# Patient Record
Sex: FEMALE | Race: WHITE | Employment: OTHER | URBAN - METROPOLITAN AREA
[De-identification: names, ages, dates, MRNs, and addresses within clinical notes are randomized per-mention and may not be internally consistent; named-entity substitution may affect disease eponyms.]

---

## 2024-07-12 ENCOUNTER — APPOINTMENT (OUTPATIENT)
Dept: RADIOLOGY | Facility: CLINIC | Age: 79
End: 2024-07-12
Payer: COMMERCIAL

## 2024-07-19 ENCOUNTER — HOSPITAL ENCOUNTER (OUTPATIENT)
Dept: RADIOLOGY | Facility: CLINIC | Age: 79
Discharge: HOME | End: 2024-07-19
Payer: COMMERCIAL

## 2024-07-19 VITALS — HEIGHT: 64 IN | BODY MASS INDEX: 26.46 KG/M2 | WEIGHT: 155 LBS

## 2024-07-19 DIAGNOSIS — Z12.31 ENCOUNTER FOR SCREENING MAMMOGRAM FOR MALIGNANT NEOPLASM OF BREAST: ICD-10-CM

## 2024-07-19 DIAGNOSIS — R05.3 CHRONIC COUGH: ICD-10-CM

## 2024-07-19 PROCEDURE — 71250 CT THORAX DX C-: CPT

## 2024-07-19 PROCEDURE — 77067 SCR MAMMO BI INCL CAD: CPT

## 2025-01-01 ENCOUNTER — APPOINTMENT (OUTPATIENT)
Dept: RADIOLOGY | Facility: HOSPITAL | Age: 80
End: 2025-01-01
Payer: MEDICARE

## 2025-01-01 ENCOUNTER — APPOINTMENT (OUTPATIENT)
Dept: CARDIOLOGY | Facility: HOSPITAL | Age: 80
End: 2025-01-01
Payer: MEDICARE

## 2025-01-01 ENCOUNTER — APPOINTMENT (OUTPATIENT)
Dept: GASTROENTEROLOGY | Facility: HOSPITAL | Age: 80
End: 2025-01-01
Payer: MEDICARE

## 2025-01-01 PROCEDURE — 71045 X-RAY EXAM CHEST 1 VIEW: CPT

## 2025-01-01 PROCEDURE — 93005 ELECTROCARDIOGRAM TRACING: CPT

## 2025-01-01 PROCEDURE — 74018 RADEX ABDOMEN 1 VIEW: CPT

## 2025-01-01 PROCEDURE — 71046 X-RAY EXAM CHEST 2 VIEWS: CPT

## 2025-01-01 PROCEDURE — 76770 US EXAM ABDO BACK WALL COMP: CPT

## 2025-01-01 PROCEDURE — 71275 CT ANGIOGRAPHY CHEST: CPT

## 2025-01-02 ENCOUNTER — HOSPITAL ENCOUNTER (INPATIENT)
Facility: HOSPITAL | Age: 80
End: 2025-01-02
Attending: STUDENT IN AN ORGANIZED HEALTH CARE EDUCATION/TRAINING PROGRAM | Admitting: INTERNAL MEDICINE
Payer: MEDICARE

## 2025-01-02 DIAGNOSIS — J18.9: Primary | ICD-10-CM

## 2025-01-02 DIAGNOSIS — J96.01 ACUTE RESPIRATORY FAILURE WITH HYPOXIA (MULTI): ICD-10-CM

## 2025-01-02 DIAGNOSIS — R09.02 HYPOXIA: ICD-10-CM

## 2025-01-02 DIAGNOSIS — J80 ARDS (ADULT RESPIRATORY DISTRESS SYNDROME) (MULTI): ICD-10-CM

## 2025-01-02 LAB
ALBUMIN SERPL BCP-MCNC: 3.3 G/DL (ref 3.4–5)
ALP SERPL-CCNC: 96 U/L (ref 33–136)
ALT SERPL W P-5'-P-CCNC: 27 U/L (ref 7–45)
ANION GAP SERPL CALCULATED.3IONS-SCNC: 14 MMOL/L (ref 10–20)
APPEARANCE UR: ABNORMAL
AST SERPL W P-5'-P-CCNC: 32 U/L (ref 9–39)
BACTERIA #/AREA URNS AUTO: ABNORMAL /HPF
BASOPHILS # BLD AUTO: 0.04 X10*3/UL (ref 0–0.1)
BASOPHILS NFR BLD AUTO: 0.3 %
BILIRUB SERPL-MCNC: 1.1 MG/DL (ref 0–1.2)
BILIRUB UR STRIP.AUTO-MCNC: NEGATIVE MG/DL
BUN SERPL-MCNC: 20 MG/DL (ref 6–23)
CALCIUM SERPL-MCNC: 8.8 MG/DL (ref 8.6–10.3)
CARDIAC TROPONIN I PNL SERPL HS: 13 NG/L (ref 0–13)
CARDIAC TROPONIN I PNL SERPL HS: 16 NG/L (ref 0–13)
CHLORIDE SERPL-SCNC: 99 MMOL/L (ref 98–107)
CO2 SERPL-SCNC: 24 MMOL/L (ref 21–32)
COLOR UR: YELLOW
CREAT SERPL-MCNC: 0.92 MG/DL (ref 0.5–1.05)
EGFRCR SERPLBLD CKD-EPI 2021: 63 ML/MIN/1.73M*2
EOSINOPHIL # BLD AUTO: 0.06 X10*3/UL (ref 0–0.4)
EOSINOPHIL NFR BLD AUTO: 0.4 %
ERYTHROCYTE [DISTWIDTH] IN BLOOD BY AUTOMATED COUNT: 11.8 % (ref 11.5–14.5)
FLUAV RNA RESP QL NAA+PROBE: NOT DETECTED
FLUBV RNA RESP QL NAA+PROBE: NOT DETECTED
GLUCOSE SERPL-MCNC: 121 MG/DL (ref 74–99)
GLUCOSE UR STRIP.AUTO-MCNC: NORMAL MG/DL
HCT VFR BLD AUTO: 32.2 % (ref 36–46)
HGB BLD-MCNC: 11 G/DL (ref 12–16)
IMM GRANULOCYTES # BLD AUTO: 0.06 X10*3/UL (ref 0–0.5)
IMM GRANULOCYTES NFR BLD AUTO: 0.4 % (ref 0–0.9)
KETONES UR STRIP.AUTO-MCNC: ABNORMAL MG/DL
LACTATE SERPL-SCNC: 1.8 MMOL/L (ref 0.4–2)
LACTATE SERPL-SCNC: 2.2 MMOL/L (ref 0.4–2)
LEUKOCYTE ESTERASE UR QL STRIP.AUTO: ABNORMAL
LYMPHOCYTES # BLD AUTO: 0.66 X10*3/UL (ref 0.8–3)
LYMPHOCYTES NFR BLD AUTO: 4.4 %
MCH RBC QN AUTO: 30.5 PG (ref 26–34)
MCHC RBC AUTO-ENTMCNC: 34.2 G/DL (ref 32–36)
MCV RBC AUTO: 89 FL (ref 80–100)
MONOCYTES # BLD AUTO: 0.91 X10*3/UL (ref 0.05–0.8)
MONOCYTES NFR BLD AUTO: 6.1 %
MUCOUS THREADS #/AREA URNS AUTO: ABNORMAL /LPF
NEUTROPHILS # BLD AUTO: 13.25 X10*3/UL (ref 1.6–5.5)
NEUTROPHILS NFR BLD AUTO: 88.4 %
NITRITE UR QL STRIP.AUTO: NEGATIVE
NRBC BLD-RTO: 0 /100 WBCS (ref 0–0)
PH UR STRIP.AUTO: 7 [PH]
PLATELET # BLD AUTO: 536 X10*3/UL (ref 150–450)
POTASSIUM SERPL-SCNC: 3.2 MMOL/L (ref 3.5–5.3)
PROT SERPL-MCNC: 7 G/DL (ref 6.4–8.2)
PROT UR STRIP.AUTO-MCNC: ABNORMAL MG/DL
RBC # BLD AUTO: 3.61 X10*6/UL (ref 4–5.2)
RBC # UR STRIP.AUTO: ABNORMAL /UL
RBC #/AREA URNS AUTO: >20 /HPF
RSV RNA RESP QL NAA+PROBE: NOT DETECTED
SARS-COV-2 RNA RESP QL NAA+PROBE: NOT DETECTED
SODIUM SERPL-SCNC: 134 MMOL/L (ref 136–145)
SP GR UR STRIP.AUTO: 1.02
UROBILINOGEN UR STRIP.AUTO-MCNC: ABNORMAL MG/DL
WBC # BLD AUTO: 15 X10*3/UL (ref 4.4–11.3)
WBC #/AREA URNS AUTO: ABNORMAL /HPF
YEAST BUDDING #/AREA UR COMP ASSIST: PRESENT /HPF

## 2025-01-02 PROCEDURE — 86738 MYCOPLASMA ANTIBODY: CPT | Performed by: INTERNAL MEDICINE

## 2025-01-02 PROCEDURE — 81001 URINALYSIS AUTO W/SCOPE: CPT | Performed by: STUDENT IN AN ORGANIZED HEALTH CARE EDUCATION/TRAINING PROGRAM

## 2025-01-02 PROCEDURE — 87075 CULTR BACTERIA EXCEPT BLOOD: CPT | Mod: WESLAB | Performed by: STUDENT IN AN ORGANIZED HEALTH CARE EDUCATION/TRAINING PROGRAM

## 2025-01-02 PROCEDURE — 84484 ASSAY OF TROPONIN QUANT: CPT | Performed by: STUDENT IN AN ORGANIZED HEALTH CARE EDUCATION/TRAINING PROGRAM

## 2025-01-02 PROCEDURE — 83605 ASSAY OF LACTIC ACID: CPT | Performed by: STUDENT IN AN ORGANIZED HEALTH CARE EDUCATION/TRAINING PROGRAM

## 2025-01-02 PROCEDURE — 87637 SARSCOV2&INF A&B&RSV AMP PRB: CPT | Performed by: STUDENT IN AN ORGANIZED HEALTH CARE EDUCATION/TRAINING PROGRAM

## 2025-01-02 PROCEDURE — 96365 THER/PROPH/DIAG IV INF INIT: CPT

## 2025-01-02 PROCEDURE — 71046 X-RAY EXAM CHEST 2 VIEWS: CPT | Performed by: RADIOLOGY

## 2025-01-02 PROCEDURE — 2500000001 HC RX 250 WO HCPCS SELF ADMINISTERED DRUGS (ALT 637 FOR MEDICARE OP): Performed by: INTERNAL MEDICINE

## 2025-01-02 PROCEDURE — 99223 1ST HOSP IP/OBS HIGH 75: CPT | Performed by: INTERNAL MEDICINE

## 2025-01-02 PROCEDURE — 93010 ELECTROCARDIOGRAM REPORT: CPT | Performed by: INTERNAL MEDICINE

## 2025-01-02 PROCEDURE — 99285 EMERGENCY DEPT VISIT HI MDM: CPT | Mod: 25 | Performed by: STUDENT IN AN ORGANIZED HEALTH CARE EDUCATION/TRAINING PROGRAM

## 2025-01-02 PROCEDURE — 87081 CULTURE SCREEN ONLY: CPT | Mod: WESLAB | Performed by: INTERNAL MEDICINE

## 2025-01-02 PROCEDURE — 2500000002 HC RX 250 W HCPCS SELF ADMINISTERED DRUGS (ALT 637 FOR MEDICARE OP, ALT 636 FOR OP/ED): Performed by: INTERNAL MEDICINE

## 2025-01-02 PROCEDURE — 85025 COMPLETE CBC W/AUTO DIFF WBC: CPT | Performed by: STUDENT IN AN ORGANIZED HEALTH CARE EDUCATION/TRAINING PROGRAM

## 2025-01-02 PROCEDURE — 36415 COLL VENOUS BLD VENIPUNCTURE: CPT | Performed by: STUDENT IN AN ORGANIZED HEALTH CARE EDUCATION/TRAINING PROGRAM

## 2025-01-02 PROCEDURE — 96361 HYDRATE IV INFUSION ADD-ON: CPT

## 2025-01-02 PROCEDURE — 2500000001 HC RX 250 WO HCPCS SELF ADMINISTERED DRUGS (ALT 637 FOR MEDICARE OP): Performed by: STUDENT IN AN ORGANIZED HEALTH CARE EDUCATION/TRAINING PROGRAM

## 2025-01-02 PROCEDURE — 87086 URINE CULTURE/COLONY COUNT: CPT | Mod: WESLAB | Performed by: STUDENT IN AN ORGANIZED HEALTH CARE EDUCATION/TRAINING PROGRAM

## 2025-01-02 PROCEDURE — 94640 AIRWAY INHALATION TREATMENT: CPT

## 2025-01-02 PROCEDURE — 2500000004 HC RX 250 GENERAL PHARMACY W/ HCPCS (ALT 636 FOR OP/ED): Performed by: STUDENT IN AN ORGANIZED HEALTH CARE EDUCATION/TRAINING PROGRAM

## 2025-01-02 PROCEDURE — 1200000002 HC GENERAL ROOM WITH TELEMETRY DAILY

## 2025-01-02 PROCEDURE — 96367 TX/PROPH/DG ADDL SEQ IV INF: CPT

## 2025-01-02 PROCEDURE — 80053 COMPREHEN METABOLIC PANEL: CPT | Performed by: STUDENT IN AN ORGANIZED HEALTH CARE EDUCATION/TRAINING PROGRAM

## 2025-01-02 RX ORDER — FLUOXETINE HYDROCHLORIDE 40 MG/1
40 CAPSULE ORAL DAILY
Status: DISCONTINUED | OUTPATIENT
Start: 2025-01-02 | End: 2025-01-14

## 2025-01-02 RX ORDER — ENOXAPARIN SODIUM 100 MG/ML
40 INJECTION SUBCUTANEOUS EVERY 24 HOURS
Status: DISCONTINUED | OUTPATIENT
Start: 2025-01-02 | End: 2025-01-06

## 2025-01-02 RX ORDER — SIMVASTATIN 20 MG/1
20 TABLET, FILM COATED ORAL NIGHTLY
Status: DISCONTINUED | OUTPATIENT
Start: 2025-01-02 | End: 2025-01-14

## 2025-01-02 RX ORDER — CEFTRIAXONE 1 G/50ML
1 INJECTION, SOLUTION INTRAVENOUS EVERY 24 HOURS
Status: DISCONTINUED | OUTPATIENT
Start: 2025-01-03 | End: 2025-01-05

## 2025-01-02 RX ORDER — POTASSIUM CHLORIDE 20 MEQ/1
40 TABLET, EXTENDED RELEASE ORAL ONCE
Status: COMPLETED | OUTPATIENT
Start: 2025-01-02 | End: 2025-01-02

## 2025-01-02 RX ORDER — CEFTRIAXONE 2 G/50ML
2 INJECTION, SOLUTION INTRAVENOUS ONCE
Status: COMPLETED | OUTPATIENT
Start: 2025-01-02 | End: 2025-01-02

## 2025-01-02 RX ORDER — SIMVASTATIN 20 MG/1
1 TABLET, FILM COATED ORAL NIGHTLY
COMMUNITY
Start: 2024-12-09

## 2025-01-02 RX ORDER — FLUOXETINE HYDROCHLORIDE 40 MG/1
40 CAPSULE ORAL DAILY
COMMUNITY

## 2025-01-02 RX ORDER — POLYETHYLENE GLYCOL 3350 17 G/17G
17 POWDER, FOR SOLUTION ORAL DAILY PRN
Status: DISCONTINUED | OUTPATIENT
Start: 2025-01-02 | End: 2025-01-14

## 2025-01-02 RX ORDER — LEVOTHYROXINE SODIUM 50 UG/1
50 TABLET ORAL DAILY
Status: DISCONTINUED | OUTPATIENT
Start: 2025-01-03 | End: 2025-01-07

## 2025-01-02 RX ORDER — ACETAMINOPHEN 325 MG/1
650 TABLET ORAL ONCE
Status: COMPLETED | OUTPATIENT
Start: 2025-01-02 | End: 2025-01-02

## 2025-01-02 RX ORDER — IPRATROPIUM BROMIDE AND ALBUTEROL SULFATE 2.5; .5 MG/3ML; MG/3ML
3 SOLUTION RESPIRATORY (INHALATION) EVERY 6 HOURS PRN
Status: DISCONTINUED | OUTPATIENT
Start: 2025-01-02 | End: 2025-01-04

## 2025-01-02 RX ORDER — GABAPENTIN 400 MG/1
400 CAPSULE ORAL 2 TIMES DAILY
Status: DISCONTINUED | OUTPATIENT
Start: 2025-01-02 | End: 2025-01-05

## 2025-01-02 RX ORDER — LEVOTHYROXINE SODIUM 50 UG/1
50 TABLET ORAL DAILY
COMMUNITY

## 2025-01-02 RX ORDER — ACETAMINOPHEN 325 MG/1
650 TABLET ORAL EVERY 6 HOURS PRN
Status: DISCONTINUED | OUTPATIENT
Start: 2025-01-02 | End: 2025-01-14

## 2025-01-02 RX ORDER — GABAPENTIN 400 MG/1
400 CAPSULE ORAL 2 TIMES DAILY
COMMUNITY

## 2025-01-02 RX ORDER — ALBUTEROL SULFATE 90 UG/1
2 INHALANT RESPIRATORY (INHALATION) EVERY 6 HOURS PRN
Status: DISCONTINUED | OUTPATIENT
Start: 2025-01-02 | End: 2025-01-02 | Stop reason: SDUPTHER

## 2025-01-02 RX ADMIN — IPRATROPIUM BROMIDE AND ALBUTEROL SULFATE 3 ML: 2.5; .5 SOLUTION RESPIRATORY (INHALATION) at 23:21

## 2025-01-02 RX ADMIN — ACETAMINOPHEN 650 MG: 325 TABLET ORAL at 14:37

## 2025-01-02 RX ADMIN — CEFTRIAXONE SODIUM 2 G: 2 INJECTION, SOLUTION INTRAVENOUS at 15:51

## 2025-01-02 RX ADMIN — FLUOXETINE HYDROCHLORIDE 40 MG: 40 CAPSULE ORAL at 21:11

## 2025-01-02 RX ADMIN — DEXTROSE MONOHYDRATE 500 MG: 50 INJECTION, SOLUTION INTRAVENOUS at 16:12

## 2025-01-02 RX ADMIN — ACETAMINOPHEN 650 MG: 325 TABLET ORAL at 23:11

## 2025-01-02 RX ADMIN — POTASSIUM CHLORIDE 40 MEQ: 1500 TABLET, EXTENDED RELEASE ORAL at 23:11

## 2025-01-02 RX ADMIN — GABAPENTIN 400 MG: 400 CAPSULE ORAL at 21:11

## 2025-01-02 RX ADMIN — SODIUM CHLORIDE 1000 ML: 900 INJECTION, SOLUTION INTRAVENOUS at 14:36

## 2025-01-02 RX ADMIN — SIMVASTATIN 20 MG: 20 TABLET, FILM COATED ORAL at 21:11

## 2025-01-02 SDOH — HEALTH STABILITY: PHYSICAL HEALTH
HOW OFTEN DO YOU NEED TO HAVE SOMEONE HELP YOU WHEN YOU READ INSTRUCTIONS, PAMPHLETS, OR OTHER WRITTEN MATERIAL FROM YOUR DOCTOR OR PHARMACY?: RARELY

## 2025-01-02 SDOH — ECONOMIC STABILITY: HOUSING INSECURITY: AT ANY TIME IN THE PAST 12 MONTHS, WERE YOU HOMELESS OR LIVING IN A SHELTER (INCLUDING NOW)?: NO

## 2025-01-02 SDOH — ECONOMIC STABILITY: FOOD INSECURITY: WITHIN THE PAST 12 MONTHS, THE FOOD YOU BOUGHT JUST DIDN'T LAST AND YOU DIDN'T HAVE MONEY TO GET MORE.: NEVER TRUE

## 2025-01-02 SDOH — ECONOMIC STABILITY: HOUSING INSECURITY: IN THE LAST 12 MONTHS, WAS THERE A TIME WHEN YOU WERE NOT ABLE TO PAY THE MORTGAGE OR RENT ON TIME?: NO

## 2025-01-02 SDOH — ECONOMIC STABILITY: FOOD INSECURITY: WITHIN THE PAST 12 MONTHS, YOU WORRIED THAT YOUR FOOD WOULD RUN OUT BEFORE YOU GOT THE MONEY TO BUY MORE.: NEVER TRUE

## 2025-01-02 SDOH — ECONOMIC STABILITY: FOOD INSECURITY: HOW HARD IS IT FOR YOU TO PAY FOR THE VERY BASICS LIKE FOOD, HOUSING, MEDICAL CARE, AND HEATING?: NOT HARD AT ALL

## 2025-01-02 SDOH — SOCIAL STABILITY: SOCIAL NETWORK: HOW OFTEN DO YOU ATTEND MEETINGS OF THE CLUBS OR ORGANIZATIONS YOU BELONG TO?: NEVER

## 2025-01-02 SDOH — SOCIAL STABILITY: SOCIAL INSECURITY: WITHIN THE LAST YEAR, HAVE YOU BEEN AFRAID OF YOUR PARTNER OR EX-PARTNER?: NO

## 2025-01-02 SDOH — SOCIAL STABILITY: SOCIAL NETWORK
DO YOU BELONG TO ANY CLUBS OR ORGANIZATIONS SUCH AS CHURCH GROUPS, UNIONS, FRATERNAL OR ATHLETIC GROUPS, OR SCHOOL GROUPS?: NO

## 2025-01-02 SDOH — HEALTH STABILITY: MENTAL HEALTH
DO YOU FEEL STRESS - TENSE, RESTLESS, NERVOUS, OR ANXIOUS, OR UNABLE TO SLEEP AT NIGHT BECAUSE YOUR MIND IS TROUBLED ALL THE TIME - THESE DAYS?: NOT AT ALL

## 2025-01-02 SDOH — SOCIAL STABILITY: SOCIAL INSECURITY
WITHIN THE LAST YEAR, HAVE YOU BEEN KICKED, HIT, SLAPPED, OR OTHERWISE PHYSICALLY HURT BY YOUR PARTNER OR EX-PARTNER?: NO

## 2025-01-02 SDOH — SOCIAL STABILITY: SOCIAL INSECURITY: WERE YOU ABLE TO COMPLETE ALL THE BEHAVIORAL HEALTH SCREENINGS?: YES

## 2025-01-02 SDOH — HEALTH STABILITY: PHYSICAL HEALTH: ON AVERAGE, HOW MANY MINUTES DO YOU ENGAGE IN EXERCISE AT THIS LEVEL?: 0 MIN

## 2025-01-02 SDOH — HEALTH STABILITY: MENTAL HEALTH: EXPERIENCED ANY OF THE FOLLOWING LIFE EVENTS: OTHER (COMMENT)

## 2025-01-02 SDOH — ECONOMIC STABILITY: INCOME INSECURITY: IN THE PAST 12 MONTHS HAS THE ELECTRIC, GAS, OIL, OR WATER COMPANY THREATENED TO SHUT OFF SERVICES IN YOUR HOME?: NO

## 2025-01-02 SDOH — SOCIAL STABILITY: SOCIAL INSECURITY: ARE YOU OR HAVE YOU BEEN THREATENED OR ABUSED PHYSICALLY, EMOTIONALLY, OR SEXUALLY BY ANYONE?: NO

## 2025-01-02 SDOH — SOCIAL STABILITY: SOCIAL INSECURITY: ABUSE: ADULT

## 2025-01-02 SDOH — SOCIAL STABILITY: SOCIAL INSECURITY: ARE YOU MARRIED, WIDOWED, DIVORCED, SEPARATED, NEVER MARRIED, OR LIVING WITH A PARTNER?: WIDOWED

## 2025-01-02 SDOH — ECONOMIC STABILITY: TRANSPORTATION INSECURITY: IN THE PAST 12 MONTHS, HAS LACK OF TRANSPORTATION KEPT YOU FROM MEDICAL APPOINTMENTS OR FROM GETTING MEDICATIONS?: NO

## 2025-01-02 SDOH — SOCIAL STABILITY: SOCIAL NETWORK
IN A TYPICAL WEEK, HOW MANY TIMES DO YOU TALK ON THE PHONE WITH FAMILY, FRIENDS, OR NEIGHBORS?: MORE THAN THREE TIMES A WEEK

## 2025-01-02 SDOH — SOCIAL STABILITY: SOCIAL INSECURITY: POSSIBLE ABUSE REPORTED TO:: OTHER (COMMENT)

## 2025-01-02 SDOH — SOCIAL STABILITY: SOCIAL INSECURITY: HAS ANYONE EVER THREATENED TO HURT YOUR FAMILY OR YOUR PETS?: NO

## 2025-01-02 SDOH — HEALTH STABILITY: PHYSICAL HEALTH: ON AVERAGE, HOW MANY DAYS PER WEEK DO YOU ENGAGE IN MODERATE TO STRENUOUS EXERCISE (LIKE A BRISK WALK)?: 0 DAYS

## 2025-01-02 SDOH — SOCIAL STABILITY: SOCIAL INSECURITY: HAVE YOU HAD ANY THOUGHTS OF HARMING ANYONE ELSE?: NO

## 2025-01-02 SDOH — SOCIAL STABILITY: SOCIAL INSECURITY: DO YOU FEEL ANYONE HAS EXPLOITED OR TAKEN ADVANTAGE OF YOU FINANCIALLY OR OF YOUR PERSONAL PROPERTY?: NO

## 2025-01-02 SDOH — SOCIAL STABILITY: SOCIAL INSECURITY: DO YOU FEEL UNSAFE GOING BACK TO THE PLACE WHERE YOU ARE LIVING?: NO

## 2025-01-02 SDOH — SOCIAL STABILITY: SOCIAL INSECURITY
WITHIN THE LAST YEAR, HAVE YOU BEEN RAPED OR FORCED TO HAVE ANY KIND OF SEXUAL ACTIVITY BY YOUR PARTNER OR EX-PARTNER?: NO

## 2025-01-02 SDOH — SOCIAL STABILITY: SOCIAL INSECURITY: WITHIN THE LAST YEAR, HAVE YOU BEEN HUMILIATED OR EMOTIONALLY ABUSED IN OTHER WAYS BY YOUR PARTNER OR EX-PARTNER?: NO

## 2025-01-02 SDOH — SOCIAL STABILITY: SOCIAL INSECURITY: HAVE YOU HAD THOUGHTS OF HARMING ANYONE ELSE?: NO

## 2025-01-02 SDOH — SOCIAL STABILITY: SOCIAL INSECURITY: DOES ANYONE TRY TO KEEP YOU FROM HAVING/CONTACTING OTHER FRIENDS OR DOING THINGS OUTSIDE YOUR HOME?: NO

## 2025-01-02 SDOH — SOCIAL STABILITY: SOCIAL INSECURITY: ARE THERE ANY APPARENT SIGNS OF INJURIES/BEHAVIORS THAT COULD BE RELATED TO ABUSE/NEGLECT?: NO

## 2025-01-02 SDOH — SOCIAL STABILITY: SOCIAL NETWORK: HOW OFTEN DO YOU GET TOGETHER WITH FRIENDS OR RELATIVES?: THREE TIMES A WEEK

## 2025-01-02 SDOH — ECONOMIC STABILITY: HOUSING INSECURITY: IN THE PAST 12 MONTHS, HOW MANY TIMES HAVE YOU MOVED WHERE YOU WERE LIVING?: 1

## 2025-01-02 ASSESSMENT — COGNITIVE AND FUNCTIONAL STATUS - GENERAL
PATIENT BASELINE BEDBOUND: NO
DAILY ACTIVITIY SCORE: 24
CLIMB 3 TO 5 STEPS WITH RAILING: A LITTLE
MOBILITY SCORE: 23

## 2025-01-02 ASSESSMENT — ACTIVITIES OF DAILY LIVING (ADL)
JUDGMENT_ADEQUATE_SAFELY_COMPLETE_DAILY_ACTIVITIES: YES
ADEQUATE_TO_COMPLETE_ADL: YES
GROOMING: INDEPENDENT
HEARING - RIGHT EAR: FUNCTIONAL
LACK_OF_TRANSPORTATION: NO
LACK_OF_TRANSPORTATION: NO
PATIENT'S MEMORY ADEQUATE TO SAFELY COMPLETE DAILY ACTIVITIES?: YES
WALKS IN HOME: INDEPENDENT
TOILETING: INDEPENDENT
BATHING: INDEPENDENT
HEARING - LEFT EAR: FUNCTIONAL
FEEDING YOURSELF: INDEPENDENT
DRESSING YOURSELF: INDEPENDENT

## 2025-01-02 ASSESSMENT — LIFESTYLE VARIABLES
AUDIT-C TOTAL SCORE: 2
SKIP TO QUESTIONS 9-10: 0
HOW OFTEN DO YOU HAVE 6 OR MORE DRINKS ON ONE OCCASION: LESS THAN MONTHLY
PRESCIPTION_ABUSE_PAST_12_MONTHS: NO
HOW OFTEN DO YOU HAVE A DRINK CONTAINING ALCOHOL: MONTHLY OR LESS
SUBSTANCE_ABUSE_PAST_12_MONTHS: NO
AUDIT-C TOTAL SCORE: 2
HOW MANY STANDARD DRINKS CONTAINING ALCOHOL DO YOU HAVE ON A TYPICAL DAY: 1 OR 2

## 2025-01-02 ASSESSMENT — PAIN DESCRIPTION - LOCATION
LOCATION: HEAD
LOCATION: CHEST

## 2025-01-02 ASSESSMENT — ENCOUNTER SYMPTOMS
HEMATOLOGIC/LYMPHATIC NEGATIVE: 1
VOMITING: 1
PSYCHIATRIC NEGATIVE: 1
CHILLS: 1
NAUSEA: 1
MUSCULOSKELETAL NEGATIVE: 1
FEVER: 1
CARDIOVASCULAR NEGATIVE: 1
ALLERGIC/IMMUNOLOGIC NEGATIVE: 1
ENDOCRINE NEGATIVE: 1
NEUROLOGICAL NEGATIVE: 1
EYES NEGATIVE: 1
SHORTNESS OF BREATH: 1
COUGH: 1

## 2025-01-02 ASSESSMENT — PAIN SCALES - WONG BAKER: WONGBAKER_NUMERICALRESPONSE: HURTS LITTLE BIT

## 2025-01-02 ASSESSMENT — PATIENT HEALTH QUESTIONNAIRE - PHQ9
2. FEELING DOWN, DEPRESSED OR HOPELESS: NOT AT ALL
SUM OF ALL RESPONSES TO PHQ9 QUESTIONS 1 & 2: 0
1. LITTLE INTEREST OR PLEASURE IN DOING THINGS: NOT AT ALL

## 2025-01-02 ASSESSMENT — PAIN SCALES - GENERAL
PAINLEVEL_OUTOF10: 5 - MODERATE PAIN
PAINLEVEL_OUTOF10: 4
PAINLEVEL_OUTOF10: 0 - NO PAIN

## 2025-01-02 ASSESSMENT — PAIN DESCRIPTION - DESCRIPTORS: DESCRIPTORS: ACHING;HEADACHE

## 2025-01-02 ASSESSMENT — PAIN - FUNCTIONAL ASSESSMENT
PAIN_FUNCTIONAL_ASSESSMENT: 0-10
PAIN_FUNCTIONAL_ASSESSMENT: 0-10

## 2025-01-02 ASSESSMENT — PAIN DESCRIPTION - PAIN TYPE: TYPE: ACUTE PAIN

## 2025-01-02 NOTE — ED PROVIDER NOTES
HPI   Chief Complaint   Patient presents with    Shortness of Breath       This is a 79-year-old female with a past medical history of arthritis, hyperlipidemia, hypothyroidism presenting to the ED for evaluation of upper respiratory symptoms for about 9 to 10 days.  Symptoms started on December 24, and have waxed and waned. She admits productive cough, fevers, shortness of breath, and has some chest pain when she coughs.  No chest pain at rest or with ambulation.  She denies any swelling in the legs, any abdominal pain, vomiting or diarrhea.  She has been taking cough medicine at home with minimal relief. She says that she would not of come to the ER but she has been able to get into see her primary doctor because the holidays so she came here due to persistent symptoms.      History provided by:  Patient   used: No            Patient History   No past medical history on file.  Past Surgical History:   Procedure Laterality Date    BREAST BIOPSY Right     core biopsy 20 years ago    HYSTERECTOMY      MR HEAD ANGIO WO IV CONTRAST  07/14/2018    MR HEAD ANGIO WO IV CONTRAST [facility]     No family history on file.  Social History     Tobacco Use    Smoking status: Not on file    Smokeless tobacco: Not on file   Substance Use Topics    Alcohol use: Not on file    Drug use: Not on file       Physical Exam   ED Triage Vitals [01/02/25 1418]   Temperature Heart Rate Respirations BP   (!) 38.5 °C (101.3 °F) 93 17 153/70      Pulse Ox Temp Source Heart Rate Source Patient Position   97 % Oral Monitor Sitting      BP Location FiO2 (%)     Left arm --       Physical Exam  General: well developed, well nourished 79-year-old female who is awake and alert, in no apparent distress  Eyes: sclera clear bilaterally, PERRL, EOMI  HENT: normocephalic, atraumatic. Pharynx without erythema or exudates, uvula midline.  CV: regular rate and rhythm, no murmur, no gallops, or rubs. radial and dorsalis pedis  pulses +2/4 bilaterally  Resp: clear to ascultation bilaterally, no wheezes, rales, or rhonchi  GI: abdomen soft, nontender without rigidity or guarding, no peritoneal signs, abdomen is nondistended, no masses palpated  MSK: no swelling of the extremities.  Psych: appropriate mood and affect, cooperative with exam  Skin: warm, dry, without evidence of rash or abrasions    ED Course & MDM   ED Course as of 01/04/25 1523   Thu Jan 02, 2025   1442 EKG my interpretation shows normal sinus rhythm, rate of 94 bpm.  Normal axis.  QTc 582 ms, AL interval 130.  No ST elevation or depression, no acute ischemic pattern.  No STEMI. [NT]      ED Course User Index  [NT] Denver Phipps DO         Diagnoses as of 01/04/25 1523   Community acquired pneumonia of both upper lobes                 No data recorded     Jasmyn Coma Scale Score: 15 (01/02/25 1419 : Rocio Bailey RN)                           Medical Decision Making  The patient is in no acute distress in the emergency department, she is febrile, heart rate 93.  Mildly hypertensive.  She is not tachypneic, not hypoxic.  Pulse ox 97%.  Given her elevated heart rate and temperature, concern for possible infection whether viral or bacterial did order an infectious workup, sepsis evaluation with lactate and blood cultures.  She was given 1 L of IV fluids, I did order Tylenol for treatment of her fever.  No indication for immediate 30 cc/KG bolus, there is no evidence that the patient is in septic shock.  Cardiac enzymes ordered given her report of chest pain as well.  Patient's cardiopulmonary exam is unremarkable, she is not wheezing, there are no rhonchi present, no murmur.  No peripheral edema, exam not suggestive of CHF.  She has no history of heart failure or CAD that she is aware of.  Presentation more suspicious for respiratory infection or pneumonia.    She was found to have extensive patchy bilateral infiltrates in the upper lobes suspicious for pneumonia.   She is febrile with a leukocytosis, rate above 90.  Lactate 2.2 on arrival. Treated with rocephin and azithromycin. On reassessment the patient does feel better after treatment of her fever, however she has reportedly been on Augmentin with no improvement of her symptoms.  She also has an elevated curb 65 score estimating increased risk for mortality from pneumonia.  She was admitted to the hospital for further medical management.    XR chest 2 views   Final Result   Extensive patchy bilateral infiltrates most marked within the upper   lobes bilaterally. Findings are suspicious for pneumonia. Follow-up   to assure complete clearing is suggested.        MACRO:   none        Signed by: Jaxon Morales 1/2/2025 3:43 PM   Dictation workstation:   [id number]      FL modified barium swallow study    (Results Pending)     Labs Reviewed   CBC WITH AUTO DIFFERENTIAL - Abnormal       Result Value    WBC 15.0 (*)     nRBC 0.0      RBC 3.61 (*)     Hemoglobin 11.0 (*)     Hematocrit 32.2 (*)     MCV 89      MCH 30.5      MCHC 34.2      RDW 11.8      Platelets 536 (*)     Neutrophils % 88.4      Immature Granulocytes %, Automated 0.4      Lymphocytes % 4.4      Monocytes % 6.1      Eosinophils % 0.4      Basophils % 0.3      Neutrophils Absolute 13.25 (*)     Immature Granulocytes Absolute, Automated 0.06      Lymphocytes Absolute 0.66 (*)     Monocytes Absolute 0.91 (*)     Eosinophils Absolute 0.06      Basophils Absolute 0.04     COMPREHENSIVE METABOLIC PANEL - Abnormal    Glucose 121 (*)     Sodium 134 (*)     Potassium 3.2 (*)     Chloride 99      Bicarbonate 24      Anion Gap 14      Urea Nitrogen 20      Creatinine 0.92      eGFR 63      Calcium 8.8      Albumin 3.3 (*)     Alkaline Phosphatase 96      Total Protein 7.0      AST 32      Bilirubin, Total 1.1      ALT 27     LACTATE - Abnormal    Lactate 2.2 (*)     Narrative:     Venipuncture immediately after or during the administration of Metamizole may lead to falsely  low results. Testing should be performed immediately prior to Metamizole dosing.   URINALYSIS WITH REFLEX CULTURE AND MICROSCOPIC - Abnormal    Color, Urine Yellow      Appearance, Urine Turbid (*)     Specific Gravity, Urine 1.017      pH, Urine 7.0      Protein, Urine 100 (2+) (*)     Glucose, Urine Normal      Blood, Urine OVER (3+) (*)     Ketones, Urine 10 (1+) (*)     Bilirubin, Urine NEGATIVE      Urobilinogen, Urine 6 (2+) (*)     Nitrite, Urine NEGATIVE      Leukocyte Esterase, Urine 75 Brissa/µL (*)     Narrative:     OVER is reported when the result is greater than the clinically reportable range.   SERIAL TROPONIN, 1 HOUR - Abnormal    Troponin I, High Sensitivity 16 (*)     Narrative:     Less than 99th percentile of normal range cutoff-  Female and children under 18 years old <14 ng/L; Male <21 ng/L: Negative  Repeat testing should be performed if clinically indicated.     Female and children under 18 years old 14-50 ng/L; Male 21-50 ng/L:  Consistent with possible cardiac damage and possible increased clinical   risk. Serial measurements may help to assess extent of myocardial damage.     >50 ng/L: Consistent with cardiac damage, increased clinical risk and  myocardial infarction. Serial measurements may help assess extent of   myocardial damage.      NOTE: Children less than 1 year old may have higher baseline troponin   levels and results should be interpreted in conjunction with the overall   clinical context.     NOTE: Troponin I testing is performed using a different   testing methodology at [facility] than at other   [facility]. Direct result comparisons should only   be made within the same method.   MICROSCOPIC ONLY, URINE - Abnormal    WBC, Urine 21-50 (*)     RBC, Urine >20 (*)     Bacteria, Urine 1+ (*)     Budding Yeast, Urine PRESENT (*)     Mucus, Urine FEW     CBC - Abnormal    WBC 15.4 (*)     nRBC 0.0      RBC 3.32 (*)     Hemoglobin 10.0 (*)     Hematocrit 29.7 (*)      MCV 90      MCH 30.1      MCHC 33.7      RDW 11.9      Platelets 491 (*)    RENAL FUNCTION PANEL - Abnormal    Glucose 99      Sodium 136      Potassium 3.2 (*)     Chloride 103      Bicarbonate 25      Anion Gap 11      Urea Nitrogen 17      Creatinine 0.85      eGFR 70      Calcium 8.1 (*)     Phosphorus 2.6      Albumin 3.0 (*)    CBC - Abnormal    WBC 17.6 (*)     nRBC 0.0      RBC 3.26 (*)     Hemoglobin 9.8 (*)     Hematocrit 28.2 (*)     MCV 87      MCH 30.1      MCHC 34.8      RDW 11.9      Platelets 518 (*)    RENAL FUNCTION PANEL - Abnormal    Glucose 91      Sodium 134 (*)     Potassium 3.7      Chloride 103      Bicarbonate 23      Anion Gap 12      Urea Nitrogen 18      Creatinine 1.06 (*)     eGFR 54 (*)     Calcium 8.1 (*)     Phosphorus 3.0      Albumin 3.0 (*)    B-TYPE NATRIURETIC PEPTIDE - Abnormal     (*)     Narrative:        <100 pg/mL - Heart failure unlikely  100-299 pg/mL - Intermediate probability of acute heart                  failure exacerbation. Correlate with clinical                  context and patient history.    >=300 pg/mL - Heart Failure likely. Correlate with clinical                  context and patient history.    BNP testing is performed using different testing methodology at [facility] than at other [facility]. Direct result comparisons should only be made within the same method.      BLOOD CULTURE - Normal    Blood Culture No growth at 1 day     BLOOD CULTURE - Normal    Blood Culture No growth at 1 day     URINE CULTURE - Normal    Urine Culture No growth     LEGIONELLA ANTIGEN, URINE - Normal    L. pneumophila Urine Ag Negative     STREPTOCOCCUS PNEUMONIAE ANTIGEN, URINE - Normal    Streptococcus pneumoniae Ag, Urine Negative     STAPHYLOCOCCUS AUREUS/MRSA COLONIZATION, CULTURE - Normal    Staph/MRSA Screen Culture No Staphylococcus aureus isolated     SARS-COV-2 AND INFLUENZA A/B PCR - Normal    Flu A Result Not Detected      Flu B Result  Not Detected      Coronavirus 2019, PCR Not Detected      Narrative:     This assay has received FDA Emergency Use Authorization (EUA) and  is only authorized for the duration of time that circumstances exist to justify the authorization of the emergency use of in vitro diagnostic tests for the detection of SARS-CoV-2 virus and/or diagnosis of COVID-19 infection under section 564(b)(1) of the Act, 21 U.S.C. 360bbb-3(b)(1). Testing for SARS-CoV-2 is only recommended for patients who meet current clinical and/or epidemiological criteria as defined by federal, state, or local public health directives. This assay is an in vitro diagnostic nucleic acid amplification test for the qualitative detection of SARS-CoV-2, Influenza A, and Influenza B from nasopharyngeal specimens and has been validated for use at [facility]. Negative results do not preclude COVID-19 infections or Influenza A/B infections, and should not be used as the sole basis for diagnosis, treatment, or other management decisions. If Influenza A/B and RSV PCR results are negative, testing for Parainfluenza virus, Adenovirus and Metapneumovirus is routinely performed for [facility] pediatric oncology and intensive care inpatients, and is available on other patients by placing an add-on request.    RSV PCR - Normal    RSV PCR Not Detected      Narrative:     This assay is an FDA-cleared, in vitro diagnostic nucleic acid amplification test for the detection of RSV from nasopharyngeal specimens, and has been validated for use at [facility]. Negative results do not preclude RSV infections, and should not be used as the sole basis for diagnosis, treatment, or other management decisions. If Influenza A/B and RSV PCR results are negative, testing for Parainfluenza virus, Adenovirus and Metapneumovirus is routinely performed for pediatric oncology and intensive care inpatients at [facility], and is available on other patients by  placing an add-on request.       SERIAL TROPONIN-INITIAL - Normal    Troponin I, High Sensitivity 13      Narrative:     Less than 99th percentile of normal range cutoff-  Female and children under 18 years old <14 ng/L; Male <21 ng/L: Negative  Repeat testing should be performed if clinically indicated.     Female and children under 18 years old 14-50 ng/L; Male 21-50 ng/L:  Consistent with possible cardiac damage and possible increased clinical   risk. Serial measurements may help to assess extent of myocardial damage.     >50 ng/L: Consistent with cardiac damage, increased clinical risk and  myocardial infarction. Serial measurements may help assess extent of   myocardial damage.      NOTE: Children less than 1 year old may have higher baseline troponin   levels and results should be interpreted in conjunction with the overall   clinical context.     NOTE: Troponin I testing is performed using a different   testing methodology at [facility] than at other   [facility]. Direct result comparisons should only   be made within the same method.   LACTATE - Normal    Lactate 1.8      Narrative:     Venipuncture immediately after or during the administration of Metamizole may lead to falsely low results. Testing should be performed immediately prior to Metamizole dosing.   MAGNESIUM - Normal    Magnesium 1.67     MAGNESIUM - Normal    Magnesium 1.67     RESPIRATORY CULTURE/SMEAR   URINALYSIS WITH REFLEX CULTURE AND MICROSCOPIC    Narrative:     The following orders were created for panel order Urinalysis with Reflex Culture and Microscopic.  Procedure                               Abnormality         Status                     ---------                               -----------         ------                     Urinalysis with Reflex C...[[id number]]  Abnormal            Final result               Extra Urine Gray Tube[[id number]]                                                         Please view  results for these tests on the individual orders.   TROPONIN SERIES- (INITIAL, 1 HR)    Narrative:     The following orders were created for panel order Troponin Series, (0, 1 HR).  Procedure                               Abnormality         Status                     ---------                               -----------         ------                     Troponin I, High Sensiti...[[id number]]  Normal              Final result               Troponin, High Sensitivi...[[id number]]  Abnormal            Final result                 Please view results for these tests on the individual orders.   MYCOPLASMA PNEUMONIAE ANTIBODY, IGM   VITAMIN D 25-HYDROXY,TOTAL         Procedure  Procedures     Denver Phipps DO  01/04/25 1526

## 2025-01-02 NOTE — PROGRESS NOTES
Sepsis Alert @ 1426    -Patient presents to ED with possible pneumonia  -T 38.5, HR 93  -WBC 15, Lactate 2.2, repeat 1.8    -IV NS 1,000 mls given @ 1436  -IV Azithromycin 500 mg given @ 1612  -IV Ceftriaxone 2 gm given @ 1551    Rasheeda Patel, PharmD

## 2025-01-02 NOTE — H&P
Chief Complaint   Patient presents with    Shortness of Breath       HPI    Janet Snow is a 79 y.o. female with a PMHx of anxiety, sciatica, hypothyroidism, hyperlipidemia who presented to [facility] on 1/2/2025 with a chief complaint of shortness of breath.  Patient's symptoms started before Lorraine, he was having difficulty breathing, chills, productive cough with greenish-yellow sputum, with started to clean after she started getting Mucinex.  She was also having fever.  The patient tried to reach out to her PCP to get checked, but she was not able to get an appointment.  She was started on Augmentin with no improvement.  As she was having difficulty breathing her kids insisted on her coming to the hospital.  She was having difficulty swallowing.  She denies any headaches, change in vision, change in hearing, chest pain, abdominal pain, weight change, change in bowel habits/urine, joint pain/swelling, weakness in any of the extremities or swelling, insomnia or any symptoms of depression. She lives with her dog  and feels safe at home.     ROS: 10 point review of systems negative with the exception of above.    ED Course:    ED Triage Vitals [01/02/25 1418]   Temperature Heart Rate Respirations BP   (!) 38.5 °C (101.3 °F) 93 17 153/70      Pulse Ox Temp Source Heart Rate Source Patient Position   97 % Oral Monitor Sitting      BP Location FiO2 (%)     Left arm --         Labs:  Abnormal Labs Reviewed   CBC WITH AUTO DIFFERENTIAL - Abnormal; Notable for the following components:       Result Value    WBC 15.0 (*)     RBC 3.61 (*)     Hemoglobin 11.0 (*)     Hematocrit 32.2 (*)     Platelets 536 (*)     Neutrophils Absolute 13.25 (*)     Lymphocytes Absolute 0.66 (*)     Monocytes Absolute 0.91 (*)     All other components within normal limits   COMPREHENSIVE METABOLIC PANEL - Abnormal; Notable for the following components:    Glucose 121 (*)     Sodium 134 (*)     Potassium 3.2 (*)     Albumin 3.3 (*)      All other components within normal limits   LACTATE - Abnormal; Notable for the following components:    Lactate 2.2 (*)     All other components within normal limits    Narrative:     Venipuncture immediately after or during the administration of Metamizole may lead to falsely low results. Testing should be performed immediately prior to Metamizole dosing.   URINALYSIS WITH REFLEX CULTURE AND MICROSCOPIC - Abnormal; Notable for the following components:    Appearance, Urine Turbid (*)     Protein, Urine 100 (2+) (*)     Blood, Urine OVER (3+) (*)     Ketones, Urine 10 (1+) (*)     Urobilinogen, Urine 6 (2+) (*)     Leukocyte Esterase, Urine 75 Brissa/µL (*)     All other components within normal limits    Narrative:     OVER is reported when the result is greater than the clinically reportable range.   SERIAL TROPONIN, 1 HOUR - Abnormal; Notable for the following components:    Troponin I, High Sensitivity 16 (*)     All other components within normal limits    Narrative:     Less than 99th percentile of normal range cutoff-  Female and children under 18 years old <14 ng/L; Male <21 ng/L: Negative  Repeat testing should be performed if clinically indicated.     Female and children under 18 years old 14-50 ng/L; Male 21-50 ng/L:  Consistent with possible cardiac damage and possible increased clinical   risk. Serial measurements may help to assess extent of myocardial damage.     >50 ng/L: Consistent with cardiac damage, increased clinical risk and  myocardial infarction. Serial measurements may help assess extent of   myocardial damage.      NOTE: Children less than 1 year old may have higher baseline troponin   levels and results should be interpreted in conjunction with the overall   clinical context.     NOTE: Troponin I testing is performed using a different   testing methodology at [facility] than at other   [facility]. Direct result comparisons should only   be made within the same method.    MICROSCOPIC ONLY, URINE - Abnormal; Notable for the following components:    WBC, Urine 21-50 (*)     RBC, Urine >20 (*)     Bacteria, Urine 1+ (*)     Budding Yeast, Urine PRESENT (*)     All other components within normal limits   CBC - Abnormal; Notable for the following components:    WBC 15.4 (*)     RBC 3.32 (*)     Hemoglobin 10.0 (*)     Hematocrit 29.7 (*)     Platelets 491 (*)     All other components within normal limits   RENAL FUNCTION PANEL - Abnormal; Notable for the following components:    Potassium 3.2 (*)     Calcium 8.1 (*)     Albumin 3.0 (*)     All other components within normal limits        No orders to display       XR chest 2 views   Final Result   Extensive patchy bilateral infiltrates most marked within the upper   lobes bilaterally. Findings are suspicious for pneumonia. Follow-up   to assure complete clearing is suggested.        MACRO:   none        Signed by: Jaxon Morales 1/2/2025 3:43 PM   Dictation workstation:   [id number]        XR chest 2 views   Final Result   Extensive patchy bilateral infiltrates most marked within the upper   lobes bilaterally. Findings are suspicious for pneumonia. Follow-up   to assure complete clearing is suggested.        MACRO:   none        Signed by: Jaxon Morales 1/2/2025 3:43 PM   Dictation workstation:   [id number]              Intervention: In ED, patient received   Medications   enoxaparin (Lovenox) syringe 40 mg (40 mg subcutaneous Not Given 1/2/25 1959)   polyethylene glycol (Glycolax, Miralax) packet 17 g (has no administration in time range)   cefTRIAXone (Rocephin) 1 g in dextrose (iso) IV 50 mL (has no administration in time range)   azithromycin 500 mg in dextrose 5% 250 mL IV (0 mg intravenous Stopped 1/3/25 1704)   FLUoxetine (PROzac) capsule 40 mg (40 mg oral Given 1/3/25 0934)   gabapentin (Neurontin) capsule 400 mg (400 mg oral Given 1/3/25 0934)   levothyroxine (Synthroid, Levoxyl) tablet 50 mcg (50 mcg oral Given 1/3/25 0603)    simvastatin (Zocor) tablet 20 mg (20 mg oral Given 1/2/25 2111)   ipratropium-albuteroL (Duo-Neb) 0.5-2.5 mg/3 mL nebulizer solution 3 mL (3 mL nebulization Given 1/2/25 2321)   acetaminophen (Tylenol) tablet 650 mg (650 mg oral Given 1/3/25 1108)   guaiFENesin (Mucinex) 12 hr tablet 1,200 mg (1,200 mg oral Given 1/3/25 1359)   benzonatate (Tessalon) capsule 200 mg (has no administration in time range)   sodium chloride 0.9 % bolus 1,000 mL (0 mL intravenous Stopped 1/2/25 1622)   acetaminophen (Tylenol) tablet 650 mg (650 mg oral Given 1/2/25 1437)   cefTRIAXone (Rocephin) 2 g in dextrose (iso) IV 50 mL (0 g intravenous Stopped 1/2/25 1611)   azithromycin 500 mg in dextrose 5% 250 mL IV (0 mg intravenous Stopped 1/2/25 1740)   potassium chloride CR (Klor-Con M20) ER tablet 40 mEq (40 mEq oral Given 1/2/25 2311)   potassium chloride CR (Klor-Con M20) ER tablet 40 mEq (40 mEq oral Given 1/3/25 1106)      Patient was then transferred to the floor for further management      Meds:   Current Discharge Medication List          Follows up with Dr. Thor Quinonez DO      Past Medical History   History reviewed. No pertinent past medical history.   Surgical History     Past Surgical History:   Procedure Laterality Date    BREAST BIOPSY Right     core biopsy 20 years ago    HYSTERECTOMY      MR HEAD ANGIO WO IV CONTRAST  07/14/2018    MR HEAD ANGIO WO IV CONTRAST [facility]     Family History   No family history on file.  Social History     Tobacco Use: Medium Risk (1/2/2025)    Patient History     Smoking Tobacco Use: Former     Smokeless Tobacco Use: Never     Passive Exposure: Not on file      Social History     Substance and Sexual Activity   Alcohol Use None      Allergies     Allergies   Allergen Reactions    Meperidine (Pf) Unknown     Makes pt feel crazy    Ropinirole Swelling and Other    Meperidine Other, GI intolerance and Rash      Meds    Scheduled medications  azithromycin, 500 mg,  "intravenous, q24h  cefTRIAXone, 1 g, intravenous, q24h  enoxaparin, 40 mg, subcutaneous, q24h  FLUoxetine, 40 mg, oral, Daily  gabapentin, 400 mg, oral, BID  guaiFENesin, 1,200 mg, oral, BID  levothyroxine, 50 mcg, oral, Daily  simvastatin, 20 mg, oral, Nightly      Continuous medications     PRN medications  PRN medications: acetaminophen, benzonatate, ipratropium-albuteroL, polyethylene glycol   Objective     Vitals  Visit Vitals  /75 (BP Location: Right arm, Patient Position: Lying)   Pulse 101   Temp 36.4 °C (97.5 °F) (Axillary)   Resp 18   Ht 1.6 m (5' 3\")   Wt 68 kg (150 lb)   SpO2 95%   BMI 26.57 kg/m²   OB Status Hysterectomy   Smoking Status Former   BSA 1.74 m²        Review of Systems   Constitutional:  Positive for chills and fever.   HENT: Negative.     Eyes: Negative.    Respiratory:  Positive for cough and shortness of breath.    Cardiovascular: Negative.    Gastrointestinal:  Positive for nausea and vomiting.   Endocrine: Negative.    Genitourinary: Negative.    Musculoskeletal: Negative.    Skin: Negative.    Allergic/Immunologic: Negative.    Neurological: Negative.    Hematological: Negative.    Psychiatric/Behavioral: Negative.       Temp:  [36.4 °C (97.5 °F)-36.8 °C (98.2 °F)] 36.4 °C (97.5 °F)  Heart Rate:  [] 101  Resp:  [17-19] 18  BP: (122-159)/(59-96) 159/75  I/O last 3 completed shifts:  In: 1720 (25.3 mL/kg) [P.O.:420; IV Piggyback:1300]  Out: 200 (2.9 mL/kg) [Urine:200 (0.1 mL/kg/hr)]  Weight: 68 kg   I/O this shift:  In: 500 [IV Piggyback:500]  Out: -   Physical Exam  Constitutional:       General: She is in acute distress.      Appearance: Normal appearance. She is normal weight.   HENT:      Head: Normocephalic and atraumatic.      Nose: Nose normal.      Mouth/Throat:      Mouth: Mucous membranes are moist.      Pharynx: Oropharynx is clear.   Eyes:      Extraocular Movements: Extraocular movements intact.      Conjunctiva/sclera: Conjunctivae normal.      Pupils: Pupils " "are equal, round, and reactive to light.   Cardiovascular:      Rate and Rhythm: Normal rate and regular rhythm.      Pulses: Normal pulses.      Heart sounds: Normal heart sounds.   Pulmonary:      Effort: Respiratory distress present.      Breath sounds: Rhonchi present.      Comments: Fair air entry  Abdominal:      General: Abdomen is flat. Bowel sounds are normal.      Palpations: Abdomen is soft.   Musculoskeletal:         General: Normal range of motion.      Cervical back: Normal range of motion and neck supple.   Skin:     General: Skin is warm and dry.   Neurological:      General: No focal deficit present.      Mental Status: She is alert and oriented to person, place, and time. Mental status is at baseline.       Assessment & Plan  Community acquired pneumonia of both upper lobes      I/Os    Intake/Output Summary (Last 24 hours) at 1/3/2025 1706  Last data filed at 1/3/2025 1704  Gross per 24 hour   Intake 1170 ml   Output 200 ml   Net 970 ml         Labs:   Results from last 72 hours   Lab Units 01/03/25  0526 01/02/25  1434   SODIUM mmol/L 136 134*   POTASSIUM mmol/L 3.2* 3.2*   CHLORIDE mmol/L 103 99   CO2 mmol/L 25 24   BUN mg/dL 17 20   CREATININE mg/dL 0.85 0.92   GLUCOSE mg/dL 99 121*   CALCIUM mg/dL 8.1* 8.8   ANION GAP mmol/L 11 14   EGFR mL/min/1.73m*2 70 63   PHOSPHORUS mg/dL 2.6  --       Results from last 72 hours   Lab Units 01/03/25  0526 01/02/25  1434   WBC AUTO x10*3/uL 15.4* 15.0*   HEMOGLOBIN g/dL 10.0* 11.0*   HEMATOCRIT % 29.7* 32.2*   PLATELETS AUTO x10*3/uL 491* 536*   NEUTROS PCT AUTO %  --  88.4   LYMPHS PCT AUTO %  --  4.4   MONOS PCT AUTO %  --  6.1   EOS PCT AUTO %  --  0.4      Lab Results   Component Value Date    CALCIUM 8.1 (L) 01/03/2025    PHOS 2.6 01/03/2025      Lab Results   Component Value Date    CRP 10.1 (H) 01/04/2020      [unfilled]     Micro/ID:   No results found for the last 90 days.                   No lab exists for component: \"AGALPCRNB\"   .ID  Lab " Results   Component Value Date    BLOODCULT Loaded on Instrument - Culture in progress 01/02/2025    BLOODCULT Loaded on Instrument - Culture in progress 01/02/2025     Images    ECG 12 lead  Normal sinus rhythm  Nonspecific ST abnormality  Prolonged QT  Abnormal ECG  No previous ECGs available    Assessment and Plan    Janet Snow is a 79 y.o. female admitted for shortness of breath      Acute Medical Issues   # Acute hypoxic respiratory failure secondary to CAP:  - CXR: Extensive patchy bilateral infiltrates most marked within the upper lobes bilaterally. Findings are suspicious for pneumonia. Follow-up to assure complete clearing is suggested.  -Patient is on 2 L nasal cannula in the ED, baseline room air, wean as tolerated  - Blood cultures x2 drawn in the ED  - Lactate: 2.2 then normal  - Abx in ED: Rocephin and azithromycin  - Sputum culture pending  - Continue Rocephin and azithromycin  - Duonebs q4h  - Albuterol nebs q2h PRN  - Incentive spirometry q2h while awake  -Mycoplasma, Legionella, MRSA; strept, pending        # Sepsis:  -SIRS criteria: 3/4 in the ED, fever 101, tachycardia 93, WBCs 15.  -Endorgan damage acute hypoxic respiratory failure.  -Lactate 2.2, improved to normal after hydration.      Chronic Medical Issues   # HLD:  Continue pravastatin 20 mg daily.    # Anxiety:  -Continue Prozac 40 mg daily.    # Sciatica  -Continue gabapentin 400 mg twice daily.    #Hypothyroidism:  -Continue levothyroxine 50 mcg daily.    F: PO intake & IVF PRN  E: Replete as needed  N: Regular diet  GI ppx: Not required  DVT ppx: Lovenox subcutaneous  Antibiotics: Ceftriaxone, azithromycin  Oxygenation: 2 L nasal cannula    Code Status: DNR and No Intubation   Emergency Contact: Extended Emergency Contact Information  Primary Emergency Contact: Beverly Canales  Address: [address]           [place] [id number]  Home Phone: [phone]  Relation: Daughter  Secondary Emergency Contact:  Valeriy Snow  Home Phone: [phone]  Relation: Son     Disposition: 79 y.o.female admitted for hypoxic respiratory failure secondary to Community-acquired pneumonia, anticipate LOS > 2 midnights.         Jo Caraballo  Internal Medicine, Hospitalist  [facility]  [place]

## 2025-01-02 NOTE — ED TRIAGE NOTES
Pt from home via EMS for SOB x9 days. Pt has been on an antibiotic for URI and has albuterol treatments at home.

## 2025-01-03 LAB
ALBUMIN SERPL BCP-MCNC: 3 G/DL (ref 3.4–5)
ANION GAP SERPL CALCULATED.3IONS-SCNC: 11 MMOL/L (ref 10–20)
ATRIAL RATE: 94 BPM
BUN SERPL-MCNC: 17 MG/DL (ref 6–23)
CALCIUM SERPL-MCNC: 8.1 MG/DL (ref 8.6–10.3)
CHLORIDE SERPL-SCNC: 103 MMOL/L (ref 98–107)
CO2 SERPL-SCNC: 25 MMOL/L (ref 21–32)
CREAT SERPL-MCNC: 0.85 MG/DL (ref 0.5–1.05)
EGFRCR SERPLBLD CKD-EPI 2021: 70 ML/MIN/1.73M*2
ERYTHROCYTE [DISTWIDTH] IN BLOOD BY AUTOMATED COUNT: 11.9 % (ref 11.5–14.5)
GLUCOSE SERPL-MCNC: 99 MG/DL (ref 74–99)
HCT VFR BLD AUTO: 29.7 % (ref 36–46)
HGB BLD-MCNC: 10 G/DL (ref 12–16)
LEGIONELLA AG UR QL: NEGATIVE
MAGNESIUM SERPL-MCNC: 1.67 MG/DL (ref 1.6–2.4)
MCH RBC QN AUTO: 30.1 PG (ref 26–34)
MCHC RBC AUTO-ENTMCNC: 33.7 G/DL (ref 32–36)
MCV RBC AUTO: 90 FL (ref 80–100)
NRBC BLD-RTO: 0 /100 WBCS (ref 0–0)
P AXIS: 77 DEGREES
P OFFSET: 211 MS
P ONSET: 155 MS
PHOSPHATE SERPL-MCNC: 2.6 MG/DL (ref 2.5–4.9)
PLATELET # BLD AUTO: 491 X10*3/UL (ref 150–450)
POTASSIUM SERPL-SCNC: 3.2 MMOL/L (ref 3.5–5.3)
PR INTERVAL: 130 MS
Q ONSET: 220 MS
QRS COUNT: 16 BEATS
QRS DURATION: 86 MS
QT INTERVAL: 466 MS
QTC CALCULATION(BAZETT): 582 MS
QTC FREDERICIA: 541 MS
R AXIS: 77 DEGREES
RBC # BLD AUTO: 3.32 X10*6/UL (ref 4–5.2)
S PNEUM AG UR QL: NEGATIVE
SODIUM SERPL-SCNC: 136 MMOL/L (ref 136–145)
T AXIS: 78 DEGREES
T OFFSET: 453 MS
VENTRICULAR RATE: 94 BPM
WBC # BLD AUTO: 15.4 X10*3/UL (ref 4.4–11.3)

## 2025-01-03 PROCEDURE — 83735 ASSAY OF MAGNESIUM: CPT | Performed by: INTERNAL MEDICINE

## 2025-01-03 PROCEDURE — 9420000001 HC RT PATIENT EDUCATION 5 MIN

## 2025-01-03 PROCEDURE — 2500000001 HC RX 250 WO HCPCS SELF ADMINISTERED DRUGS (ALT 637 FOR MEDICARE OP): Performed by: INTERNAL MEDICINE

## 2025-01-03 PROCEDURE — 2500000002 HC RX 250 W HCPCS SELF ADMINISTERED DRUGS (ALT 637 FOR MEDICARE OP, ALT 636 FOR OP/ED): Performed by: INTERNAL MEDICINE

## 2025-01-03 PROCEDURE — 2500000004 HC RX 250 GENERAL PHARMACY W/ HCPCS (ALT 636 FOR OP/ED): Performed by: INTERNAL MEDICINE

## 2025-01-03 PROCEDURE — 99233 SBSQ HOSP IP/OBS HIGH 50: CPT | Performed by: INTERNAL MEDICINE

## 2025-01-03 PROCEDURE — 87899 AGENT NOS ASSAY W/OPTIC: CPT | Mod: WESLAB | Performed by: INTERNAL MEDICINE

## 2025-01-03 PROCEDURE — 80069 RENAL FUNCTION PANEL: CPT | Performed by: INTERNAL MEDICINE

## 2025-01-03 PROCEDURE — 94668 MNPJ CHEST WALL SBSQ: CPT

## 2025-01-03 PROCEDURE — 87449 NOS EACH ORGANISM AG IA: CPT | Mod: WESLAB | Performed by: INTERNAL MEDICINE

## 2025-01-03 PROCEDURE — 36415 COLL VENOUS BLD VENIPUNCTURE: CPT | Performed by: INTERNAL MEDICINE

## 2025-01-03 PROCEDURE — 1200000002 HC GENERAL ROOM WITH TELEMETRY DAILY

## 2025-01-03 PROCEDURE — 85027 COMPLETE CBC AUTOMATED: CPT | Performed by: INTERNAL MEDICINE

## 2025-01-03 RX ORDER — POTASSIUM CHLORIDE 20 MEQ/1
40 TABLET, EXTENDED RELEASE ORAL ONCE
Status: COMPLETED | OUTPATIENT
Start: 2025-01-03 | End: 2025-01-03

## 2025-01-03 RX ORDER — GUAIFENESIN 600 MG/1
1200 TABLET, EXTENDED RELEASE ORAL 2 TIMES DAILY
Status: DISCONTINUED | OUTPATIENT
Start: 2025-01-03 | End: 2025-01-06

## 2025-01-03 RX ORDER — BENZONATATE 100 MG/1
200 CAPSULE ORAL 3 TIMES DAILY PRN
Status: DISCONTINUED | OUTPATIENT
Start: 2025-01-03 | End: 2025-01-08

## 2025-01-03 RX ADMIN — SIMVASTATIN 20 MG: 20 TABLET, FILM COATED ORAL at 21:18

## 2025-01-03 RX ADMIN — GUAIFENESIN 1200 MG: 600 TABLET, MULTILAYER, EXTENDED RELEASE ORAL at 13:59

## 2025-01-03 RX ADMIN — CEFTRIAXONE SODIUM 1 G: 1 INJECTION, SOLUTION INTRAVENOUS at 17:05

## 2025-01-03 RX ADMIN — FLUOXETINE HYDROCHLORIDE 40 MG: 40 CAPSULE ORAL at 09:34

## 2025-01-03 RX ADMIN — LEVOTHYROXINE SODIUM 50 MCG: 0.05 TABLET ORAL at 06:03

## 2025-01-03 RX ADMIN — ACETAMINOPHEN 650 MG: 325 TABLET ORAL at 11:08

## 2025-01-03 RX ADMIN — GABAPENTIN 400 MG: 400 CAPSULE ORAL at 21:17

## 2025-01-03 RX ADMIN — DEXTROSE MONOHYDRATE 500 MG: 50 INJECTION, SOLUTION INTRAVENOUS at 16:03

## 2025-01-03 RX ADMIN — POTASSIUM CHLORIDE 40 MEQ: 1500 TABLET, EXTENDED RELEASE ORAL at 11:06

## 2025-01-03 RX ADMIN — GABAPENTIN 400 MG: 400 CAPSULE ORAL at 09:34

## 2025-01-03 RX ADMIN — GUAIFENESIN 1200 MG: 600 TABLET, MULTILAYER, EXTENDED RELEASE ORAL at 21:17

## 2025-01-03 ASSESSMENT — COGNITIVE AND FUNCTIONAL STATUS - GENERAL
MOBILITY SCORE: 24
DAILY ACTIVITIY SCORE: 24

## 2025-01-03 ASSESSMENT — PAIN DESCRIPTION - LOCATION: LOCATION: HEAD

## 2025-01-03 ASSESSMENT — PAIN DESCRIPTION - ORIENTATION: ORIENTATION: PROXIMAL;ANTERIOR

## 2025-01-03 ASSESSMENT — PAIN SCALES - GENERAL
PAINLEVEL_OUTOF10: 0 - NO PAIN
PAINLEVEL_OUTOF10: 3
PAINLEVEL_OUTOF10: 0 - NO PAIN

## 2025-01-03 ASSESSMENT — ACTIVITIES OF DAILY LIVING (ADL): LACK_OF_TRANSPORTATION: NO

## 2025-01-03 ASSESSMENT — PAIN - FUNCTIONAL ASSESSMENT
PAIN_FUNCTIONAL_ASSESSMENT: 0-10

## 2025-01-03 ASSESSMENT — PAIN DESCRIPTION - DESCRIPTORS
DESCRIPTORS: ACHING;HEADACHE
DESCRIPTORS: ACHING;HEADACHE

## 2025-01-03 NOTE — PROGRESS NOTES
01/03/25 1021   Discharge Planning   Living Arrangements Alone   Support Systems Children   Type of Residence Private residence   Number of Stairs to Enter Residence 3   Number of Stairs Within Residence 0   Do you have animals or pets at home? Yes   Type of Animals or Pets dog   Who is requesting discharge planning? Provider   Expected Discharge Disposition Othe   Financial Resource Strain   How hard is it for you to pay for the very basics like food, housing, medical care, and heating? Not hard   Housing Stability   In the last 12 months, was there a time when you were not able to pay the mortgage or rent on time? N   In the past 12 months, how many times have you moved where you were living? 0   At any time in the past 12 months, were you homeless or living in a shelter (including now)? N   Transportation Needs   In the past 12 months, has lack of transportation kept you from medical appointments or from getting medications? no   In the past 12 months, has lack of transportation kept you from meetings, work, or from getting things needed for daily living? No     TCC met with patient at bedside. Introduced self and explained role. Patient lives home alone. There are 3 steps to enter and no steps within the home. Patient has no medical equipment and does not use and assistive devices. Patient drives and is able to get to all appointments.  No reports of smoking or alcohol use.  [facility] choice for medications. Patient has established LW and POA.  Plan is to return home upon discharge. TCC will follow for discharge needs.

## 2025-01-03 NOTE — PROGRESS NOTES
Janet Snow is a 79 y.o. female on day 1 of admission presenting with Community acquired pneumonia of both upper lobes.      Subjective   Patient was seen and examined at bedside, she stated she is feeling much better, she was accompanied by her daughter, she was complaining of cough especially when she talks, but better than before, Denies any f/c, n/v, new onset headaches, vision changes, abdominal pain, changes in BM, or urinary changes.       Objective     Last Recorded Vitals  /75 (BP Location: Right arm, Patient Position: Lying)   Pulse 101   Temp 36.4 °C (97.5 °F) (Axillary)   Resp 18   Wt 68 kg (150 lb)   SpO2 95%   Intake/Output last 3 Shifts:    Intake/Output Summary (Last 24 hours) at 1/3/2025 1705  Last data filed at 1/3/2025 1704  Gross per 24 hour   Intake 1170 ml   Output 200 ml   Net 970 ml       Admission Weight  Weight: 68 kg (150 lb) (01/02/25 1418)    Daily Weight  01/02/25 : 68 kg (150 lb)    Image Results  ECG 12 lead  Normal sinus rhythm  Nonspecific ST abnormality  Prolonged QT  Abnormal ECG  No previous ECGs available      Physical Exam  Constitutional:       General: She is not in acute distress.     Appearance: Normal appearance. She is normal weight.   HENT:      Head: Normocephalic and atraumatic.      Nose: Nose normal.      Mouth/Throat:      Mouth: Mucous membranes are moist.      Pharynx: Oropharynx is clear.   Eyes:      Extraocular Movements: Extraocular movements intact.      Conjunctiva/sclera: Conjunctivae normal.      Pupils: Pupils are equal, round, and reactive to light.   Cardiovascular:      Rate and Rhythm: Normal rate and regular rhythm.      Pulses: Normal pulses.      Heart sounds: Normal heart sounds.   Pulmonary:      Effort: Pulmonary effort is normal. No respiratory distress.      Breath sounds: Normal breath sounds.      Comments: Nasal cannula in place, mild basal rhonchi  Abdominal:      General: Abdomen is flat. Bowel sounds are normal.       Palpations: Abdomen is soft.   Musculoskeletal:         General: Normal range of motion.      Cervical back: Normal range of motion and neck supple.   Skin:     General: Skin is warm and dry.   Neurological:      General: No focal deficit present.      Mental Status: She is alert and oriented to person, place, and time. Mental status is at baseline.         Relevant Results  Scheduled medications  azithromycin, 500 mg, intravenous, q24h  cefTRIAXone, 1 g, intravenous, q24h  enoxaparin, 40 mg, subcutaneous, q24h  FLUoxetine, 40 mg, oral, Daily  gabapentin, 400 mg, oral, BID  guaiFENesin, 1,200 mg, oral, BID  levothyroxine, 50 mcg, oral, Daily  simvastatin, 20 mg, oral, Nightly      Continuous medications     PRN medications  PRN medications: acetaminophen, benzonatate, ipratropium-albuteroL, polyethylene glycol      Results for orders placed or performed during the hospital encounter of 01/02/25 (from the past 24 hours)   CBC   Result Value Ref Range    WBC 15.4 (H) 4.4 - 11.3 x10*3/uL    nRBC 0.0 0.0 - 0.0 /100 WBCs    RBC 3.32 (L) 4.00 - 5.20 x10*6/uL    Hemoglobin 10.0 (L) 12.0 - 16.0 g/dL    Hematocrit 29.7 (L) 36.0 - 46.0 %    MCV 90 80 - 100 fL    MCH 30.1 26.0 - 34.0 pg    MCHC 33.7 32.0 - 36.0 g/dL    RDW 11.9 11.5 - 14.5 %    Platelets 491 (H) 150 - 450 x10*3/uL   Renal function panel   Result Value Ref Range    Glucose 99 74 - 99 mg/dL    Sodium 136 136 - 145 mmol/L    Potassium 3.2 (L) 3.5 - 5.3 mmol/L    Chloride 103 98 - 107 mmol/L    Bicarbonate 25 21 - 32 mmol/L    Anion Gap 11 10 - 20 mmol/L    Urea Nitrogen 17 6 - 23 mg/dL    Creatinine 0.85 0.50 - 1.05 mg/dL    eGFR 70 >60 mL/min/1.73m*2    Calcium 8.1 (L) 8.6 - 10.3 mg/dL    Phosphorus 2.6 2.5 - 4.9 mg/dL    Albumin 3.0 (L) 3.4 - 5.0 g/dL   Magnesium   Result Value Ref Range    Magnesium 1.67 1.60 - 2.40 mg/dL                    Assessment/Plan      Assessment & Plan  Community acquired pneumonia of both upper lobes    Janet Snow is a 79 y.o.  female admitted for shortness of breath        Acute Medical Issues   # Acute hypoxic respiratory failure secondary to CAP (Improving)  - CXR: Extensive patchy bilateral infiltrates most marked within the upper lobes bilaterally. Findings are suspicious for pneumonia. Follow-up to assure complete clearing is suggested.  -Patient is on 2 L nasal cannula in the ED, baseline room air, wean as tolerated; today saturating 89-91% on RA, continue to wean as tolerated.  - Blood cultures x2 drawn in the ED  - Lactate: 2.2 then normal  - Abx in ED: Rocephin and azithromycin  - Sputum culture pending  - Continue Rocephin and azithromycin  - Duonebs q4h  - Albuterol nebs q2h PRN  - Incentive spirometry q2h while awake  -Mycoplasma, Legionella, MRSA; strept, pending           # Sepsis (Resolved):  -SIRS criteria: 3/4 in the ED, fever 101, tachycardia 93, WBCs 15.  -Endorgan damage acute hypoxic respiratory failure.  -Lactate 2.2, improved to normal after hydration.       #Leukocytosis:  - Continue to monitor, no fever, patient is improving.      #Thrombocytosis:  - Likely reactive, continue to monitor.     Chronic Medical Issues   # HLD:  Continue pravastatin 20 mg daily.     # Anxiety:  -Continue Prozac 40 mg daily.     # Sciatica  -Continue gabapentin 400 mg twice daily.     #Hypothyroidism:  -Continue levothyroxine 50 mcg daily.     F: PO intake & IVF PRN  E: Replete as needed  N: Regular diet  GI ppx: Not required  DVT ppx: Lovenox subcutaneous  Antibiotics: Ceftriaxone, azithromycin  Oxygenation: 2 L nasal cannula     Code Status: DNR and No Intubation   Emergency Contact: Extended Emergency Contact Information  Primary Emergency Contact: Beverly Canales  Address: [address]           [place] [id number]  Home Phone: [phone]  Relation: Daughter  Secondary Emergency Contact: Valeriy Snow  Home Phone: [phone]  Relation: Son      Disposition: 79 y.o.female admitted for hypoxic respiratory failure  secondary to Community-acquired pneumonia, patient is improving, continue management as above.           Jo Caraballo, DO  Internal Medicine, Hospitalist  [facility]  [place]

## 2025-01-04 LAB
25(OH)D3 SERPL-MCNC: 34 NG/ML (ref 30–100)
ALBUMIN SERPL BCP-MCNC: 3 G/DL (ref 3.4–5)
ANION GAP SERPL CALCULATED.3IONS-SCNC: 12 MMOL/L (ref 10–20)
BACTERIA UR CULT: NO GROWTH
BNP SERPL-MCNC: 251 PG/ML (ref 0–99)
BUN SERPL-MCNC: 18 MG/DL (ref 6–23)
CALCIUM SERPL-MCNC: 8.1 MG/DL (ref 8.6–10.3)
CHLORIDE SERPL-SCNC: 103 MMOL/L (ref 98–107)
CO2 SERPL-SCNC: 23 MMOL/L (ref 21–32)
CREAT SERPL-MCNC: 1.06 MG/DL (ref 0.5–1.05)
EGFRCR SERPLBLD CKD-EPI 2021: 54 ML/MIN/1.73M*2
ERYTHROCYTE [DISTWIDTH] IN BLOOD BY AUTOMATED COUNT: 11.9 % (ref 11.5–14.5)
GLUCOSE SERPL-MCNC: 91 MG/DL (ref 74–99)
HCT VFR BLD AUTO: 28.2 % (ref 36–46)
HGB BLD-MCNC: 9.8 G/DL (ref 12–16)
MAGNESIUM SERPL-MCNC: 1.67 MG/DL (ref 1.6–2.4)
MCH RBC QN AUTO: 30.1 PG (ref 26–34)
MCHC RBC AUTO-ENTMCNC: 34.8 G/DL (ref 32–36)
MCV RBC AUTO: 87 FL (ref 80–100)
NRBC BLD-RTO: 0 /100 WBCS (ref 0–0)
PHOSPHATE SERPL-MCNC: 3 MG/DL (ref 2.5–4.9)
PLATELET # BLD AUTO: 518 X10*3/UL (ref 150–450)
POTASSIUM SERPL-SCNC: 3.7 MMOL/L (ref 3.5–5.3)
RBC # BLD AUTO: 3.26 X10*6/UL (ref 4–5.2)
SODIUM SERPL-SCNC: 134 MMOL/L (ref 136–145)
STAPHYLOCOCCUS SPEC CULT: NORMAL
WBC # BLD AUTO: 17.6 X10*3/UL (ref 4.4–11.3)

## 2025-01-04 PROCEDURE — 99222 1ST HOSP IP/OBS MODERATE 55: CPT | Performed by: INTERNAL MEDICINE

## 2025-01-04 PROCEDURE — 85027 COMPLETE CBC AUTOMATED: CPT | Performed by: INTERNAL MEDICINE

## 2025-01-04 PROCEDURE — 36415 COLL VENOUS BLD VENIPUNCTURE: CPT | Performed by: INTERNAL MEDICINE

## 2025-01-04 PROCEDURE — 97165 OT EVAL LOW COMPLEX 30 MIN: CPT | Mod: GO

## 2025-01-04 PROCEDURE — 2500000001 HC RX 250 WO HCPCS SELF ADMINISTERED DRUGS (ALT 637 FOR MEDICARE OP)

## 2025-01-04 PROCEDURE — 2500000005 HC RX 250 GENERAL PHARMACY W/O HCPCS: Performed by: INTERNAL MEDICINE

## 2025-01-04 PROCEDURE — 1200000002 HC GENERAL ROOM WITH TELEMETRY DAILY

## 2025-01-04 PROCEDURE — 2500000002 HC RX 250 W HCPCS SELF ADMINISTERED DRUGS (ALT 637 FOR MEDICARE OP, ALT 636 FOR OP/ED): Performed by: INTERNAL MEDICINE

## 2025-01-04 PROCEDURE — 94664 DEMO&/EVAL PT USE INHALER: CPT

## 2025-01-04 PROCEDURE — 99233 SBSQ HOSP IP/OBS HIGH 50: CPT

## 2025-01-04 PROCEDURE — 2500000001 HC RX 250 WO HCPCS SELF ADMINISTERED DRUGS (ALT 637 FOR MEDICARE OP): Performed by: INTERNAL MEDICINE

## 2025-01-04 PROCEDURE — 80069 RENAL FUNCTION PANEL: CPT | Performed by: INTERNAL MEDICINE

## 2025-01-04 PROCEDURE — 2500000004 HC RX 250 GENERAL PHARMACY W/ HCPCS (ALT 636 FOR OP/ED): Performed by: INTERNAL MEDICINE

## 2025-01-04 PROCEDURE — 2500000004 HC RX 250 GENERAL PHARMACY W/ HCPCS (ALT 636 FOR OP/ED)

## 2025-01-04 PROCEDURE — 83735 ASSAY OF MAGNESIUM: CPT | Performed by: INTERNAL MEDICINE

## 2025-01-04 PROCEDURE — 83880 ASSAY OF NATRIURETIC PEPTIDE: CPT

## 2025-01-04 PROCEDURE — 82306 VITAMIN D 25 HYDROXY: CPT | Mod: WESLAB

## 2025-01-04 PROCEDURE — 92610 EVALUATE SWALLOWING FUNCTION: CPT | Mod: GN

## 2025-01-04 PROCEDURE — 2500000002 HC RX 250 W HCPCS SELF ADMINISTERED DRUGS (ALT 637 FOR MEDICARE OP, ALT 636 FOR OP/ED)

## 2025-01-04 PROCEDURE — 94640 AIRWAY INHALATION TREATMENT: CPT

## 2025-01-04 PROCEDURE — 36415 COLL VENOUS BLD VENIPUNCTURE: CPT

## 2025-01-04 RX ORDER — SODIUM CHLORIDE 9 MG/ML
50 INJECTION, SOLUTION INTRAVENOUS CONTINUOUS
Status: DISCONTINUED | OUTPATIENT
Start: 2025-01-04 | End: 2025-01-05

## 2025-01-04 RX ORDER — HYDROCODONE BITARTRATE AND HOMATROPINE METHYLBROMIDE ORAL SOLUTION 5; 1.5 MG/5ML; MG/5ML
5 LIQUID ORAL EVERY 6 HOURS PRN
Status: DISCONTINUED | OUTPATIENT
Start: 2025-01-04 | End: 2025-01-05

## 2025-01-04 RX ORDER — DOCUSATE SODIUM 100 MG/1
100 CAPSULE, LIQUID FILLED ORAL 2 TIMES DAILY
Status: DISCONTINUED | OUTPATIENT
Start: 2025-01-04 | End: 2025-01-07

## 2025-01-04 RX ORDER — IPRATROPIUM BROMIDE AND ALBUTEROL SULFATE 2.5; .5 MG/3ML; MG/3ML
3 SOLUTION RESPIRATORY (INHALATION) EVERY 6 HOURS
Status: DISCONTINUED | OUTPATIENT
Start: 2025-01-04 | End: 2025-01-04

## 2025-01-04 RX ORDER — IPRATROPIUM BROMIDE AND ALBUTEROL SULFATE 2.5; .5 MG/3ML; MG/3ML
3 SOLUTION RESPIRATORY (INHALATION)
Status: DISCONTINUED | OUTPATIENT
Start: 2025-01-04 | End: 2025-01-05

## 2025-01-04 RX ORDER — PANTOPRAZOLE SODIUM 40 MG/1
40 TABLET, DELAYED RELEASE ORAL
Status: DISCONTINUED | OUTPATIENT
Start: 2025-01-05 | End: 2025-01-06

## 2025-01-04 RX ORDER — PSYLLIUM HUSK 0.4 G
1 CAPSULE ORAL 2 TIMES DAILY
Status: DISCONTINUED | OUTPATIENT
Start: 2025-01-04 | End: 2025-01-06

## 2025-01-04 RX ORDER — LANOLIN ALCOHOL/MO/W.PET/CERES
400 CREAM (GRAM) TOPICAL 2 TIMES DAILY
Status: DISCONTINUED | OUTPATIENT
Start: 2025-01-04 | End: 2025-01-06

## 2025-01-04 RX ADMIN — FLUOXETINE HYDROCHLORIDE 40 MG: 40 CAPSULE ORAL at 09:01

## 2025-01-04 RX ADMIN — SIMVASTATIN 20 MG: 20 TABLET, FILM COATED ORAL at 20:55

## 2025-01-04 RX ADMIN — IPRATROPIUM BROMIDE AND ALBUTEROL SULFATE 3 ML: 2.5; .5 SOLUTION RESPIRATORY (INHALATION) at 12:04

## 2025-01-04 RX ADMIN — HYDROCODONE BITARTRATE AND HOMATROPINE METHYLBROMIDE 5 ML: 5; 1.5 SOLUTION ORAL at 16:45

## 2025-01-04 RX ADMIN — Medication 1 TABLET: at 16:48

## 2025-01-04 RX ADMIN — IPRATROPIUM BROMIDE AND ALBUTEROL SULFATE 3 ML: 2.5; .5 SOLUTION RESPIRATORY (INHALATION) at 19:45

## 2025-01-04 RX ADMIN — GABAPENTIN 400 MG: 400 CAPSULE ORAL at 09:01

## 2025-01-04 RX ADMIN — Medication 3 L/MIN: at 19:45

## 2025-01-04 RX ADMIN — LEVOTHYROXINE SODIUM 50 MCG: 0.05 TABLET ORAL at 06:06

## 2025-01-04 RX ADMIN — ENOXAPARIN SODIUM 40 MG: 40 INJECTION SUBCUTANEOUS at 19:09

## 2025-01-04 RX ADMIN — Medication 400 MG: at 20:56

## 2025-01-04 RX ADMIN — CEFTRIAXONE SODIUM 1 G: 1 INJECTION, SOLUTION INTRAVENOUS at 18:55

## 2025-01-04 RX ADMIN — DOCUSATE SODIUM 100 MG: 100 CAPSULE, LIQUID FILLED ORAL at 20:55

## 2025-01-04 RX ADMIN — BENZONATATE 200 MG: 100 CAPSULE ORAL at 09:01

## 2025-01-04 RX ADMIN — ACETAMINOPHEN 650 MG: 325 TABLET ORAL at 09:01

## 2025-01-04 RX ADMIN — GUAIFENESIN 1200 MG: 600 TABLET, MULTILAYER, EXTENDED RELEASE ORAL at 20:55

## 2025-01-04 RX ADMIN — HYDROCODONE BITARTRATE AND HOMATROPINE METHYLBROMIDE 5 ML: 5; 1.5 SOLUTION ORAL at 11:33

## 2025-01-04 RX ADMIN — GUAIFENESIN 1200 MG: 600 TABLET, MULTILAYER, EXTENDED RELEASE ORAL at 09:01

## 2025-01-04 RX ADMIN — ACETAMINOPHEN 650 MG: 325 TABLET ORAL at 19:09

## 2025-01-04 RX ADMIN — SODIUM CHLORIDE 50 ML/HR: 900 INJECTION, SOLUTION INTRAVENOUS at 14:14

## 2025-01-04 RX ADMIN — BENZONATATE 200 MG: 100 CAPSULE ORAL at 16:45

## 2025-01-04 RX ADMIN — GABAPENTIN 400 MG: 400 CAPSULE ORAL at 20:56

## 2025-01-04 ASSESSMENT — COGNITIVE AND FUNCTIONAL STATUS - GENERAL
CLIMB 3 TO 5 STEPS WITH RAILING: A LITTLE
TOILETING: A LITTLE
HELP NEEDED FOR BATHING: A LITTLE
DAILY ACTIVITIY SCORE: 22
DRESSING REGULAR LOWER BODY CLOTHING: A LITTLE
CLIMB 3 TO 5 STEPS WITH RAILING: A LITTLE
DAILY ACTIVITIY SCORE: 24
MOBILITY SCORE: 23
DRESSING REGULAR LOWER BODY CLOTHING: A LITTLE
HELP NEEDED FOR BATHING: A LITTLE
DAILY ACTIVITIY SCORE: 20
MOBILITY SCORE: 23
DRESSING REGULAR UPPER BODY CLOTHING: A LITTLE

## 2025-01-04 ASSESSMENT — PAIN SCALES - PAIN ASSESSMENT IN ADVANCED DEMENTIA (PAINAD)
TOTALSCORE: 0
FACIALEXPRESSION: SMILING OR INEXPRESSIVE
CONSOLABILITY: NO NEED TO CONSOLE
BODYLANGUAGE: RELAXED
TOTALSCORE: MEDICATION (SEE MAR)
TOTALSCORE: MEDICATION (SEE MAR)
BREATHING: NORMAL

## 2025-01-04 ASSESSMENT — ACTIVITIES OF DAILY LIVING (ADL)
ADL_ASSISTANCE: INDEPENDENT
BATHING_ASSISTANCE: STAND BY

## 2025-01-04 ASSESSMENT — PAIN SCALES - WONG BAKER: WONGBAKER_NUMERICALRESPONSE: NO HURT

## 2025-01-04 ASSESSMENT — ENCOUNTER SYMPTOMS
MYALGIAS: 0
RHINORRHEA: 0
ABDOMINAL PAIN: 0
CHEST TIGHTNESS: 0
COUGH: 1
DIZZINESS: 0
SORE THROAT: 0
BLOOD IN STOOL: 0
FEVER: 0
ABDOMINAL DISTENTION: 0
NAUSEA: 0
UNEXPECTED WEIGHT CHANGE: 0
DYSURIA: 0
VOMITING: 0
LIGHT-HEADEDNESS: 0
CHILLS: 0
SHORTNESS OF BREATH: 1
ADENOPATHY: 0
DIARRHEA: 0

## 2025-01-04 ASSESSMENT — PAIN DESCRIPTION - LOCATION
LOCATION: CHEST
LOCATION: CHEST

## 2025-01-04 ASSESSMENT — PAIN SCALES - GENERAL
PAINLEVEL_OUTOF10: 0 - NO PAIN
PAINLEVEL_OUTOF10: 3
PAINLEVEL_OUTOF10: 3

## 2025-01-04 ASSESSMENT — PAIN - FUNCTIONAL ASSESSMENT
PAIN_FUNCTIONAL_ASSESSMENT: 0-10

## 2025-01-04 NOTE — PROGRESS NOTES
Occupational Therapy    Evaluation    Patient Name: Janet Snow  MRN: [id number]  Department: [facility]  Room: [address] [place] [address]  Today's Date: 1/4/2025  Time Calculation  Start Time: 1245  Stop Time: 1305  Time Calculation (min): 20 min    Assessment  IP OT Assessment  OT Assessment: Patient is a 79 year old female admitted with PNA. Patient would benefit from skilled OT while in house to maximize her safety and independence with daily tasks. Recommend low intensity OT post d/c, with hopeful progression to no OT needs as patient's medical status improves.  Prognosis: Good  Barriers to Discharge Home: No anticipated barriers  Evaluation/Treatment Tolerance: Other (Comment) (O2 sats decreasing with mobility)  End of Session Communication: Bedside nurse  End of Session Patient Position: Up in chair, Alarm on  Plan:  Treatment Interventions: ADL retraining, Functional transfer training, UE strengthening/ROM, Endurance training, Patient/family training, Compensatory technique education  OT Frequency: 3 times per week  OT Discharge Recommendations: Low intensity level of continued care  OT Recommended Transfer Status: Stand by assist  OT - OK to Discharge: Yes    Subjective   Current Problem:  1. Community acquired pneumonia of both upper lobes          General:  General  Reason for Referral: decline in ADLs, PNA  Referred By: NIKITA Lugo-CNP  Past Medical History Relevant to Rehab: anxiety, sciatica, hypothyroidism, hyperlipidemia, GERD  Family/Caregiver Present: Yes  Caregiver Feedback: daughter, very supportive  Prior to Session Communication: Bedside nurse  Patient Position Received: Bed, 3 rail up, Alarm off, not on at start of session  Preferred Learning Style: verbal, visual  General Comment: Patient is a 79 year old female who presented to the ED with c/o SOB, cough. Chest xray completed indicating: Extensive patchy bilateral infiltrates most marked within the upper  lobes bilaterally. Findings are suspicious  for pneumonia. Patient admitted with community acquired PNA of both upper lobes. Patient is cleared by nursing for therapy. Patient in bed upon arrival and agreeable to participate. +4L O2 via NC, tele  Precautions:  Hearing/Visual Limitations: glasses  Medical Precautions: Fall precautions, Oxygen therapy device and L/min (4L O2 via NC)    Vital Sign (Past 2hrs)        Date/Time Vitals Session Patient Position Pulse Resp SpO2 BP MAP (mmHg)    01/04/25 1204 --  --  --  --  93 %  --  --                   Vital Signs Comment: O2 reading 86% while on 4L with patient in the chair. Patient does recover to 91% with increased time    Pain:  Pain Assessment  Pain Assessment: 0-10  0-10 (Numeric) Pain Score: 0 - No pain (no pain at rest. 7/10 with coughing in chest)    Objective   Cognition:  Overall Cognitive Status: Within Functional Limits  Orientation Level: Oriented X4  Cognition Comments: patient is pleasant and cooperative. able to follow commands appropriately throughout     Home Living:  Type of Home: House  Lives With: Alone  Home Adaptive Equipment: Walker rolling or standard, Cane, Wheelchair-manual, Reacher, Sock aid (bed rails)  Home Layout: One level  Home Access: Stairs to enter with rails, Stairs to enter without rails  Entrance Stairs-Rails:  (one railing with first step to porch, no railing through the door with second step)  Entrance Stairs-Number of Steps: 1+1  Bathroom Shower/Tub: Tub only, Walk-in shower (uses walk in)  Bathroom Toilet: Adaptive toilet seating (raised toilet with rails)  Bathroom Equipment: Grab bars in shower, Built-in shower seat  Home Living Comments: patient reports that she falls often. she is able to get up without difficulty. last major fall was in 2022, she fell down the stairs and fx her pelvis   Prior Function:  Level of [place]: Independent with ADLs and functional transfers, Independent with homemaking with ambulation  Receives Help From: Family  ADL Assistance:  Independent  Homemaking Assistance: Independent  Ambulatory Assistance: Independent  Vocational: Retired  Leisure: patient is very active in the community. She has a group that she goes to dinner with often. She enjoys spending time with her family. Enjoys traveling  Prior Function Comments: + drives  IADL History:  Homemaking Responsibilities: Yes  ADL:  Eating Assistance: Independent  Eating Deficit: Setup (per clinical judgement)  Grooming Assistance: Stand by  Grooming Deficit: Supervision/safety (per clinical judgement)  Bathing Assistance: Stand by  Bathing Deficit: Steadying, Supervision/safety, Increased time to complete  (per clinical judgement)  UE Dressing Assistance: Stand by  UE Dressing Deficit: Setup (per clinical judgement)  LE Dressing Assistance: Stand by  LE Dressing Deficit: Steadying, Increased time to complete, Supervision/safety (patient demo the ability to don socks)  Toileting Assistance with Device: Stand by  Toileting Deficit: Steadying, Supervison/safety (per clinical judgement)  Activity Tolerance:  Endurance: Decreased tolerance for upright activites  Activity Tolerance Comments: 86% while on 4L O2 via NC when sitting up in the chair. patient does recover to 91%  Bed Mobility/Transfers: Bed Mobility  Bed Mobility: Yes  Bed Mobility 1  Bed Mobility 1: Supine to sitting  Level of Assistance 1: Close supervision  Bed Mobility Comments 1: head of bed elevated    Transfers  Transfer: Yes  Transfer 1  Transfer From 1: Bed to  Transfer to 1: Stand  Technique 1: Sit to stand  Transfer Level of Assistance 1: Close supervision    Functional Mobility:  Functional Mobility  Functional Mobility Performed: Yes  Functional Mobility 1  Surface 1: Level tile  Device 1: No device  Assistance 1: Close supervision  Comments 1: ~2 steps from EOB to the chair  Sitting Balance:  Static Sitting Balance  Static Sitting-Balance Support: Feet supported, No upper extremity supported  Static Sitting-Level of  Assistance: Independent  Standing Balance:  Static Standing Balance  Static Standing-Balance Support: No upper extremity supported  Static Standing-Level of Assistance: Close supervision    IADL's:   Homemaking Responsibilities: Yes  Vision: Vision - Basic Assessment  Current Vision: Wears glasses all the time  Sensation:  Sensation Comment: patient reports 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 L LE  Strength:  Strength Comments: B UE at least >/= 3+/5 grossly  Coordination:  Movements are Fluid and Coordinated: Yes   Hand Function:  Hand Function  Gross Grasp: Functional  Coordination: Functional  Extremities: RUE   RUE : Within Functional Limits and LUE   LUE: Within Functional Limits    Outcome Measures: [facility] Daily Activity  Putting on and taking off regular lower body clothing: A little  Bathing (including washing, rinsing, drying): A little  Putting on and taking off regular upper body clothing: A little  Toileting, which includes using toilet, bedpan or urinal: A little  Taking care of personal grooming such as brushing teeth: None  Eating Meals: None  Daily Activity - Total Score: 20    Education Documentation  Body Mechanics, taught by Darling Sanabria OT at 1/4/2025  1:42 PM.  Learner: Patient  Readiness: Acceptance  Method: Explanation, Demonstration  Response: Verbalizes Understanding    Precautions, taught by Darling Sanabria OT at 1/4/2025  1:42 PM.  Learner: Patient  Readiness: Acceptance  Method: Explanation, Demonstration  Response: Verbalizes Understanding    ADL Training, taught by Darling Sanabria OT at 1/4/2025  1:42 PM.  Learner: Patient  Readiness: Acceptance  Method: Explanation, Demonstration  Response: Verbalizes Understanding    Education Comments  No comments found.      Goals:   Encounter Problems       Encounter Problems (Active)       OT Goals       ADLs (Progressing)       Start:  01/04/25    Expected End:  01/31/25       Patient will demonstrate the ability to complete ADL tasks with [place] to safely  return to PLOF.         Functional Transfers (Progressing)       Start:  01/04/25    Expected End:  01/31/25       Patient will demonstrate the ability to complete functional transfers with [place] in order to increase safety return to PLOF.         Functional Mobility  (Progressing)       Start:  01/04/25    Expected End:  01/31/25       Patient will demonstrate the ability to complete item retrieval and functional mobility with [place] in order to increase safety return to PLOF.           Activity Tolerance (Progressing)       Start:  01/04/25    Expected End:  01/31/25       Patient will demonstrate the ability to participate in functional activity at least >/= 25 minutes in order to increase patient's safety and independence with daily tasks.

## 2025-01-04 NOTE — CARE PLAN
The patient's goals for the shift include Able to breath better, safety    The clinical goals for the shift include safety

## 2025-01-04 NOTE — CARE PLAN
The patient's goals for the shift include Able to breath better, safety    The clinical goals for the shift include assess symptoms    Problem: Pain - Adult  Goal: Verbalizes/displays adequate comfort level or baseline comfort level  Outcome: Progressing     Problem: Safety - Adult  Goal: Free from fall injury  Outcome: Progressing     Problem: Discharge Planning  Goal: Discharge to home or other facility with appropriate resources  Outcome: Progressing     Problem: Chronic Conditions and Co-morbidities  Goal: Patient's chronic conditions and co-morbidity symptoms are monitored and maintained or improved  Outcome: Progressing

## 2025-01-04 NOTE — PROGRESS NOTES
Speech-Language Pathology    Inpatient Speech-Language Pathology Clinical Swallow Evaluation       Patient Name: Janet Snow  MRN: [id number]  : 1945  Today's Date: 25  Time Calculation  Start Time: 1100  Stop Time: 1133  Time Calculation (min): 33 min       Current Problem:  1. Community acquired pneumonia of both upper lobes            Past Medical History:  GERD, anxiety, sciatica, hypothyroidism, hyperlipidemia     Reason for Referral:  Pt presented to ED on 25 for SOB x 9 days.  Pt admitted for community acquired BUL pneumonia.  Pt also reports occasional swallowing difficulty that has been ongoing for about the past year.  Pt referred to Speech-Language Pathology services for Clinical Swallow Evaluation in order to assess current swallow skills and determine plan of treatment.        Relevant Imaging Results:  CXR 25--  IMPRESSION:  Extensive patchy bilateral infiltrates most marked within the upper  lobes bilaterally. Findings are suspicious for pneumonia. Follow-up  to assure complete clearing is suggested.    Assessment    Impression: Pt presenting with frequent, mostly dry, cough before initiation of trials.  No immediate or responsive cough noted post swallow trials of liquids and solids.  Pt with long history of GERD, treated with omeprazole, reporting occasional coughing and near choking post swallowing liquids for approximately the past year.  Although no overt s/s aspiration detected at bedside, given current pneumonia and reported history of s/s dysphagia, further assessment of pharyngeal phase is recommended via MBSS.    Consistencies Trialed: Ice Chips, Thin (IDDSI Level 0) - Straw, Thin (IDDSI Level 0) - Cup, Pureed/extremely thick (IDDSI Level 4), Regular (IDDSI Level 7)   Oral Motor: Within Functional Limits  Dentition: Adequate/Natural   Medical Staff Made Aware: Yes      Recommendations:  Risk for Aspiration: Yes   Solid Diet Recommendations : Regular (IDDSI Level 7)    Liquid Diet Recommendations: Thin (IDDSI Level 0)         MBSS Recommended: Yes, to further assess pharyngeal phase of swallowing    Medication Intake Method: Whole with liquid  Compensatory Swallow Strategies:   - Upright positioning as close to 90º as possible  - Small bites/sips  - Small single sips  - Slow rate of intake      Skilled dysphagia treatment may be warranted at next level of care--to be determined upon further treatment.      Plan  SLP Plan: Skilled SLP   SLP Frequency: 2x per week   Duration: 2 weeks   Next Treatment Priority: MBSS   Discussed POC: Patient, Nursing   Discussed Risks/Benefits: Yes   Patient/Caregiver Agreeable: Yes     Further treatment in acute setting:  Skilled dysphagia treatment is warranted for further education and training on dysphagia management including instruction on oropharyngeal therapeutic exercises and/or compensatory swallowing strategies    Dysphagia Goals: (Established 1/4/25, projected discontinuation 2 weeks)    - Pt will safely swallow recommended diet without s/s aspiration for 90% of observed trials in order to maintain adequate nutrition and hydration.   CURRENT STATUS: Goal established   PROGRESS: N/A  - Pt will utilize safe swallow strategies with 75% acc in order to reduce risk of aspiration and s/s dysphagia.   CURRENT STATUS: Goal established   PROGRESS: N/A  - Pt to participate in assessment of oropharyngeal swallow function via MBSS for assessment of possible aspiration and to determine least restrictive diet to meet nutritional and hydration needs.    CURRENT STATUS: Goal established   PROGRESS: N/A      Subjective   Pt pleasant and cooperative, upright in bed for assessment.  Pt reports xerostomia and reduced appetite.    General Visit Information:  Ordering Physician: Jo Caraballo DO     Reason for Referral: Assess swallowing in setting of BUL pneumonia  Current Diet : Reg/thin       Self feeding: independent    Objective  Clinical Swallow  Evaluation completed consisting of patient/caregiver interview, oral motor assessment, and analysis of swallow abilities with PO trials (4 oz water via cup & straw, puree, regular solids.)    ORAL PHASE:   Natural dentition in adequate condition.     Oral mucosa were normal in color, appeared slightly dry,but free of obvious lesions.  SLP provided supplies and assisted pt in completion of oral hygiene with toothbrush and toothpaste in order to minimize oral bacteria and provide oral moisture prior to providing trials.  Mastication of regular solids was adequate and efficient.  Bolus formation,  A-P transport, and oral clearance were also adequate.    PHARYNGEAL PHASE:   No suspected delay in onset of timeliness of swallow.  Laryngeal movement was visualized with all trials, however adequacy of hyolaryngeal elevation/excursion cannot be determined at bedside.   No overt (immediate or delayed) s/s aspiration were demonstrated with any consistencies.  Vocal quality clear post all swallows.  Pt reports occasional sensation of coughing/choking on liquids for approximately the past year, although this did not occur during current assessment.    Baseline Assessment:  Respiratory Status: Oxygen via nasal cannula  Behavior/Cognition: Alert, Cooperative, Pleasant mood  Patient Positioning: Upright in Bed  Baseline Vocal Quality: Normal  Volitional Cough: Strong  Volitional Swallow: Within Functional Limits    Pain:  Pain Assessment  Pain Assessment: 0-10  0-10 (Numeric) Pain Score: 0 - No pain     Oxygen Status:  Oxygen via nasal canula    Oral Motor Assessment:  Oral/Motor Assessment  Oral Hygiene: WFL  Dentition: Adequate/Natural  Oral Motor: Within Functional Limits  Labial ROM: Within Functional Limits  Lingual ROM: Within Functional Limits    Inpatient Education:   Individual(s) Educated: Patient  Verbal Education : Results, recommendations  Risk and Benefits Discussed with Patient/Caregiver/Other: yes  Patient/Caregiver  Demonstrated Understanding: yes  Plan of Care Discussed and Agreed Upon: yes  Patient Response to Education: Patient/Caregiver Verbalized Understanding of Information    Communication with Medical Team:  SLP communicated with bedside nurse prior to evaluation to obtain clearance for patient participation.  Results of the clinical swallow evaluation were discussed verbally with nurse upon completion with verbal understanding noted.    Consultations/Referrals/Coordination of Services: Order MBSS to be scheduled with Radiology

## 2025-01-04 NOTE — PROGRESS NOTES
Janet Snow is a 79 y.o. female on day 2 of admission presenting with Community acquired pneumonia of both upper lobes.      Subjective   Laying in bed. Reports rib soreness from coughing. Denies chest pain. Currently on 2L oxygen NC no shortness of breath.        Objective     Last Recorded Vitals  /73 (BP Location: Left arm, Patient Position: Lying)   Pulse 95   Temp 36.9 °C (98.4 °F) (Oral)   Resp 20   Wt 68 kg (150 lb)   SpO2 92%   Intake/Output last 3 Shifts:    Intake/Output Summary (Last 24 hours) at 1/4/2025 1040  Last data filed at 1/4/2025 0300  Gross per 24 hour   Intake 920 ml   Output --   Net 920 ml       Admission Weight  Weight: 68 kg (150 lb) (01/02/25 1418)    Daily Weight  01/02/25 : 68 kg (150 lb)    Image Results  ECG 12 lead  Normal sinus rhythm  Nonspecific ST abnormality  Prolonged QT  Abnormal ECG  No previous ECGs available      Physical Exam  Vitals and nursing note reviewed.   Constitutional:       Appearance: Normal appearance.   HENT:      Head: Normocephalic and atraumatic.      Mouth/Throat:      Mouth: Mucous membranes are moist.   Eyes:      Extraocular Movements: Extraocular movements intact.   Cardiovascular:      Rate and Rhythm: Normal rate and regular rhythm.      Pulses: Normal pulses.      Heart sounds: Normal heart sounds.   Pulmonary:      Breath sounds: Normal breath sounds.      Comments: 2L oxygen NC  Lungs clear  Abdominal:      General: Bowel sounds are normal.      Palpations: Abdomen is soft.   Musculoskeletal:         General: Normal range of motion.      Cervical back: Normal range of motion and neck supple.   Skin:     General: Skin is warm and dry.      Capillary Refill: Capillary refill takes less than 2 seconds.   Neurological:      General: No focal deficit present.      Mental Status: She is alert and oriented to person, place, and time.   Psychiatric:         Mood and Affect: Mood normal.         Behavior: Behavior normal.       Relevant  Results               Assessment/Plan      Assessment & Plan  Community acquired pneumonia of both upper lobes    Acute hypoxic respiratory failure secondary to CAP   On 2 L nasal cannula, baseline room air, wean as tolerated  Negative COVID/FLU/RSV  Continue Rocephin and azithromycin, Duonebs, Albuterol  Sputum culture, procalcitonin pending   Pulmonology consult.  Scheduled guaifenesin. Have added PRN hycodan  Encourage IS, OOB with assistance as tolerated     Leukocytosis  Mildly increased today. May be reactive   Patient appears to be clinically improving on assessment   BCx2 NTD, Urine culture negative.    VSS, afebrile   Continue ABX, Recheck CXR, labs in AM    Mildly elevated Cr  Mild hyponatremia   Cr 0.85-->1.06 overnight.  Sodium 134.    Gentle IVF, Recheck BMP in AM    Thrombocytosis  Likely reactive, continue to monitor.    HLD  Continue home statin      Depression, Anxiety  Continue home prozac     Sciatica  Continue gabapentin 400 mg twice daily.     Hypothyroidism  Continue levothyroxine 50 mcg daily.    DVT/GI  Lovenox/SCDs, PPI    PT OT. Falls/aspiration precautions.               Brinda Paez, APRN-CNP

## 2025-01-04 NOTE — CONSULTS
Inpatient consult to Pulmonology  Consult performed by: Joya Ho MD  Consult ordered by: NIKITA Lugo-CNP  Reason for consult: Pneumonia      Department of Medicine  Division of Pulmonary, Critical Care, and Sleep Medicine  Consultation Note    No ref. provider found    History Of Present Illness:    Janet Snow is a 79 y.o. female with a past medical history of anxiety, hypothyroidism, hyperlipidemia who presented to [facility] ED with a chief complaint of shortness of breath.  Pulmonary was consulted for concerns of pneumonia.    Per patient she has been having symptoms over the last few days since the start of [place].  States that she has been having shortness of breath with associated productive cough for green to yellow sputum.  Furthermore she associated chills.  She is however denying fevers.  Tried to treat with over-the-counter Mucinex with no relief.  Went to her PCP who started her on Augmentin and once again had no relief.  Patient did mention that she has been having conversational dyspnea as well.    Patient was seen and assessed in the ED and worked up with both laboratory studies and imaging studies.  ED BMP showed hyperglycemia to 121, mild hyponatremia 134, hypokalemia 3.2, and low albumin to 3.3.  Lactate was elevated at 2.2 but resolved to 1.8.  BNP was elevated at 251.  Initial high-sensitivity troponin was 13 and was trended to 16.  Patient had a leukocytosis of 15 which has been uptrending to 17.6 currently.  Furthermore she has anemia with a hemoglobin and hematocrit of 11 and 32.2 which is considered to drop now to a hemoglobin of 9.8 and a hematocrit of 28.2.  She does have a thrombocytosis with the most recent platelet count being 518.  Blood cultures x 2 were collected and are in process.  MRSA nares was collected and is negative.  Strep pneumonia and urine Legionella antigen are negative.  Influenza A and B, RSV, and COVID-19 PCR's were negative.  Urinalysis was  positive for leuk esterase and WBCs.  Urine culture was sent off and currently shows no growth.  Patient did have a chest x-ray in the ED which showed bilateral upper lobe patchy infiltrates.  No effusions were noted.       Review of systems:   Review of Systems   Constitutional:  Negative for chills, fever and unexpected weight change.   HENT:  Negative for congestion, rhinorrhea and sore throat.    Eyes:  Negative for visual disturbance.   Respiratory:  Positive for cough and shortness of breath. Negative for chest tightness.    Cardiovascular:  Negative for chest pain and leg swelling.   Gastrointestinal:  Negative for abdominal distention, abdominal pain, blood in stool, diarrhea, nausea and vomiting.   Endocrine: Negative for cold intolerance and heat intolerance.   Genitourinary:  Negative for dysuria.   Musculoskeletal:  Negative for myalgias.   Skin:  Negative for rash.   Neurological:  Negative for dizziness and light-headedness.   Hematological:  Negative for adenopathy.   Psychiatric/Behavioral:  Negative for behavioral problems.       A 10+ point ROS was completed and otherwise negative except as noted above and per HPI.    Past Medical History:  History reviewed. No pertinent past medical history.    Past Surgical History:  Past Surgical History:   Procedure Laterality Date    BREAST BIOPSY Right     core biopsy 20 years ago    HYSTERECTOMY      MR HEAD ANGIO WO IV CONTRAST  07/14/2018    MR HEAD ANGIO WO IV CONTRAST [facility]        Family History:  No family history on file.     Social History:   reports that she has quit smoking. Her smoking use included cigarettes. She has never used smokeless tobacco. She reports that she does not use drugs. No history on file for alcohol use.    Objective     Last Recorded Vitals:  Vitals:    01/03/25 2358 01/04/25 0710 01/04/25 1130 01/04/25 1204   BP: 152/71 167/73 133/68    BP Location: Left arm Left arm Left arm    Patient Position: Lying Lying  Lying    Pulse: 93 95 86    Resp: 20 20 20    Temp: 37 °C (98.6 °F) 36.9 °C (98.4 °F) 36.4 °C (97.5 °F)    TempSrc: Oral Oral Oral    SpO2: 92% 92% 95% 93%   Weight:       Height:           Oxygen Therapy  SpO2: 93 %  Medical Gas Therapy: Supplemental oxygen  Medical Gas Delivery Method: Nasal cannula (3L)       Physical Exam:  Physical Exam  Constitutional:       General: She is not in acute distress.     Appearance: She is not toxic-appearing.   HENT:      Head: Normocephalic and atraumatic.      Nose: Nose normal.      Mouth/Throat:      Pharynx: Oropharynx is clear.   Eyes:      Extraocular Movements: Extraocular movements intact.   Neck:      Comments: No visualized masses  Cardiovascular:      Rate and Rhythm: Normal rate and regular rhythm.      Heart sounds: No murmur heard.     No friction rub. No gallop.   Pulmonary:      Effort: No respiratory distress.      Breath sounds: Normal breath sounds. No wheezing, rhonchi or rales.   Abdominal:      General: There is no distension.      Palpations: Abdomen is soft.      Tenderness: There is no abdominal tenderness. There is no guarding.   Musculoskeletal:         General: No tenderness.      Right lower leg: No edema.      Left lower leg: No edema.   Skin:     General: Skin is warm and dry.   Neurological:      General: No focal deficit present.      Mental Status: She is alert and oriented to person, place, and time.   Psychiatric:         Mood and Affect: Mood normal.         Allergies:  Allergies   Allergen Reactions    Meperidine (Pf) Unknown     Makes pt feel crazy    Ropinirole Swelling and Other    Meperidine Other, GI intolerance and Rash       Inpatient Medications:  azithromycin, 500 mg, intravenous, q24h  calcium carbonate-vitamin D3, 1 tablet, oral, BID  cefTRIAXone, 1 g, intravenous, q24h  docusate sodium, 100 mg, oral, BID  enoxaparin, 40 mg, subcutaneous, q24h  FLUoxetine, 40 mg, oral, Daily  gabapentin, 400 mg, oral, BID  guaiFENesin, 1,200 mg,  oral, BID  ipratropium-albuteroL, 3 mL, nebulization, q6h  levothyroxine, 50 mcg, oral, Daily  magnesium oxide, 400 mg, oral, BID  [START ON 1/5/2025] pantoprazole, 40 mg, oral, Daily before breakfast  simvastatin, 20 mg, oral, Nightly      sodium chloride 0.9%, 50 mL/hr, Last Rate: 50 mL/hr (01/04/25 1414)      PRN medications: acetaminophen, benzonatate, hydrocodone-homatropine, polyethylene glycol    Outpatient Medications:  Prior to Admission medications    Medication Sig Start Date End Date Taking? Authorizing Provider   simvastatin (Zocor) 20 mg tablet Take 1 tablet (20 mg) by mouth once daily at bedtime. 12/9/24  Yes Historical Provider, MD   FLUoxetine (PROzac) 40 mg capsule Take 1 capsule (40 mg) by mouth once daily.    Historical Provider, MD   gabapentin (Neurontin) 400 mg capsule Take 1 capsule (400 mg) by mouth 2 times a day.    Historical Provider, MD   levothyroxine (Synthroid, Levoxyl) 50 mcg tablet Take 1 tablet (50 mcg) by mouth early in the morning..    Historical Provider, MD       LABS/IMAGING/DIAGNOSTICS REVIEW:    Labs:  Lab Results   Component Value Date    WBC 17.6 (H) 01/04/2025    HGB 9.8 (L) 01/04/2025    HCT 28.2 (L) 01/04/2025    MCV 87 01/04/2025     (H) 01/04/2025      Lab Results   Component Value Date    GLUCOSE 91 01/04/2025    CALCIUM 8.1 (L) 01/04/2025     (L) 01/04/2025    K 3.7 01/04/2025    CO2 23 01/04/2025     01/04/2025    BUN 18 01/04/2025    CREATININE 1.06 (H) 01/04/2025      Lab Results   Component Value Date    ALT 27 01/02/2025    AST 32 01/02/2025    ALKPHOS 96 01/02/2025    BILITOT 1.1 01/02/2025        No results found for this or any previous visit from the past 10 days.    XR chest 2 views 01/02/2025    Narrative  Interpreted By:  Jaxon Morales,  STUDY:  XR CHEST 2 VIEWS; 1/2/2025 3:05 pm    INDICATION:  Signs/Symptoms:fever, productive cough, chest pain.    COMPARISON:  06/14/2022    ACCESSION NUMBER(S):  [id number]    ORDERING  "CLINICIAN:  DANIAL COATES    TECHNIQUE:  AP and lateral views of the chest were obtained.    FINDINGS:  The cardiac silhouette is normal in appearance. There are new patchy  bilateral alveolar infiltrates most marked within the upper lobes  bilaterally .  No effusions are identified.    Impression  Extensive patchy bilateral infiltrates most marked within the upper  lobes bilaterally. Findings are suspicious for pneumonia. Follow-up  to assure complete clearing is suggested.    MACRO:  none    Signed by: Jaxon Morales 1/2/2025 3:43 PM  Dictation workstation:   [id number]      Pulmonary Functions Testing Results:  No results found for: \"FEV1\", \"FVC\", \"ICZ8LVE\", \"TLC\", \"DLCO\"     Relevant imaging results were personally reviewed.      Assessment/Plan     Janet Snow is a 79 y.o. female with a past medical history of anxiety, hypothyroidism, hyperlipidemia who presented to [facility] ED with a chief complaint of shortness of breath.  Pulmonary was consulted for concerns of pneumonia.    Patient was seen and assessed by myself and his chart was reviewed for previous pulmonary visits, Labs and imaging.  Overall her chest x-ray appears to have bilateral upper lobe patchy infiltrates.  No effusions were noted.  Thus far her infectious workup was negative for strep pneumo urine antigen, Legionella urine antigen, influenza A and B, RSV, and COVID-19 PCR.  Blood cultures show no growth to date x 1 day.  Furthermore, MRSA nares was negative.  She does have a leukocytosis and does have symptoms of pneumonia however.  Will continue to treat with antibiotics.    Impressions and recommendations below:    #Hypoxic respiratory failure  #Bilateral upper lobe infiltrates  #Pneumonia of the bilateral upper lobes due to unknown organism  #Leukocytosis secondary to above    Recommendations:  -Can likely discontinue azithromycin as atypicals are negative; order discontinued  -Continue ceftriaxone  -Titrate FiO2 to SpO2 " greater than 92%  -Bronchodilators as needed  -Aggressive bronchopulmonary hygiene with I-S and flutter valve.  Can consider consulting RT for IPV if patient is unable to coordinate handheld bronchopulmonary hygiene.  -Continue to follow blood cultures  -Obtain procalcitonin; order placed      I spent 60 minutes in the professional and overall care of this patient.    Pulmonary team will continue to follow the patient while they are in house.     Joya Ho MD, MPH  Pulmonary and Critical Care Medicine     Please excuse any typographical or unwanted errors as voice recognition software was used to complete this note.

## 2025-01-05 ENCOUNTER — APPOINTMENT (OUTPATIENT)
Dept: RADIOLOGY | Facility: HOSPITAL | Age: 80
End: 2025-01-05
Payer: MEDICARE

## 2025-01-05 VITALS
SYSTOLIC BLOOD PRESSURE: 159 MMHG | DIASTOLIC BLOOD PRESSURE: 80 MMHG | TEMPERATURE: 97.3 F | WEIGHT: 154.32 LBS | BODY MASS INDEX: 27.34 KG/M2 | RESPIRATION RATE: 38 BRPM | OXYGEN SATURATION: 96 % | HEART RATE: 98 BPM | HEIGHT: 63 IN

## 2025-01-05 LAB
ALBUMIN SERPL BCP-MCNC: 3 G/DL (ref 3.4–5)
ANION GAP BLDA CALCULATED.4IONS-SCNC: 12 MMO/L (ref 10–25)
ANION GAP SERPL CALCULATED.3IONS-SCNC: 13 MMOL/L (ref 10–20)
APPARATUS: ABNORMAL
ARTERIAL PATENCY WRIST A: POSITIVE
ARTERIAL PATENCY WRIST A: POSITIVE
BACTERIA BLD CULT: NORMAL
BACTERIA BLD CULT: NORMAL
BASE EXCESS BLDA CALC-SCNC: -3.9 MMOL/L (ref -2–3)
BASE EXCESS BLDA CALC-SCNC: -4.4 MMOL/L (ref -2–3)
BNP SERPL-MCNC: 224 PG/ML (ref 0–99)
BODY TEMPERATURE: 37 DEGREES CELSIUS
BODY TEMPERATURE: 37 DEGREES CELSIUS
BUN SERPL-MCNC: 26 MG/DL (ref 6–23)
C3 SERPL-MCNC: 160 MG/DL (ref 87–200)
C4 SERPL-MCNC: 30 MG/DL (ref 10–50)
CA-I BLDA-SCNC: 1.2 MMOL/L (ref 1.1–1.33)
CALCIUM SERPL-MCNC: 8.5 MG/DL (ref 8.6–10.3)
CHLORIDE BLDA-SCNC: 104 MMOL/L (ref 98–107)
CHLORIDE SERPL-SCNC: 101 MMOL/L (ref 98–107)
CO2 SERPL-SCNC: 23 MMOL/L (ref 21–32)
CREAT SERPL-MCNC: 1.49 MG/DL (ref 0.5–1.05)
CREAT UR-MCNC: 81.1 MG/DL (ref 20–320)
CREAT UR-MCNC: 81.1 MG/DL (ref 20–320)
CRITICAL CALL TIME: 818
CRITICAL CALLED BY: ABNORMAL
CRITICAL CALLED TO: ABNORMAL
CRITICAL NOTE: ABNORMAL
CRITICAL READ BACK: ABNORMAL
CRP SERPL-MCNC: 35.67 MG/DL
EGFRCR SERPLBLD CKD-EPI 2021: 36 ML/MIN/1.73M*2
EPAP CMH2O: 10 CM H2O
ERYTHROCYTE [DISTWIDTH] IN BLOOD BY AUTOMATED COUNT: 12 % (ref 11.5–14.5)
ERYTHROCYTE [SEDIMENTATION RATE] IN BLOOD BY WESTERGREN METHOD: 77 MM/H (ref 0–30)
GLUCOSE BLDA-MCNC: 152 MG/DL (ref 74–99)
GLUCOSE SERPL-MCNC: 124 MG/DL (ref 74–99)
HCO3 BLDA-SCNC: 20.2 MMOL/L (ref 22–26)
HCO3 BLDA-SCNC: 21.6 MMOL/L (ref 22–26)
HCT VFR BLD AUTO: 26.1 % (ref 36–46)
HCT VFR BLD EST: 27 % (ref 36–46)
HGB BLD-MCNC: 9.1 G/DL (ref 12–16)
HGB BLDA-MCNC: 9.1 G/DL (ref 12–16)
INHALED O2 CONCENTRATION: 100 %
INHALED O2 CONCENTRATION: 80 %
IPAP CMH2O: 14 CM H2O
LACTATE BLDA-SCNC: 1.2 MMOL/L (ref 0.4–2)
M PNEUMO IGM SER IA-ACNC: 0.44 U/L
MAGNESIUM SERPL-MCNC: 1.84 MG/DL (ref 1.6–2.4)
MCH RBC QN AUTO: 30.6 PG (ref 26–34)
MCHC RBC AUTO-ENTMCNC: 34.9 G/DL (ref 32–36)
MCV RBC AUTO: 88 FL (ref 80–100)
NRBC BLD-RTO: 0 /100 WBCS (ref 0–0)
OSMOLALITY UR: 360 MOSM/KG (ref 200–1200)
OXYHGB MFR BLDA: 76.2 % (ref 94–98)
OXYHGB MFR BLDA: 97.7 % (ref 94–98)
PCO2 BLDA: 35 MM HG (ref 38–42)
PCO2 BLDA: 40 MM HG (ref 38–42)
PH BLDA: 7.34 PH (ref 7.38–7.42)
PH BLDA: 7.37 PH (ref 7.38–7.42)
PHOSPHATE SERPL-MCNC: 3.7 MG/DL (ref 2.5–4.9)
PLATELET # BLD AUTO: 572 X10*3/UL (ref 150–450)
PO2 BLDA: 108 MM HG (ref 85–95)
PO2 BLDA: 44 MM HG (ref 85–95)
POTASSIUM BLDA-SCNC: 4.1 MMOL/L (ref 3.5–5.3)
POTASSIUM SERPL-SCNC: 3.4 MMOL/L (ref 3.5–5.3)
PROT SERPL-MCNC: 6.6 G/DL (ref 6.4–8.2)
PROT UR-MCNC: 196 MG/DL (ref 5–24)
PROT/CREAT UR: 2.42 MG/MG CREAT (ref 0–0.17)
RBC # BLD AUTO: 2.97 X10*6/UL (ref 4–5.2)
SAO2 % BLDA: 78 % (ref 94–100)
SAO2 % BLDA: 99 % (ref 94–100)
SODIUM BLDA-SCNC: 133 MMOL/L (ref 136–145)
SODIUM SERPL-SCNC: 134 MMOL/L (ref 136–145)
SODIUM UR-SCNC: 17 MMOL/L
SODIUM/CREAT UR-RTO: 21 MMOL/G CREAT
SPECIMEN DRAWN FROM PATIENT: ABNORMAL
SPECIMEN DRAWN FROM PATIENT: ABNORMAL
TSH SERPL-ACNC: 1.35 MIU/L (ref 0.44–3.98)
VENTILATOR MODE: ABNORMAL
WBC # BLD AUTO: 21.2 X10*3/UL (ref 4.4–11.3)

## 2025-01-05 PROCEDURE — 94799 UNLISTED PULMONARY SVC/PX: CPT

## 2025-01-05 PROCEDURE — 94640 AIRWAY INHALATION TREATMENT: CPT

## 2025-01-05 PROCEDURE — 36600 WITHDRAWAL OF ARTERIAL BLOOD: CPT

## 2025-01-05 PROCEDURE — 2500000002 HC RX 250 W HCPCS SELF ADMINISTERED DRUGS (ALT 637 FOR MEDICARE OP, ALT 636 FOR OP/ED): Performed by: INTERNAL MEDICINE

## 2025-01-05 PROCEDURE — 86160 COMPLEMENT ANTIGEN: CPT | Mod: WESLAB | Performed by: INTERNAL MEDICINE

## 2025-01-05 PROCEDURE — 99233 SBSQ HOSP IP/OBS HIGH 50: CPT

## 2025-01-05 PROCEDURE — 2500000004 HC RX 250 GENERAL PHARMACY W/ HCPCS (ALT 636 FOR OP/ED): Performed by: INTERNAL MEDICINE

## 2025-01-05 PROCEDURE — 71275 CT ANGIOGRAPHY CHEST: CPT | Performed by: RADIOLOGY

## 2025-01-05 PROCEDURE — 71045 X-RAY EXAM CHEST 1 VIEW: CPT | Performed by: RADIOLOGY

## 2025-01-05 PROCEDURE — 84443 ASSAY THYROID STIM HORMONE: CPT

## 2025-01-05 PROCEDURE — 85652 RBC SED RATE AUTOMATED: CPT | Performed by: INTERNAL MEDICINE

## 2025-01-05 PROCEDURE — 2500000001 HC RX 250 WO HCPCS SELF ADMINISTERED DRUGS (ALT 637 FOR MEDICARE OP)

## 2025-01-05 PROCEDURE — 2550000001 HC RX 255 CONTRASTS: Performed by: INTERNAL MEDICINE

## 2025-01-05 PROCEDURE — 86038 ANTINUCLEAR ANTIBODIES: CPT | Mod: WESLAB | Performed by: INTERNAL MEDICINE

## 2025-01-05 PROCEDURE — 51701 INSERT BLADDER CATHETER: CPT

## 2025-01-05 PROCEDURE — 2500000004 HC RX 250 GENERAL PHARMACY W/ HCPCS (ALT 636 FOR OP/ED)

## 2025-01-05 PROCEDURE — 86320 SERUM IMMUNOELECTROPHORESIS: CPT | Performed by: INTERNAL MEDICINE

## 2025-01-05 PROCEDURE — 94660 CPAP INITIATION&MGMT: CPT

## 2025-01-05 PROCEDURE — 83935 ASSAY OF URINE OSMOLALITY: CPT | Mod: WESLAB | Performed by: INTERNAL MEDICINE

## 2025-01-05 PROCEDURE — 84156 ASSAY OF PROTEIN URINE: CPT | Performed by: INTERNAL MEDICINE

## 2025-01-05 PROCEDURE — 84165 PROTEIN E-PHORESIS SERUM: CPT | Performed by: INTERNAL MEDICINE

## 2025-01-05 PROCEDURE — 82805 BLOOD GASES W/O2 SATURATION: CPT

## 2025-01-05 PROCEDURE — 85027 COMPLETE CBC AUTOMATED: CPT | Performed by: INTERNAL MEDICINE

## 2025-01-05 PROCEDURE — 5A09357 ASSISTANCE WITH RESPIRATORY VENTILATION, LESS THAN 24 CONSECUTIVE HOURS, CONTINUOUS POSITIVE AIRWAY PRESSURE: ICD-10-PCS | Performed by: STUDENT IN AN ORGANIZED HEALTH CARE EDUCATION/TRAINING PROGRAM

## 2025-01-05 PROCEDURE — 76770 US EXAM ABDO BACK WALL COMP: CPT | Performed by: RADIOLOGY

## 2025-01-05 PROCEDURE — 84155 ASSAY OF PROTEIN SERUM: CPT | Mod: WESLAB | Performed by: INTERNAL MEDICINE

## 2025-01-05 PROCEDURE — 83880 ASSAY OF NATRIURETIC PEPTIDE: CPT

## 2025-01-05 PROCEDURE — 2500000002 HC RX 250 W HCPCS SELF ADMINISTERED DRUGS (ALT 637 FOR MEDICARE OP, ALT 636 FOR OP/ED)

## 2025-01-05 PROCEDURE — 71045 X-RAY EXAM CHEST 1 VIEW: CPT

## 2025-01-05 PROCEDURE — 99291 CRITICAL CARE FIRST HOUR: CPT

## 2025-01-05 PROCEDURE — 87632 RESP VIRUS 6-11 TARGETS: CPT | Performed by: INTERNAL MEDICINE

## 2025-01-05 PROCEDURE — 36415 COLL VENOUS BLD VENIPUNCTURE: CPT

## 2025-01-05 PROCEDURE — 80069 RENAL FUNCTION PANEL: CPT | Performed by: INTERNAL MEDICINE

## 2025-01-05 PROCEDURE — 84300 ASSAY OF URINE SODIUM: CPT | Performed by: INTERNAL MEDICINE

## 2025-01-05 PROCEDURE — 87305 ASPERGILLUS AG IA: CPT | Performed by: INTERNAL MEDICINE

## 2025-01-05 PROCEDURE — 87449 NOS EACH ORGANISM AG IA: CPT | Performed by: INTERNAL MEDICINE

## 2025-01-05 PROCEDURE — 84145 PROCALCITONIN (PCT): CPT | Mod: WESLAB

## 2025-01-05 PROCEDURE — 9420000001 HC RT PATIENT EDUCATION 5 MIN

## 2025-01-05 PROCEDURE — 36415 COLL VENOUS BLD VENIPUNCTURE: CPT | Performed by: INTERNAL MEDICINE

## 2025-01-05 PROCEDURE — 83735 ASSAY OF MAGNESIUM: CPT | Performed by: INTERNAL MEDICINE

## 2025-01-05 PROCEDURE — 2500000005 HC RX 250 GENERAL PHARMACY W/O HCPCS: Performed by: INTERNAL MEDICINE

## 2025-01-05 PROCEDURE — 86140 C-REACTIVE PROTEIN: CPT | Performed by: INTERNAL MEDICINE

## 2025-01-05 PROCEDURE — 2020000001 HC ICU ROOM DAILY

## 2025-01-05 PROCEDURE — 86334 IMMUNOFIX E-PHORESIS SERUM: CPT | Mod: WESLAB | Performed by: INTERNAL MEDICINE

## 2025-01-05 RX ORDER — POTASSIUM CHLORIDE 1.5 G/1.58G
40 POWDER, FOR SOLUTION ORAL 2 TIMES DAILY
Status: DISCONTINUED | OUTPATIENT
Start: 2025-01-05 | End: 2025-01-06

## 2025-01-05 RX ORDER — ALBUTEROL SULFATE 0.83 MG/ML
2.5 SOLUTION RESPIRATORY (INHALATION) EVERY 2 HOUR PRN
Status: DISCONTINUED | OUTPATIENT
Start: 2025-01-05 | End: 2025-01-14

## 2025-01-05 RX ORDER — SODIUM CHLORIDE AND POTASSIUM CHLORIDE 150; 900 MG/100ML; MG/100ML
75 INJECTION, SOLUTION INTRAVENOUS CONTINUOUS
Status: DISCONTINUED | OUTPATIENT
Start: 2025-01-05 | End: 2025-01-05

## 2025-01-05 RX ORDER — SODIUM CHLORIDE 9 MG/ML
75 INJECTION, SOLUTION INTRAVENOUS CONTINUOUS
Status: DISCONTINUED | OUTPATIENT
Start: 2025-01-05 | End: 2025-01-05

## 2025-01-05 RX ORDER — MORPHINE SULFATE 4 MG/ML
6 INJECTION, SOLUTION INTRAMUSCULAR; INTRAVENOUS ONCE
Status: COMPLETED | OUTPATIENT
Start: 2025-01-05 | End: 2025-01-05

## 2025-01-05 RX ORDER — HYDROCODONE BITARTRATE AND HOMATROPINE METHYLBROMIDE ORAL SOLUTION 5; 1.5 MG/5ML; MG/5ML
5 LIQUID ORAL EVERY 4 HOURS PRN
Status: DISCONTINUED | OUTPATIENT
Start: 2025-01-05 | End: 2025-01-07

## 2025-01-05 RX ORDER — IPRATROPIUM BROMIDE AND ALBUTEROL SULFATE 2.5; .5 MG/3ML; MG/3ML
3 SOLUTION RESPIRATORY (INHALATION)
Status: DISCONTINUED | OUTPATIENT
Start: 2025-01-05 | End: 2025-01-14

## 2025-01-05 RX ORDER — ERGOCALCIFEROL 1.25 MG/1
1250 CAPSULE ORAL WEEKLY
Status: DISCONTINUED | OUTPATIENT
Start: 2025-01-05 | End: 2025-01-06

## 2025-01-05 RX ADMIN — IPRATROPIUM BROMIDE AND ALBUTEROL SULFATE 3 ML: 2.5; .5 SOLUTION RESPIRATORY (INHALATION) at 02:51

## 2025-01-05 RX ADMIN — SODIUM CHLORIDE AND POTASSIUM CHLORIDE 75 ML/HR: 9; 1.49 INJECTION, SOLUTION INTRAVENOUS at 10:23

## 2025-01-05 RX ADMIN — IPRATROPIUM BROMIDE AND ALBUTEROL SULFATE 3 ML: 2.5; .5 SOLUTION RESPIRATORY (INHALATION) at 15:15

## 2025-01-05 RX ADMIN — MORPHINE SULFATE 4 MG: 4 INJECTION, SOLUTION INTRAMUSCULAR; INTRAVENOUS at 09:01

## 2025-01-05 RX ADMIN — HYDROCODONE BITARTRATE AND HOMATROPINE METHYLBROMIDE 5 ML: 5; 1.5 SOLUTION ORAL at 07:58

## 2025-01-05 RX ADMIN — DOXYCYCLINE 100 MG: 100 INJECTION, POWDER, LYOPHILIZED, FOR SOLUTION INTRAVENOUS at 13:36

## 2025-01-05 RX ADMIN — Medication 100 PERCENT: at 11:16

## 2025-01-05 RX ADMIN — IPRATROPIUM BROMIDE AND ALBUTEROL SULFATE 3 ML: 2.5; .5 SOLUTION RESPIRATORY (INHALATION) at 11:15

## 2025-01-05 RX ADMIN — PIPERACILLIN SODIUM AND TAZOBACTAM SODIUM 2.25 G: 2; .25 INJECTION, SOLUTION INTRAVENOUS at 15:31

## 2025-01-05 RX ADMIN — METHYLPREDNISOLONE SODIUM SUCCINATE 125 MG: 125 INJECTION, POWDER, FOR SOLUTION INTRAMUSCULAR; INTRAVENOUS at 09:08

## 2025-01-05 RX ADMIN — LEVOTHYROXINE SODIUM 50 MCG: 0.05 TABLET ORAL at 06:05

## 2025-01-05 RX ADMIN — DOCUSATE SODIUM 100 MG: 100 CAPSULE, LIQUID FILLED ORAL at 21:00

## 2025-01-05 RX ADMIN — Medication 1 TABLET: at 21:00

## 2025-01-05 RX ADMIN — Medication 40 PERCENT: at 20:50

## 2025-01-05 RX ADMIN — PIPERACILLIN SODIUM AND TAZOBACTAM SODIUM 2.25 G: 2; .25 INJECTION, SOLUTION INTRAVENOUS at 10:21

## 2025-01-05 RX ADMIN — PANTOPRAZOLE SODIUM 40 MG: 40 TABLET, DELAYED RELEASE ORAL at 06:05

## 2025-01-05 RX ADMIN — GUAIFENESIN 1200 MG: 600 TABLET, MULTILAYER, EXTENDED RELEASE ORAL at 21:00

## 2025-01-05 RX ADMIN — METHYLPREDNISOLONE SODIUM SUCCINATE 40 MG: 40 INJECTION, POWDER, LYOPHILIZED, FOR SOLUTION INTRAMUSCULAR; INTRAVENOUS at 21:01

## 2025-01-05 RX ADMIN — IPRATROPIUM BROMIDE AND ALBUTEROL SULFATE 3 ML: 2.5; .5 SOLUTION RESPIRATORY (INHALATION) at 20:50

## 2025-01-05 RX ADMIN — SIMVASTATIN 20 MG: 20 TABLET, FILM COATED ORAL at 21:00

## 2025-01-05 RX ADMIN — POTASSIUM CHLORIDE 40 MEQ: 1.5 FOR SOLUTION ORAL at 20:59

## 2025-01-05 RX ADMIN — IPRATROPIUM BROMIDE AND ALBUTEROL SULFATE 3 ML: 2.5; .5 SOLUTION RESPIRATORY (INHALATION) at 07:55

## 2025-01-05 RX ADMIN — METHYLPREDNISOLONE SODIUM SUCCINATE 40 MG: 40 INJECTION, POWDER, LYOPHILIZED, FOR SOLUTION INTRAMUSCULAR; INTRAVENOUS at 15:29

## 2025-01-05 RX ADMIN — Medication 400 MG: at 21:00

## 2025-01-05 RX ADMIN — ENOXAPARIN SODIUM 40 MG: 40 INJECTION SUBCUTANEOUS at 18:20

## 2025-01-05 RX ADMIN — PIPERACILLIN SODIUM AND TAZOBACTAM SODIUM 2.25 G: 2; .25 INJECTION, SOLUTION INTRAVENOUS at 21:06

## 2025-01-05 RX ADMIN — IOHEXOL 75 ML: 350 INJECTION, SOLUTION INTRAVENOUS at 15:54

## 2025-01-05 ASSESSMENT — PAIN SCALES - GENERAL
PAINLEVEL_OUTOF10: 0 - NO PAIN

## 2025-01-05 ASSESSMENT — ENCOUNTER SYMPTOMS
SLEEP DISTURBANCE: 0
ARTHRALGIAS: 0
WOUND: 0
LIGHT-HEADEDNESS: 0
BACK PAIN: 0
CONSTIPATION: 0
NAUSEA: 0
POLYDIPSIA: 0
SEIZURES: 0
WHEEZING: 0
FEVER: 0
MYALGIAS: 0
VOMITING: 0
BLOOD IN STOOL: 0
ABDOMINAL PAIN: 0
SORE THROAT: 0
JOINT SWELLING: 0
DIZZINESS: 0
NUMBNESS: 0
PALPITATIONS: 0
NECK PAIN: 0
DYSURIA: 0
HALLUCINATIONS: 0
TREMORS: 0
DIARRHEA: 0
APNEA: 0
SINUS PRESSURE: 0
PHOTOPHOBIA: 0
COLOR CHANGE: 0
FREQUENCY: 0
CONFUSION: 0
WEAKNESS: 1
SPEECH DIFFICULTY: 0
SHORTNESS OF BREATH: 1
POLYPHAGIA: 0
HEMATURIA: 0
HEADACHES: 0
BRUISES/BLEEDS EASILY: 0
CHILLS: 0
COUGH: 1
RECTAL PAIN: 0
FATIGUE: 1
ADENOPATHY: 0

## 2025-01-05 ASSESSMENT — PAIN SCALES - WONG BAKER
WONGBAKER_NUMERICALRESPONSE: NO HURT
WONGBAKER_NUMERICALRESPONSE: NO HURT

## 2025-01-05 ASSESSMENT — PAIN - FUNCTIONAL ASSESSMENT
PAIN_FUNCTIONAL_ASSESSMENT: 0-10

## 2025-01-05 NOTE — PROGRESS NOTES
Janet Snow is a 79 y.o. female on day 3 of admission presenting with Community acquired pneumonia of both upper lobes.      Subjective   Notified by nursing that patient is in worsening respiratory failure. Unable to get accurate pulse ox saturation.        Objective     Last Recorded Vitals  /61 (BP Location: Left arm, Patient Position: Lying)   Pulse 108   Temp 37.1 °C (98.8 °F) (Oral)   Resp 20   Wt 68 kg (150 lb)   SpO2 92%   Intake/Output last 3 Shifts:  No intake or output data in the 24 hours ending 01/05/25 0835      Admission Weight  Weight: 68 kg (150 lb) (01/02/25 1418)    Daily Weight  01/02/25 : 68 kg (150 lb)    Image Results  ECG 12 lead  Normal sinus rhythm  Nonspecific ST abnormality  Prolonged QT  Abnormal ECG  No previous ECGs available      Physical Exam  Vitals and nursing note reviewed. Exam conducted with a chaperone present.   Constitutional:       Appearance: Normal appearance.   HENT:      Head: Normocephalic and atraumatic.      Mouth/Throat:      Mouth: Mucous membranes are moist.   Eyes:      Extraocular Movements: Extraocular movements intact.   Cardiovascular:      Rate and Rhythm: Regular rhythm. Tachycardia present.      Pulses: Normal pulses.      Heart sounds: Normal heart sounds.   Pulmonary:      Effort: Respiratory distress present.      Breath sounds: Normal breath sounds.      Comments: No wheezing rales or rhonchi  NonRB mask signficant conversational dyspnea noted  Abdominal:      General: Bowel sounds are normal.      Palpations: Abdomen is soft.   Musculoskeletal:         General: Normal range of motion.      Cervical back: Normal range of motion and neck supple.   Skin:     General: Skin is warm and dry.      Capillary Refill: Capillary refill takes less than 2 seconds.   Neurological:      General: No focal deficit present.      Mental Status: She is alert and oriented to person, place, and time.   Psychiatric:         Mood and Affect: Mood normal.          Relevant Results               Assessment/Plan      Assessment & Plan  Community acquired pneumonia of both upper lobes    Acute hypoxic respiratory failure secondary to CAP   --Getting worse  Currently on BIPAP baseline room air  Negative COVID/FLU/RSV  Continue scheduled Duonebs, Albuterol  Sputum culture, procalcitonin pending   Pulmonology consult. They have discontinued azithromycin, continuing rocephin   Scheduled guaifenesin. Have added PRN hycodan  Encourage IS, OOB with assistance as tolerated     Leukocytosis  --Getting worse  15.4--17.6--21.2 today 1/5/25  BCx2 NTD, Urine culture negative.    Stat CXR ordered  Continue ABX. Pulm dc'd azithromycin yesterday  ID Consult     Acute Kidney Injury  Hyponatremia  Hypokalemia   --Getting worse  Cr 0.85-->1.06--1.49 today 1/5/25  Sodium 134.  K 3.4  Increased maintenance IVF, with KCL  Ordered renal ultrasound  Nephrology consult, will appreciate reccs    Thrombocytosis  --Getting worse  Likely reactive to the above  Daily CBC    HLD  Continue home statin      Depression, Anxiety  Continue home prozac     Sciatica  Hold gabapentin for now     Hypothyroidism  Continue home synthroid     DVT/GI  Lovenox/SCDs, PPI    PT OT. Falls/aspiration precautions.     1/5/25: Notified by nursing that patient is in worsening respiratory failure. Morning labs demonstrate worsening acute kidney failure and worsening leukocytosis despite being on continuous fluids all night and despite being on antibiotics. Interestingly urine and blood cultures negative to date. Arrived to the room with RN, RT bedside and patient is on Non-rebreather mask with pulse ox mid 80s. Patient looks visibly in distress with significant conversational dyspnea. Assisted patient to sit on edge of bed. Have ordered STAT ABG and STAT CXR. Bolus 500cc NS fluids. Have increased maintenance fluids to 75cc/hr with 20mEQ KCL to address hypokalemia. Discussed with attending physician Dr. Fontanez, who is also  bedside. Attending has discussed case with ICU team who is also bedside. Have also consulted Nephrology who evaluated bedside. Infectious disease has also been consulted to assist in managing this now critically ill patient.  Addendum: Patient now placed on bipap, ABG results reviewed with RT, attending and ICU team. Patient will be transferred to ICU for escalation of care.               Brinda Paez, APRN-CNP

## 2025-01-05 NOTE — CARE PLAN
Problem: Respiratory  Goal: Minimal/no exertional discomfort or dyspnea this shift  Outcome: Progressing  Goal: No signs of respiratory distress (eg. Use of accessory muscles. Peds grunting)  Outcome: Not Progressing  Goal: Verbalize decreased shortness of breath this shift  Outcome: Progressing  Goal: Wean oxygen to maintain O2 saturation per order/standard this shift  1/5/2025 1559 by Faye Mancilla, RRT  Outcome: Progressing

## 2025-01-05 NOTE — NURSING NOTE
Patient appeared to be resting when RN entered room.  However, when patient was aroused by nurse and the respiratory therapist, her breathing became more labored and her oxygen saturations continued be be all over the place from 70's -90's.  CNP notified and asked to come to bedside to evaluate patient. Patient began a coughing jag that was difficult to recover from. Patient placed on non rebreather while evaluating.  ABG's ordered and stat port CXR.  Dr. Fontanez and Dr. Hemphill also at bedside to evaluate patient.  Rapid response nurse came to bedside also to evaluate for ICU.  Decision made to transfer patient based on ABGs results and the need for bipap.

## 2025-01-05 NOTE — NURSING NOTE
Rounding as Rapid Response RN-pt observed in tripoid position at bedside, RT, NP, RN, Dr Fontanez and Dr Hemphill at bedside. Report received from bedside Rosy RN, pt desating and worsening sob. ABG completed by RT, placed on cpap. ICU SARAH Cheney at bedside, new order to transfer to ICU. This RN assisted in transfer to ICU.

## 2025-01-05 NOTE — PROGRESS NOTES
Physical Therapy                 Therapy Communication Note    Patient Name: Janet Snow  MRN: [id number]  Department: [facility] 3 E ICU  Room: 19/19-A  Today's Date: 1/5/2025     Discipline: Physical Therapy    PT Missed Visit: Yes     Missed Visit Reason: Missed Visit Reason: Other (Comment) (pt being transferred to ICU at this time per RN. Pt desating with worsening SOB).    Missed Time: Attempt

## 2025-01-05 NOTE — CONSULTS
Inpatient consult to Infectious Diseases  Consult performed by: Max Sanchez MD  Consult ordered by: Shravan Barroso PA-C          Primary MD: Thor Quinonez DO    Reason For Consult  Pneumonia    History Of Present Illness  Janet Snow is a 79 y.o. female presenting with shortness of breath.  Onset was about 10 days ago.  It was gradual, constant, with interval worsening.  She had associated cough productive of greenish/yellowish sputum.  She was placed on Augmentin without any improvement.  She came to the hospital for further evaluation and management.  She had a CT of the chest which was remarkable for bilateral infiltrates.  She was started on ceftriaxone.  She had interval worsening of her respiratory status and was transferred to ICU.  She is on BiPAP.  Workup was also remarkable for elevated serum creatinine.  She denies any fever or chills.       Past Medical History  She has no past medical history on file.    Surgical History  She has a past surgical history that includes MR angio head wo IV contrast (07/14/2018); Hysterectomy; and Breast biopsy (Right).     Social History     Occupational History    Not on file   Tobacco Use    Smoking status: Former     Types: Cigarettes    Smokeless tobacco: Never   Vaping Use    Vaping status: Not on file   Substance and Sexual Activity    Alcohol use: Not on file    Drug use: Never    Sexual activity: Not Currently     Travel History   Travel since 12/05/24    No documented travel since 12/05/24         Family History  No family history on file.  Allergies  Meperidine (pf), Ropinirole, and Meperidine     Immunization History   Administered Date(s) Administered    COVID-19, mRNA, LNP-S, PF, 30 mcg/0.3 mL dose 02/09/2021, 03/02/2021, 10/13/2021    Moderna COVID-19 vaccine, 12 years and older (50mcg/0.5mL)(Spikevax) 09/22/2024    Pfizer COVID-19 vaccine, 12 years and older, (30mcg/0.3mL) (Comirnaty) 11/06/2023    Pfizer COVID-19 vaccine, bivalent, age 12  years and older (30 mcg/0.3 mL) 09/21/2022, 12/22/2022    Pfizer Gray Cap SARS-CoV-2 07/14/2022     Medications  Home medications:  Medications Prior to Admission   Medication Sig Dispense Refill Last Dose/Taking    simvastatin (Zocor) 20 mg tablet Take 1 tablet (20 mg) by mouth once daily at bedtime.   Taking    FLUoxetine (PROzac) 40 mg capsule Take 1 capsule (40 mg) by mouth once daily.       gabapentin (Neurontin) 400 mg capsule Take 1 capsule (400 mg) by mouth 2 times a day.       levothyroxine (Synthroid, Levoxyl) 50 mcg tablet Take 1 tablet (50 mcg) by mouth early in the morning..        Current medications:  Scheduled medications  calcium carbonate-vitamin D3, 1 tablet, oral, BID  docusate sodium, 100 mg, oral, BID  doxycycline, 100 mg, intravenous, q12h  enoxaparin, 40 mg, subcutaneous, q24h  ergocalciferol, 1,250 mcg, oral, Weekly  FLUoxetine, 40 mg, oral, Daily  guaiFENesin, 1,200 mg, oral, BID  ipratropium-albuteroL, 3 mL, nebulization, q4h  levothyroxine, 50 mcg, oral, Daily  magnesium oxide, 400 mg, oral, BID  methylPREDNISolone sodium succinate (PF), 40 mg, intravenous, q6h  oxygen, , inhalation, Continuous - Inhalation  pantoprazole, 40 mg, oral, Daily before breakfast  piperacillin-tazobactam, 2.25 g, intravenous, q6h  potassium chloride, 40 mEq, oral, BID  simvastatin, 20 mg, oral, Nightly  sodium chloride, 500 mL, intravenous, Once      Continuous medications  potassium chloride in 0.9%NaCl, 75 mL/hr, Last Rate: 75 mL/hr (01/05/25 1056)      PRN medications  PRN medications: acetaminophen, albuterol, benzonatate, hydrocodone-homatropine, oxygen, polyethylene glycol    Review of Systems   Constitutional:  Positive for fatigue. Negative for chills and fever.   Respiratory:  Positive for cough and shortness of breath.    Cardiovascular:  Negative for chest pain.   All other systems reviewed and are negative.       Objective  Range of Vitals (last 24 hours)  Heart Rate:  []   Temp:  [36.3 °C  (97.3 °F)-37.1 °C (98.8 °F)]   Resp:  [24-39]   BP: (126-169)/(61-79)   Weight:  [70 kg (154 lb 5.2 oz)]   SpO2:  [90 %-99 %]   Daily Weight  01/05/25 : 70 kg (154 lb 5.2 oz)    Body mass index is 27.34 kg/m².     Physical Exam  Constitutional:       Appearance: She is ill-appearing.   HENT:      Head: Normocephalic and atraumatic.      Nose: Nose normal.   Eyes:      General: No scleral icterus.     Extraocular Movements: Extraocular movements intact.      Conjunctiva/sclera: Conjunctivae normal.   Cardiovascular:      Rate and Rhythm: Regular rhythm. Tachycardia present.      Heart sounds: Normal heart sounds.   Pulmonary:      Breath sounds: Decreased breath sounds present.   Musculoskeletal:      Cervical back: Normal range of motion and neck supple.      Right lower leg: No edema.      Left lower leg: No edema.   Skin:     General: Skin is warm and dry.   Neurological:      Mental Status: She is alert.   Psychiatric:         Behavior: Behavior normal. Behavior is cooperative.          Relevant Results  Outside Hospital Results    Labs  Results from last 72 hours   Lab Units 01/05/25 0515 01/04/25 0515 01/03/25  0526 01/02/25  1434   WBC AUTO x10*3/uL 21.2* 17.6* 15.4* 15.0*   HEMOGLOBIN g/dL 9.1* 9.8* 10.0* 11.0*   HEMATOCRIT % 26.1* 28.2* 29.7* 32.2*   PLATELETS AUTO x10*3/uL 572* 518* 491* 536*   NEUTROS PCT AUTO %  --   --   --  88.4   LYMPHS PCT AUTO %  --   --   --  4.4   MONOS PCT AUTO %  --   --   --  6.1   EOS PCT AUTO %  --   --   --  0.4     Results from last 72 hours   Lab Units 01/05/25 0515 01/04/25 0515 01/03/25  0526   SODIUM mmol/L 134* 134* 136   POTASSIUM mmol/L 3.4* 3.7 3.2*   CHLORIDE mmol/L 101 103 103   CO2 mmol/L 23 23 25   BUN mg/dL 26* 18 17   CREATININE mg/dL 1.49* 1.06* 0.85   GLUCOSE mg/dL 124* 91 99   CALCIUM mg/dL 8.5* 8.1* 8.1*   ANION GAP mmol/L 13 12 11   EGFR mL/min/1.73m*2 36* 54* 70   PHOSPHORUS mg/dL 3.7 3.0 2.6     Results from last 72 hours   Lab Units 01/05/25  0515  "01/04/25  0515 01/03/25  0526 01/02/25  1434   ALK PHOS U/L  --   --   --  96   BILIRUBIN TOTAL mg/dL  --   --   --  1.1   PROTEIN TOTAL g/dL  --   --   --  7.0   ALT U/L  --   --   --  27   AST U/L  --   --   --  32   ALBUMIN g/dL 3.0* 3.0* 3.0* 3.3*     Estimated Creatinine Clearance: 28.7 mL/min (A) (by C-G formula based on SCr of 1.49 mg/dL (H)).  CRP   Date Value Ref Range Status   01/04/2020 10.1 (H) 0 - 2.0 MG/DL Final     Comment:     Performed at [address] [address]     Sedimentation Rate   Date Value Ref Range Status   01/04/2020 20 0 - 20 MM/HR Final     Comment:     Performed at [address] [address]     No results found for: \"HIV1X2\", \"HIVCONF\", \"XUWTIX5JW\"  No results found for: \"HEPCABINIT\", \"HEPCAB\", \"HCVPCRQUANT\"  Microbiology  Reviewed-blood cultures pending, negative urine culture, negative nasal MRSA PCR, negative streptococcal and Legionella antigen  Imaging  US renal complete    Result Date: 1/5/2025  Interpreted By:  Jaxon Morales, STUDY: US RENAL COMPLETE; 1/5/2025 10:29 am   INDICATION: Signs/Symptoms:nancy.   COMPARISON: None   ACCESSION NUMBER(S): [id number]   ORDERING CLINICIAN: NORM LANDAVERDE   TECHNIQUE: Grayscale and color Doppler imaging of the kidneys   FINDINGS: The right kidney measures 11.0 cm  . The left kidney measures 11.0 cm  .   There is mild increased echogenicity of the renal cortex bilaterally.     No renal stones are identified.  Renal cortex is normal in thickness bilaterally. No hydronephrosis is identified.   Urinary bladder is nonspecific in appearance with no stones or masses identified.       Mild increased renal cortical echogenicity diffusely bilaterally suggesting chronic renal medical disease.   MACRO: none   Signed by: Jaxon Morales 1/5/2025 12:45 PM Dictation workstation:   [id number]    XR chest 1 view    Result Date: 1/5/2025  Interpreted By:  Sincere Slater, STUDY: XR CHEST 1 VIEW;  1/5/2025 8:48 am   " INDICATION: Signs/Symptoms:respiratory distress acute.   COMPARISON: 01/02/2025   ACCESSION NUMBER(S): [id number]   ORDERING CLINICIAN: RAMANDEEP ALLRED   FINDINGS: Diffusely increased bilateral airspace consolidation. No visualized pleural effusion. Cardiomediastinal silhouette unchanged. Aortic atherosclerosis. Thoracic degenerative changes with dextroscoliosis.       Diffusely increased bilateral airspace consolidation.   MACRO: None   Signed by: Sincere Slater 1/5/2025 10:08 AM Dictation workstation:   [id number]    ECG 12 lead    Result Date: 1/3/2025  Normal sinus rhythm Nonspecific ST abnormality Prolonged QT Abnormal ECG No previous ECGs available    XR chest 2 views    Result Date: 1/2/2025  Interpreted By:  Jaxon Morales, STUDY: XR CHEST 2 VIEWS; 1/2/2025 3:05 pm   INDICATION: Signs/Symptoms:fever, productive cough, chest pain.   COMPARISON: 06/14/2022   ACCESSION NUMBER(S): [id number]   ORDERING CLINICIAN: DANIAL COATES   TECHNIQUE: AP and lateral views of the chest were obtained.   FINDINGS: The cardiac silhouette is normal in appearance. There are new patchy bilateral alveolar infiltrates most marked within the upper lobes bilaterally .  No effusions are identified.       Extensive patchy bilateral infiltrates most marked within the upper lobes bilaterally. Findings are suspicious for pneumonia. Follow-up to assure complete clearing is suggested.   MACRO: none   Signed by: Jaxon Morales 1/2/2025 3:43 PM Dictation workstation:   [id number]      Assessment/Plan   Acute hypoxic respiratory failure  Sepsis  Acute kidney injury  Leukocytosis, multifactorial  Pneumonia-gram-positive versus gram-negative versus atypical-negative nasal MRSA PCR    Discontinue ceftriaxone  IV Zosyn  IV doxycycline  Follow-up mycoplasma serology  Monitor renal function  Oxygen as needed  Supportive care  Monitor temperature and WBC      Max Sanchez MD

## 2025-01-05 NOTE — CARE PLAN
Problem: Respiratory  Goal: Minimal/no exertional discomfort or dyspnea this shift  1/5/2025 0257 by Evelyn Medel, RRT  Outcome: Progressing  1/4/2025 1950 by Evelyn Medel RRT  Outcome: Progressing  Goal: No signs of respiratory distress (eg. Use of accessory muscles. Peds grunting)  1/5/2025 0257 by Evelyn Medel, RRT  Outcome: Progressing  1/4/2025 1950 by Evelyn Medel, RRT  Outcome: Progressing  Goal: Verbalize decreased shortness of breath this shift  1/5/2025 0257 by Evelyn Medel, RRT  Outcome: Progressing  1/4/2025 1950 by Evelyn Medel, RRT  Outcome: Progressing  Goal: Wean oxygen to maintain O2 saturation per order/standard this shift  1/5/2025 0257 by Evelyn Medel, RRT  Outcome: Progressing  1/4/2025 1950 by Evelyn Medel, RRT  Outcome: Progressing

## 2025-01-05 NOTE — PROGRESS NOTES
[facility] Critical Care Medicine       Date:  1/5/2025  Patient:  Janet Snow  YOB: 1945  MRN:  [id number]   Admit Date:  1/2/2025      Chief Complaint   Patient presents with    Shortness of Breath         History of Present Illness:  Janet Snow is a 79 y.o. year old female patient with past medical history of hypothyroidism, anxiety, depression, hyperlipidemia, sciatica, prior cigarette smoker (30 years, quit 30 years ago) who presented to  ED 1/2 with complaints of shortness of breath. Her symptoms started shortly before [place] which would have been >1 week prior to time of presentation. At this time, she also complained of productive cough with yellow-green colored sputum, difficulty swallowing, chills. She was seen at an urgent care and was prescribed Augmentin, but did not have improvement.     She met SIRS criteria in the ED with fever, tachycardia, and leukocytosis, as well as elevated lactate. She was given rocephin and azithromycin in ED. She was admitted to the regular nursing floor on 2L NC and started on CAP coverage. Pulmonology was consulted and dc azithromycin due to negative urine atypicals. She was reportedly on 3L NC yesterday but would have random episodes of desaturations with coughing fits, but O2 sats would return back to 3L.    On the morning of 1/5, the hospitalist contacted the ICU team to come see her as she became acutely tachypnic, hypoxic with SpO2 in the 60s, came up to the 80s on NRB. She was visibly in respiratory distress, in tripod position at the edge of the bed. ABG 7.37/35/44, confirmed arterial draw. CXR worsening bilateral airspace consolidations compared to CXR 1/2. She was placed on continuous CPAP 8/100% with SpO2 up to the low 90s and urgently transported to the ICU.     She did have a CT chest 7/24 which showed multifocal reticulonodular and ground-glass opacities (since 2020) 2/2 chronic infectious/inflammatory process. Multiple new-mildly  enlarged pulmonary nodules up to 6mm. Multiple unchanged prominent enlarged lymph nodes, likely reactive. Recommended follow-up CT chest in 3-6 months.     Interval ICU Events:  1/5: Pt arrived to ICU on continuous CPAP. Started IV steroids. Work of breathing improved as the day goes on. FiO2 able to be weaned to 80%. Stat CT angio chest obtained to r/o PE and for further differentiation of diffuse infiltrates seen on CXR.     Objective     Medical History:  History reviewed. No pertinent past medical history.  Past Surgical History:   Procedure Laterality Date    BREAST BIOPSY Right     core biopsy 20 years ago    HYSTERECTOMY      MR HEAD ANGIO WO IV CONTRAST  07/14/2018    MR HEAD ANGIO WO IV CONTRAST [facility]     Medications Prior to Admission   Medication Sig Dispense Refill Last Dose/Taking    simvastatin (Zocor) 20 mg tablet Take 1 tablet (20 mg) by mouth once daily at bedtime.   Taking    FLUoxetine (PROzac) 40 mg capsule Take 1 capsule (40 mg) by mouth once daily.       gabapentin (Neurontin) 400 mg capsule Take 1 capsule (400 mg) by mouth 2 times a day.       levothyroxine (Synthroid, Levoxyl) 50 mcg tablet Take 1 tablet (50 mcg) by mouth early in the morning..        Meperidine (pf), Ropinirole, and Meperidine  Social History     Tobacco Use    Smoking status: Former     Types: Cigarettes    Smokeless tobacco: Never   Substance Use Topics    Drug use: Never     No family history on file.    Hospital Medications:    potassium chloride in 0.9%NaCl, 75 mL/hr, Last Rate: 75 mL/hr (01/05/25 1449)          Current Facility-Administered Medications:     acetaminophen (Tylenol) tablet 650 mg, 650 mg, oral, q6h PRN, Shravan Barroso PA-C, 650 mg at 01/04/25 1909    albuterol 2.5 mg /3 mL (0.083 %) nebulizer solution 2.5 mg, 2.5 mg, nebulization, q2h PRN, Shravan Barroso PA-C    benzonatate (Tessalon) capsule 200 mg, 200 mg, oral, TID PRN, Shravan Barroso PA-C, 200 mg at 01/04/25 1645    calcium  carbonate-vitamin D3 500 mg-5 mcg (200 unit) per tablet 1 tablet, 1 tablet, oral, BID, Shravan Barroso PA-C, 1 tablet at 01/04/25 1648    docusate sodium (Colace) capsule 100 mg, 100 mg, oral, BID, Shravan Barroso PA-C, 100 mg at 01/04/25 2055    doxycycline (Vibramycin) 100 mg in dextrose 5% 100 mL IV, 100 mg, intravenous, q12h, Max Sanchez MD, Stopped at 01/05/25 1448    enoxaparin (Lovenox) syringe 40 mg, 40 mg, subcutaneous, q24h, Shravan Barroso PA-C, 40 mg at 01/04/25 1909    ergocalciferol (Vitamin D-2) capsule 1,250 mcg, 1,250 mcg, oral, Weekly, Shravan Barroso PA-C    FLUoxetine (PROzac) capsule 40 mg, 40 mg, oral, Daily, Shravan Barroso PA-C, 40 mg at 01/04/25 0901    guaiFENesin (Mucinex) 12 hr tablet 1,200 mg, 1,200 mg, oral, BID, Shravan Barroso PA-C, 1,200 mg at 01/04/25 2055    hydrocodone-homatropine (Hycodan) 5-1.5 mg/5 mL syrup 5 mL, 5 mL, oral, q4h PRN, Shravan Barroso PA-C    ipratropium-albuteroL (Duo-Neb) 0.5-2.5 mg/3 mL nebulizer solution 3 mL, 3 mL, nebulization, q4h, Shravan Barroso PA-C, 3 mL at 01/05/25 1515    levothyroxine (Synthroid, Levoxyl) tablet 50 mcg, 50 mcg, oral, Daily, Shravan Barroso PA-C, 50 mcg at 01/05/25 0605    magnesium oxide (Mag-Ox) tablet 400 mg, 400 mg, oral, BID, Shravan Barroso PA-C, 400 mg at 01/04/25 2056    methylPREDNISolone sod succinate (SOLU-Medrol) 40 mg/mL injection 40 mg, 40 mg, intravenous, q6h, Shravan Barroso PA-C, 40 mg at 01/05/25 1529    oxygen (O2) therapy, , inhalation, Continuous PRN - O2/gases, Shravan Barroso PA-C, 3 L/min at 01/1945    oxygen (O2) therapy, , inhalation, Continuous - Inhalation, Sepideh Kearney, DO, 100 percent at 01/05/25 1116    pantoprazole (ProtoNix) EC tablet 40 mg, 40 mg, oral, Daily before breakfast, Shravan Barroso PA-C, 40 mg at 01/05/25 0605    piperacillin-tazobactam (Zosyn) 2.25 g in dextrose (iso) IV 50 mL, 2.25 g, intravenous, q6h, Shravan Barroso PA-C, Stopped at 01/05/25 1635     polyethylene glycol (Glycolax, Miralax) packet 17 g, 17 g, oral, Daily PRN, Shravan Barroso PA-C    potassium chloride (Klor-Con) packet 40 mEq, 40 mEq, oral, BID, Shravan Barroso PA-C    simvastatin (Zocor) tablet 20 mg, 20 mg, oral, Nightly, Shravan Barroso PA-C, 20 mg at 01/04/25 2055    sodium chloride 0.9 % with KCl 20 mEq/L infusion, 75 mL/hr, intravenous, Continuous, Shravan Barroso PA-C, Last Rate: 75 mL/hr at 01/05/25 1449, 75 mL/hr at 01/05/25 1449    Review of Systems:  14 point review of systems was completed and negative except for those specially mention in my HPI    Physical Exam:    Heart Rate:  []   Temp:  [36.3 °C (97.3 °F)-37.1 °C (98.8 °F)]   Resp:  [24-39]   BP: (124-169)/(61-96)   Weight:  [70 kg (154 lb 5.2 oz)]   SpO2:  [88 %-99 %]     Physical Exam  Constitutional:       General: She is in acute distress.      Appearance: She is ill-appearing.   HENT:      Head: Normocephalic and atraumatic.      Mouth/Throat:      Mouth: Mucous membranes are dry.   Eyes:      Extraocular Movements: Extraocular movements intact.      Conjunctiva/sclera: Conjunctivae normal.      Pupils: Pupils are equal, round, and reactive to light.   Cardiovascular:      Rate and Rhythm: Regular rhythm. Tachycardia present.   Pulmonary:      Effort: Tachypnea, accessory muscle usage and respiratory distress present.      Breath sounds: Examination of the right-lower field reveals decreased breath sounds. Examination of the left-lower field reveals decreased breath sounds. Decreased breath sounds and rhonchi present.   Abdominal:      General: There is no distension.      Palpations: Abdomen is soft.      Tenderness: There is no abdominal tenderness.   Skin:     General: Skin is dry.      Coloration: Skin is pale.   Neurological:      General: No focal deficit present.      Mental Status: She is alert and oriented to person, place, and time.      GCS: GCS eye subscore is 4. GCS verbal subscore is 5. GCS motor  subscore is 6.         Objective:    I have reviewed all medications, laboratory results, and imaging pertinent for today's encounter.    FiO2 (%):  [80 %-100 %] 80 %  S RR:  [16] 16      Intake/Output Summary (Last 24 hours) at 1/5/2025 1643  Last data filed at 1/5/2025 1635  Gross per 24 hour   Intake 1611.67 ml   Output 400 ml   Net 1211.67 ml            Assessment/Plan:    I am currently managing this critically ill patient for the following problems:    Neuro/Psych/Pain Ctrl/Sedation:   Hx anxiety, depression   - Pain Control: acetaminophen PRN  - Continue home prozac   - CAM ICU qshift, sleep-wake hygiene, delirium precautions     Respiratory/ENT:  Acute hypoxic respiratory failure - vasculitis/DAH vs atypical pneumonia vs acute inflammatory process   Acute respiratory distress syndrome   Prior tobacco use   - Supplemental O2: continuous cpap   - Start solu-medrol   - Duonebs q4hrs   - FU CT angio chest results   - Titrate FiO2 to maintain SpO2 >92%  - Continuous pulse ox monitoring   - Pulm hygiene    Cardiovascular:   Hyperlipidemia   Sinus tachycardia 2/2 hypoxia   - TTE ordered due to possibly acute pulmonary edema   - Continue statin   - Maintain MAPs >65  - Continuous cardiac monitoring   - EKGs PRN for ACS symptoms, arrhythmias     GI:  Dysphagia   - Diet: regular  - SLP following, recommended MBS  -- likely to be delayed pending improvement in respiratory status   - BR: colace   - GI Prophylaxis: PPI    Renal/Volume Status/Electrolytes:  Acute renal failure   Acute urinary retention   - Baseline Cr 0.9  - Hourly I/O's, maintain urine output >0.5cc/kg/hr  - Straight cath x1 today for 350cc in bladder, consider guerrero catheter if retention persists   - Nephrology consulted   -- Started low-dose maintenance fluids  -- Ordered urine studies, JOSE, C3, C4, SPE  - Replete electrolytes to maintain K >4.0 and Mg >2.0  - Daily RFP, Mg    Endocrine:  Hypothyroidism   - Continue home synthroid   - Consider adding  SSI if glucose persistently >180 with steroid use   - Hypoglycemia protocol PRN     Infectious Disease:  Atypical pneumonia   - Antibiotics: rocephin broadened to zosyn and doxycycline   - Blood cultures: no growth at 2 days   - Urine culture: no growth  - Negative COVID, Influenza, RSV, urine legionella, urine strep pneumoniae, MRSA PCR  - Procal ordered   - ESR and CRP ordered  - ID consulted  - Aspergillus galactomannan, fungitell, respiratory viral panel ordered   - Monitor SIRS criteria    Heme/Onc:  Thrombocytosis - likely acutely reactive   - Baseline Hgb WNL  - Transfuse if Hgb <7.0   - Daily CBC    MSK/Skin:  Hx sciatica   - PT/OT when appropriate  - ICU skin protocol, padded pressure points  - Q2hr turns    Ethics/Code Status:  DNR, DNI - confirmed with patient and daughter Beverly     :  DVT Prophylaxis: SQH, SCDs  GI Prophylaxis: PPI  Bowel Regimen: colace  Diet: regular  CVC: none  Liz: none  West: none  Restraints: none  Disposition: ICU    Critical Care Time:  52 minutes spent in preparing to see patient (I.e. labs, imaging, etc.), documentation, discussion plan of care with patient/family/caregiver, and/or coordination of care with multidisciplinary team including the attending. Time does not include completion of procedure time.     Shravan Barroso PA-C  Pulmonary & Critical Care Medicine   [facility]

## 2025-01-05 NOTE — CARE PLAN
Problem: Pain - Adult  Goal: Verbalizes/displays adequate comfort level or baseline comfort level  Outcome: Progressing     Problem: Safety - Adult  Goal: Free from fall injury  Outcome: Progressing     Problem: Discharge Planning  Goal: Discharge to home or other facility with appropriate resources  Outcome: Progressing     Problem: Chronic Conditions and Co-morbidities  Goal: Patient's chronic conditions and co-morbidity symptoms are monitored and maintained or improved  Outcome: Progressing     Problem: Respiratory  Goal: Minimal/no exertional discomfort or dyspnea this shift  Outcome: Progressing  Goal: No signs of respiratory distress (eg. Use of accessory muscles. Peds grunting)  Outcome: Progressing  Goal: Verbalize decreased shortness of breath this shift  Outcome: Progressing  Goal: Wean oxygen to maintain O2 saturation per order/standard this shift  Outcome: Progressing     Problem: Skin  Goal: Decreased wound size/increased tissue granulation at next dressing change  Outcome: Progressing  Flowsheets (Taken 1/5/2025 1058)  Decreased wound size/increased tissue granulation at next dressing change: Protective dressings over bony prominences  Goal: Participates in plan/prevention/treatment measures  Outcome: Progressing  Flowsheets (Taken 1/5/2025 1058)  Participates in plan/prevention/treatment measures:   Elevate heels   Increase activity/out of bed for meals  Goal: Prevent/manage excess moisture  Outcome: Progressing  Flowsheets (Taken 1/5/2025 1058)  Prevent/manage excess moisture:   Moisturize dry skin   Cleanse incontinence/protect with barrier cream  Goal: Prevent/minimize sheer/friction injuries  Outcome: Progressing  Flowsheets (Taken 1/5/2025 1058)  Prevent/minimize sheer/friction injuries:   Use pull sheet   Increase activity/out of bed for meals   Turn/reposition every 2 hours/use positioning/transfer devices   HOB 30 degrees or less  Goal: Promote/optimize nutrition  Outcome:  Progressing  Flowsheets (Taken 1/5/2025 1058)  Promote/optimize nutrition:   Monitor/record intake including meals   Discuss with provider if NPO > 2 days  Goal: Promote skin healing  Outcome: Progressing  Flowsheets (Taken 1/5/2025 1058)  Promote skin healing:   Turn/reposition every 2 hours/use positioning/transfer devices   Protective dressings over bony prominences   The patient's goals for the shift include Able to breath better, safety    The clinical goals for the shift include pt will have improved oxygenation.    Over the shift, the patient did not make progress toward the following goals. Barriers to progression include pt oxygenation needs increased as pt is on bipap. Recommendations to address these barriers include monitor labs and vitals.

## 2025-01-05 NOTE — CONSULTS
.Reason For Consult  Acute kidney injury    History Of Present Illness  Janet Snow is a 79 y.o. female who is known to have a history of hyperlipidemia, hypothyroidism no history of kidney disease in the past who basically admitted initially to the hospital with increased shortness of breath she had seen her primary care physician as an outpatient she was started on Augmentin for upper respiratory tract infection however her symptoms had deteriorated she became more short of breath so she reported to the emergency room subsequently admitted to a regular floor she was seen by pulmonary and diagnosed with pneumonia initially she was treated with Zithromax and she was started on ceftriaxone and started on oxygen supplements today her respiratory status had deteriorated significantly and I team was called the patient had ABGs done which showed severe hypoxemia so the patient was transferred down to the intensive care unit placed on CPAP I was asked to see the patient in consultation because of new onset of acute kidney injury with a creatinine level up to 1.49 her admission creatinine was 0.92.  Chest x-ray showed bilateral infiltrates compatible with pneumonia     Review of Systems  Review of Systems   Constitutional:  Positive for fatigue. Negative for chills and fever.   HENT:  Negative for sinus pressure, sore throat and tinnitus.    Eyes:  Negative for photophobia and visual disturbance.   Respiratory:  Positive for cough and shortness of breath. Negative for apnea and wheezing.    Cardiovascular:  Negative for chest pain, palpitations and leg swelling.   Gastrointestinal:  Negative for abdominal pain, blood in stool, constipation, diarrhea, nausea, rectal pain and vomiting.   Endocrine: Negative for cold intolerance, heat intolerance, polydipsia, polyphagia and polyuria.   Genitourinary:  Negative for decreased urine volume, dysuria, frequency, hematuria and urgency.   Musculoskeletal:  Negative for  arthralgias, back pain, joint swelling, myalgias and neck pain.   Skin:  Negative for color change, pallor, rash and wound.   Neurological:  Positive for weakness. Negative for dizziness, tremors, seizures, syncope, speech difficulty, light-headedness, numbness and headaches.   Hematological:  Negative for adenopathy. Does not bruise/bleed easily.   Psychiatric/Behavioral:  Negative for confusion, hallucinations, sleep disturbance and suicidal ideas.         Past Medical History  She has no past medical history on file.    Surgical History  She has a past surgical history that includes MR angio head wo IV contrast (07/14/2018); Hysterectomy; and Breast biopsy (Right).     Social History  She reports that she has quit smoking. Her smoking use included cigarettes. She has never used smokeless tobacco. She reports that she does not use drugs. No history on file for alcohol use.    Family History  No family history on file.     Current Facility-Administered Medications:     acetaminophen (Tylenol) tablet 650 mg, 650 mg, oral, q6h PRN, Shravan Barroso PA-C, 650 mg at 01/04/25 1909    albuterol 2.5 mg /3 mL (0.083 %) nebulizer solution 2.5 mg, 2.5 mg, nebulization, q2h PRN, Shravan Barroso PA-C    benzonatate (Tessalon) capsule 200 mg, 200 mg, oral, TID PRN, Shravan Barroso PA-C, 200 mg at 01/04/25 1645    calcium carbonate-vitamin D3 500 mg-5 mcg (200 unit) per tablet 1 tablet, 1 tablet, oral, BID, Shravan Barroso PA-C, 1 tablet at 01/04/25 1648    docusate sodium (Colace) capsule 100 mg, 100 mg, oral, BID, Shravan Barroso PA-C, 100 mg at 01/04/25 2055    enoxaparin (Lovenox) syringe 40 mg, 40 mg, subcutaneous, q24h, Shravan Barroso PA-C, 40 mg at 01/04/25 1909    ergocalciferol (Vitamin D-2) capsule 1,250 mcg, 1,250 mcg, oral, Weekly, Shravan Barroso PA-C    FLUoxetine (PROzac) capsule 40 mg, 40 mg, oral, Daily, Shravan Barroso PA-C, 40 mg at 01/04/25 0901    guaiFENesin (Mucinex) 12 hr tablet 1,200 mg, 1,200  mg, oral, BID, Shravan Barroso PA-C, 1,200 mg at 01/04/25 2055    hydrocodone-homatropine (Hycodan) 5-1.5 mg/5 mL syrup 5 mL, 5 mL, oral, q4h PRN, Shravan Barroso PA-C    ipratropium-albuteroL (Duo-Neb) 0.5-2.5 mg/3 mL nebulizer solution 3 mL, 3 mL, nebulization, q4h, Shravan Barroso PA-C    levothyroxine (Synthroid, Levoxyl) tablet 50 mcg, 50 mcg, oral, Daily, Shravan Barroso PA-C, 50 mcg at 01/05/25 0605    magnesium oxide (Mag-Ox) tablet 400 mg, 400 mg, oral, BID, Shravan Barroso PA-C, 400 mg at 01/04/25 2056    methylPREDNISolone sod succinate (SOLU-Medrol) 40 mg/mL injection 40 mg, 40 mg, intravenous, q6h, Shravan Barroso PA-C    oxygen (O2) therapy, , inhalation, Continuous PRN - O2/gases, Shravan Barroso PA-C, 3 L/min at 01/1945    oxygen (O2) therapy, , inhalation, Continuous - Inhalation, Sepideh K[place],     pantoprazole (ProtoNix) EC tablet 40 mg, 40 mg, oral, Daily before breakfast, Shravan Barroso PA-C, 40 mg at 01/05/25 0605    piperacillin-tazobactam (Zosyn) 2.25 g in dextrose (iso) IV 50 mL, 2.25 g, intravenous, q6h, Shravan Barroso PA-C    polyethylene glycol (Glycolax, Miralax) packet 17 g, 17 g, oral, Daily PRN, Shravan Barroso PA-C    potassium chloride (Klor-Con) packet 40 mEq, 40 mEq, oral, BID, Shravan Barroso PA-C    simvastatin (Zocor) tablet 20 mg, 20 mg, oral, Nightly, Shravan Barroso PA-C, 20 mg at 01/04/25 2055    sodium chloride 0.9 % bolus 500 mL, 500 mL, intravenous, Once, Shravan Barroso PA-C, Stopped at 01/05/25 0850    sodium chloride 0.9 % with KCl 20 mEq/L infusion, 75 mL/hr, intravenous, Continuous, Shravan Barroso PA-C   Allergies  Meperidine (pf), Ropinirole, and Meperidine         Physical Exam  Physical Exam  Constitutional:       General: She is not in acute distress.     Appearance: She is not toxic-appearing.   HENT:      Head: Normocephalic and atraumatic.   Eyes:      Extraocular Movements: Extraocular movements intact.      Pupils: Pupils are  equal, round, and reactive to light.   Neck:      Vascular: No carotid bruit.   Cardiovascular:      Rate and Rhythm: Normal rate and regular rhythm.   Pulmonary:      Effort: No respiratory distress.      Breath sounds: No stridor. Rhonchi present. No wheezing or rales.      Comments: Severely diminished breath sounds throughout the lung fields  Chest:      Chest wall: No tenderness.   Abdominal:      General: There is no distension.      Palpations: There is no mass.      Tenderness: There is no abdominal tenderness. There is no right CVA tenderness, left CVA tenderness or guarding.      Hernia: No hernia is present.   Musculoskeletal:         General: No swelling or tenderness.      Cervical back: No rigidity.      Right lower leg: No edema.      Left lower leg: No edema.   Lymphadenopathy:      Cervical: No cervical adenopathy.   Skin:     General: Skin is warm and dry.      Coloration: Skin is not jaundiced or pale.      Findings: No bruising or erythema.   Neurological:      General: No focal deficit present.      Mental Status: She is alert and oriented to person, place, and time.   Psychiatric:         Mood and Affect: Mood normal.         Behavior: Behavior normal.              I&O 24HR  No intake or output data in the 24 hours ending 01/05/25 0938    Vitals 24HR  Heart Rate:  []   Temp:  [36.4 °C (97.5 °F)-37.1 °C (98.8 °F)]   Resp:  [20-35]   BP: (126-151)/(61-70)   SpO2:  [90 %-97 %]     Relevant Results        Results for orders placed or performed during the hospital encounter of 01/02/25 (from the past 96 hours)   Sars-CoV-2 and Influenza A/B PCR   Result Value Ref Range    Flu A Result Not Detected Not Detected    Flu B Result Not Detected Not Detected    Coronavirus 2019, PCR Not Detected Not Detected   RSV PCR   Result Value Ref Range    RSV PCR Not Detected Not Detected   CBC and Auto Differential   Result Value Ref Range    WBC 15.0 (H) 4.4 - 11.3 x10*3/uL    nRBC 0.0 0.0 - 0.0 /100 WBCs     RBC 3.61 (L) 4.00 - 5.20 x10*6/uL    Hemoglobin 11.0 (L) 12.0 - 16.0 g/dL    Hematocrit 32.2 (L) 36.0 - 46.0 %    MCV 89 80 - 100 fL    MCH 30.5 26.0 - 34.0 pg    MCHC 34.2 32.0 - 36.0 g/dL    RDW 11.8 11.5 - 14.5 %    Platelets 536 (H) 150 - 450 x10*3/uL    Neutrophils % 88.4 40.0 - 80.0 %    Immature Granulocytes %, Automated 0.4 0.0 - 0.9 %    Lymphocytes % 4.4 13.0 - 44.0 %    Monocytes % 6.1 2.0 - 10.0 %    Eosinophils % 0.4 0.0 - 6.0 %    Basophils % 0.3 0.0 - 2.0 %    Neutrophils Absolute 13.25 (H) 1.60 - 5.50 x10*3/uL    Immature Granulocytes Absolute, Automated 0.06 0.00 - 0.50 x10*3/uL    Lymphocytes Absolute 0.66 (L) 0.80 - 3.00 x10*3/uL    Monocytes Absolute 0.91 (H) 0.05 - 0.80 x10*3/uL    Eosinophils Absolute 0.06 0.00 - 0.40 x10*3/uL    Basophils Absolute 0.04 0.00 - 0.10 x10*3/uL   Comprehensive metabolic panel   Result Value Ref Range    Glucose 121 (H) 74 - 99 mg/dL    Sodium 134 (L) 136 - 145 mmol/L    Potassium 3.2 (L) 3.5 - 5.3 mmol/L    Chloride 99 98 - 107 mmol/L    Bicarbonate 24 21 - 32 mmol/L    Anion Gap 14 10 - 20 mmol/L    Urea Nitrogen 20 6 - 23 mg/dL    Creatinine 0.92 0.50 - 1.05 mg/dL    eGFR 63 >60 mL/min/1.73m*2    Calcium 8.8 8.6 - 10.3 mg/dL    Albumin 3.3 (L) 3.4 - 5.0 g/dL    Alkaline Phosphatase 96 33 - 136 U/L    Total Protein 7.0 6.4 - 8.2 g/dL    AST 32 9 - 39 U/L    Bilirubin, Total 1.1 0.0 - 1.2 mg/dL    ALT 27 7 - 45 U/L   Lactate   Result Value Ref Range    Lactate 2.2 (H) 0.4 - 2.0 mmol/L   Blood Culture    Specimen: Peripheral Venipuncture; Blood culture   Result Value Ref Range    Blood Culture No growth at 2 days    Blood Culture    Specimen: Peripheral Venipuncture; Blood culture   Result Value Ref Range    Blood Culture No growth at 2 days    Troponin I, High Sensitivity, Initial   Result Value Ref Range    Troponin I, High Sensitivity 13 0 - 13 ng/L   ECG 12 lead   Result Value Ref Range    Ventricular Rate 94 BPM    Atrial Rate 94 BPM    VT Interval 130 ms     QRS Duration 86 ms    QT Interval 466 ms    QTC Calculation(Bazett) 582 ms    P Axis 77 degrees    R Axis 77 degrees    T Axis 78 degrees    QRS Count 16 beats    Q Onset 220 ms    P Onset 155 ms    P Offset 211 ms    T Offset 453 ms    QTC Fredericia 541 ms   Urinalysis with Reflex Culture and Microscopic   Result Value Ref Range    Color, Urine Yellow Light-Yellow, Yellow, Dark-Yellow    Appearance, Urine Turbid (N) Clear    Specific Gravity, Urine 1.017 1.005 - 1.035    pH, Urine 7.0 5.0, 5.5, 6.0, 6.5, 7.0, 7.5, 8.0    Protein, Urine 100 (2+) (A) NEGATIVE, 10 (TRACE), 20 (TRACE) mg/dL    Glucose, Urine Normal Normal mg/dL    Blood, Urine OVER (3+) (A) NEGATIVE    Ketones, Urine 10 (1+) (A) NEGATIVE mg/dL    Bilirubin, Urine NEGATIVE NEGATIVE    Urobilinogen, Urine 6 (2+) (A) Normal mg/dL    Nitrite, Urine NEGATIVE NEGATIVE    Leukocyte Esterase, Urine 75 Brissa/µL (A) NEGATIVE   Troponin, High Sensitivity, 1 Hour   Result Value Ref Range    Troponin I, High Sensitivity 16 (H) 0 - 13 ng/L   Lactate   Result Value Ref Range    Lactate 1.8 0.4 - 2.0 mmol/L   Microscopic Only, Urine   Result Value Ref Range    WBC, Urine 21-50 (A) 1-5, NONE /HPF    RBC, Urine >20 (A) NONE, 1-2, 3-5 /HPF    Bacteria, Urine 1+ (A) NONE SEEN /HPF    Budding Yeast, Urine PRESENT (A) NONE /HPF    Mucus, Urine FEW Reference range not established. /LPF   Urine Culture    Specimen: Clean Catch/Voided; Urine   Result Value Ref Range    Urine Culture No growth    Staphylococcus aureus/MRSA colonization, Culture    Specimen: Anterior Nares; Swab   Result Value Ref Range    Staph/MRSA Screen Culture No Staphylococcus aureus isolated    CBC   Result Value Ref Range    WBC 15.4 (H) 4.4 - 11.3 x10*3/uL    nRBC 0.0 0.0 - 0.0 /100 WBCs    RBC 3.32 (L) 4.00 - 5.20 x10*6/uL    Hemoglobin 10.0 (L) 12.0 - 16.0 g/dL    Hematocrit 29.7 (L) 36.0 - 46.0 %    MCV 90 80 - 100 fL    MCH 30.1 26.0 - 34.0 pg    MCHC 33.7 32.0 - 36.0 g/dL    RDW 11.9 11.5 - 14.5 %     Platelets 491 (H) 150 - 450 x10*3/uL   Renal function panel   Result Value Ref Range    Glucose 99 74 - 99 mg/dL    Sodium 136 136 - 145 mmol/L    Potassium 3.2 (L) 3.5 - 5.3 mmol/L    Chloride 103 98 - 107 mmol/L    Bicarbonate 25 21 - 32 mmol/L    Anion Gap 11 10 - 20 mmol/L    Urea Nitrogen 17 6 - 23 mg/dL    Creatinine 0.85 0.50 - 1.05 mg/dL    eGFR 70 >60 mL/min/1.73m*2    Calcium 8.1 (L) 8.6 - 10.3 mg/dL    Phosphorus 2.6 2.5 - 4.9 mg/dL    Albumin 3.0 (L) 3.4 - 5.0 g/dL   Magnesium   Result Value Ref Range    Magnesium 1.67 1.60 - 2.40 mg/dL   Legionella Antigen, Urine    Specimen: Clean Catch/Voided; Urine   Result Value Ref Range    L. pneumophila Urine Ag Negative Negative   Streptococcus pneumoniae Antigen, Urine    Specimen: Clean Catch/Voided; Urine   Result Value Ref Range    Streptococcus pneumoniae Ag, Urine Negative Negative   CBC   Result Value Ref Range    WBC 17.6 (H) 4.4 - 11.3 x10*3/uL    nRBC 0.0 0.0 - 0.0 /100 WBCs    RBC 3.26 (L) 4.00 - 5.20 x10*6/uL    Hemoglobin 9.8 (L) 12.0 - 16.0 g/dL    Hematocrit 28.2 (L) 36.0 - 46.0 %    MCV 87 80 - 100 fL    MCH 30.1 26.0 - 34.0 pg    MCHC 34.8 32.0 - 36.0 g/dL    RDW 11.9 11.5 - 14.5 %    Platelets 518 (H) 150 - 450 x10*3/uL   Renal function panel   Result Value Ref Range    Glucose 91 74 - 99 mg/dL    Sodium 134 (L) 136 - 145 mmol/L    Potassium 3.7 3.5 - 5.3 mmol/L    Chloride 103 98 - 107 mmol/L    Bicarbonate 23 21 - 32 mmol/L    Anion Gap 12 10 - 20 mmol/L    Urea Nitrogen 18 6 - 23 mg/dL    Creatinine 1.06 (H) 0.50 - 1.05 mg/dL    eGFR 54 (L) >60 mL/min/1.73m*2    Calcium 8.1 (L) 8.6 - 10.3 mg/dL    Phosphorus 3.0 2.5 - 4.9 mg/dL    Albumin 3.0 (L) 3.4 - 5.0 g/dL   Magnesium   Result Value Ref Range    Magnesium 1.67 1.60 - 2.40 mg/dL   B-type natriuretic peptide   Result Value Ref Range     (H) 0 - 99 pg/mL   Vitamin D 25-Hydroxy,Total (for eval of Vitamin D levels)   Result Value Ref Range    Vitamin D, 25-Hydroxy, Total 34 30 -  100 ng/mL   CBC   Result Value Ref Range    WBC 21.2 (H) 4.4 - 11.3 x10*3/uL    nRBC 0.0 0.0 - 0.0 /100 WBCs    RBC 2.97 (L) 4.00 - 5.20 x10*6/uL    Hemoglobin 9.1 (L) 12.0 - 16.0 g/dL    Hematocrit 26.1 (L) 36.0 - 46.0 %    MCV 88 80 - 100 fL    MCH 30.6 26.0 - 34.0 pg    MCHC 34.9 32.0 - 36.0 g/dL    RDW 12.0 11.5 - 14.5 %    Platelets 572 (H) 150 - 450 x10*3/uL   Renal function panel   Result Value Ref Range    Glucose 124 (H) 74 - 99 mg/dL    Sodium 134 (L) 136 - 145 mmol/L    Potassium 3.4 (L) 3.5 - 5.3 mmol/L    Chloride 101 98 - 107 mmol/L    Bicarbonate 23 21 - 32 mmol/L    Anion Gap 13 10 - 20 mmol/L    Urea Nitrogen 26 (H) 6 - 23 mg/dL    Creatinine 1.49 (H) 0.50 - 1.05 mg/dL    eGFR 36 (L) >60 mL/min/1.73m*2    Calcium 8.5 (L) 8.6 - 10.3 mg/dL    Phosphorus 3.7 2.5 - 4.9 mg/dL    Albumin 3.0 (L) 3.4 - 5.0 g/dL   Magnesium   Result Value Ref Range    Magnesium 1.84 1.60 - 2.40 mg/dL   BLOOD GAS ARTERIAL   Result Value Ref Range    POCT pH, Arterial 7.37 (L) 7.38 - 7.42 pH    POCT pCO2, Arterial 35 (L) 38 - 42 mm Hg    POCT pO2, Arterial 44 (LL) 85 - 95 mm Hg    POCT SO2, Arterial 78 (L) 94 - 100 %    POCT Oxy Hemoglobin, Arterial 76.2 (L) 94.0 - 98.0 %    POCT Base Excess, Arterial -4.4 (L) -2.0 - 3.0 mmol/L    POCT HCO3 Calculated, Arterial 20.2 (L) 22.0 - 26.0 mmol/L    Patient Temperature 37.0 degrees Celsius    FiO2 100 %    Apparatus NON REBREATHER     Critical Called By OLIVE ESQUIVEL RRT     Critical Called To DR SPICER     Critical Call Time 818     Critical Read Back Y     Critical Note CRVR     Site of Arterial Puncture Radial Right     Aba's Test Positive       Impression:  Acute kidney injury differential diagnosis could be prerenal etiology with dehydration however rule out secondary to sepsis rule out secondary to interstitial nephritis secondary to antibiotic therapy also I am concerned about postinfectious glomerulonephritis especially with the presence of proteinuria and hematuria in the  urinalysis  Bilateral pneumonia  Acute respiratory failure  Anemia  Hyperlipidemia  Thrombocytosis rule out polycythemia    Recommendations:  Urine protein to creatinine ratio  JOSE C3 and C4 levels  CPK level  Gentle IV hydration  Urine sodium and osmolarity  Serum protein electrophoresis  Spectrum antibiotics agree with Zosyn consider atypical pneumonia antibiotic coverage    Thank you for your consultation        Gonzalo Hemphill MDInpatient consult to Nephrology  Consult performed by: Gonzalo Hemphill MD  Consult ordered by: Shravan Barroso PA-C

## 2025-01-05 NOTE — SIGNIFICANT EVENT
Nursing advised that patient is in worsening acute respiratory failure. Evaluated bedside by myself, attending physician Dr. Fontanez, and nephrologist Dr. Gonzalo Hemphill. Please see detailed progress note dated 1/5/25.

## 2025-01-06 ENCOUNTER — APPOINTMENT (OUTPATIENT)
Dept: CARDIOLOGY | Facility: HOSPITAL | Age: 80
End: 2025-01-06
Payer: MEDICARE

## 2025-01-06 LAB
ALBUMIN SERPL BCP-MCNC: 3 G/DL (ref 3.4–5)
ANA PATTERN: ABNORMAL
ANA SER QL HEP2 SUBST: POSITIVE
ANA TITR SER IF: ABNORMAL {TITER}
ANION GAP SERPL CALCULATED.3IONS-SCNC: 12 MMOL/L (ref 10–20)
AORTIC VALVE MEAN GRADIENT: 11 MMHG
AORTIC VALVE PEAK VELOCITY: 2.35 M/S
ATRIAL RATE: 94 BPM
AV PEAK GRADIENT: 22 MMHG
AVA (PEAK VEL): 2.35 CM2
AVA (VTI): 2.11 CM2
BACTERIA BLD CULT: NORMAL
BACTERIA BLD CULT: NORMAL
BASOPHILS # BLD AUTO: 0.03 X10*3/UL (ref 0–0.1)
BASOPHILS NFR BLD AUTO: 0.1 %
BNP SERPL-MCNC: 194 PG/ML (ref 0–99)
BUN SERPL-MCNC: 35 MG/DL (ref 6–23)
CALCIUM SERPL-MCNC: 8.5 MG/DL (ref 8.6–10.3)
CHLORIDE SERPL-SCNC: 107 MMOL/L (ref 98–107)
CO2 SERPL-SCNC: 22 MMOL/L (ref 21–32)
CREAT SERPL-MCNC: 1.75 MG/DL (ref 0.5–1.05)
EGFRCR SERPLBLD CKD-EPI 2021: 29 ML/MIN/1.73M*2
EJECTION FRACTION APICAL 4 CHAMBER: 60.2
EJECTION FRACTION: 73 %
EOSINOPHIL # BLD AUTO: 0 X10*3/UL (ref 0–0.4)
EOSINOPHIL NFR BLD AUTO: 0 %
ERYTHROCYTE [DISTWIDTH] IN BLOOD BY AUTOMATED COUNT: 12.8 % (ref 11.5–14.5)
GLUCOSE SERPL-MCNC: 141 MG/DL (ref 74–99)
HCT VFR BLD AUTO: 25.3 % (ref 36–46)
HGB BLD-MCNC: 8.4 G/DL (ref 12–16)
IMM GRANULOCYTES # BLD AUTO: 0.25 X10*3/UL (ref 0–0.5)
IMM GRANULOCYTES NFR BLD AUTO: 1.1 % (ref 0–0.9)
LEFT VENTRICLE INTERNAL DIMENSION DIASTOLE: 5.32 CM (ref 3.5–6)
LEFT VENTRICULAR OUTFLOW TRACT DIAMETER: 1.9 CM
LV EJECTION FRACTION BIPLANE: 65 %
LYMPHOCYTES # BLD AUTO: 0.65 X10*3/UL (ref 0.8–3)
LYMPHOCYTES NFR BLD AUTO: 3 %
MAGNESIUM SERPL-MCNC: 1.93 MG/DL (ref 1.6–2.4)
MCH RBC QN AUTO: 30.9 PG (ref 26–34)
MCHC RBC AUTO-ENTMCNC: 33.2 G/DL (ref 32–36)
MCV RBC AUTO: 93 FL (ref 80–100)
MITRAL VALVE E/A RATIO: 0.91
MONOCYTES # BLD AUTO: 0.71 X10*3/UL (ref 0.05–0.8)
MONOCYTES NFR BLD AUTO: 3.2 %
NEUTROPHILS # BLD AUTO: 20.22 X10*3/UL (ref 1.6–5.5)
NEUTROPHILS NFR BLD AUTO: 92.6 %
NRBC BLD-RTO: 0 /100 WBCS (ref 0–0)
P AXIS: 77 DEGREES
P OFFSET: 211 MS
P ONSET: 155 MS
PHOSPHATE SERPL-MCNC: 3 MG/DL (ref 2.5–4.9)
PLATELET # BLD AUTO: 585 X10*3/UL (ref 150–450)
POTASSIUM SERPL-SCNC: 4.1 MMOL/L (ref 3.5–5.3)
PR INTERVAL: 130 MS
PROCALCITONIN SERPL-MCNC: 2.86 NG/ML
Q ONSET: 220 MS
QRS COUNT: 16 BEATS
QRS DURATION: 86 MS
QT INTERVAL: 466 MS
QTC CALCULATION(BAZETT): 582 MS
QTC FREDERICIA: 541 MS
R AXIS: 77 DEGREES
RBC # BLD AUTO: 2.72 X10*6/UL (ref 4–5.2)
RIGHT VENTRICLE FREE WALL PEAK S': 21.1 CM/S
RIGHT VENTRICLE PEAK SYSTOLIC PRESSURE: 58.7 MMHG
SODIUM SERPL-SCNC: 137 MMOL/L (ref 136–145)
T AXIS: 78 DEGREES
T OFFSET: 453 MS
VENTRICULAR RATE: 94 BPM
WBC # BLD AUTO: 21.9 X10*3/UL (ref 4.4–11.3)

## 2025-01-06 PROCEDURE — 36415 COLL VENOUS BLD VENIPUNCTURE: CPT

## 2025-01-06 PROCEDURE — 83880 ASSAY OF NATRIURETIC PEPTIDE: CPT | Performed by: STUDENT IN AN ORGANIZED HEALTH CARE EDUCATION/TRAINING PROGRAM

## 2025-01-06 PROCEDURE — 94640 AIRWAY INHALATION TREATMENT: CPT

## 2025-01-06 PROCEDURE — 2500000005 HC RX 250 GENERAL PHARMACY W/O HCPCS

## 2025-01-06 PROCEDURE — 85025 COMPLETE CBC W/AUTO DIFF WBC: CPT

## 2025-01-06 PROCEDURE — 87385 HISTOPLASMA CAPSUL AG IA: CPT | Performed by: STUDENT IN AN ORGANIZED HEALTH CARE EDUCATION/TRAINING PROGRAM

## 2025-01-06 PROCEDURE — 86036 ANCA SCREEN EACH ANTIBODY: CPT | Performed by: STUDENT IN AN ORGANIZED HEALTH CARE EDUCATION/TRAINING PROGRAM

## 2025-01-06 PROCEDURE — 2500000005 HC RX 250 GENERAL PHARMACY W/O HCPCS: Performed by: INTERNAL MEDICINE

## 2025-01-06 PROCEDURE — 9420000001 HC RT PATIENT EDUCATION 5 MIN

## 2025-01-06 PROCEDURE — 36415 COLL VENOUS BLD VENIPUNCTURE: CPT | Performed by: STUDENT IN AN ORGANIZED HEALTH CARE EDUCATION/TRAINING PROGRAM

## 2025-01-06 PROCEDURE — 99222 1ST HOSP IP/OBS MODERATE 55: CPT | Performed by: STUDENT IN AN ORGANIZED HEALTH CARE EDUCATION/TRAINING PROGRAM

## 2025-01-06 PROCEDURE — 94660 CPAP INITIATION&MGMT: CPT

## 2025-01-06 PROCEDURE — 93306 TTE W/DOPPLER COMPLETE: CPT | Performed by: INTERNAL MEDICINE

## 2025-01-06 PROCEDURE — 2500000002 HC RX 250 W HCPCS SELF ADMINISTERED DRUGS (ALT 637 FOR MEDICARE OP, ALT 636 FOR OP/ED)

## 2025-01-06 PROCEDURE — 99291 CRITICAL CARE FIRST HOUR: CPT

## 2025-01-06 PROCEDURE — C8929 TTE W OR WO FOL WCON,DOPPLER: HCPCS

## 2025-01-06 PROCEDURE — 2550000001 HC RX 255 CONTRASTS

## 2025-01-06 PROCEDURE — 83735 ASSAY OF MAGNESIUM: CPT

## 2025-01-06 PROCEDURE — 2500000004 HC RX 250 GENERAL PHARMACY W/ HCPCS (ALT 636 FOR OP/ED): Performed by: INTERNAL MEDICINE

## 2025-01-06 PROCEDURE — 2500000004 HC RX 250 GENERAL PHARMACY W/ HCPCS (ALT 636 FOR OP/ED)

## 2025-01-06 PROCEDURE — 71045 X-RAY EXAM CHEST 1 VIEW: CPT | Performed by: RADIOLOGY

## 2025-01-06 PROCEDURE — 83615 LACTATE (LD) (LDH) ENZYME: CPT | Performed by: STUDENT IN AN ORGANIZED HEALTH CARE EDUCATION/TRAINING PROGRAM

## 2025-01-06 PROCEDURE — 2020000001 HC ICU ROOM DAILY

## 2025-01-06 PROCEDURE — 80069 RENAL FUNCTION PANEL: CPT

## 2025-01-06 RX ORDER — ESOMEPRAZOLE MAGNESIUM 40 MG/1
40 GRANULE, DELAYED RELEASE ORAL DAILY
Status: DISCONTINUED | OUTPATIENT
Start: 2025-01-06 | End: 2025-01-14

## 2025-01-06 RX ORDER — LEVOTHYROXINE SODIUM ANHYDROUS 100 UG/5ML
25 INJECTION, POWDER, LYOPHILIZED, FOR SOLUTION INTRAVENOUS
Status: DISCONTINUED | OUTPATIENT
Start: 2025-01-06 | End: 2025-01-14

## 2025-01-06 RX ORDER — PANTOPRAZOLE SODIUM 40 MG/1
40 TABLET, DELAYED RELEASE ORAL DAILY
Status: DISCONTINUED | OUTPATIENT
Start: 2025-01-06 | End: 2025-01-14

## 2025-01-06 RX ORDER — MAGNESIUM SULFATE HEPTAHYDRATE 40 MG/ML
2 INJECTION, SOLUTION INTRAVENOUS ONCE
Status: COMPLETED | OUTPATIENT
Start: 2025-01-06 | End: 2025-01-06

## 2025-01-06 RX ORDER — MORPHINE SULFATE 2 MG/ML
2 INJECTION, SOLUTION INTRAMUSCULAR; INTRAVENOUS EVERY 4 HOURS PRN
Status: DISCONTINUED | OUTPATIENT
Start: 2025-01-06 | End: 2025-01-08

## 2025-01-06 RX ORDER — VANCOMYCIN HYDROCHLORIDE 1 G/20ML
INJECTION, POWDER, LYOPHILIZED, FOR SOLUTION INTRAVENOUS DAILY PRN
Status: CANCELLED | OUTPATIENT
Start: 2025-01-06

## 2025-01-06 RX ORDER — LINEZOLID 2 MG/ML
600 INJECTION, SOLUTION INTRAVENOUS EVERY 12 HOURS
Status: DISCONTINUED | OUTPATIENT
Start: 2025-01-06 | End: 2025-01-09

## 2025-01-06 RX ORDER — PANTOPRAZOLE SODIUM 40 MG/10ML
40 INJECTION, POWDER, LYOPHILIZED, FOR SOLUTION INTRAVENOUS DAILY
Status: DISCONTINUED | OUTPATIENT
Start: 2025-01-06 | End: 2025-01-14

## 2025-01-06 RX ORDER — HYDROXYZINE HYDROCHLORIDE 25 MG/ML
25 INJECTION, SOLUTION INTRAMUSCULAR EVERY 6 HOURS PRN
Status: DISCONTINUED | OUTPATIENT
Start: 2025-01-06 | End: 2025-01-10

## 2025-01-06 RX ORDER — SODIUM CHLORIDE FOR INHALATION 3 %
3 VIAL, NEBULIZER (ML) INHALATION
Status: DISCONTINUED | OUTPATIENT
Start: 2025-01-06 | End: 2025-01-12

## 2025-01-06 RX ORDER — DIAZEPAM 5 MG/ML
1 INJECTION, SOLUTION INTRAMUSCULAR; INTRAVENOUS EVERY 6 HOURS PRN
Status: DISCONTINUED | OUTPATIENT
Start: 2025-01-06 | End: 2025-01-06

## 2025-01-06 RX ORDER — DIAZEPAM 5 MG/ML
2 INJECTION, SOLUTION INTRAMUSCULAR; INTRAVENOUS EVERY 6 HOURS PRN
Status: DISCONTINUED | OUTPATIENT
Start: 2025-01-06 | End: 2025-01-08

## 2025-01-06 RX ORDER — HEPARIN SODIUM 5000 [USP'U]/ML
5000 INJECTION, SOLUTION INTRAVENOUS; SUBCUTANEOUS EVERY 8 HOURS
Status: DISCONTINUED | OUTPATIENT
Start: 2025-01-06 | End: 2025-01-14

## 2025-01-06 RX ADMIN — IPRATROPIUM BROMIDE AND ALBUTEROL SULFATE 3 ML: 2.5; .5 SOLUTION RESPIRATORY (INHALATION) at 00:11

## 2025-01-06 RX ADMIN — MAGNESIUM SULFATE 2 G: 2 INJECTION INTRAVENOUS at 06:31

## 2025-01-06 RX ADMIN — HYDROXYZINE HYDROCHLORIDE 25 MG: 25 INJECTION, SOLUTION INTRAMUSCULAR at 11:46

## 2025-01-06 RX ADMIN — PIPERACILLIN SODIUM AND TAZOBACTAM SODIUM 2.25 G: 2; .25 INJECTION, SOLUTION INTRAVENOUS at 20:28

## 2025-01-06 RX ADMIN — DIAZEPAM 2 MG: 5 INJECTION, SOLUTION INTRAMUSCULAR; INTRAVENOUS at 23:22

## 2025-01-06 RX ADMIN — METHYLPREDNISOLONE SODIUM SUCCINATE 40 MG: 40 INJECTION, POWDER, LYOPHILIZED, FOR SOLUTION INTRAMUSCULAR; INTRAVENOUS at 20:28

## 2025-01-06 RX ADMIN — DIAZEPAM 1 MG: 5 INJECTION, SOLUTION INTRAMUSCULAR; INTRAVENOUS at 16:43

## 2025-01-06 RX ADMIN — IPRATROPIUM BROMIDE AND ALBUTEROL SULFATE 3 ML: 2.5; .5 SOLUTION RESPIRATORY (INHALATION) at 03:50

## 2025-01-06 RX ADMIN — METHYLPREDNISOLONE SODIUM SUCCINATE 40 MG: 40 INJECTION, POWDER, LYOPHILIZED, FOR SOLUTION INTRAMUSCULAR; INTRAVENOUS at 02:58

## 2025-01-06 RX ADMIN — HEPARIN SODIUM 5000 UNITS: 5000 INJECTION, SOLUTION INTRAVENOUS; SUBCUTANEOUS at 11:46

## 2025-01-06 RX ADMIN — DOXYCYCLINE 100 MG: 100 INJECTION, POWDER, LYOPHILIZED, FOR SOLUTION INTRAVENOUS at 02:58

## 2025-01-06 RX ADMIN — PIPERACILLIN SODIUM AND TAZOBACTAM SODIUM 2.25 G: 2; .25 INJECTION, SOLUTION INTRAVENOUS at 02:58

## 2025-01-06 RX ADMIN — Medication 3 ML: at 15:29

## 2025-01-06 RX ADMIN — LEVOTHYROXINE SODIUM ANHYDROUS 25 MCG: 100 INJECTION, POWDER, LYOPHILIZED, FOR SOLUTION INTRAVENOUS at 11:45

## 2025-01-06 RX ADMIN — MORPHINE SULFATE 2 MG: 2 INJECTION, SOLUTION INTRAMUSCULAR; INTRAVENOUS at 12:31

## 2025-01-06 RX ADMIN — PIPERACILLIN SODIUM AND TAZOBACTAM SODIUM 2.25 G: 2; .25 INJECTION, SOLUTION INTRAVENOUS at 08:38

## 2025-01-06 RX ADMIN — SULFUR HEXAFLUORIDE 24.28 MG: KIT at 10:29

## 2025-01-06 RX ADMIN — IPRATROPIUM BROMIDE AND ALBUTEROL SULFATE 3 ML: 2.5; .5 SOLUTION RESPIRATORY (INHALATION) at 08:00

## 2025-01-06 RX ADMIN — IPRATROPIUM BROMIDE AND ALBUTEROL SULFATE 3 ML: 2.5; .5 SOLUTION RESPIRATORY (INHALATION) at 15:30

## 2025-01-06 RX ADMIN — LINEZOLID 600 MG: 600 INJECTION, SOLUTION INTRAVENOUS at 13:25

## 2025-01-06 RX ADMIN — HEPARIN SODIUM 5000 UNITS: 5000 INJECTION, SOLUTION INTRAVENOUS; SUBCUTANEOUS at 20:28

## 2025-01-06 RX ADMIN — MORPHINE SULFATE 2 MG: 2 INJECTION, SOLUTION INTRAMUSCULAR; INTRAVENOUS at 08:38

## 2025-01-06 RX ADMIN — IPRATROPIUM BROMIDE AND ALBUTEROL SULFATE 3 ML: 2.5; .5 SOLUTION RESPIRATORY (INHALATION) at 11:29

## 2025-01-06 RX ADMIN — IPRATROPIUM BROMIDE AND ALBUTEROL SULFATE 3 ML: 2.5; .5 SOLUTION RESPIRATORY (INHALATION) at 20:00

## 2025-01-06 RX ADMIN — DOXYCYCLINE 100 MG: 100 INJECTION, POWDER, LYOPHILIZED, FOR SOLUTION INTRAVENOUS at 17:40

## 2025-01-06 RX ADMIN — Medication 3 ML: at 11:29

## 2025-01-06 RX ADMIN — PIPERACILLIN SODIUM AND TAZOBACTAM SODIUM 2.25 G: 2; .25 INJECTION, SOLUTION INTRAVENOUS at 16:41

## 2025-01-06 RX ADMIN — HYDROXYZINE HYDROCHLORIDE 25 MG: 25 INJECTION, SOLUTION INTRAMUSCULAR at 20:52

## 2025-01-06 RX ADMIN — Medication 3 ML: at 20:00

## 2025-01-06 RX ADMIN — PANTOPRAZOLE SODIUM 40 MG: 40 INJECTION, POWDER, FOR SOLUTION INTRAVENOUS at 08:38

## 2025-01-06 RX ADMIN — MORPHINE SULFATE 2 MG: 2 INJECTION, SOLUTION INTRAMUSCULAR; INTRAVENOUS at 16:42

## 2025-01-06 RX ADMIN — METHYLPREDNISOLONE SODIUM SUCCINATE 40 MG: 40 INJECTION, POWDER, LYOPHILIZED, FOR SOLUTION INTRAMUSCULAR; INTRAVENOUS at 08:38

## 2025-01-06 RX ADMIN — Medication 80 PERCENT: at 08:00

## 2025-01-06 RX ADMIN — Medication 100 PERCENT: at 20:00

## 2025-01-06 RX ADMIN — METHYLPREDNISOLONE SODIUM SUCCINATE 40 MG: 40 INJECTION, POWDER, LYOPHILIZED, FOR SOLUTION INTRAMUSCULAR; INTRAVENOUS at 16:42

## 2025-01-06 ASSESSMENT — PAIN SCALES - GENERAL
PAINLEVEL_OUTOF10: 0 - NO PAIN

## 2025-01-06 ASSESSMENT — PAIN - FUNCTIONAL ASSESSMENT
PAIN_FUNCTIONAL_ASSESSMENT: 0-10
PAIN_FUNCTIONAL_ASSESSMENT: 0-10

## 2025-01-06 ASSESSMENT — PAIN SCALES - WONG BAKER
WONGBAKER_NUMERICALRESPONSE: NO HURT
WONGBAKER_NUMERICALRESPONSE: NO HURT

## 2025-01-06 NOTE — CARE PLAN
The patient's goals for the shift include Able to breath better, safety    The clinical goals for the shift include Patient ewill wean down supplememntal O2 while maintianing O2 SAT in n ormal range    Over the shift, the patient did not make progress toward the following goals. Barriers to progression include. Recommendations to address these barriers include  Problem: Pain - Adult  Goal: Verbalizes/displays adequate comfort level or baseline comfort level  Outcome: Progressing  Flowsheets (Taken 1/6/2025 0056)  Verbalizes/displays adequate comfort level or baseline comfort level:   Encourage patient to monitor pain and request assistance   Assess pain using appropriate pain scale   Administer analgesics based on type and severity of pain and evaluate response   Implement non-pharmacological measures as appropriate and evaluate response   Consider cultural and social influences on pain and pain management   Notify Licensed Independent Practitioner if interventions unsuccessful or patient reports new pain     Problem: Safety - Adult  Goal: Free from fall injury  Outcome: Progressing  Flowsheets (Taken 1/6/2025 0056)  Free from fall injury: Based on caregiver fall risk screen, instruct family/caregiver to ask for assistance with transferring infant if caregiver noted to have fall risk factors     Problem: Discharge Planning  Goal: Discharge to home or other facility with appropriate resources  Outcome: Progressing  Flowsheets (Taken 1/6/2025 0056)  Discharge to home or other facility with appropriate resources:   Identify barriers to discharge with patient and caregiver   Arrange for needed discharge resources and transportation as appropriate   Identify discharge learning needs (meds, wound care, etc)   Arrange for interpreters to assist at discharge as needed   Refer to discharge planning if patient needs post-hospital services based on physician order or complex needs related to functional status, cognitive  ability or social support system     Problem: Chronic Conditions and Co-morbidities  Goal: Patient's chronic conditions and co-morbidity symptoms are monitored and maintained or improved  Outcome: Progressing  Flowsheets (Taken 1/6/2025 0056)  Care Plan - Patient's Chronic Conditions and Co-Morbidity Symptoms are Monitored and Maintained or Improved:   Monitor and assess patient's chronic conditions and comorbid symptoms for stability, deterioration, or improvement   Collaborate with multidisciplinary team to address chronic and comorbid conditions and prevent exacerbation or deterioration   Update acute care plan with appropriate goals if chronic or comorbid symptoms are exacerbated and prevent overall improvement and discharge     Problem: Skin  Goal: Decreased wound size/increased tissue granulation at next dressing change  Outcome: Progressing  Flowsheets (Taken 1/6/2025 0056)  Decreased wound size/increased tissue granulation at next dressing change: Protective dressings over bony prominences  Goal: Participates in plan/prevention/treatment measures  Outcome: Progressing  Flowsheets (Taken 1/6/2025 0056)  Participates in plan/prevention/treatment measures: Elevate heels  Goal: Prevent/manage excess moisture  Outcome: Progressing  Flowsheets (Taken 1/6/2025 0056)  Prevent/manage excess moisture:   Moisturize dry skin   Cleanse incontinence/protect with barrier cream   Monitor for/manage infection if present  Goal: Prevent/minimize sheer/friction injuries  Outcome: Progressing  Flowsheets (Taken 1/6/2025 0056)  Prevent/minimize sheer/friction injuries:   Use pull sheet   HOB 30 degrees or less  Goal: Promote/optimize nutrition  Outcome: Progressing  Flowsheets (Taken 1/6/2025 0056)  Promote/optimize nutrition:   Offer water/supplements/favorite foods   Monitor/record intake including meals  Goal: Promote skin healing  Outcome: Progressing  Flowsheets (Taken 1/6/2025 0056)  Promote skin healing:   Turn/reposition  every 2 hours/use positioning/transfer devices   Protective dressings over bony prominences   .

## 2025-01-06 NOTE — PROGRESS NOTES
"Janet Snow is a 79 y.o. female on day 4 of admission presenting with Community acquired pneumonia of both upper lobes.    Subjective   The patient is seen for acute kidney injury admitted with bilateral pneumonia yesterday she was transferred to intensive care unit because of severe hypoxia she is on 100% FiO2 with BiPAP continuously is awake and responsive complaining of sore throat she did have a CT angiogram of the chest which I have reviewed showed no evidence of pulmonary emboli however she has extensive bilateral pulm infiltrates mainly alveolar infiltrates       Objective     Physical Exam  Neck:      Vascular: No carotid bruit.   Cardiovascular:      Rate and Rhythm: Normal rate and regular rhythm.      Heart sounds: No murmur heard.     No friction rub. No gallop.   Pulmonary:      Breath sounds: Wheezing and rhonchi present. No rales.   Chest:      Chest wall: No tenderness.   Abdominal:      General: There is no distension.      Tenderness: There is no abdominal tenderness. There is no guarding or rebound.   Musculoskeletal:         General: No swelling or tenderness.      Cervical back: Neck supple.      Right lower leg: No edema.      Left lower leg: No edema.   Lymphadenopathy:      Cervical: No cervical adenopathy.         Last Recorded Vitals  Blood pressure 153/79, pulse 96, temperature 36.6 °C (97.9 °F), temperature source Axillary, resp. rate (!) 33, height 1.6 m (5' 3\"), weight 70 kg (154 lb 5.2 oz), SpO2 98%.    Intake/Output last 3 Shifts:  I/O last 3 completed shifts:  In: 2481.7 (35.5 mL/kg) [I.V.:2081.7 (29.7 mL/kg); IV Piggyback:400]  Out: 900 (12.9 mL/kg) [Urine:900 (0.4 mL/kg/hr)]  Weight: 70 kg     Current Facility-Administered Medications:     acetaminophen (Tylenol) tablet 650 mg, 650 mg, oral, q6h PRN, Shravan Barroso PA-C, 650 mg at 01/04/25 1909    albuterol 2.5 mg /3 mL (0.083 %) nebulizer solution 2.5 mg, 2.5 mg, nebulization, q2h PRN, Shravan Barroso PA-C    benzonatate " (Tessalon) capsule 200 mg, 200 mg, oral, TID PRN, Shravan Barroso PA-C, 200 mg at 01/04/25 1645    calcium carbonate-vitamin D3 500 mg-5 mcg (200 unit) per tablet 1 tablet, 1 tablet, oral, BID, Shravan Barroso PA-C, 1 tablet at 01/05/25 2100    docusate sodium (Colace) capsule 100 mg, 100 mg, oral, BID, Shravan Barroso PA-C, 100 mg at 01/05/25 2100    doxycycline (Vibramycin) 100 mg in dextrose 5% 100 mL IV, 100 mg, intravenous, q12h, Max Sanchez MD, Stopped at 01/06/25 0458    enoxaparin (Lovenox) syringe 40 mg, 40 mg, subcutaneous, q24h, Shravan Barroso PA-C, 40 mg at 01/05/25 1820    ergocalciferol (Vitamin D-2) capsule 1,250 mcg, 1,250 mcg, oral, Weekly, Shravan Barroso PA-C    pantoprazole (ProtoNix) EC tablet 40 mg, 40 mg, oral, Daily **OR** esomeprazole (NexIUM) suspension 40 mg, 40 mg, oral, Daily **OR** pantoprazole (ProtoNix) injection 40 mg, 40 mg, intravenous, Daily, Linda Dodd PA-C, 40 mg at 01/06/25 0838    FLUoxetine (PROzac) capsule 40 mg, 40 mg, oral, Daily, Shravan Barroso PA-C, 40 mg at 01/04/25 0901    guaiFENesin (Mucinex) 12 hr tablet 1,200 mg, 1,200 mg, oral, BID, Shravan Barroso PA-C, 1,200 mg at 01/05/25 2100    hydrocodone-homatropine (Hycodan) 5-1.5 mg/5 mL syrup 5 mL, 5 mL, oral, q4h PRN, Shravan Barroso PA-C    ipratropium-albuteroL (Duo-Neb) 0.5-2.5 mg/3 mL nebulizer solution 3 mL, 3 mL, nebulization, q4h, Shravan Barroso PA-C, 3 mL at 01/06/25 0800    levothyroxine (Synthroid) injection 25 mcg, 25 mcg, intravenous, q24h [facility], Linda Dodd PA-C    [Held by provider] levothyroxine (Synthroid, Levoxyl) tablet 50 mcg, 50 mcg, oral, Daily, Shravan Barroso PA-C, 50 mcg at 01/05/25 0605    magnesium oxide (Mag-Ox) tablet 400 mg, 400 mg, oral, BID, Shravan Barroso PA-C, 400 mg at 01/05/25 2100    methylPREDNISolone sod succinate (SOLU-Medrol) 40 mg/mL injection 40 mg, 40 mg, intravenous, q6h, Shravan Barroso PA-C, 40 mg at 01/06/25 0838    morphine injection 2 mg, 2  mg, intravenous, q4h PRN, Shravan Barroso PA-C, 2 mg at 01/06/25 0838    oxygen (O2) therapy, , inhalation, Continuous PRN - O2/gases, Shravan Barroso PA-C, 3 L/min at 01/1945    oxygen (O2) therapy, , inhalation, Continuous - Inhalation, Sepideh Kearney, DO, 80 percent at 01/06/25 0800    piperacillin-tazobactam (Zosyn) 2.25 g in dextrose (iso) IV 50 mL, 2.25 g, intravenous, q6h, Shravan Barroso PA-C, Last Rate: 0 mL/hr at 01/06/25 0458, 2.25 g at 01/06/25 0838    polyethylene glycol (Glycolax, Miralax) packet 17 g, 17 g, oral, Daily PRN, Shravan Barroso PA-C    potassium chloride (Klor-Con) packet 40 mEq, 40 mEq, oral, BID, Shravan Barroso PA-C, 40 mEq at 01/05/25 2059    simvastatin (Zocor) tablet 20 mg, 20 mg, oral, Nightly, Shravan Barroso PA-C, 20 mg at 01/05/25 2100   Relevant Results    Results for orders placed or performed during the hospital encounter of 01/02/25 (from the past 96 hours)   Sars-CoV-2 and Influenza A/B PCR   Result Value Ref Range    Flu A Result Not Detected Not Detected    Flu B Result Not Detected Not Detected    Coronavirus 2019, PCR Not Detected Not Detected   RSV PCR   Result Value Ref Range    RSV PCR Not Detected Not Detected   CBC and Auto Differential   Result Value Ref Range    WBC 15.0 (H) 4.4 - 11.3 x10*3/uL    nRBC 0.0 0.0 - 0.0 /100 WBCs    RBC 3.61 (L) 4.00 - 5.20 x10*6/uL    Hemoglobin 11.0 (L) 12.0 - 16.0 g/dL    Hematocrit 32.2 (L) 36.0 - 46.0 %    MCV 89 80 - 100 fL    MCH 30.5 26.0 - 34.0 pg    MCHC 34.2 32.0 - 36.0 g/dL    RDW 11.8 11.5 - 14.5 %    Platelets 536 (H) 150 - 450 x10*3/uL    Neutrophils % 88.4 40.0 - 80.0 %    Immature Granulocytes %, Automated 0.4 0.0 - 0.9 %    Lymphocytes % 4.4 13.0 - 44.0 %    Monocytes % 6.1 2.0 - 10.0 %    Eosinophils % 0.4 0.0 - 6.0 %    Basophils % 0.3 0.0 - 2.0 %    Neutrophils Absolute 13.25 (H) 1.60 - 5.50 x10*3/uL    Immature Granulocytes Absolute, Automated 0.06 0.00 - 0.50 x10*3/uL    Lymphocytes Absolute 0.66  (L) 0.80 - 3.00 x10*3/uL    Monocytes Absolute 0.91 (H) 0.05 - 0.80 x10*3/uL    Eosinophils Absolute 0.06 0.00 - 0.40 x10*3/uL    Basophils Absolute 0.04 0.00 - 0.10 x10*3/uL   Comprehensive metabolic panel   Result Value Ref Range    Glucose 121 (H) 74 - 99 mg/dL    Sodium 134 (L) 136 - 145 mmol/L    Potassium 3.2 (L) 3.5 - 5.3 mmol/L    Chloride 99 98 - 107 mmol/L    Bicarbonate 24 21 - 32 mmol/L    Anion Gap 14 10 - 20 mmol/L    Urea Nitrogen 20 6 - 23 mg/dL    Creatinine 0.92 0.50 - 1.05 mg/dL    eGFR 63 >60 mL/min/1.73m*2    Calcium 8.8 8.6 - 10.3 mg/dL    Albumin 3.3 (L) 3.4 - 5.0 g/dL    Alkaline Phosphatase 96 33 - 136 U/L    Total Protein 7.0 6.4 - 8.2 g/dL    AST 32 9 - 39 U/L    Bilirubin, Total 1.1 0.0 - 1.2 mg/dL    ALT 27 7 - 45 U/L   Lactate   Result Value Ref Range    Lactate 2.2 (H) 0.4 - 2.0 mmol/L   Blood Culture    Specimen: Peripheral Venipuncture; Blood culture   Result Value Ref Range    Blood Culture No growth at 3 days    Blood Culture    Specimen: Peripheral Venipuncture; Blood culture   Result Value Ref Range    Blood Culture No growth at 3 days    Troponin I, High Sensitivity, Initial   Result Value Ref Range    Troponin I, High Sensitivity 13 0 - 13 ng/L   ECG 12 lead   Result Value Ref Range    Ventricular Rate 94 BPM    Atrial Rate 94 BPM    KY Interval 130 ms    QRS Duration 86 ms    QT Interval 466 ms    QTC Calculation(Bazett) 582 ms    P Axis 77 degrees    R Axis 77 degrees    T Axis 78 degrees    QRS Count 16 beats    Q Onset 220 ms    P Onset 155 ms    P Offset 211 ms    T Offset 453 ms    QTC Fredericia 541 ms   Urinalysis with Reflex Culture and Microscopic   Result Value Ref Range    Color, Urine Yellow Light-Yellow, Yellow, Dark-Yellow    Appearance, Urine Turbid (N) Clear    Specific Gravity, Urine 1.017 1.005 - 1.035    pH, Urine 7.0 5.0, 5.5, 6.0, 6.5, 7.0, 7.5, 8.0    Protein, Urine 100 (2+) (A) NEGATIVE, 10 (TRACE), 20 (TRACE) mg/dL    Glucose, Urine Normal Normal mg/dL     Blood, Urine OVER (3+) (A) NEGATIVE    Ketones, Urine 10 (1+) (A) NEGATIVE mg/dL    Bilirubin, Urine NEGATIVE NEGATIVE    Urobilinogen, Urine 6 (2+) (A) Normal mg/dL    Nitrite, Urine NEGATIVE NEGATIVE    Leukocyte Esterase, Urine 75 Brissa/µL (A) NEGATIVE   Troponin, High Sensitivity, 1 Hour   Result Value Ref Range    Troponin I, High Sensitivity 16 (H) 0 - 13 ng/L   Lactate   Result Value Ref Range    Lactate 1.8 0.4 - 2.0 mmol/L   Microscopic Only, Urine   Result Value Ref Range    WBC, Urine 21-50 (A) 1-5, NONE /HPF    RBC, Urine >20 (A) NONE, 1-2, 3-5 /HPF    Bacteria, Urine 1+ (A) NONE SEEN /HPF    Budding Yeast, Urine PRESENT (A) NONE /HPF    Mucus, Urine FEW Reference range not established. /LPF   Urine Culture    Specimen: Clean Catch/Voided; Urine   Result Value Ref Range    Urine Culture No growth    Staphylococcus aureus/MRSA colonization, Culture    Specimen: Anterior Nares; Swab   Result Value Ref Range    Staph/MRSA Screen Culture No Staphylococcus aureus isolated    Mycoplasma pneumoniae antibody, IgM   Result Value Ref Range    Mycoplasma pneumoniae IgM 0.44 <=0.76 U/L   CBC   Result Value Ref Range    WBC 15.4 (H) 4.4 - 11.3 x10*3/uL    nRBC 0.0 0.0 - 0.0 /100 WBCs    RBC 3.32 (L) 4.00 - 5.20 x10*6/uL    Hemoglobin 10.0 (L) 12.0 - 16.0 g/dL    Hematocrit 29.7 (L) 36.0 - 46.0 %    MCV 90 80 - 100 fL    MCH 30.1 26.0 - 34.0 pg    MCHC 33.7 32.0 - 36.0 g/dL    RDW 11.9 11.5 - 14.5 %    Platelets 491 (H) 150 - 450 x10*3/uL   Renal function panel   Result Value Ref Range    Glucose 99 74 - 99 mg/dL    Sodium 136 136 - 145 mmol/L    Potassium 3.2 (L) 3.5 - 5.3 mmol/L    Chloride 103 98 - 107 mmol/L    Bicarbonate 25 21 - 32 mmol/L    Anion Gap 11 10 - 20 mmol/L    Urea Nitrogen 17 6 - 23 mg/dL    Creatinine 0.85 0.50 - 1.05 mg/dL    eGFR 70 >60 mL/min/1.73m*2    Calcium 8.1 (L) 8.6 - 10.3 mg/dL    Phosphorus 2.6 2.5 - 4.9 mg/dL    Albumin 3.0 (L) 3.4 - 5.0 g/dL   Magnesium   Result Value Ref Range     Magnesium 1.67 1.60 - 2.40 mg/dL   Legionella Antigen, Urine    Specimen: Clean Catch/Voided; Urine   Result Value Ref Range    L. pneumophila Urine Ag Negative Negative   Streptococcus pneumoniae Antigen, Urine    Specimen: Clean Catch/Voided; Urine   Result Value Ref Range    Streptococcus pneumoniae Ag, Urine Negative Negative   CBC   Result Value Ref Range    WBC 17.6 (H) 4.4 - 11.3 x10*3/uL    nRBC 0.0 0.0 - 0.0 /100 WBCs    RBC 3.26 (L) 4.00 - 5.20 x10*6/uL    Hemoglobin 9.8 (L) 12.0 - 16.0 g/dL    Hematocrit 28.2 (L) 36.0 - 46.0 %    MCV 87 80 - 100 fL    MCH 30.1 26.0 - 34.0 pg    MCHC 34.8 32.0 - 36.0 g/dL    RDW 11.9 11.5 - 14.5 %    Platelets 518 (H) 150 - 450 x10*3/uL   Renal function panel   Result Value Ref Range    Glucose 91 74 - 99 mg/dL    Sodium 134 (L) 136 - 145 mmol/L    Potassium 3.7 3.5 - 5.3 mmol/L    Chloride 103 98 - 107 mmol/L    Bicarbonate 23 21 - 32 mmol/L    Anion Gap 12 10 - 20 mmol/L    Urea Nitrogen 18 6 - 23 mg/dL    Creatinine 1.06 (H) 0.50 - 1.05 mg/dL    eGFR 54 (L) >60 mL/min/1.73m*2    Calcium 8.1 (L) 8.6 - 10.3 mg/dL    Phosphorus 3.0 2.5 - 4.9 mg/dL    Albumin 3.0 (L) 3.4 - 5.0 g/dL   Magnesium   Result Value Ref Range    Magnesium 1.67 1.60 - 2.40 mg/dL   B-type natriuretic peptide   Result Value Ref Range     (H) 0 - 99 pg/mL   Vitamin D 25-Hydroxy,Total (for eval of Vitamin D levels)   Result Value Ref Range    Vitamin D, 25-Hydroxy, Total 34 30 - 100 ng/mL   CBC   Result Value Ref Range    WBC 21.2 (H) 4.4 - 11.3 x10*3/uL    nRBC 0.0 0.0 - 0.0 /100 WBCs    RBC 2.97 (L) 4.00 - 5.20 x10*6/uL    Hemoglobin 9.1 (L) 12.0 - 16.0 g/dL    Hematocrit 26.1 (L) 36.0 - 46.0 %    MCV 88 80 - 100 fL    MCH 30.6 26.0 - 34.0 pg    MCHC 34.9 32.0 - 36.0 g/dL    RDW 12.0 11.5 - 14.5 %    Platelets 572 (H) 150 - 450 x10*3/uL   Renal function panel   Result Value Ref Range    Glucose 124 (H) 74 - 99 mg/dL    Sodium 134 (L) 136 - 145 mmol/L    Potassium 3.4 (L) 3.5 - 5.3 mmol/L     Chloride 101 98 - 107 mmol/L    Bicarbonate 23 21 - 32 mmol/L    Anion Gap 13 10 - 20 mmol/L    Urea Nitrogen 26 (H) 6 - 23 mg/dL    Creatinine 1.49 (H) 0.50 - 1.05 mg/dL    eGFR 36 (L) >60 mL/min/1.73m*2    Calcium 8.5 (L) 8.6 - 10.3 mg/dL    Phosphorus 3.7 2.5 - 4.9 mg/dL    Albumin 3.0 (L) 3.4 - 5.0 g/dL   Magnesium   Result Value Ref Range    Magnesium 1.84 1.60 - 2.40 mg/dL   TSH with reflex to Free T4 if abnormal   Result Value Ref Range    Thyroid Stimulating Hormone 1.35 0.44 - 3.98 mIU/L   B-type natriuretic peptide   Result Value Ref Range     (H) 0 - 99 pg/mL   BLOOD GAS ARTERIAL   Result Value Ref Range    POCT pH, Arterial 7.37 (L) 7.38 - 7.42 pH    POCT pCO2, Arterial 35 (L) 38 - 42 mm Hg    POCT pO2, Arterial 44 (LL) 85 - 95 mm Hg    POCT SO2, Arterial 78 (L) 94 - 100 %    POCT Oxy Hemoglobin, Arterial 76.2 (L) 94.0 - 98.0 %    POCT Base Excess, Arterial -4.4 (L) -2.0 - 3.0 mmol/L    POCT HCO3 Calculated, Arterial 20.2 (L) 22.0 - 26.0 mmol/L    Patient Temperature 37.0 degrees Celsius    FiO2 100 %    Apparatus NON REBREATHER     Critical Called By OLIVE ESQUIVEL RRT     Critical Called To DR SPICER     Critical Call Time 818     Critical Read Back Y     Critical Note CRVR     Site of Arterial Puncture Radial Right     Aba's Test Positive    C3 Complement   Result Value Ref Range    C3 Complement 160 87 - 200 mg/dL   C4 Complement   Result Value Ref Range    C4 Complement 30 10 - 50 mg/dL   Protein, Total   Result Value Ref Range    Total Protein 6.6 6.4 - 8.2 g/dL   Sodium, Urine Random   Result Value Ref Range    Sodium, Urine Random 17 mmol/L    Creatinine, Urine Random 81.1 20.0 - 320.0 mg/dL    Sodium/Creatinine Ratio 21 Not established. mmol/g Creat   Osmolality, Urine   Result Value Ref Range    Osmolality, Urine Random 360 200 - 1,200 mOsm/kg   Protein, Urine Random   Result Value Ref Range    Total Protein, Urine Random 196 (H) 5 - 24 mg/dL    Creatinine, Urine Random 81.1 20.0 -  320.0 mg/dL    T. Protein/Creatinine Ratio 2.42 (H) 0.00 - 0.17 mg/mg Creat   Blood Gas Arterial Full Panel   Result Value Ref Range    POCT pH, Arterial 7.34 (L) 7.38 - 7.42 pH    POCT pCO2, Arterial 40 38 - 42 mm Hg    POCT pO2, Arterial 108 (H) 85 - 95 mm Hg    POCT SO2, Arterial 99 94 - 100 %    POCT Oxy Hemoglobin, Arterial 97.7 94.0 - 98.0 %    POCT Hematocrit Calculated, Arterial 27.0 (L) 36.0 - 46.0 %    POCT Sodium, Arterial 133 (L) 136 - 145 mmol/L    POCT Potassium, Arterial 4.1 3.5 - 5.3 mmol/L    POCT Chloride, Arterial 104 98 - 107 mmol/L    POCT Ionized Calcium, Arterial 1.20 1.10 - 1.33 mmol/L    POCT Glucose, Arterial 152 (H) 74 - 99 mg/dL    POCT Lactate, Arterial 1.2 0.4 - 2.0 mmol/L    POCT Base Excess, Arterial -3.9 (L) -2.0 - 3.0 mmol/L    POCT HCO3 Calculated, Arterial 21.6 (L) 22.0 - 26.0 mmol/L    POCT Hemoglobin, Arterial 9.1 (L) 12.0 - 16.0 g/dL    POCT Anion Gap, Arterial 12 10 - 25 mmo/L    Patient Temperature 37.0 degrees Celsius    FiO2 80 %    Ventilator Mode BiPAP     Ipap CMH2O 14.0 cm H2O    Epap CMH2O 10.0 cm H2O    Site of Arterial Puncture Radial Left     Aba's Test Positive    Sedimentation rate, automated   Result Value Ref Range    Sedimentation Rate 77 (H) 0 - 30 mm/h   C-reactive protein   Result Value Ref Range    C-Reactive Protein 35.67 (H) <1.00 mg/dL   Renal function panel   Result Value Ref Range    Glucose 141 (H) 74 - 99 mg/dL    Sodium 137 136 - 145 mmol/L    Potassium 4.1 3.5 - 5.3 mmol/L    Chloride 107 98 - 107 mmol/L    Bicarbonate 22 21 - 32 mmol/L    Anion Gap 12 10 - 20 mmol/L    Urea Nitrogen 35 (H) 6 - 23 mg/dL    Creatinine 1.75 (H) 0.50 - 1.05 mg/dL    eGFR 29 (L) >60 mL/min/1.73m*2    Calcium 8.5 (L) 8.6 - 10.3 mg/dL    Phosphorus 3.0 2.5 - 4.9 mg/dL    Albumin 3.0 (L) 3.4 - 5.0 g/dL   Magnesium   Result Value Ref Range    Magnesium 1.93 1.60 - 2.40 mg/dL   CBC and Auto Differential   Result Value Ref Range    WBC 21.9 (H) 4.4 - 11.3 x10*3/uL    nRBC  0.0 0.0 - 0.0 /100 WBCs    RBC 2.72 (L) 4.00 - 5.20 x10*6/uL    Hemoglobin 8.4 (L) 12.0 - 16.0 g/dL    Hematocrit 25.3 (L) 36.0 - 46.0 %    MCV 93 80 - 100 fL    MCH 30.9 26.0 - 34.0 pg    MCHC 33.2 32.0 - 36.0 g/dL    RDW 12.8 11.5 - 14.5 %    Platelets 585 (H) 150 - 450 x10*3/uL    Neutrophils % 92.6 40.0 - 80.0 %    Immature Granulocytes %, Automated 1.1 (H) 0.0 - 0.9 %    Lymphocytes % 3.0 13.0 - 44.0 %    Monocytes % 3.2 2.0 - 10.0 %    Eosinophils % 0.0 0.0 - 6.0 %    Basophils % 0.1 0.0 - 2.0 %    Neutrophils Absolute 20.22 (H) 1.60 - 5.50 x10*3/uL    Immature Granulocytes Absolute, Automated 0.25 0.00 - 0.50 x10*3/uL    Lymphocytes Absolute 0.65 (L) 0.80 - 3.00 x10*3/uL    Monocytes Absolute 0.71 0.05 - 0.80 x10*3/uL    Eosinophils Absolute 0.00 0.00 - 0.40 x10*3/uL    Basophils Absolute 0.03 0.00 - 0.10 x10*3/uL       Assessment/Plan   Acute kidney injury the use of IV contrast yesterday renal function is worsening creatinine is higher than yesterday over the patient is nonoliguric at this time we will continue current antibiotic therapy continue to monitor renal function very closely no indication for dialysis therapy at this point  Bilateral pneumonia sinew with IV antibiotics per infectious disease  Acute respiratory failure with BiPAP patient is DO NOT INTUBATE  Anemia  Hyperlipidemia  Thrombocytosis rule out polycythemia    Gonzalo Hemphill MD

## 2025-01-06 NOTE — PROGRESS NOTES
Janet Snow is a 79 y.o. female on day 4 of admission presenting with Community acquired pneumonia of both upper lobes.    Subjective   On continuous Bipap  Afebrile  Reports shortness of breath and cough  Chest soreness with coughing  Denies nausea, vomiting, diarrhea  Discussed with nursing    Objective   Range of Vitals (last 24 hours)  Heart Rate:  []   Temp:  [36.3 °C (97.3 °F)-36.7 °C (98.1 °F)]   Resp:  [22-43]   BP: (122-178)/()   Weight:  [70 kg (154 lb 5.2 oz)]   SpO2:  [73 %-100 %]   Daily Weight  01/05/25 : 70 kg (154 lb 5.2 oz)    Body mass index is 27.34 kg/m².    Physical Exam  Constitutional:       Appearance: She is ill-appearing.   HENT:      Head: Normocephalic and atraumatic.   Eyes:      Extraocular Movements: Extraocular movements intact.      Conjunctiva/sclera: Conjunctivae normal.   Cardiovascular:      Rate and Rhythm: Normal rate.   Pulmonary:      Effort: Pulmonary effort is normal.      Breath sounds: Normal breath sounds.   Abdominal:      General: Bowel sounds are normal.      Palpations: Abdomen is soft.   Musculoskeletal:         General: Normal range of motion.      Cervical back: Normal range of motion and neck supple.   Skin:     General: Skin is warm and dry.   Neurological:      General: No focal deficit present.      Mental Status: She is alert.   Psychiatric:         Mood and Affect: Mood normal.         Antibiotics  doxycycline (Vibramycin)  mg in 100 mL D5W (ADV/MBP)  linezolid - 600 mg/300 mL  piperacillin-tazobactam - 2.25 gram/50 mL    Relevant Results  Labs  Results from last 72 hours   Lab Units 01/06/25  0433 01/05/25  0515 01/04/25  0515   WBC AUTO x10*3/uL 21.9* 21.2* 17.6*   HEMOGLOBIN g/dL 8.4* 9.1* 9.8*   HEMATOCRIT % 25.3* 26.1* 28.2*   PLATELETS AUTO x10*3/uL 585* 572* 518*   NEUTROS PCT AUTO % 92.6  --   --    LYMPHS PCT AUTO % 3.0  --   --    MONOS PCT AUTO % 3.2  --   --    EOS PCT AUTO % 0.0  --   --      Results from last 72 hours    Lab Units 01/06/25  0433 01/05/25  0515 01/04/25  0515   SODIUM mmol/L 137 134* 134*   POTASSIUM mmol/L 4.1 3.4* 3.7   CHLORIDE mmol/L 107 101 103   CO2 mmol/L 22 23 23   BUN mg/dL 35* 26* 18   CREATININE mg/dL 1.75* 1.49* 1.06*   GLUCOSE mg/dL 141* 124* 91   CALCIUM mg/dL 8.5* 8.5* 8.1*   ANION GAP mmol/L 12 13 12   EGFR mL/min/1.73m*2 29* 36* 54*   PHOSPHORUS mg/dL 3.0 3.7 3.0     Results from last 72 hours   Lab Units 01/06/25  0433 01/05/25  1000 01/05/25  0515 01/04/25  0515   PROTEIN TOTAL g/dL  --  6.6  --   --    ALBUMIN g/dL 3.0*  --  3.0* 3.0*     Estimated Creatinine Clearance: 24.4 mL/min (A) (by C-G formula based on SCr of 1.75 mg/dL (H)).  C-Reactive Protein   Date Value Ref Range Status   01/05/2025 35.67 (H) <1.00 mg/dL Final     CRP   Date Value Ref Range Status   01/04/2020 10.1 (H) 0 - 2.0 MG/DL Final     Comment:     Performed at [place] [id number]     Microbiology  Negative MRSA swab, mycoplasma pneumonia IgM negative, Streptococcus pneumoniae antigen negative, Legionella antigen negative  Procalcitonin elevated at 2.86  Aspergillus, beta D glucan, histoplasma antigen ordered by pulmonary and intensivist-pending  Blood cultures pending with no growth  Urine culture negative    Imaging  XR chest 1 view    Result Date: 1/6/2025  Interpreted By:  Jaxon Morales, STUDY: XR CHEST 1 VIEW; 1/6/2025 5:38 am   INDICATION: CLINICAL INFORMATION: Signs/Symptoms:Pneumonia follow-up. Previous abnormal chest radiograph. Shortness of breath.   COMPARISON: 01/05/2025   ACCESSION NUMBER(S): [id number]   ORDERING CLINICIAN: AMANDA SÁNCHEZ   TECHNIQUE: Portable chest one view.   FINDINGS: The cardiac size is indeterminate in view of the AP projection. Diffuse ground-glass infiltrates are again identified, similar compared to the previous study. No effusions are appreciated.       Diffuse ground-glass infiltrates. There is no interval change when compared to the previous examination.    MACRO: none   Signed by: Jaxon Morales 1/6/2025 8:29 AM Dictation workstation:   [id number]    CT angio chest for pulmonary embolism    Result Date: 1/5/2025  Interpreted By:  Selin Matthews, STUDY: CT ANGIO CHEST FOR PULMONARY EMBOLISM;  1/5/2025 4:10 pm   INDICATION: Signs/Symptoms:severe hypoxia   COMPARISON: Chest x-ray 01/05/2025. CT chest 07/19/2024   ACCESSION NUMBER(S): [id number]   ORDERING CLINICIAN: NORM LANDAVERDE   TECHNIQUE: Helical data acquisition of the chest was obtained following the uneventful administration of intravenous contrast material. Images were reformatted in axial, coronal, and sagittal planes. MIP images were created and reviewed.   FINDINGS: POTENTIAL LIMITATIONS OF THE STUDY: Motion artifact.   HEART AND VESSELS: The evaluation for filling defects at the pulmonary arteries is degraded by motion artifact. No large central filling defects to suggest pulmonary embolism. The smaller segmental/subsegmental pulmonary arteries are not well evaluated on this exam.   No thoracic aortic aneurysm. Mild atherosclerotic calcifications are noted at the thoracic aorta.   The heart is mildly enlarged. There is trace pericardial effusion.   MEDIASTINUM AND MANPREET, LOWER NECK AND AXILLA: The visualized thyroid gland is small.   A right paratracheal lymph node measures 10 mm in short axis. A subcarinal lymph node measures 12 mm in short axis. A right hilar lymph node measures 19 mm in short axis. A left hilar lymph node measures 11 mm in short axis. Calcified lymph nodes are noted at the mediastinum and left hilum.   LUNGS AND AIRWAYS: The trachea and central airways are patent.   There are diffuse bilateral ground-glass opacities. No pleural effusion or pneumothorax.   UPPER ABDOMEN: Calcific foci at the spleen are suggestive of granulomas.   CHEST WALL AND OSSEOUS STRUCTURES: Multilevel degenerative changes at the spine. Redemonstration of remote compression deformity with Schmorl's node at the  superior endplate of L1.       1. Study degraded by motion artifact. No evidence of large central pulmonary emboli. The smaller segmental/subsegmental pulmonary arteries are not well evaluated on this exam. 2. Diffuse bilateral ground-glass opacities, may be secondary to pulmonary edema and/or multifocal pneumonia. Acute respiratory distress syndrome is in the differential diagnoses. 3. Mild cardiomegaly. Trace pericardial effusion. 4. Mild mediastinal and hilar adenopathy.     MACRO: None.   Signed by: Selin Matthews 1/5/2025 6:42 PM Dictation workstation:   [id number]    US renal complete    Result Date: 1/5/2025  Interpreted By:  Jaxon Morales, STUDY: US RENAL COMPLETE; 1/5/2025 10:29 am   INDICATION: Signs/Symptoms:nancy.   COMPARISON: None   ACCESSION NUMBER(S): [id number]   ORDERING CLINICIAN: NORM LANDAVERDE   TECHNIQUE: Grayscale and color Doppler imaging of the kidneys   FINDINGS: The right kidney measures 11.0 cm  . The left kidney measures 11.0 cm  .   There is mild increased echogenicity of the renal cortex bilaterally.     No renal stones are identified.  Renal cortex is normal in thickness bilaterally. No hydronephrosis is identified.   Urinary bladder is nonspecific in appearance with no stones or masses identified.       Mild increased renal cortical echogenicity diffusely bilaterally suggesting chronic renal medical disease.   MACRO: none   Signed by: Jaxon Morales 1/5/2025 12:45 PM Dictation workstation:   [id number]    XR chest 1 view    Result Date: 1/5/2025  Interpreted By:  Sincere Slater, STUDY: XR CHEST 1 VIEW;  1/5/2025 8:48 am   INDICATION: Signs/Symptoms:respiratory distress acute.   COMPARISON: 01/02/2025   ACCESSION NUMBER(S): [id number]   ORDERING CLINICIAN: RAMANDEEP ALLRED   FINDINGS: Diffusely increased bilateral airspace consolidation. No visualized pleural effusion. Cardiomediastinal silhouette unchanged. Aortic atherosclerosis. Thoracic degenerative changes with dextroscoliosis.        Diffusely increased bilateral airspace consolidation.   MACRO: None   Signed by: Sincere Slater 1/5/2025 10:08 AM Dictation workstation:   [id number]    ECG 12 lead    Result Date: 1/3/2025  Normal sinus rhythm Nonspecific ST abnormality Prolonged QT Abnormal ECG No previous ECGs available    XR chest 2 views    Result Date: 1/2/2025  Interpreted By:  Jaxon Morales, STUDY: XR CHEST 2 VIEWS; 1/2/2025 3:05 pm   INDICATION: Signs/Symptoms:fever, productive cough, chest pain.   COMPARISON: 06/14/2022   ACCESSION NUMBER(S): [id number]   ORDERING CLINICIAN: DANIAL COATES   TECHNIQUE: AP and lateral views of the chest were obtained.   FINDINGS: The cardiac silhouette is normal in appearance. There are new patchy bilateral alveolar infiltrates most marked within the upper lobes bilaterally .  No effusions are identified.       Extensive patchy bilateral infiltrates most marked within the upper lobes bilaterally. Findings are suspicious for pneumonia. Follow-up to assure complete clearing is suggested.   MACRO: none   Signed by: Jaxon Morales 1/2/2025 3:43 PM Dictation workstation:   [id number]     Assessment/Plan   Acute hypoxic respiratory failure-on continuous BiPAP  Sepsis  Acute kidney injury-interval worsening  Leukocytosis, multifactorial  Pneumonia-gram-positive versus gram-negative versus atypical-negative nasal MRSA PCR    IV Zosyn  IV doxycycline  Follow-up respiratory viral panel  Follow-up pending work-up  Monitor renal function  Oxygen as needed  Supportive care  Monitor temperature and WBC    Discussed with Dr Sanchez    Total time spent caring for the patient today was 25 minutes.  This includes time spent before the visit reviewing the chart, time spent during the visit, and time spent after the visit on documentation.         Helen Moscoso, APRN-CNP

## 2025-01-06 NOTE — PROGRESS NOTES
Speech-Language Pathology                 Therapy Communication Note    Patient Name: Janet Snow  MRN: [id number]  Department: [facility] 3 E ICU  Room: 19/19-A  Today's Date: 1/6/2025     Discipline: Speech Language Pathology          Missed Visit Reason:  MBSS canceled today d/t pt currently on contniuous BIPAP per radiology tech, will attempt pt reschedule for 1/7/25 as appropriate    Comment:

## 2025-01-06 NOTE — CARE PLAN
The patient's goals for the shift include Able to breath better, safety    The clinical goals for the shift include Titrate spo2 as tolerated      Problem: Pain - Adult  Goal: Verbalizes/displays adequate comfort level or baseline comfort level  Outcome: Progressing     Problem: Safety - Adult  Goal: Free from fall injury  Outcome: Progressing     Problem: Discharge Planning  Goal: Discharge to home or other facility with appropriate resources  Outcome: Progressing     Problem: Chronic Conditions and Co-morbidities  Goal: Patient's chronic conditions and co-morbidity symptoms are monitored and maintained or improved  Outcome: Progressing     Problem: Respiratory  Goal: Minimal/no exertional discomfort or dyspnea this shift  Outcome: Progressing  Goal: Verbalize decreased shortness of breath this shift  Outcome: Progressing  Goal: Wean oxygen to maintain O2 saturation per order/standard this shift  Outcome: Progressing     Problem: Skin  Goal: Decreased wound size/increased tissue granulation at next dressing change  Outcome: Progressing  Goal: Participates in plan/prevention/treatment measures  Outcome: Progressing  Goal: Prevent/manage excess moisture  Outcome: Progressing  Goal: Prevent/minimize sheer/friction injuries  Outcome: Progressing  Goal: Promote/optimize nutrition  Outcome: Progressing  Goal: Promote skin healing  Outcome: Progressing

## 2025-01-06 NOTE — PROGRESS NOTES
[facility] Critical Care Medicine       Date:  1/6/2025  Patient:  Janet Snow  YOB: 1945  MRN:  [id number]   Admit Date:  1/2/2025      Chief Complaint   Patient presents with    Shortness of Breath         History of Present Illness:  Janet Snow is a 79 y.o. year old female patient with past medical history of hypothyroidism, anxiety, depression, hyperlipidemia, sciatica, prior cigarette smoker (30 years, quit 30 years ago) who presented to  ED 1/2 with complaints of shortness of breath. Her symptoms started shortly before [place] which would have been >1 week prior to time of presentation. At this time, she also complained of productive cough with yellow-green colored sputum, difficulty swallowing, chills. She was seen at an urgent care and was prescribed Augmentin, but did not have improvement.      She met SIRS criteria in the ED with fever, tachycardia, and leukocytosis, as well as elevated lactate. She was given rocephin and azithromycin in ED. She was admitted to the regular nursing floor on 2L NC and started on CAP coverage. Pulmonology was consulted and dc azithromycin due to negative urine atypicals. She was reportedly on 3L NC yesterday but would have random episodes of desaturations with coughing fits, but O2 sats would return back to 3L.     On the morning of 1/5, the hospitalist contacted the ICU team to come see her as she became acutely tachypnic, hypoxic with SpO2 in the 60s, came up to the 80s on NRB. She was visibly in respiratory distress, in tripod position at the edge of the bed. ABG 7.37/35/44, confirmed arterial draw. CXR worsening bilateral airspace consolidations compared to CXR 1/2. She was placed on continuous CPAP 8/100% with SpO2 up to the low 90s and urgently transported to the ICU.      She did have a CT chest 7/24 which showed multifocal reticulonodular and ground-glass opacities (since 2020) 2/2 chronic infectious/inflammatory process. Multiple  new-mildly enlarged pulmonary nodules up to 6mm. Multiple unchanged prominent enlarged lymph nodes, likely reactive. Recommended follow-up CT chest in 3-6 months.      Interval ICU Events:  1/5: Pt arrived to ICU on continuous CPAP. Started IV steroids. Work of breathing improved as the day goes on. FiO2 able to be weaned to 80%. Stat CT angio chest obtained to r/o PE and for further differentiation of diffuse infiltrates seen on CXR.     1/6: Attempted to give patient a break from bipap overnight, but she became hypoxic with SpO2 in the 70s immedately. Remains on continuous bipap this morning. Very anxious with coughing fits and becomes acutely SOB with increased WOB during these episodes. CT chest yesterday showing diffuse GGO, will reengage pulmonology for their input. Broaden abx include zyvox.     Objective     Medical History:  History reviewed. No pertinent past medical history.  Past Surgical History:   Procedure Laterality Date    BREAST BIOPSY Right     core biopsy 20 years ago    HYSTERECTOMY      MR HEAD ANGIO WO IV CONTRAST  07/14/2018    MR HEAD ANGIO WO IV CONTRAST [facility]     Medications Prior to Admission   Medication Sig Dispense Refill Last Dose/Taking    simvastatin (Zocor) 20 mg tablet Take 1 tablet (20 mg) by mouth once daily at bedtime.   Taking    FLUoxetine (PROzac) 40 mg capsule Take 1 capsule (40 mg) by mouth once daily.       gabapentin (Neurontin) 400 mg capsule Take 1 capsule (400 mg) by mouth 2 times a day.       levothyroxine (Synthroid, Levoxyl) 50 mcg tablet Take 1 tablet (50 mcg) by mouth early in the morning..        Meperidine (pf), Ropinirole, and Meperidine  Social History     Tobacco Use    Smoking status: Former     Types: Cigarettes    Smokeless tobacco: Never   Substance Use Topics    Drug use: Never     No family history on file.    Hospital Medications:           Current Facility-Administered Medications:     acetaminophen (Tylenol) tablet 650 mg, 650 mg,  oral, q6h PRN, Shravan Barroso PA-C, 650 mg at 01/04/25 1909    albuterol 2.5 mg /3 mL (0.083 %) nebulizer solution 2.5 mg, 2.5 mg, nebulization, q2h PRN, Shravan Barroso PA-C    benzonatate (Tessalon) capsule 200 mg, 200 mg, oral, TID PRN, Shravan Barroso PA-C, 200 mg at 01/04/25 1645    calcium carbonate-vitamin D3 500 mg-5 mcg (200 unit) per tablet 1 tablet, 1 tablet, oral, BID, Shravan Barroso PA-C, 1 tablet at 01/05/25 2100    docusate sodium (Colace) capsule 100 mg, 100 mg, oral, BID, Shravan Barroso PA-C, 100 mg at 01/05/25 2100    doxycycline (Vibramycin) 100 mg in dextrose 5% 100 mL IV, 100 mg, intravenous, q12h, Max Sanchez MD, Stopped at 01/06/25 0458    enoxaparin (Lovenox) syringe 40 mg, 40 mg, subcutaneous, q24h, Shravan Barroso PA-C, 40 mg at 01/05/25 1820    ergocalciferol (Vitamin D-2) capsule 1,250 mcg, 1,250 mcg, oral, Weekly, Shravan Barroso PA-C    pantoprazole (ProtoNix) EC tablet 40 mg, 40 mg, oral, Daily **OR** esomeprazole (NexIUM) suspension 40 mg, 40 mg, oral, Daily **OR** pantoprazole (ProtoNix) injection 40 mg, 40 mg, intravenous, Daily, Linda Dodd PA-C    FLUoxetine (PROzac) capsule 40 mg, 40 mg, oral, Daily, Shravan Barroso PA-C, 40 mg at 01/04/25 0901    guaiFENesin (Mucinex) 12 hr tablet 1,200 mg, 1,200 mg, oral, BID, Shravan Barroso PA-C, 1,200 mg at 01/05/25 2100    hydrocodone-homatropine (Hycodan) 5-1.5 mg/5 mL syrup 5 mL, 5 mL, oral, q4h PRN, Shravan Barroso PA-C    ipratropium-albuteroL (Duo-Neb) 0.5-2.5 mg/3 mL nebulizer solution 3 mL, 3 mL, nebulization, q4h, Shravan Barroso PA-C, 3 mL at 01/06/25 0350    levothyroxine (Synthroid) injection 25 mcg, 25 mcg, intravenous, q24h [facility], Linda Dodd PA-C    [Held by provider] levothyroxine (Synthroid, Levoxyl) tablet 50 mcg, 50 mcg, oral, Daily, Shravan Barroso PA-C, 50 mcg at 01/05/25 0605    magnesium oxide (Mag-Ox) tablet 400 mg, 400 mg, oral, BID, Shravan Barroso PA-C, 400 mg at 01/05/25 2100     magnesium sulfate 2 g in sterile water for injection 50 mL, 2 g, intravenous, Once, Linda Dodd PA-C, Last Rate: 25 mL/hr at 01/06/25 0631, 2 g at 01/06/25 0631    methylPREDNISolone sod succinate (SOLU-Medrol) 40 mg/mL injection 40 mg, 40 mg, intravenous, q6h, Shravan Barroso PA-C, 40 mg at 01/06/25 0258    oxygen (O2) therapy, , inhalation, Continuous PRN - O2/gases, Shravan Barroso PA-C, 3 L/min at 01/1945    oxygen (O2) therapy, , inhalation, Continuous - Inhalation, Sepideh Kearney, DO, 40 percent at 01/05/25 2050    piperacillin-tazobactam (Zosyn) 2.25 g in dextrose (iso) IV 50 mL, 2.25 g, intravenous, q6h, Shravan Barroso PA-C, Stopped at 01/06/25 0458    polyethylene glycol (Glycolax, Miralax) packet 17 g, 17 g, oral, Daily PRN, Shravan Barroso PA-C    potassium chloride (Klor-Con) packet 40 mEq, 40 mEq, oral, BID, Shravan Barroso PA-C, 40 mEq at 01/05/25 2059    simvastatin (Zocor) tablet 20 mg, 20 mg, oral, Nightly, Shravan Barroso PA-C, 20 mg at 01/05/25 2100    Review of Systems:  14 point review of systems was completed and negative except for those specially mention in my HPI    Physical Exam:    Heart Rate:  []   Temp:  [36.3 °C (97.3 °F)-36.6 °C (97.9 °F)]   Resp:  [22-39]   BP: (122-178)/()   Weight:  [70 kg (154 lb 5.2 oz)]   SpO2:  [78 %-99 %]     Physical Exam  Constitutional:       General: She is in acute distress.      Appearance: She is ill-appearing.   HENT:      Mouth/Throat:      Mouth: Mucous membranes are dry.   Eyes:      Extraocular Movements: Extraocular movements intact.      Conjunctiva/sclera: Conjunctivae normal.      Pupils: Pupils are equal, round, and reactive to light.   Cardiovascular:      Rate and Rhythm: Regular rhythm. Tachycardia present.   Pulmonary:      Effort: Tachypnea, accessory muscle usage and respiratory distress present.      Breath sounds: Decreased air movement present. Examination of the right-upper field reveals rhonchi.  Examination of the right-lower field reveals decreased breath sounds. Examination of the left-lower field reveals decreased breath sounds. Decreased breath sounds and rhonchi present.      Comments: Continuous bipap  Abdominal:      General: There is no distension.      Tenderness: There is no abdominal tenderness.   Musculoskeletal:      Right lower leg: No edema.      Left lower leg: No edema.   Skin:     Capillary Refill: Capillary refill takes less than 2 seconds.   Neurological:      General: No focal deficit present.      Mental Status: She is alert and oriented to person, place, and time.      GCS: GCS eye subscore is 4. GCS verbal subscore is 5. GCS motor subscore is 6.         Objective:    I have reviewed all medications, laboratory results, and imaging pertinent for today's encounter.    FiO2 (%):  [80 %-100 %] 100 %  S RR:  [16] 16      Intake/Output Summary (Last 24 hours) at 1/6/2025 0747  Last data filed at 1/6/2025 0631  Gross per 24 hour   Intake 1507.5 ml   Output 900 ml   Net 607.5 ml            Assessment/Plan:    I am currently managing this critically ill patient for the following problems:    Neuro/Psych/Pain Ctrl/Sedation:   Hx anxiety, depression   - Pain Control: acetaminophen PRN  - Hold home prozac while on zyvox  - Start PRN morphine, valium, vistaril for anxiety   - CAM ICU qshift, sleep-wake hygiene, delirium precautions      Respiratory/ENT:  Acute hypoxic respiratory failure - vasculitis/DAH vs atypical pneumonia vs acute inflammatory process   Acute respiratory distress syndrome   Prior tobacco use   - Supplemental O2: continuous bipap   - Continue solu-medrol   - Duonebs q4hrs, add 3% nebs   - CT angio chest: no pulmonary embolism, diffuse ground glass opacities   - Will obtain CT sinus and neck   - Daily CXR for now   - Pulmonology reconsulted today  - Titrate FiO2 to maintain SpO2 >92%  - Continuous pulse ox monitoring   - Pulm hygiene     Cardiovascular:   Hyperlipidemia   Sinus  tachycardia 2/2 hypoxia   - TTE ordered, reading pending   - Continue statin   - Maintain MAPs >65  - Continuous cardiac monitoring   - EKGs PRN for ACS symptoms, arrhythmias      GI:  Dysphagia   - Diet: regular  - SLP following, recommended MBS  -- likely to be delayed pending improvement in respiratory status   - BR: colace   - GI Prophylaxis: PPI     Renal/Volume Status/Electrolytes:  Acute renal failure, non-oliguric   Acute urinary retention   Hx urge incontinence   - Baseline Cr 0.9  -- up to 1.7 this am   - Hourly I/O's, maintain urine output >0.5cc/kg/hr  - Straight cath x1 today for 350cc in bladder, consider guerrero catheter if retention persists   - Nephrology consulted   -- Started low-dose maintenance fluids  -- Ordered urine studies, JOSE, C3, C4, SPE  - Home detrol not on formulary here, will ask daughter to bring in   - Replete electrolytes to maintain K >4.0 and Mg >2.0  - Daily RFP, Mg    Endocrine:  Hypothyroidism   - Continue home synthroid   - Consider adding SSI if glucose persistently >180 with steroid use   - Hypoglycemia protocol PRN      Infectious Disease:  Atypical pneumonia   - Antibiotics: rocephin broadened to zosyn and doxycycline   - Blood cultures: no growth at 2 days   - Urine culture: no growth  - Negative COVID, Influenza, RSV, urine legionella, urine strep pneumoniae, MRSA PCR  - Procal ordered   - ESR: 77  - CRP: 35  - ID consulted  - Aspergillus galactomannan, fungitell, respiratory viral panel, histoplasma pending   - Monitor SIRS criteria     Heme/Onc:  Thrombocytosis - likely acutely reactive   - Baseline Hgb WNL  - Transfuse if Hgb <7.0   - Daily CBC     MSK/Skin:  Hx sciatica   - PT/OT eval  - ICU skin protocol, padded pressure points  - Q2hr turns     Ethics/Code Status:  DNR, DNI - confirmed with patient and daughter Beverly. Did discuss this with the patient again today and she said she is still DNI.      :  DVT Prophylaxis: SQH, SCDs  GI Prophylaxis:  PPI  Bowel Regimen: colace  Diet: regular  CVC: none  Liz: none  West: none  Restraints: none  Disposition: ICU    Critical Care Time:  40 minutes spent in preparing to see patient (I.e. labs, imaging, etc.), documentation, discussion plan of care with patient/family/caregiver, and/or coordination of care with multidisciplinary team including the attending. Time does not include completion of procedure time.     Shravan Barroso PA-C  Pulmonary & Critical Care Medicine   [facility]

## 2025-01-06 NOTE — CARE PLAN
Problem: Respiratory  Goal: No signs of respiratory distress (eg. Use of accessory muscles. Peds grunting)  1/6/2025 1111 by Faye Mancilla, RRT  Outcome: Not Progressing  Goal: Wean oxygen to maintain O2 saturation per order/standard this shift  1/6/2025 1111 by Faye Mancilla, RRT  Outcome: Not Progressing

## 2025-01-06 NOTE — CONSULTS
Inpatient consult to Pulmonology  Consult performed by: Laura Maloney MD  Reason for consult: Pneumonia    Reason For Consult  GGO, ARDS, respiratory failure     History Of Present Illness  Janet Snow is a 79 y.o. female ith a past medical history of anxiety, hypothyroidism, hyperlipidemia who presented to [facility] ED with a chief complaint of shortness of breath.  Pulmonary was initially on 01/04 with concerns of pneumonia.     Patient was seen and assessed in the ED and worked up with both laboratory studies and imaging studies.  ED BMP showed hyperglycemia to 121, mild hyponatremia 134, hypokalemia 3.2, and low albumin to 3.3.  Lactate was elevated at 2.2 but resolved to 1.8.  BNP was elevated at 251.  Initial high-sensitivity troponin was 13 and was trended to 16.  Patient had a leukocytosis of 15 on admission, steadily uptrending to 21.2 today,  Hgb/Hctof 11/2.2 which has been decreasing to 9.1/26.1, thrombocytosis with the most recent platelet count being 518.  Blood cultures x 2 were collected and are in process.  MRSA nares was collected and is negative.  Strep pneumonia and urine Legionella antigen are negative.  Influenza A and B, RSV, and COVID-19 PCR's were negative.  Urinalysis was positive for leuk esterase and WBCs.  Urine culture was sent off and currently shows no growth. She has had a progressive KALEB of unclear etiology (Cr 0.85 on admission, now 1.5)      She was initially started on CAP coverage on admission with CTX/azithro. On the morning of 1/5, the hospitalist contacted the ICU team to come see her as she became acutely tachypnic, hypoxic with SpO2 in the 60s, came up to the 80s on NRB. She was visibly in respiratory distress, in tripod position at the edge of the bed. ABG 7.37/35/44, confirmed arterial draw. CXR worsening bilateral airspace consolidations compared to CXR 1/2. She was placed on continuous CPAP 8/100% with SpO2 up to the low 90s and urgently transported to the ICU.      Currently on 14/10 BPAP 80% FiO2. The patient has been refusing intubation as she has seen many family member die on the ventilator. Current ABX include Zosyn, doxycycline and Linezolid.    Hsitory obtained from the patient through BPAP mask and her daughter who is present at bedside. 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 She began having URI sx around [place] time and her doctor prescribed her Augmentin on the phone but she was unable to be seen in clinic. She declined being seen at Urgent care at that time. She endorses cough productive of greenish/brown sputum. At times there appeared to be blood in the sputum, but did not appear to have norma hemoptysis. She does endorse sinus pressure and has problems with ear pain when coughing. She has had problems swallowing, which she says has been going on for the past year. She endorses fever, particularly on admission. She report knowing about gross hematuria, and states that she has had this giving birth to her daughter (ie ~40 years).       Past Medical History  anxiety, hypothyroidism, hyperlipidemia     Surgical History  She has a past surgical history that includes MR angio head wo IV contrast (07/14/2018); Hysterectomy; and Breast biopsy (Right).     Social History  She reports that she has quit smoking. Her smoking use included cigarettes. She has never used smokeless tobacco. She reports that she does not use drugs. No history on file for alcohol use.    Family History  No family history on file.     Allergies  Meperidine (pf), Ropinirole, and Meperidine    Review of Systems  Scheduled medications  calcium carbonate-vitamin D3, 1 tablet, oral, BID  docusate sodium, 100 mg, oral, BID  doxycycline, 100 mg, intravenous, q12h  enoxaparin, 40 mg, subcutaneous, q24h  ergocalciferol, 1,250 mcg, oral, Weekly  pantoprazole, 40 mg, oral, Daily   Or  esomeprazole, 40 mg, oral, Daily    Or  pantoprazole, 40 mg, intravenous, Daily  FLUoxetine, 40 mg, oral, Daily  guaiFENesin, 1,200 mg, oral, BID  ipratropium-albuteroL, 3 mL, nebulization, q4h  levothyroxine, 25 mcg, intravenous, q24h ADE  [Held by provider] levothyroxine, 50 mcg, oral, Daily  magnesium oxide, 400 mg, oral, BID  methylPREDNISolone sodium succinate (PF), 40 mg, intravenous, q6h  oxygen, , inhalation, Continuous - Inhalation  perflutren lipid microspheres, 0.5-10 mL of dilution, intravenous, Once in imaging  perflutren protein A microsphere, 0.5 mL, intravenous, Once in imaging  piperacillin-tazobactam, 2.25 g, intravenous, q6h  simvastatin, 20 mg, oral, Nightly  sodium chloride, 3 mL, nebulization, q4h      Continuous medications     PRN medications  PRN medications: acetaminophen, albuterol, benzonatate, hydrocodone-homatropine, morphine, oxygen, polyethylene glycol    Results for orders placed or performed during the hospital encounter of 01/02/25 (from the past 24 hours)   Sodium, Urine Random   Result Value Ref Range    Sodium, Urine Random 17 mmol/L    Creatinine, Urine Random 81.1 20.0 - 320.0 mg/dL    Sodium/Creatinine Ratio 21 Not established. mmol/g Creat   Osmolality, Urine   Result Value Ref Range    Osmolality, Urine Random 360 200 - 1,200 mOsm/kg   Protein, Urine Random   Result Value Ref Range    Total Protein, Urine Random 196 (H) 5 - 24 mg/dL    Creatinine, Urine Random 81.1 20.0 - 320.0 mg/dL    T. Protein/Creatinine Ratio 2.42 (H) 0.00 - 0.17 mg/mg Creat   Blood Gas Arterial Full Panel   Result Value Ref Range    POCT pH, Arterial 7.34 (L) 7.38 - 7.42 pH    POCT pCO2, Arterial 40 38 - 42 mm Hg    POCT pO2, Arterial 108 (H) 85 - 95 mm Hg    POCT SO2, Arterial 99 94 - 100 %    POCT Oxy Hemoglobin, Arterial 97.7 94.0 - 98.0 %    POCT Hematocrit Calculated, Arterial 27.0 (L) 36.0 - 46.0 %    POCT Sodium, Arterial 133 (L) 136 - 145 mmol/L    POCT Potassium, Arterial 4.1 3.5 - 5.3 mmol/L    POCT Chloride, Arterial 104 98 -  107 mmol/L    POCT Ionized Calcium, Arterial 1.20 1.10 - 1.33 mmol/L    POCT Glucose, Arterial 152 (H) 74 - 99 mg/dL    POCT Lactate, Arterial 1.2 0.4 - 2.0 mmol/L    POCT Base Excess, Arterial -3.9 (L) -2.0 - 3.0 mmol/L    POCT HCO3 Calculated, Arterial 21.6 (L) 22.0 - 26.0 mmol/L    POCT Hemoglobin, Arterial 9.1 (L) 12.0 - 16.0 g/dL    POCT Anion Gap, Arterial 12 10 - 25 mmo/L    Patient Temperature 37.0 degrees Celsius    FiO2 80 %    Ventilator Mode BiPAP     Ipap CMH2O 14.0 cm H2O    Epap CMH2O 10.0 cm H2O    Site of Arterial Puncture Radial Left     Aba's Test Positive    Sedimentation rate, automated   Result Value Ref Range    Sedimentation Rate 77 (H) 0 - 30 mm/h   C-reactive protein   Result Value Ref Range    C-Reactive Protein 35.67 (H) <1.00 mg/dL   Renal function panel   Result Value Ref Range    Glucose 141 (H) 74 - 99 mg/dL    Sodium 137 136 - 145 mmol/L    Potassium 4.1 3.5 - 5.3 mmol/L    Chloride 107 98 - 107 mmol/L    Bicarbonate 22 21 - 32 mmol/L    Anion Gap 12 10 - 20 mmol/L    Urea Nitrogen 35 (H) 6 - 23 mg/dL    Creatinine 1.75 (H) 0.50 - 1.05 mg/dL    eGFR 29 (L) >60 mL/min/1.73m*2    Calcium 8.5 (L) 8.6 - 10.3 mg/dL    Phosphorus 3.0 2.5 - 4.9 mg/dL    Albumin 3.0 (L) 3.4 - 5.0 g/dL   Magnesium   Result Value Ref Range    Magnesium 1.93 1.60 - 2.40 mg/dL   CBC and Auto Differential   Result Value Ref Range    WBC 21.9 (H) 4.4 - 11.3 x10*3/uL    nRBC 0.0 0.0 - 0.0 /100 WBCs    RBC 2.72 (L) 4.00 - 5.20 x10*6/uL    Hemoglobin 8.4 (L) 12.0 - 16.0 g/dL    Hematocrit 25.3 (L) 36.0 - 46.0 %    MCV 93 80 - 100 fL    MCH 30.9 26.0 - 34.0 pg    MCHC 33.2 32.0 - 36.0 g/dL    RDW 12.8 11.5 - 14.5 %    Platelets 585 (H) 150 - 450 x10*3/uL    Neutrophils % 92.6 40.0 - 80.0 %    Immature Granulocytes %, Automated 1.1 (H) 0.0 - 0.9 %    Lymphocytes % 3.0 13.0 - 44.0 %    Monocytes % 3.2 2.0 - 10.0 %    Eosinophils % 0.0 0.0 - 6.0 %    Basophils % 0.1 0.0 - 2.0 %    Neutrophils Absolute 20.22 (H) 1.60 -  5.50 x10*3/uL    Immature Granulocytes Absolute, Automated 0.25 0.00 - 0.50 x10*3/uL    Lymphocytes Absolute 0.65 (L) 0.80 - 3.00 x10*3/uL    Monocytes Absolute 0.71 0.05 - 0.80 x10*3/uL    Eosinophils Absolute 0.00 0.00 - 0.40 x10*3/uL    Basophils Absolute 0.03 0.00 - 0.10 x10*3/uL   Transthoracic Echo (TTE) Complete   Result Value Ref Range    BSA 1.76 m2       XR chest 1 view    Result Date: 1/6/2025  Interpreted By:  Jaxon Morales, STUDY: XR CHEST 1 VIEW; 1/6/2025 5:38 am   INDICATION: CLINICAL INFORMATION: Signs/Symptoms:Pneumonia follow-up. Previous abnormal chest radiograph. Shortness of breath.   COMPARISON: 01/05/2025   ACCESSION NUMBER(S): [id number]   ORDERING CLINICIAN: AMANDA SÁNCHEZ   TECHNIQUE: Portable chest one view.   FINDINGS: The cardiac size is indeterminate in view of the AP projection. Diffuse ground-glass infiltrates are again identified, similar compared to the previous study. No effusions are appreciated.       Diffuse ground-glass infiltrates. There is no interval change when compared to the previous examination.   MACRO: none   Signed by: Jaxon Morales 1/6/2025 8:29 AM Dictation workstation:   [id number]    CT angio chest for pulmonary embolism    Result Date: 1/5/2025  Interpreted By:  Selin Matthews, STUDY: CT ANGIO CHEST FOR PULMONARY EMBOLISM;  1/5/2025 4:10 pm   INDICATION: Signs/Symptoms:severe hypoxia   COMPARISON: Chest x-ray 01/05/2025. CT chest 07/19/2024   ACCESSION NUMBER(S): [id number]   ORDERING CLINICIAN: NORM LANDAVERDE   TECHNIQUE: Helical data acquisition of the chest was obtained following the uneventful administration of intravenous contrast material. Images were reformatted in axial, coronal, and sagittal planes. MIP images were created and reviewed.   FINDINGS: POTENTIAL LIMITATIONS OF THE STUDY: Motion artifact.   HEART AND VESSELS: The evaluation for filling defects at the pulmonary arteries is degraded by motion artifact. No large central filling defects  to suggest pulmonary embolism. The smaller segmental/subsegmental pulmonary arteries are not well evaluated on this exam.   No thoracic aortic aneurysm. Mild atherosclerotic calcifications are noted at the thoracic aorta.   The heart is mildly enlarged. There is trace pericardial effusion.   MEDIASTINUM AND MANPREET, LOWER NECK AND AXILLA: The visualized thyroid gland is small.   A right paratracheal lymph node measures 10 mm in short axis. A subcarinal lymph node measures 12 mm in short axis. A right hilar lymph node measures 19 mm in short axis. A left hilar lymph node measures 11 mm in short axis. Calcified lymph nodes are noted at the mediastinum and left hilum.   LUNGS AND AIRWAYS: The trachea and central airways are patent.   There are diffuse bilateral ground-glass opacities. No pleural effusion or pneumothorax.   UPPER ABDOMEN: Calcific foci at the spleen are suggestive of granulomas.   CHEST WALL AND OSSEOUS STRUCTURES: Multilevel degenerative changes at the spine. Redemonstration of remote compression deformity with Schmorl's node at the superior endplate of L1.       1. Study degraded by motion artifact. No evidence of large central pulmonary emboli. The smaller segmental/subsegmental pulmonary arteries are not well evaluated on this exam. 2. Diffuse bilateral ground-glass opacities, may be secondary to pulmonary edema and/or multifocal pneumonia. Acute respiratory distress syndrome is in the differential diagnoses. 3. Mild cardiomegaly. Trace pericardial effusion. 4. Mild mediastinal and hilar adenopathy.     MACRO: None.   Signed by: Selin Matthews 1/5/2025 6:42 PM Dictation workstation:   [id number]    US renal complete    Result Date: 1/5/2025  Interpreted By:  Jaxon Morales, STUDY: US RENAL COMPLETE; 1/5/2025 10:29 am   INDICATION: Signs/Symptoms:nancy.   COMPARISON: None   ACCESSION NUMBER(S): [id number]   ORDERING CLINICIAN: NORM LANDAVERDE   TECHNIQUE: Grayscale and color Doppler imaging of the  kidneys   FINDINGS: The right kidney measures 11.0 cm  . The left kidney measures 11.0 cm  .   There is mild increased echogenicity of the renal cortex bilaterally.     No renal stones are identified.  Renal cortex is normal in thickness bilaterally. No hydronephrosis is identified.   Urinary bladder is nonspecific in appearance with no stones or masses identified.       Mild increased renal cortical echogenicity diffusely bilaterally suggesting chronic renal medical disease.   MACRO: none   Signed by: Jaxon Morales 1/5/2025 12:45 PM Dictation workstation:   [id number]    XR chest 1 view    Result Date: 1/5/2025  Interpreted By:  Sincere Slater, STUDY: XR CHEST 1 VIEW;  1/5/2025 8:48 am   INDICATION: Signs/Symptoms:respiratory distress acute.   COMPARISON: 01/02/2025   ACCESSION NUMBER(S): [id number]   ORDERING CLINICIAN: RAMANDEEP ALLRED   FINDINGS: Diffusely increased bilateral airspace consolidation. No visualized pleural effusion. Cardiomediastinal silhouette unchanged. Aortic atherosclerosis. Thoracic degenerative changes with dextroscoliosis.       Diffusely increased bilateral airspace consolidation.   MACRO: None   Signed by: Sincere Slater 1/5/2025 10:08 AM Dictation workstation:   [id number]    ECG 12 lead    Result Date: 1/3/2025  Normal sinus rhythm Nonspecific ST abnormality Prolonged QT Abnormal ECG No previous ECGs available    XR chest 2 views    Result Date: 1/2/2025  Interpreted By:  Jaxon Morales, STUDY: XR CHEST 2 VIEWS; 1/2/2025 3:05 pm   INDICATION: Signs/Symptoms:fever, productive cough, chest pain.   COMPARISON: 06/14/2022   ACCESSION NUMBER(S): [id number]   ORDERING CLINICIAN: DANIAL COATES   TECHNIQUE: AP and lateral views of the chest were obtained.   FINDINGS: The cardiac silhouette is normal in appearance. There are new patchy bilateral alveolar infiltrates most marked within the upper lobes bilaterally .  No effusions are identified.       Extensive patchy bilateral infiltrates most  "marked within the upper lobes bilaterally. Findings are suspicious for pneumonia. Follow-up to assure complete clearing is suggested.   MACRO: none   Signed by: Jaxon Morales 1/2/2025 3:43 PM Dictation workstation:   [id number]        Physical Exam  Gen: elderly, frail appearing female, labored respirations on BPAP   HEENT: BPAP mask on, satting mid 80s to 90s while taking to me   CV: RRR  Pulm: scattered rhonci anteriorly, RR 42   Skin :wwp   Psych: normal mood and affect  Neuro: appears alert and oriented, responds appropriately to interviewer     Last Recorded Vitals  Blood pressure 146/72, pulse 103, temperature 36.7 °C (98.1 °F), temperature source Oral, resp. rate (!) 33, height 1.6 m (5' 3\"), weight 70 kg (154 lb 5.2 oz), SpO2 99%.    Relevant Results  No current facility-administered medications on file prior to encounter.     Current Outpatient Medications on File Prior to Encounter   Medication Sig Dispense Refill    simvastatin (Zocor) 20 mg tablet Take 1 tablet (20 mg) by mouth once daily at bedtime.      FLUoxetine (PROzac) 40 mg capsule Take 1 capsule (40 mg) by mouth once daily.      gabapentin (Neurontin) 400 mg capsule Take 1 capsule (400 mg) by mouth 2 times a day.      levothyroxine (Synthroid, Levoxyl) 50 mcg tablet Take 1 tablet (50 mcg) by mouth early in the morning..         .     Assessment/Plan     Janet Snow is a 79 y.o. female with a past medical history of anxiety, hypothyroidism, hyperlipidemia who presented to [facility] ED with a chief complaint of shortness of breath.  Pulmonary was consulted for concerns of pneumonia.     Patient was seen and assessed by myself and his chart was reviewed for previous pulmonary visits, Labs and imaging.  Thus far her infectious workup was negative for strep pneumo urine antigen, Legionella urine antigen, influenza A and B, RSV, and COVID-19 PCR.  Blood cultures show no growth to date x 1 day.  Furthermore, MRSA nares was negative.  She does " have a leukocytosis and does have symptoms of pneumonia however.  Will continue to treat with antibiotics.     Impressions and recommendations below:     #Acute hypoxic respiratory failure  #Bilateral upper lobe infiltrates  #?Pneumonia of the bilateral upper lobes due to unknown organism versus DAH versus pulmonary edema  # ?cardiopulmonary syndrome  #Leukocytosis     Recommendations:  - Patient with progressive respiratory failure despite escalation of ABX. CT chest reviewed by myself and ddx is broad as above. I discussed benefits of intubation and bronchoscopy with the patient and daughter to help to clarify diagnosis. Patient is do not intubate at this time   -- Recommend escalating ABX to include MRSA coverage although MRSA screen negative iso critically ill patient  -- Procal is high at 2.86, so would continue treating for bacterial etiology. Recommend repeating procal daily  -- ESR 77, CRP 35, JOSE slightly positive at 1:80   -- Fungitell, galactomannan, respiratory viral panel pending   -- ordered urine Histo Ag   -- Recommend CT sinus and neck if patient can safely undergo scan  -- BNP added on -- consider diuresis   -- LDH added on  -- Continue IV methylpred -- would not escalate to pulse dose steroids iso having not excluded bacterial infection  -- Consider switching from Zosyn to other pseudomonal coverage as Zosyn may contribute to KALEB  -- Appreciate input from ID, Nephrology   -- Will continue to follow with you     I spent 60 minutes in the professional and overall care of this patient.      Laura Maloney MD

## 2025-01-06 NOTE — PROGRESS NOTES
Patient not medically clear. Patient transferred to the ICU. Patient with worsening respiratory status, on continuous bipap. At this time there is not a safe discharge plan in place. Will continue to follow.      01/06/25 1767   Discharge Planning   Home or Post Acute Services Other (Comment)   Expected Discharge Disposition [place]  (TBD)   Does the patient need discharge transport arranged? Yes   RoundTrip coordination needed? Yes

## 2025-01-06 NOTE — PROGRESS NOTES
Physical Therapy                 Therapy Communication Note    Patient Name: Janet Snow  MRN: [id number]  Department: [facility] 3 E ICU  Room: 19/19-A  Today's Date: 1/6/2025     Discipline: Physical Therapy    PT Missed Visit: Yes     Missed Visit Reason: Missed Visit Reason: Cancel (Per RN, pt not appropriate for participation in PT at this time d/t poor respiratory status.)    Missed Time: Cancel    Comment:

## 2025-01-06 NOTE — PROGRESS NOTES
Occupational Therapy                 Therapy Communication Note    Patient Name: Janet Snow  MRN: [id number]  Department: [facility] 3 E ICU  Room: 19/19-A  Today's Date: 1/6/2025     Discipline: Occupational Therapy    OT Missed Visit: Yes     Missed Visit Reason: Missed Visit Reason: Cancel (Per RN, pt not appropriate for participation in OT at this time d/t poor respiratory status.)    Missed Time: Cancel    Comment:

## 2025-01-07 PROBLEM — K52.9 COLITIS: Status: ACTIVE | Noted: 2025-01-07

## 2025-01-07 PROBLEM — F32.A DEPRESSION, UNSPECIFIED: Status: ACTIVE | Noted: 2022-01-01

## 2025-01-07 PROBLEM — K21.9 GASTRO-ESOPHAGEAL REFLUX DISEASE WITHOUT ESOPHAGITIS: Status: ACTIVE | Noted: 2022-01-01

## 2025-01-07 PROBLEM — F41.1 GENERALIZED ANXIETY DISORDER: Status: ACTIVE | Noted: 2022-01-01

## 2025-01-07 PROBLEM — I10 ESSENTIAL HYPERTENSION: Status: ACTIVE | Noted: 2025-01-07

## 2025-01-07 PROBLEM — E03.9 HYPOTHYROIDISM, UNSPECIFIED: Status: ACTIVE | Noted: 2022-01-01

## 2025-01-07 LAB
ALBUMIN SERPL BCP-MCNC: 2.9 G/DL (ref 3.4–5)
ANION GAP BLDA CALCULATED.4IONS-SCNC: 6 MMO/L (ref 10–25)
ANION GAP BLDA CALCULATED.4IONS-SCNC: 7 MMO/L (ref 10–25)
ANION GAP BLDA CALCULATED.4IONS-SCNC: 7 MMO/L (ref 10–25)
ANION GAP BLDA CALCULATED.4IONS-SCNC: 8 MMO/L (ref 10–25)
ANION GAP BLDA CALCULATED.4IONS-SCNC: 8 MMO/L (ref 10–25)
ANION GAP BLDA CALCULATED.4IONS-SCNC: 9 MMO/L (ref 10–25)
ANION GAP SERPL CALCULATED.3IONS-SCNC: 13 MMOL/L (ref 10–20)
APPARATUS: ABNORMAL
ARTERIAL PATENCY WRIST A: NEGATIVE
ARTERIAL PATENCY WRIST A: POSITIVE
BASE EXCESS BLDA CALC-SCNC: -2.2 MMOL/L (ref -2–3)
BASE EXCESS BLDA CALC-SCNC: -2.6 MMOL/L (ref -2–3)
BASE EXCESS BLDA CALC-SCNC: -3.3 MMOL/L (ref -2–3)
BASE EXCESS BLDA CALC-SCNC: -3.5 MMOL/L (ref -2–3)
BASE EXCESS BLDA CALC-SCNC: -4.5 MMOL/L (ref -2–3)
BASE EXCESS BLDA CALC-SCNC: -5.2 MMOL/L (ref -2–3)
BASOPHILS # BLD AUTO: 0.02 X10*3/UL (ref 0–0.1)
BASOPHILS NFR BLD AUTO: 0.1 %
BNP SERPL-MCNC: 234 PG/ML (ref 0–99)
BODY TEMPERATURE: 37 DEGREES CELSIUS
BUN SERPL-MCNC: 50 MG/DL (ref 6–23)
CA-I BLDA-SCNC: 1.26 MMOL/L (ref 1.1–1.33)
CA-I BLDA-SCNC: 1.27 MMOL/L (ref 1.1–1.33)
CA-I BLDA-SCNC: 1.27 MMOL/L (ref 1.1–1.33)
CA-I BLDA-SCNC: 1.29 MMOL/L (ref 1.1–1.33)
CALCIUM SERPL-MCNC: 8.7 MG/DL (ref 8.6–10.3)
CHLORIDE BLDA-SCNC: 110 MMOL/L (ref 98–107)
CHLORIDE BLDA-SCNC: 112 MMOL/L (ref 98–107)
CHLORIDE BLDA-SCNC: 112 MMOL/L (ref 98–107)
CHLORIDE SERPL-SCNC: 108 MMOL/L (ref 98–107)
CK SERPL-CCNC: 371 U/L (ref 0–215)
CO2 SERPL-SCNC: 23 MMOL/L (ref 21–32)
CREAT SERPL-MCNC: 2.06 MG/DL (ref 0.5–1.05)
CRITICAL CALL TIME: 1706
CRITICAL CALL TIME: 1755
CRITICAL CALL TIME: 1931
CRITICAL CALLED BY: ABNORMAL
CRITICAL CALLED TO: ABNORMAL
CRITICAL READ BACK: ABNORMAL
EGFRCR SERPLBLD CKD-EPI 2021: 24 ML/MIN/1.73M*2
EOSINOPHIL # BLD AUTO: 0 X10*3/UL (ref 0–0.4)
EOSINOPHIL NFR BLD AUTO: 0 %
EPAP CMH2O: 10 CM H2O
ERYTHROCYTE [DISTWIDTH] IN BLOOD BY AUTOMATED COUNT: 13.1 % (ref 11.5–14.5)
GLUCOSE BLD MANUAL STRIP-MCNC: 114 MG/DL (ref 74–99)
GLUCOSE BLD MANUAL STRIP-MCNC: 174 MG/DL (ref 74–99)
GLUCOSE BLD MANUAL STRIP-MCNC: 191 MG/DL (ref 74–99)
GLUCOSE BLD MANUAL STRIP-MCNC: 210 MG/DL (ref 74–99)
GLUCOSE BLDA-MCNC: 109 MG/DL (ref 74–99)
GLUCOSE BLDA-MCNC: 125 MG/DL (ref 74–99)
GLUCOSE BLDA-MCNC: 137 MG/DL (ref 74–99)
GLUCOSE BLDA-MCNC: 218 MG/DL (ref 74–99)
GLUCOSE BLDA-MCNC: 218 MG/DL (ref 74–99)
GLUCOSE BLDA-MCNC: 230 MG/DL (ref 74–99)
GLUCOSE SERPL-MCNC: 126 MG/DL (ref 74–99)
HCO3 BLDA-SCNC: 22.4 MMOL/L (ref 22–26)
HCO3 BLDA-SCNC: 22.6 MMOL/L (ref 22–26)
HCO3 BLDA-SCNC: 23.7 MMOL/L (ref 22–26)
HCO3 BLDA-SCNC: 23.8 MMOL/L (ref 22–26)
HCO3 BLDA-SCNC: 24.3 MMOL/L (ref 22–26)
HCO3 BLDA-SCNC: 25.2 MMOL/L (ref 22–26)
HCT VFR BLD AUTO: 24.5 % (ref 36–46)
HCT VFR BLD EST: 22 % (ref 36–46)
HCT VFR BLD EST: 23 % (ref 36–46)
HCT VFR BLD EST: 23 % (ref 36–46)
HCT VFR BLD EST: 24 % (ref 36–46)
HCT VFR BLD EST: 24 % (ref 36–46)
HCT VFR BLD EST: 27 % (ref 36–46)
HGB BLD-MCNC: 7.9 G/DL (ref 12–16)
HGB BLDA-MCNC: 7.4 G/DL (ref 12–16)
HGB BLDA-MCNC: 7.6 G/DL (ref 12–16)
HGB BLDA-MCNC: 7.7 G/DL (ref 12–16)
HGB BLDA-MCNC: 8 G/DL (ref 12–16)
HGB BLDA-MCNC: 8 G/DL (ref 12–16)
HGB BLDA-MCNC: 9.1 G/DL (ref 12–16)
IMM GRANULOCYTES # BLD AUTO: 0.2 X10*3/UL (ref 0–0.5)
IMM GRANULOCYTES NFR BLD AUTO: 0.9 % (ref 0–0.9)
INHALED O2 CONCENTRATION: 100 %
INHALED O2 CONCENTRATION: 80 %
IPAP CMH2O: 14 CM H2O
LACTATE BLDA-SCNC: 1 MMOL/L (ref 0.4–2)
LACTATE BLDA-SCNC: 1 MMOL/L (ref 0.4–2)
LACTATE BLDA-SCNC: 1.1 MMOL/L (ref 0.4–2)
LACTATE BLDA-SCNC: 1.1 MMOL/L (ref 0.4–2)
LACTATE BLDA-SCNC: 1.2 MMOL/L (ref 0.4–2)
LACTATE BLDA-SCNC: 1.6 MMOL/L (ref 0.4–2)
LDH SERPL L TO P-CCNC: 724 U/L (ref 84–246)
LYMPHOCYTES # BLD AUTO: 0.7 X10*3/UL (ref 0.8–3)
LYMPHOCYTES NFR BLD AUTO: 3.3 %
MAGNESIUM SERPL-MCNC: 2.62 MG/DL (ref 1.6–2.4)
MCH RBC QN AUTO: 30.2 PG (ref 26–34)
MCHC RBC AUTO-ENTMCNC: 32.2 G/DL (ref 32–36)
MCV RBC AUTO: 94 FL (ref 80–100)
MONOCYTES # BLD AUTO: 1.01 X10*3/UL (ref 0.05–0.8)
MONOCYTES NFR BLD AUTO: 4.7 %
NEUTROPHILS # BLD AUTO: 19.35 X10*3/UL (ref 1.6–5.5)
NEUTROPHILS NFR BLD AUTO: 91 %
NRBC BLD-RTO: 0 /100 WBCS (ref 0–0)
OXYHGB MFR BLDA: 95.4 % (ref 94–98)
OXYHGB MFR BLDA: 95.5 % (ref 94–98)
OXYHGB MFR BLDA: 96.2 % (ref 94–98)
OXYHGB MFR BLDA: 96.7 % (ref 94–98)
OXYHGB MFR BLDA: 96.9 % (ref 94–98)
OXYHGB MFR BLDA: 96.9 % (ref 94–98)
PCO2 BLDA: 40 MM HG (ref 38–42)
PCO2 BLDA: 45 MM HG (ref 38–42)
PCO2 BLDA: 50 MM HG (ref 38–42)
PCO2 BLDA: 58 MM HG (ref 38–42)
PCO2 BLDA: 69 MM HG (ref 38–42)
PCO2 BLDA: 70 MM HG (ref 38–42)
PEEP CMH2O: 12 CM H2O
PEEP CMH2O: 12 CM H2O
PEEP CMH2O: 14 CM H2O
PH BLDA: 7.14 PH (ref 7.38–7.42)
PH BLDA: 7.17 PH (ref 7.38–7.42)
PH BLDA: 7.23 PH (ref 7.38–7.42)
PH BLDA: 7.26 PH (ref 7.38–7.42)
PH BLDA: 7.33 PH (ref 7.38–7.42)
PH BLDA: 7.36 PH (ref 7.38–7.42)
PHOSPHATE SERPL-MCNC: 3.4 MG/DL (ref 2.5–4.9)
PLATELET # BLD AUTO: 629 X10*3/UL (ref 150–450)
PO2 BLDA: 100 MM HG (ref 85–95)
PO2 BLDA: 111 MM HG (ref 85–95)
PO2 BLDA: 111 MM HG (ref 85–95)
PO2 BLDA: 114 MM HG (ref 85–95)
PO2 BLDA: 124 MM HG (ref 85–95)
PO2 BLDA: 164 MM HG (ref 85–95)
POTASSIUM BLDA-SCNC: 3.7 MMOL/L (ref 3.5–5.3)
POTASSIUM BLDA-SCNC: 4 MMOL/L (ref 3.5–5.3)
POTASSIUM BLDA-SCNC: 4 MMOL/L (ref 3.5–5.3)
POTASSIUM BLDA-SCNC: 4.2 MMOL/L (ref 3.5–5.3)
POTASSIUM BLDA-SCNC: 4.3 MMOL/L (ref 3.5–5.3)
POTASSIUM BLDA-SCNC: 4.4 MMOL/L (ref 3.5–5.3)
POTASSIUM SERPL-SCNC: 3.8 MMOL/L (ref 3.5–5.3)
PROCALCITONIN SERPL-MCNC: 2.55 NG/ML
RBC # BLD AUTO: 2.62 X10*6/UL (ref 4–5.2)
SAO2 % BLDA: 100 % (ref 94–100)
SAO2 % BLDA: 97 % (ref 94–100)
SAO2 % BLDA: 98 % (ref 94–100)
SODIUM BLDA-SCNC: 137 MMOL/L (ref 136–145)
SODIUM BLDA-SCNC: 138 MMOL/L (ref 136–145)
SODIUM BLDA-SCNC: 139 MMOL/L (ref 136–145)
SODIUM BLDA-SCNC: 139 MMOL/L (ref 136–145)
SODIUM SERPL-SCNC: 140 MMOL/L (ref 136–145)
SPECIMEN DRAWN FROM PATIENT: ABNORMAL
SPONTANEOUS TIDAL VOLUME: 373 ML
TIDAL VOLUME: 370 ML
TIDAL VOLUME: 370 ML
TIDAL VOLUME: 400 ML
VENTILATOR MODE: ABNORMAL
VENTILATOR RATE: 20 BPM
VENTILATOR RATE: 22 BPM
VENTILATOR RATE: 24 BPM
WBC # BLD AUTO: 21.3 X10*3/UL (ref 4.4–11.3)

## 2025-01-07 PROCEDURE — 2500000004 HC RX 250 GENERAL PHARMACY W/ HCPCS (ALT 636 FOR OP/ED): Performed by: SURGERY

## 2025-01-07 PROCEDURE — 99223 1ST HOSP IP/OBS HIGH 75: CPT

## 2025-01-07 PROCEDURE — 31622 DX BRONCHOSCOPE/WASH: CPT

## 2025-01-07 PROCEDURE — 88312 SPECIAL STAINS GROUP 1: CPT | Mod: TC,MCY,WESLAB | Performed by: STUDENT IN AN ORGANIZED HEALTH CARE EDUCATION/TRAINING PROGRAM

## 2025-01-07 PROCEDURE — 87385 HISTOPLASMA CAPSUL AG IA: CPT | Performed by: STUDENT IN AN ORGANIZED HEALTH CARE EDUCATION/TRAINING PROGRAM

## 2025-01-07 PROCEDURE — 2500000001 HC RX 250 WO HCPCS SELF ADMINISTERED DRUGS (ALT 637 FOR MEDICARE OP)

## 2025-01-07 PROCEDURE — 83735 ASSAY OF MAGNESIUM: CPT

## 2025-01-07 PROCEDURE — 87581 M.PNEUMON DNA AMP PROBE: CPT | Performed by: STUDENT IN AN ORGANIZED HEALTH CARE EDUCATION/TRAINING PROGRAM

## 2025-01-07 PROCEDURE — 71045 X-RAY EXAM CHEST 1 VIEW: CPT | Performed by: RADIOLOGY

## 2025-01-07 PROCEDURE — 2500000002 HC RX 250 W HCPCS SELF ADMINISTERED DRUGS (ALT 637 FOR MEDICARE OP, ALT 636 FOR OP/ED)

## 2025-01-07 PROCEDURE — 94002 VENT MGMT INPAT INIT DAY: CPT

## 2025-01-07 PROCEDURE — 87102 FUNGUS ISOLATION CULTURE: CPT | Mod: WESLAB | Performed by: STUDENT IN AN ORGANIZED HEALTH CARE EDUCATION/TRAINING PROGRAM

## 2025-01-07 PROCEDURE — 99291 CRITICAL CARE FIRST HOUR: CPT

## 2025-01-07 PROCEDURE — 03HC33Z INSERTION OF INFUSION DEVICE INTO LEFT RADIAL ARTERY, PERCUTANEOUS APPROACH: ICD-10-PCS | Performed by: INTERNAL MEDICINE

## 2025-01-07 PROCEDURE — 88112 CYTOPATH CELL ENHANCE TECH: CPT | Performed by: PATHOLOGY

## 2025-01-07 PROCEDURE — 36620 INSERTION CATHETER ARTERY: CPT

## 2025-01-07 PROCEDURE — 9420000001 HC RT PATIENT EDUCATION 5 MIN

## 2025-01-07 PROCEDURE — 87632 RESP VIRUS 6-11 TARGETS: CPT | Performed by: STUDENT IN AN ORGANIZED HEALTH CARE EDUCATION/TRAINING PROGRAM

## 2025-01-07 PROCEDURE — 2500000004 HC RX 250 GENERAL PHARMACY W/ HCPCS (ALT 636 FOR OP/ED)

## 2025-01-07 PROCEDURE — 87799 DETECT AGENT NOS DNA QUANT: CPT | Performed by: STUDENT IN AN ORGANIZED HEALTH CARE EDUCATION/TRAINING PROGRAM

## 2025-01-07 PROCEDURE — 82330 ASSAY OF CALCIUM: CPT

## 2025-01-07 PROCEDURE — 36600 WITHDRAWAL OF ARTERIAL BLOOD: CPT

## 2025-01-07 PROCEDURE — 0B9D8ZX DRAINAGE OF RIGHT MIDDLE LUNG LOBE, VIA NATURAL OR ARTIFICIAL OPENING ENDOSCOPIC, DIAGNOSTIC: ICD-10-PCS | Performed by: PATHOLOGY

## 2025-01-07 PROCEDURE — 0BH17EZ INSERTION OF ENDOTRACHEAL AIRWAY INTO TRACHEA, VIA NATURAL OR ARTIFICIAL OPENING: ICD-10-PCS | Performed by: SURGERY

## 2025-01-07 PROCEDURE — 87116 MYCOBACTERIA CULTURE: CPT | Performed by: SURGERY

## 2025-01-07 PROCEDURE — 84295 ASSAY OF SERUM SODIUM: CPT

## 2025-01-07 PROCEDURE — 85025 COMPLETE CBC W/AUTO DIFF WBC: CPT

## 2025-01-07 PROCEDURE — 87449 NOS EACH ORGANISM AG IA: CPT | Performed by: STUDENT IN AN ORGANIZED HEALTH CARE EDUCATION/TRAINING PROGRAM

## 2025-01-07 PROCEDURE — 5A1955Z RESPIRATORY VENTILATION, GREATER THAN 96 CONSECUTIVE HOURS: ICD-10-PCS | Performed by: SURGERY

## 2025-01-07 PROCEDURE — 94660 CPAP INITIATION&MGMT: CPT

## 2025-01-07 PROCEDURE — 82947 ASSAY GLUCOSE BLOOD QUANT: CPT

## 2025-01-07 PROCEDURE — 31500 INSERT EMERGENCY AIRWAY: CPT | Performed by: SURGERY

## 2025-01-07 PROCEDURE — 3E033XZ INTRODUCTION OF VASOPRESSOR INTO PERIPHERAL VEIN, PERCUTANEOUS APPROACH: ICD-10-PCS | Performed by: INTERNAL MEDICINE

## 2025-01-07 PROCEDURE — 36415 COLL VENOUS BLD VENIPUNCTURE: CPT | Performed by: STUDENT IN AN ORGANIZED HEALTH CARE EDUCATION/TRAINING PROGRAM

## 2025-01-07 PROCEDURE — 36415 COLL VENOUS BLD VENIPUNCTURE: CPT

## 2025-01-07 PROCEDURE — 2500000005 HC RX 250 GENERAL PHARMACY W/O HCPCS

## 2025-01-07 PROCEDURE — 94640 AIRWAY INHALATION TREATMENT: CPT

## 2025-01-07 PROCEDURE — 31500 INSERT EMERGENCY AIRWAY: CPT

## 2025-01-07 PROCEDURE — 82805 BLOOD GASES W/O2 SATURATION: CPT | Performed by: SURGERY

## 2025-01-07 PROCEDURE — 2500000004 HC RX 250 GENERAL PHARMACY W/ HCPCS (ALT 636 FOR OP/ED): Performed by: STUDENT IN AN ORGANIZED HEALTH CARE EDUCATION/TRAINING PROGRAM

## 2025-01-07 PROCEDURE — 87801 DETECT AGNT MULT DNA AMPLI: CPT | Performed by: STUDENT IN AN ORGANIZED HEALTH CARE EDUCATION/TRAINING PROGRAM

## 2025-01-07 PROCEDURE — 2500000005 HC RX 250 GENERAL PHARMACY W/O HCPCS: Performed by: SURGERY

## 2025-01-07 PROCEDURE — 84145 PROCALCITONIN (PCT): CPT | Mod: WESLAB | Performed by: STUDENT IN AN ORGANIZED HEALTH CARE EDUCATION/TRAINING PROGRAM

## 2025-01-07 PROCEDURE — 88312 SPECIAL STAINS GROUP 1: CPT | Performed by: PATHOLOGY

## 2025-01-07 PROCEDURE — 2500000004 HC RX 250 GENERAL PHARMACY W/ HCPCS (ALT 636 FOR OP/ED): Performed by: INTERNAL MEDICINE

## 2025-01-07 PROCEDURE — 99232 SBSQ HOSP IP/OBS MODERATE 35: CPT | Performed by: STUDENT IN AN ORGANIZED HEALTH CARE EDUCATION/TRAINING PROGRAM

## 2025-01-07 PROCEDURE — 2020000001 HC ICU ROOM DAILY

## 2025-01-07 PROCEDURE — 87075 CULTR BACTERIA EXCEPT BLOOD: CPT | Mod: WESLAB | Performed by: STUDENT IN AN ORGANIZED HEALTH CARE EDUCATION/TRAINING PROGRAM

## 2025-01-07 PROCEDURE — 82550 ASSAY OF CK (CPK): CPT | Performed by: INTERNAL MEDICINE

## 2025-01-07 PROCEDURE — 88104 CYTOPATH FL NONGYN SMEARS: CPT | Performed by: PATHOLOGY

## 2025-01-07 PROCEDURE — 89050 BODY FLUID CELL COUNT: CPT | Mod: WESLAB

## 2025-01-07 PROCEDURE — 87533 HHV-6 DNA QUANT: CPT | Performed by: STUDENT IN AN ORGANIZED HEALTH CARE EDUCATION/TRAINING PROGRAM

## 2025-01-07 PROCEDURE — 2500000005 HC RX 250 GENERAL PHARMACY W/O HCPCS: Performed by: INTERNAL MEDICINE

## 2025-01-07 PROCEDURE — 87305 ASPERGILLUS AG IA: CPT | Performed by: STUDENT IN AN ORGANIZED HEALTH CARE EDUCATION/TRAINING PROGRAM

## 2025-01-07 PROCEDURE — 87015 SPECIMEN INFECT AGNT CONCNTJ: CPT | Mod: WESLAB | Performed by: STUDENT IN AN ORGANIZED HEALTH CARE EDUCATION/TRAINING PROGRAM

## 2025-01-07 RX ORDER — LIDOCAINE HYDROCHLORIDE 10 MG/ML
10 INJECTION, SOLUTION INFILTRATION; PERINEURAL ONCE
Status: COMPLETED | OUTPATIENT
Start: 2025-01-07 | End: 2025-01-07

## 2025-01-07 RX ORDER — DEXTROSE 50 % IN WATER (D50W) INTRAVENOUS SYRINGE
25
Status: DISCONTINUED | OUTPATIENT
Start: 2025-01-07 | End: 2025-01-14

## 2025-01-07 RX ORDER — MIDAZOLAM HYDROCHLORIDE 1 MG/ML
4 INJECTION, SOLUTION INTRAMUSCULAR; INTRAVENOUS ONCE
Status: DISCONTINUED | OUTPATIENT
Start: 2025-01-07 | End: 2025-01-10

## 2025-01-07 RX ORDER — FOLIC ACID 1 MG/1
1 TABLET ORAL DAILY
Status: DISCONTINUED | OUTPATIENT
Start: 2025-01-07 | End: 2025-01-14

## 2025-01-07 RX ORDER — DOCUSATE SODIUM 50 MG/5ML
100 LIQUID ORAL 2 TIMES DAILY
Status: DISCONTINUED | OUTPATIENT
Start: 2025-01-07 | End: 2025-01-14

## 2025-01-07 RX ORDER — DEXTROSE 50 % IN WATER (D50W) INTRAVENOUS SYRINGE
12.5
Status: DISCONTINUED | OUTPATIENT
Start: 2025-01-07 | End: 2025-01-14

## 2025-01-07 RX ORDER — FUROSEMIDE 10 MG/ML
20 INJECTION INTRAMUSCULAR; INTRAVENOUS ONCE
Status: COMPLETED | OUTPATIENT
Start: 2025-01-07 | End: 2025-01-07

## 2025-01-07 RX ORDER — PHENOBARBITAL SODIUM 65 MG/ML
260 INJECTION, SOLUTION INTRAMUSCULAR; INTRAVENOUS ONCE
Status: COMPLETED | OUTPATIENT
Start: 2025-01-07 | End: 2025-01-07

## 2025-01-07 RX ORDER — ETOMIDATE 2 MG/ML
0.3 INJECTION INTRAVENOUS ONCE
Status: COMPLETED | OUTPATIENT
Start: 2025-01-07 | End: 2025-01-07

## 2025-01-07 RX ORDER — NOREPINEPHRINE BITARTRATE/D5W 8 MG/250ML
0-.5 PLASTIC BAG, INJECTION (ML) INTRAVENOUS CONTINUOUS
Status: DISCONTINUED | OUTPATIENT
Start: 2025-01-07 | End: 2025-01-08

## 2025-01-07 RX ORDER — ETOMIDATE 2 MG/ML
20 INJECTION INTRAVENOUS ONCE
Status: COMPLETED | OUTPATIENT
Start: 2025-01-07 | End: 2025-01-07

## 2025-01-07 RX ORDER — DEXMEDETOMIDINE HYDROCHLORIDE 4 UG/ML
0-1.5 INJECTION, SOLUTION INTRAVENOUS CONTINUOUS
Status: DISCONTINUED | OUTPATIENT
Start: 2025-01-07 | End: 2025-01-09

## 2025-01-07 RX ORDER — NOREPINEPHRINE BITARTRATE/D5W 8 MG/250ML
PLASTIC BAG, INJECTION (ML) INTRAVENOUS
Status: COMPLETED
Start: 2025-01-07 | End: 2025-01-07

## 2025-01-07 RX ORDER — THIAMINE HYDROCHLORIDE 100 MG/ML
100 INJECTION, SOLUTION INTRAMUSCULAR; INTRAVENOUS DAILY
Status: COMPLETED | OUTPATIENT
Start: 2025-01-07 | End: 2025-01-09

## 2025-01-07 RX ORDER — DIAZEPAM 5 MG/ML
2 INJECTION, SOLUTION INTRAMUSCULAR; INTRAVENOUS ONCE
Status: COMPLETED | OUTPATIENT
Start: 2025-01-07 | End: 2025-01-07

## 2025-01-07 RX ORDER — INSULIN LISPRO 100 [IU]/ML
0-5 INJECTION, SOLUTION INTRAVENOUS; SUBCUTANEOUS EVERY 4 HOURS
Status: DISCONTINUED | OUTPATIENT
Start: 2025-01-07 | End: 2025-01-14

## 2025-01-07 RX ORDER — SUCCINYLCHOLINE CHLORIDE 20 MG/ML
1 INJECTION INTRAMUSCULAR; INTRAVENOUS ONCE
Status: COMPLETED | OUTPATIENT
Start: 2025-01-07 | End: 2025-01-07

## 2025-01-07 RX ORDER — MULTIVIT-MIN/IRON FUM/FOLIC AC 7.5 MG-4
1 TABLET ORAL DAILY
Status: DISCONTINUED | OUTPATIENT
Start: 2025-01-07 | End: 2025-01-11

## 2025-01-07 RX ORDER — PHENOBARBITAL SODIUM 65 MG/ML
32.5 INJECTION, SOLUTION INTRAMUSCULAR; INTRAVENOUS EVERY 8 HOURS
Status: DISCONTINUED | OUTPATIENT
Start: 2025-01-09 | End: 2025-01-09

## 2025-01-07 RX ORDER — FENTANYL CITRATE 50 UG/ML
50 INJECTION, SOLUTION INTRAMUSCULAR; INTRAVENOUS ONCE
Status: COMPLETED | OUTPATIENT
Start: 2025-01-07 | End: 2025-01-07

## 2025-01-07 RX ORDER — MIDAZOLAM HYDROCHLORIDE 1 MG/ML
4 INJECTION, SOLUTION INTRAMUSCULAR; INTRAVENOUS ONCE
Status: COMPLETED | OUTPATIENT
Start: 2025-01-07 | End: 2025-01-07

## 2025-01-07 RX ORDER — PROPOFOL 10 MG/ML
0-20 INJECTION, EMULSION INTRAVENOUS CONTINUOUS
Status: DISCONTINUED | OUTPATIENT
Start: 2025-01-07 | End: 2025-01-07

## 2025-01-07 RX ORDER — ROCURONIUM BROMIDE 10 MG/ML
50 INJECTION, SOLUTION INTRAVENOUS ONCE
Status: COMPLETED | OUTPATIENT
Start: 2025-01-07 | End: 2025-01-07

## 2025-01-07 RX ORDER — SULFAMETHOXAZOLE AND TRIMETHOPRIM 800; 160 MG/1; MG/1
1 TABLET ORAL EVERY 12 HOURS SCHEDULED
Status: DISCONTINUED | OUTPATIENT
Start: 2025-01-07 | End: 2025-01-08

## 2025-01-07 RX ORDER — PHENOBARBITAL SODIUM 65 MG/ML
65 INJECTION, SOLUTION INTRAMUSCULAR; INTRAVENOUS EVERY 8 HOURS
Status: DISCONTINUED | OUTPATIENT
Start: 2025-01-07 | End: 2025-01-09

## 2025-01-07 RX ORDER — PROPOFOL 10 MG/ML
0-50 INJECTION, EMULSION INTRAVENOUS CONTINUOUS
Status: DISCONTINUED | OUTPATIENT
Start: 2025-01-07 | End: 2025-01-13

## 2025-01-07 RX ORDER — LANOLIN ALCOHOL/MO/W.PET/CERES
100 CREAM (GRAM) TOPICAL DAILY
Status: DISCONTINUED | OUTPATIENT
Start: 2025-01-10 | End: 2025-01-14

## 2025-01-07 RX ADMIN — INSULIN LISPRO 1 UNITS: 100 INJECTION, SOLUTION INTRAVENOUS; SUBCUTANEOUS at 19:59

## 2025-01-07 RX ADMIN — HEPARIN SODIUM 5000 UNITS: 5000 INJECTION, SOLUTION INTRAVENOUS; SUBCUTANEOUS at 03:09

## 2025-01-07 RX ADMIN — MORPHINE SULFATE 2 MG: 2 INJECTION, SOLUTION INTRAMUSCULAR; INTRAVENOUS at 05:44

## 2025-01-07 RX ADMIN — HYDROXYZINE HYDROCHLORIDE 25 MG: 25 INJECTION, SOLUTION INTRAMUSCULAR at 02:06

## 2025-01-07 RX ADMIN — IPRATROPIUM BROMIDE AND ALBUTEROL SULFATE 3 ML: 2.5; .5 SOLUTION RESPIRATORY (INHALATION) at 15:17

## 2025-01-07 RX ADMIN — Medication 3 ML: at 00:15

## 2025-01-07 RX ADMIN — LINEZOLID 600 MG: 600 INJECTION, SOLUTION INTRAVENOUS at 00:37

## 2025-01-07 RX ADMIN — PIPERACILLIN SODIUM AND TAZOBACTAM SODIUM 2.25 G: 2; .25 INJECTION, SOLUTION INTRAVENOUS at 11:07

## 2025-01-07 RX ADMIN — PIPERACILLIN SODIUM AND TAZOBACTAM SODIUM 2.25 G: 2; .25 INJECTION, SOLUTION INTRAVENOUS at 03:09

## 2025-01-07 RX ADMIN — Medication 100 PERCENT: at 07:43

## 2025-01-07 RX ADMIN — DEXMEDETOMIDINE HYDROCHLORIDE 1.5 MCG/KG/HR: 4 INJECTION, SOLUTION INTRAVENOUS at 14:01

## 2025-01-07 RX ADMIN — THIAMINE HYDROCHLORIDE 100 MG: 100 INJECTION, SOLUTION INTRAMUSCULAR; INTRAVENOUS at 11:44

## 2025-01-07 RX ADMIN — PIPERACILLIN SODIUM AND TAZOBACTAM SODIUM 2.25 G: 2; .25 INJECTION, SOLUTION INTRAVENOUS at 16:23

## 2025-01-07 RX ADMIN — FUROSEMIDE 20 MG: 10 INJECTION, SOLUTION INTRAMUSCULAR; INTRAVENOUS at 11:44

## 2025-01-07 RX ADMIN — Medication 100 PERCENT: at 10:15

## 2025-01-07 RX ADMIN — IPRATROPIUM BROMIDE AND ALBUTEROL SULFATE 3 ML: 2.5; .5 SOLUTION RESPIRATORY (INHALATION) at 19:19

## 2025-01-07 RX ADMIN — NOREPINEPHRINE BITARTRATE 0.01 MCG/KG/MIN: 8 INJECTION, SOLUTION INTRAVENOUS at 11:13

## 2025-01-07 RX ADMIN — PHENOBARBITAL SODIUM 260 MG: 65 INJECTION INTRAMUSCULAR; INTRAVENOUS at 11:41

## 2025-01-07 RX ADMIN — FENTANYL CITRATE 50 MCG: 50 INJECTION INTRAMUSCULAR; INTRAVENOUS at 10:29

## 2025-01-07 RX ADMIN — ETOMIDATE 20 MG: 2 INJECTION, SOLUTION INTRAVENOUS at 09:55

## 2025-01-07 RX ADMIN — IPRATROPIUM BROMIDE AND ALBUTEROL SULFATE 3 ML: 2.5; .5 SOLUTION RESPIRATORY (INHALATION) at 10:16

## 2025-01-07 RX ADMIN — FUROSEMIDE 20 MG: 10 INJECTION, SOLUTION INTRAMUSCULAR; INTRAVENOUS at 20:21

## 2025-01-07 RX ADMIN — DOXYCYCLINE 100 MG: 100 INJECTION, POWDER, LYOPHILIZED, FOR SOLUTION INTRAVENOUS at 17:04

## 2025-01-07 RX ADMIN — METHYLPREDNISOLONE SODIUM SUCCINATE 40 MG: 40 INJECTION, POWDER, LYOPHILIZED, FOR SOLUTION INTRAMUSCULAR; INTRAVENOUS at 20:22

## 2025-01-07 RX ADMIN — PIPERACILLIN SODIUM AND TAZOBACTAM SODIUM 2.25 G: 2; .25 INJECTION, SOLUTION INTRAVENOUS at 21:26

## 2025-01-07 RX ADMIN — PHENOBARBITAL SODIUM 65 MG: 65 INJECTION INTRAMUSCULAR; INTRAVENOUS at 20:22

## 2025-01-07 RX ADMIN — ETOMIDATE 20 MG: 2 INJECTION, SOLUTION INTRAVENOUS at 10:37

## 2025-01-07 RX ADMIN — Medication 3 ML: at 10:16

## 2025-01-07 RX ADMIN — PROPOFOL 50 MCG/KG/MIN: 10 INJECTION, EMULSION INTRAVENOUS at 10:28

## 2025-01-07 RX ADMIN — DOXYCYCLINE 100 MG: 100 INJECTION, POWDER, LYOPHILIZED, FOR SOLUTION INTRAVENOUS at 02:07

## 2025-01-07 RX ADMIN — LIDOCAINE HYDROCHLORIDE 10 ML: 10 INJECTION, SOLUTION INFILTRATION; PERINEURAL at 15:52

## 2025-01-07 RX ADMIN — PROPOFOL 50 MCG/KG/MIN: 10 INJECTION, EMULSION INTRAVENOUS at 20:59

## 2025-01-07 RX ADMIN — METHYLPREDNISOLONE SODIUM SUCCINATE 40 MG: 40 INJECTION, POWDER, LYOPHILIZED, FOR SOLUTION INTRAMUSCULAR; INTRAVENOUS at 11:06

## 2025-01-07 RX ADMIN — Medication 3 ML: at 15:17

## 2025-01-07 RX ADMIN — DOCUSATE SODIUM 100 MG: 50 LIQUID ORAL at 23:23

## 2025-01-07 RX ADMIN — IPRATROPIUM BROMIDE AND ALBUTEROL SULFATE 3 ML: 2.5; .5 SOLUTION RESPIRATORY (INHALATION) at 07:42

## 2025-01-07 RX ADMIN — DEXMEDETOMIDINE HYDROCHLORIDE 1.5 MCG/KG/HR: 4 INJECTION, SOLUTION INTRAVENOUS at 17:31

## 2025-01-07 RX ADMIN — HEPARIN SODIUM 5000 UNITS: 5000 INJECTION, SOLUTION INTRAVENOUS; SUBCUTANEOUS at 11:07

## 2025-01-07 RX ADMIN — SIMVASTATIN 20 MG: 20 TABLET, FILM COATED ORAL at 23:23

## 2025-01-07 RX ADMIN — IPRATROPIUM BROMIDE AND ALBUTEROL SULFATE 3 ML: 2.5; .5 SOLUTION RESPIRATORY (INHALATION) at 03:13

## 2025-01-07 RX ADMIN — Medication 3 ML: at 07:42

## 2025-01-07 RX ADMIN — DEXMEDETOMIDINE HYDROCHLORIDE 1.5 MCG/KG/HR: 4 INJECTION, SOLUTION INTRAVENOUS at 21:20

## 2025-01-07 RX ADMIN — DEXMEDETOMIDINE HYDROCHLORIDE 1 MCG/KG/HR: 4 INJECTION, SOLUTION INTRAVENOUS at 10:44

## 2025-01-07 RX ADMIN — ROCURONIUM BROMIDE 50 MG: 10 INJECTION, SOLUTION INTRAVENOUS at 16:27

## 2025-01-07 RX ADMIN — Medication 3 ML: at 03:13

## 2025-01-07 RX ADMIN — PROPOFOL 50 MCG/KG/MIN: 10 INJECTION, EMULSION INTRAVENOUS at 16:17

## 2025-01-07 RX ADMIN — Medication 3 ML: at 19:19

## 2025-01-07 RX ADMIN — METHYLPREDNISOLONE SODIUM SUCCINATE 40 MG: 40 INJECTION, POWDER, LYOPHILIZED, FOR SOLUTION INTRAMUSCULAR; INTRAVENOUS at 15:49

## 2025-01-07 RX ADMIN — METHYLPREDNISOLONE SODIUM SUCCINATE 40 MG: 40 INJECTION, POWDER, LYOPHILIZED, FOR SOLUTION INTRAMUSCULAR; INTRAVENOUS at 03:09

## 2025-01-07 RX ADMIN — INSULIN LISPRO 1 UNITS: 100 INJECTION, SOLUTION INTRAVENOUS; SUBCUTANEOUS at 23:24

## 2025-01-07 RX ADMIN — SUCCINYLCHOLINE CHLORIDE 70 MG: 20 INJECTION, SOLUTION INTRAMUSCULAR; INTRAVENOUS; PARENTERAL at 10:01

## 2025-01-07 RX ADMIN — DIAZEPAM 2 MG: 5 INJECTION, SOLUTION INTRAMUSCULAR; INTRAVENOUS at 00:47

## 2025-01-07 RX ADMIN — IPRATROPIUM BROMIDE AND ALBUTEROL SULFATE 3 ML: 2.5; .5 SOLUTION RESPIRATORY (INHALATION) at 00:15

## 2025-01-07 RX ADMIN — LINEZOLID 600 MG: 600 INJECTION, SOLUTION INTRAVENOUS at 14:25

## 2025-01-07 RX ADMIN — DIAZEPAM 2 MG: 5 INJECTION, SOLUTION INTRAMUSCULAR; INTRAVENOUS at 08:54

## 2025-01-07 RX ADMIN — MIDAZOLAM 4 MG: 1 INJECTION INTRAMUSCULAR; INTRAVENOUS at 09:57

## 2025-01-07 RX ADMIN — Medication 80 PERCENT: at 19:19

## 2025-01-07 RX ADMIN — DIAZEPAM 2 MG: 5 INJECTION, SOLUTION INTRAMUSCULAR; INTRAVENOUS at 00:43

## 2025-01-07 RX ADMIN — PROPOFOL 20 MCG/KG/MIN: 10 INJECTION, EMULSION INTRAVENOUS at 10:20

## 2025-01-07 RX ADMIN — IPRATROPIUM BROMIDE AND ALBUTEROL SULFATE 3 ML: 2.5; .5 SOLUTION RESPIRATORY (INHALATION) at 23:51

## 2025-01-07 RX ADMIN — Medication 3 ML: at 23:51

## 2025-01-07 RX ADMIN — Medication 0.01 MCG/KG/MIN: at 11:13

## 2025-01-07 RX ADMIN — MORPHINE SULFATE 2 MG: 2 INJECTION, SOLUTION INTRAMUSCULAR; INTRAVENOUS at 00:39

## 2025-01-07 RX ADMIN — HEPARIN SODIUM 5000 UNITS: 5000 INJECTION, SOLUTION INTRAVENOUS; SUBCUTANEOUS at 20:22

## 2025-01-07 ASSESSMENT — PAIN - FUNCTIONAL ASSESSMENT
PAIN_FUNCTIONAL_ASSESSMENT: 0-10
PAIN_FUNCTIONAL_ASSESSMENT: 0-10

## 2025-01-07 ASSESSMENT — PAIN SCALES - GENERAL
PAINLEVEL_OUTOF10: 0 - NO PAIN
PAINLEVEL_OUTOF10: 0 - NO PAIN

## 2025-01-07 ASSESSMENT — PAIN SCALES - WONG BAKER
WONGBAKER_NUMERICALRESPONSE: NO HURT
WONGBAKER_NUMERICALRESPONSE: NO HURT

## 2025-01-07 NOTE — POST-PROCEDURE NOTE
Bronchoscopy performed at bedside successfully. Patient tolerated well. BAL performed at RML 140cc instilled, 90cc return of clear cloudy white return. Full details found under chart review.

## 2025-01-07 NOTE — PROGRESS NOTES
Janet Snow is a 79 y.o. female on day 5 of admission presenting with Community acquired pneumonia of both upper lobes.    Subjective   Interval History:   Patient seen and examined  Interval intubation  Bronchoscopy planned  Nurse present during evaluation        Review of Systems   Unable to perform ROS: Intubated       Objective   Range of Vitals (last 24 hours)  Heart Rate:  []   Temp:  [36.3 °C (97.3 °F)-36.7 °C (98.1 °F)]   Resp:  [19-43]   BP: (104-174)/()   Weight:  [70.8 kg (156 lb 1.4 oz)]   SpO2:  [81 %-100 %]   Daily Weight  01/07/25 : 70.8 kg (156 lb 1.4 oz)    Body mass index is 27.65 kg/m².    Physical Exam  Constitutional:       Interventions: She is sedated and intubated.   HENT:      Head: Normocephalic and atraumatic.   Eyes:      General: No scleral icterus.     Conjunctiva/sclera: Conjunctivae normal.   Cardiovascular:      Rate and Rhythm: Normal rate and regular rhythm.      Heart sounds: Normal heart sounds.   Pulmonary:      Effort: She is intubated.      Breath sounds: Decreased breath sounds present.   Abdominal:      General: Bowel sounds are normal.      Palpations: Abdomen is soft.   Musculoskeletal:      Cervical back: Neck supple.      Right lower leg: No edema.      Left lower leg: No edema.   Skin:     General: Skin is warm and dry.   Neurological:      Comments: Unable    Psychiatric:      Comments: Unable to assess            Antibiotics  doxycycline (Vibramycin)  mg in 100 mL D5W (ADV/MBP)  linezolid - 600 mg/300 mL  piperacillin-tazobactam - 2.25 gram/50 mL    Relevant Results  Labs  Results from last 72 hours   Lab Units 01/07/25  0416 01/06/25  0433 01/05/25  0515   WBC AUTO x10*3/uL 21.3* 21.9* 21.2*   HEMOGLOBIN g/dL 7.9* 8.4* 9.1*   HEMATOCRIT % 24.5* 25.3* 26.1*   PLATELETS AUTO x10*3/uL 629* 585* 572*   NEUTROS PCT AUTO % 91.0 92.6  --    LYMPHS PCT AUTO % 3.3 3.0  --    MONOS PCT AUTO % 4.7 3.2  --    EOS PCT AUTO % 0.0 0.0  --      Results from  last 72 hours   Lab Units 01/07/25  0416 01/06/25  0433 01/05/25  0515   SODIUM mmol/L 140 137 134*   POTASSIUM mmol/L 3.8 4.1 3.4*   CHLORIDE mmol/L 108* 107 101   CO2 mmol/L 23 22 23   BUN mg/dL 50* 35* 26*   CREATININE mg/dL 2.06* 1.75* 1.49*   GLUCOSE mg/dL 126* 141* 124*   CALCIUM mg/dL 8.7 8.5* 8.5*   ANION GAP mmol/L 13 12 13   EGFR mL/min/1.73m*2 24* 29* 36*   PHOSPHORUS mg/dL 3.4 3.0 3.7     Results from last 72 hours   Lab Units 01/07/25  0416 01/06/25  0433 01/05/25  1000 01/05/25  0515   PROTEIN TOTAL g/dL  --   --  6.6  --    ALBUMIN g/dL 2.9* 3.0*  --  3.0*     Estimated Creatinine Clearance: 20.9 mL/min (A) (by C-G formula based on SCr of 2.06 mg/dL (H)).  C-Reactive Protein   Date Value Ref Range Status   01/05/2025 35.67 (H) <1.00 mg/dL Final     CRP   Date Value Ref Range Status   01/04/2020 10.1 (H) 0 - 2.0 MG/DL Final     Comment:     Performed at [address] [address]     Microbiology  Reviewed-BAL work up pending   Imaging  Bronchoscopy    Result Date: 1/7/2025  Table formatting from the original result was not included. Impression The left main stem, left upper lobar bronchus, lingular lobar bronchus, left lower lobar bronchus, right main stem, right upper lobar bronchus, bronchus intermedius, right middle lobar bronchus and right lower lobar bronchus appeared normal. Bronchoalveolar lavage was performed x1 in the RML and the fluid appeared cloudy Findings The left main stem, left upper lobar bronchus, lingular lobar bronchus, left lower lobar bronchus, right main stem, right upper lobar bronchus, bronchus intermedius, right middle lobar bronchus and right lower lobar bronchus appeared normal. Bronchoalveolar lavage was performed x1 in the RML with 140 mL of saline instilled and a total return of 90 mL. The fluid appeared cloudy. Recommendation There is no recommended follow-up for this procedure. Indication ARDS (adult respiratory distress syndrome) (Multi) Staff  Staff Role No Staff Documented Medications See Anesthesia Record. Preprocedure A history and physical has been performed, and patient medication allergies have been reviewed. The patient's tolerance of previous anesthesia has been reviewed. The risks and benefits of the procedure and the sedation options and risks were discussed with the  daughter (medical POA) and son (financial POA) . All questions were answered and informed consent obtained. Details of the Procedure The patient was already under sedation prior to the procedure. The patient's blood pressure, respirations, heart rate, oxygen and ECG were monitored throughout the procedure. The patient experienced no blood loss. The scope was introduced through the endotracheal tube. The procedure was not difficult. The patient tolerated the procedure well. There were no apparent adverse events. Events Procedure Events Event Event Time Specimens * Cannot find log * BAL taken from RML 140cc instilled, retunr 90cc of white cloudy fluid. Mucosa appeared normal and non friable. Some mucous secretions suctioned away. Procedure Location [address] Intensive [facility] [address] [facility] [id number] [phone] Referring Provider Laura Maloney MD Procedure Provider Laura PERALTA MD     XR chest 1 view    Result Date: 1/7/2025  Interpreted By:  Elda Dorado, STUDY: XR CHEST 1 VIEW;  1/7/2025 10:40 am   INDICATION: Signs/Symptoms:intubation.   COMPARISON: 01/07/2025 at 5:45 a.m.   ACCESSION NUMBER(S): [id number]   ORDERING CLINICIAN: KENISHA LEE   FINDINGS: Heart is normal in size.   The patient is intubated. The tip terminates above the noah.   There is diffuse parenchymal infiltration.   There are atherosclerotic changes of the aorta. There are degenerative changes of the spine.   COMPARISON OF FINDING: The patient has undergone interval intubation. The lungs are similar.       Diffuse bilateral parenchymal  infiltration. Interval intubation.   MACRO: none   Signed by: Elda Dorado 1/7/2025 11:22 AM Dictation workstation:   [id number]    XR chest 1 view    Result Date: 1/7/2025  Interpreted By:  Jaxon Morales, STUDY: XR CHEST 1 VIEW; 1/7/2025 6:35 am   INDICATION: CLINICAL INFORMATION: Signs/Symptoms:persistent hypoxia, unable to wean from NIV.   COMPARISON: 01/06/2025   ACCESSION NUMBER(S): [id number]   ORDERING CLINICIAN: NORM LANDAVERDE   TECHNIQUE: Portable chest one view.   FINDINGS: The cardiac size is indeterminate in view of the AP projection. There is continued diffuse ground-glass infiltrate bilaterally. No effusions are identified.       Persistent ground-glass infiltrates diffusely bilaterally. Etiology is unclear with differential to include infectious organisms as well as pulmonary edema and ARDS.   MACRO: none   Signed by: Jaxon Morales 1/7/2025 8:20 AM Dictation workstation:   [id number]    ECG 12 lead    Result Date: 1/6/2025  Normal sinus rhythm Nonspecific ST abnormality Prolonged QT Abnormal ECG No previous ECGs available Confirmed by Vicky Corado ([id number]) on 1/6/2025 4:01:46 PM    Transthoracic Echo (TTE) Complete    Result Date: 1/6/2025           [place] [address]            Phone [phone] TRANSTHORACIC ECHOCARDIOGRAM REPORT Patient Name:       ROBBIE ROCHA     Reading Physician:    [id number] Vicky Corado MD Study Date:         1/6/2025             Ordering Provider:    [id number] NORM LANDAVERDE MRN/PID:            [id number]             Fellow: Accession#:         [id number]         Nurse: Date of Birth/Age:  1945 / 79 years Sonographer:          Rosalba Leiva RDCS Gender Assigned at  F                    Additional Staff: Birth: Height:              160.02 cm            Admit Date: Weight:             69.85 kg             Admission Status:     Inpatient -                                                                Routine BSA / BMI:          1.73 m2 / 27.28      Department Location:  [facility], [place], operated by [facility] ICU                     kg/m2 Blood Pressure: 151 /76 mmHg Study Type:    TRANSTHORACIC ECHO (TTE) COMPLETE Diagnosis/ICD: Hypoxemia-R09.02; Acute respiratory failure with hypoxia-J96.01 Indication:    hypoxemia CPT Codes:     Echo Complete w Full [id number] Patient History: Smoker:            Former. BMI:               Overweight 25 - 30 Pertinent History: Resp difficulty, bipap, cough, pna, sciatica, arf, resp                    failure/hypoxia. Study Detail: The following Echo studies were performed: 2D, Doppler, M-Mode and               color flow. Technically challenging study due to prominent lung               artifact, body habitus and patient lying in supine position.               Definity used as a contrast agent for endocardial border               definition. Total contrast used for this procedure was 2 mL via IV               push.  PHYSICIAN INTERPRETATION: Left Ventricle: The left ventricular systolic function is normal, with a visually estimated ejection fraction of 70-75%. There are no regional wall motion abnormalities. The left ventricular cavity size is normal. There is normal septal thickness. Spectral Doppler shows a normal pattern of left ventricular diastolic filling. Left Atrium: The left atrium is normal in size. Right Ventricle: The right ventricle is normal in size. There is normal right ventricular global systolic function. Right Atrium: The right atrium is normal in size. Aortic Valve: The aortic valve is trileaflet. The aortic valve dimensionless index is 0.74. There is no evidence of aortic valve regurgitation. The peak instantaneous gradient of the aortic valve is 22 mmHg. The mean gradient of the aortic valve is 11 mmHg. Mitral  Valve: The mitral valve is normal in structure. There is no evidence of mitral valve regurgitation. Tricuspid Valve: The tricuspid valve is structurally normal. There is mild tricuspid regurgitation. Pulmonic Valve: The pulmonic valve is structurally normal. There is physiologic pulmonic valve regurgitation. Pericardium: Small pericardial effusion. Aorta: The aortic root is normal. Systemic Veins: The inferior vena cava appears normal in size, with IVC inspiratory collapse greater than 50%.  CONCLUSIONS:  1. The left ventricular systolic function is normal, with a visually estimated ejection fraction of 70-75%.  2. Spectral Doppler shows a normal pattern of left ventricular diastolic filling.  3. There is normal right ventricular global systolic function.  4. No evidence of mitral valve regurgitation.  5. Mild tricuspid regurgitation is visualized. QUANTITATIVE DATA SUMMARY:  2D MEASUREMENTS:           Normal Ranges: LAs:             2.60 cm   (2.7-4.0cm) IVSd:            0.68 cm   (0.6-1.1cm) LVPWd:           0.53 cm   (0.6-1.1cm) LVIDd:           5.32 cm   (3.9-5.9cm) LV Mass Index:   62.2 g/m2  LV SYSTOLIC FUNCTION BY 2D PLANIMETRY (MOD):                      Normal Ranges: EF-A4C View:    60 % (>=55%) EF-A2C View:    66 % EF-Biplane:     65 % EF-Visual:      73 % LV EF Reported: 73 %  LV DIASTOLIC FUNCTION:           Normal Ranges: MV Peak E:             0.94 m/s  (0.7-1.2 m/s) MV Peak A:             1.04 m/s  (0.42-0.7 m/s) E/A Ratio:             0.91      (1.0-2.2) MV e'                  0.078 m/s (>8.0) MV lateral e'          0.08 m/s MV medial e'           0.07 m/s E/e' Ratio:            12.07     (<8.0) a'                     0.08 m/s  MITRAL VALVE:          Normal Ranges: MV DT:        186 msec (150-240msec)  AORTIC VALVE:                      Normal Ranges: AoV Vmax:                2.35 m/s  (<=1.7m/s) AoV Peak P.1 mmHg (<20mmHg) AoV Mean P.0 mmHg (1.7-11.5mmHg) LVOT Max  Cj:            1.95 m/s  (<=1.1m/s) AoV VTI:                 43.90 cm  (18-25cm) LVOT VTI:                32.60 cm LVOT Diameter:           1.90 cm   (1.8-2.4cm) AoV Area, VTI:           2.11 cm2  (2.5-5.5cm2) AoV Area,Vmax:           2.35 cm2  (2.5-4.5cm2) AoV Dimensionless Index: 0.74  RIGHT VENTRICLE: RV s' 0.21 m/s  TRICUSPID VALVE/RVSP:          Normal Ranges: Peak TR Velocity:     3.56 m/s RV Syst Pressure:     59 mmHg  (< 30mmHg) IVC Diam:             1.16 cm  PULMONIC VALVE:          Normal Ranges: PV Accel Time:  124 msec (>120ms) PV Max Cj:     1.3 m/s  (0.6-0.9m/s) PV Max P.9 mmHg  [id number] Vicky Corado MD Electronically signed on 2025 at 3:30:16 PM  ** Final **     XR chest 1 view    Result Date: 2025  Interpreted By:  Jaxon Morales, STUDY: XR CHEST 1 VIEW; 2025 5:38 am   INDICATION: CLINICAL INFORMATION: Signs/Symptoms:Pneumonia follow-up. Previous abnormal chest radiograph. Shortness of breath.   COMPARISON: 2025   ACCESSION NUMBER(S): [id number]   ORDERING CLINICIAN: AMANDA SÁNCHEZ   TECHNIQUE: Portable chest one view.   FINDINGS: The cardiac size is indeterminate in view of the AP projection. Diffuse ground-glass infiltrates are again identified, similar compared to the previous study. No effusions are appreciated.       Diffuse ground-glass infiltrates. There is no interval change when compared to the previous examination.   MACRO: none   Signed by: Jaxon Morales 2025 8:29 AM Dictation workstation:   [id number]    CT angio chest for pulmonary embolism    Result Date: 2025  Interpreted By:  Selin Matthews, STUDY: CT ANGIO CHEST FOR PULMONARY EMBOLISM;  2025 4:10 pm   INDICATION: Signs/Symptoms:severe hypoxia   COMPARISON: Chest x-ray 2025. CT chest 2024   ACCESSION NUMBER(S): [id number]   ORDERING CLINICIAN: NORM LANDAVERDE   TECHNIQUE: Helical data acquisition of the chest was obtained following the uneventful administration of intravenous  contrast material. Images were reformatted in axial, coronal, and sagittal planes. MIP images were created and reviewed.   FINDINGS: POTENTIAL LIMITATIONS OF THE STUDY: Motion artifact.   HEART AND VESSELS: The evaluation for filling defects at the pulmonary arteries is degraded by motion artifact. No large central filling defects to suggest pulmonary embolism. The smaller segmental/subsegmental pulmonary arteries are not well evaluated on this exam.   No thoracic aortic aneurysm. Mild atherosclerotic calcifications are noted at the thoracic aorta.   The heart is mildly enlarged. There is trace pericardial effusion.   MEDIASTINUM AND MANPREET, LOWER NECK AND AXILLA: The visualized thyroid gland is small.   A right paratracheal lymph node measures 10 mm in short axis. A subcarinal lymph node measures 12 mm in short axis. A right hilar lymph node measures 19 mm in short axis. A left hilar lymph node measures 11 mm in short axis. Calcified lymph nodes are noted at the mediastinum and left hilum.   LUNGS AND AIRWAYS: The trachea and central airways are patent.   There are diffuse bilateral ground-glass opacities. No pleural effusion or pneumothorax.   UPPER ABDOMEN: Calcific foci at the spleen are suggestive of granulomas.   CHEST WALL AND OSSEOUS STRUCTURES: Multilevel degenerative changes at the spine. Redemonstration of remote compression deformity with Schmorl's node at the superior endplate of L1.       1. Study degraded by motion artifact. No evidence of large central pulmonary emboli. The smaller segmental/subsegmental pulmonary arteries are not well evaluated on this exam. 2. Diffuse bilateral ground-glass opacities, may be secondary to pulmonary edema and/or multifocal pneumonia. Acute respiratory distress syndrome is in the differential diagnoses. 3. Mild cardiomegaly. Trace pericardial effusion. 4. Mild mediastinal and hilar adenopathy.     MACRO: None.   Signed by: Selin Matthews 1/5/2025 6:42 PM Dictation  workstation:   [id number]    US renal complete    Result Date: 1/5/2025  Interpreted By:  Jaxon Morales, STUDY: US RENAL COMPLETE; 1/5/2025 10:29 am   INDICATION: Signs/Symptoms:nancy.   COMPARISON: None   ACCESSION NUMBER(S): [id number]   ORDERING CLINICIAN: NORM LANDAVERDE   TECHNIQUE: Grayscale and color Doppler imaging of the kidneys   FINDINGS: The right kidney measures 11.0 cm  . The left kidney measures 11.0 cm  .   There is mild increased echogenicity of the renal cortex bilaterally.     No renal stones are identified.  Renal cortex is normal in thickness bilaterally. No hydronephrosis is identified.   Urinary bladder is nonspecific in appearance with no stones or masses identified.       Mild increased renal cortical echogenicity diffusely bilaterally suggesting chronic renal medical disease.   MACRO: none   Signed by: Jaxon Morales 1/5/2025 12:45 PM Dictation workstation:   [id number]    XR chest 1 view    Result Date: 1/5/2025  Interpreted By:  Sincere Slater, STUDY: XR CHEST 1 VIEW;  1/5/2025 8:48 am   INDICATION: Signs/Symptoms:respiratory distress acute.   COMPARISON: 01/02/2025   ACCESSION NUMBER(S): [id number]   ORDERING CLINICIAN: RAMANDEEP ALLRED   FINDINGS: Diffusely increased bilateral airspace consolidation. No visualized pleural effusion. Cardiomediastinal silhouette unchanged. Aortic atherosclerosis. Thoracic degenerative changes with dextroscoliosis.       Diffusely increased bilateral airspace consolidation.   MACRO: None   Signed by: Sincere Slater 1/5/2025 10:08 AM Dictation workstation:   [id number]    XR chest 2 views    Result Date: 1/2/2025  Interpreted By:  Jaxon Morales, STUDY: XR CHEST 2 VIEWS; 1/2/2025 3:05 pm   INDICATION: Signs/Symptoms:fever, productive cough, chest pain.   COMPARISON: 06/14/2022   ACCESSION NUMBER(S): [id number]   ORDERING CLINICIAN: DANIAL COATES   TECHNIQUE: AP and lateral views of the chest were obtained.   FINDINGS: The cardiac silhouette is normal in  appearance. There are new patchy bilateral alveolar infiltrates most marked within the upper lobes bilaterally .  No effusions are identified.       Extensive patchy bilateral infiltrates most marked within the upper lobes bilaterally. Findings are suspicious for pneumonia. Follow-up to assure complete clearing is suggested.   MACRO: none   Signed by: Jaxon Morales 1/2/2025 3:43 PM Dictation workstation:   [id number]     Assessment/Plan   Acute hypoxic respiratory failure, interval intubation  Sepsis  Acute kidney injury-interval worsening  Leukocytosis, multifactorial  Pneumonia-gram-positive versus gram-negative versus atypical-negative nasal MRSA PCR     IV Zosyn  IV doxycycline  Continue Zyvox  Vent management  Follow-up respiratory viral panel  Follow-up pending work-up  Monitor renal function  Follow-up BAL workup  Supportive care  Monitor temperature and WBC         Max Sanchez MD

## 2025-01-07 NOTE — PROGRESS NOTES
Speech-Language Pathology                 Therapy Communication Note    Patient Name: Janet Snow  MRN: [id number]  Department: [facility] 3 E ICU  Room: 19/19-A  Today's Date: 1/7/2025     Discipline: Speech Language Pathology          Missed Visit Reason:  Pt is still on cont BIPAP, with possibility for intubation, per RN cx MBSS today, will cont to monitor    Missed Time: Cancel    Comment:

## 2025-01-07 NOTE — PROGRESS NOTES
"Janet Snow is a 79 y.o. female on day 5 of admission presenting with Community acquired pneumonia of both upper lobes.    Interval Events/Subjective   Tenuous respiratory status -- desatted this morning with turning into the low 80s, took 20 minutes to recover  Patient feels mouth is dry, wants mouth swabs   Told ICU staff that she wants to proceed with intubation -- awaiting family to arrive at bedside  Tells me that she \"has made my peace\" and she wants her daughter 1st who is her medical power of  and her son Avelino 2nd who is her financial power of       Objective     Physical Exam  Vitals and nursing note reviewed.   Constitutional:       Appearance: She is ill-appearing.   HENT:      Head: Normocephalic.   Cardiovascular:      Rate and Rhythm: Tachycardia present.   Pulmonary:      Effort: No respiratory distress.      Breath sounds: Rhonchi present.   Skin:     General: Skin is warm and dry.   Neurological:      Mental Status: She is alert.      Comments: Tremulous; able to write to communciate her thoughts            Last Recorded Vitals  Blood pressure 146/78, pulse 94, temperature 36.7 °C (98.1 °F), temperature source Temporal, resp. rate (!) 34, height 1.6 m (5' 3\"), weight 70.8 kg (156 lb 1.4 oz), SpO2 91%.  Intake/Output last 3 Shifts:  I/O last 3 completed shifts:  In: 1938.3 (27.4 mL/kg) [P.O.:240; I.V.:498.3 (7 mL/kg); IV Piggyback:1200]  Out: 900 (12.7 mL/kg) [Urine:900 (0.4 mL/kg/hr)]  Weight: 70.8 kg     Relevant Results  Scheduled medications  docusate sodium, 100 mg, oral, BID  doxycycline, 100 mg, intravenous, q12h  pantoprazole, 40 mg, oral, Daily   Or  esomeprazole, 40 mg, oral, Daily   Or  pantoprazole, 40 mg, intravenous, Daily  [Held by provider] FLUoxetine, 40 mg, oral, Daily  heparin, 5,000 Units, subcutaneous, q8h  ipratropium-albuteroL, 3 mL, nebulization, q4h  levothyroxine, 25 mcg, intravenous, q24h ADE  [Held by provider] levothyroxine, 50 mcg, oral, " Daily  linezolid, 600 mg, intravenous, q12h  methylPREDNISolone sodium succinate (PF), 40 mg, intravenous, q6h  oxygen, , inhalation, Continuous - Inhalation  piperacillin-tazobactam, 2.25 g, intravenous, q6h  simvastatin, 20 mg, oral, Nightly  sodium chloride, 3 mL, nebulization, q4h      Continuous medications     PRN medications  PRN medications: acetaminophen, albuterol, benzonatate, diazePAM, hydrocodone-homatropine, hydrOXYzine, morphine, oxygen, polyethylene glycol    Results for orders placed or performed during the hospital encounter of 01/02/25 (from the past 24 hours)   Transthoracic Echo (TTE) Complete   Result Value Ref Range    AV pk dale 2.35 m/s    LVOT diam 1.90 cm    AV mn grad 11 mmHg    MV E/A ratio 0.91     LV Biplane EF 65 %    LV EF 73 %    RV free wall pk S' 21.10 cm/s    RVSP 58.7 mmHg    LVIDd 5.32 cm    AV pk grad 22 mmHg    Aortic Valve Area by Continuity of VTI 2.11 cm2    Aortic Valve Area by Continuity of Peak Velocity 2.35 cm2    LV A4C EF 60.2    B-type natriuretic peptide   Result Value Ref Range     (H) 0 - 99 pg/mL   Lactate dehydrogenase   Result Value Ref Range     (H) 84 - 246 U/L   Blood Gas Arterial Full Panel   Result Value Ref Range    POCT pH, Arterial 7.33 (L) 7.38 - 7.42 pH    POCT pCO2, Arterial 45 (H) 38 - 42 mm Hg    POCT pO2, Arterial 111 (H) 85 - 95 mm Hg    POCT SO2, Arterial 97 94 - 100 %    POCT Oxy Hemoglobin, Arterial 95.5 94.0 - 98.0 %    POCT Hematocrit Calculated, Arterial 27.0 (L) 36.0 - 46.0 %    POCT Sodium, Arterial 137 136 - 145 mmol/L    POCT Potassium, Arterial 4.2 3.5 - 5.3 mmol/L    POCT Chloride, Arterial 110 (H) 98 - 107 mmol/L    POCT Ionized Calcium, Arterial 1.29 1.10 - 1.33 mmol/L    POCT Glucose, Arterial 137 (H) 74 - 99 mg/dL    POCT Lactate, Arterial 1.1 0.4 - 2.0 mmol/L    POCT Base Excess, Arterial -2.2 (L) -2.0 - 3.0 mmol/L    POCT HCO3 Calculated, Arterial 23.7 22.0 - 26.0 mmol/L    POCT Hemoglobin, Arterial 9.1 (L) 12.0 -  16.0 g/dL    POCT Anion Gap, Arterial 8 (L) 10 - 25 mmo/L    Patient Temperature 37.0 degrees Celsius    FiO2 100 %    Apparatus FACE MASK     Ventilator Mode BiPAP     Ipap CMH2O 14.0 cm H2O    Epap CMH2O 10.0 cm H2O    Site of Arterial Puncture Radial Left     Aba's Test Positive    CBC and Auto Differential   Result Value Ref Range    WBC 21.3 (H) 4.4 - 11.3 x10*3/uL    nRBC 0.0 0.0 - 0.0 /100 WBCs    RBC 2.62 (L) 4.00 - 5.20 x10*6/uL    Hemoglobin 7.9 (L) 12.0 - 16.0 g/dL    Hematocrit 24.5 (L) 36.0 - 46.0 %    MCV 94 80 - 100 fL    MCH 30.2 26.0 - 34.0 pg    MCHC 32.2 32.0 - 36.0 g/dL    RDW 13.1 11.5 - 14.5 %    Platelets 629 (H) 150 - 450 x10*3/uL    Neutrophils % 91.0 40.0 - 80.0 %    Immature Granulocytes %, Automated 0.9 0.0 - 0.9 %    Lymphocytes % 3.3 13.0 - 44.0 %    Monocytes % 4.7 2.0 - 10.0 %    Eosinophils % 0.0 0.0 - 6.0 %    Basophils % 0.1 0.0 - 2.0 %    Neutrophils Absolute 19.35 (H) 1.60 - 5.50 x10*3/uL    Immature Granulocytes Absolute, Automated 0.20 0.00 - 0.50 x10*3/uL    Lymphocytes Absolute 0.70 (L) 0.80 - 3.00 x10*3/uL    Monocytes Absolute 1.01 (H) 0.05 - 0.80 x10*3/uL    Eosinophils Absolute 0.00 0.00 - 0.40 x10*3/uL    Basophils Absolute 0.02 0.00 - 0.10 x10*3/uL   Magnesium   Result Value Ref Range    Magnesium 2.62 (H) 1.60 - 2.40 mg/dL   Renal function panel   Result Value Ref Range    Glucose 126 (H) 74 - 99 mg/dL    Sodium 140 136 - 145 mmol/L    Potassium 3.8 3.5 - 5.3 mmol/L    Chloride 108 (H) 98 - 107 mmol/L    Bicarbonate 23 21 - 32 mmol/L    Anion Gap 13 10 - 20 mmol/L    Urea Nitrogen 50 (H) 6 - 23 mg/dL    Creatinine 2.06 (H) 0.50 - 1.05 mg/dL    eGFR 24 (L) >60 mL/min/1.73m*2    Calcium 8.7 8.6 - 10.3 mg/dL    Phosphorus 3.4 2.5 - 4.9 mg/dL    Albumin 2.9 (L) 3.4 - 5.0 g/dL       XR chest 1 view    Result Date: 1/7/2025  Interpreted By:  Jaxon Morales, STUDY: XR CHEST 1 VIEW; 1/7/2025 6:35 am   INDICATION: CLINICAL INFORMATION: Signs/Symptoms:persistent hypoxia,  unable to wean from NIV.   COMPARISON: 01/06/2025   ACCESSION NUMBER(S): [id number]   ORDERING CLINICIAN: NORM LANDAVERDE   TECHNIQUE: Portable chest one view.   FINDINGS: The cardiac size is indeterminate in view of the AP projection. There is continued diffuse ground-glass infiltrate bilaterally. No effusions are identified.       Persistent ground-glass infiltrates diffusely bilaterally. Etiology is unclear with differential to include infectious organisms as well as pulmonary edema and ARDS.   MACRO: none   Signed by: Jaxon Morales 1/7/2025 8:20 AM Dictation workstation:   [id number]    ECG 12 lead    Result Date: 1/6/2025  Normal sinus rhythm Nonspecific ST abnormality Prolonged QT Abnormal ECG No previous ECGs available Confirmed by Vicky Corado ([id number]) on 1/6/2025 4:01:46 PM    Transthoracic Echo (TTE) Complete    Result Date: 1/6/2025           [place] [address]            Phone [phone] TRANSTHORACIC ECHOCARDIOGRAM REPORT Patient Name:       ROBBIE FAJARDO AJ     Reading Physician:    [id number] Vicky Corado MD Study Date:         1/6/2025             Ordering Provider:    [id number] NORM LANDAVERDE MRN/PID:            [id number]             Fellow: Accession#:         [id number]         Nurse: Date of Birth/Age:  1945 / 79 years Sonographer:          Rosalba Leiva RDCS Gender Assigned at  F                    Additional Staff: Birth: Height:             160.02 cm            Admit Date: Weight:             69.85 kg             Admission Status:     Inpatient -                                                                Routine BSA / BMI:          1.73 m2 / 27.28      Department Location:  [facility]                     kg/m2 Blood Pressure: 151 /76 mmHg Study Type:     TRANSTHORACIC ECHO (TTE) COMPLETE Diagnosis/ICD: Hypoxemia-R09.02; Acute respiratory failure with hypoxia-J96.01 Indication:    hypoxemia CPT Codes:     Echo Complete w Full [id number] Patient History: Smoker:            Former. BMI:               Overweight 25 - 30 Pertinent History: Resp difficulty, bipap, cough, pna, sciatica, arf, resp                    failure/hypoxia. Study Detail: The following Echo studies were performed: 2D, Doppler, M-Mode and               color flow. Technically challenging study due to prominent lung               artifact, body habitus and patient lying in supine position.               Definity used as a contrast agent for endocardial border               definition. Total contrast used for this procedure was 2 mL via IV               push.  PHYSICIAN INTERPRETATION: Left Ventricle: The left ventricular systolic function is normal, with a visually estimated ejection fraction of 70-75%. There are no regional wall motion abnormalities. The left ventricular cavity size is normal. There is normal septal thickness. Spectral Doppler shows a normal pattern of left ventricular diastolic filling. Left Atrium: The left atrium is normal in size. Right Ventricle: The right ventricle is normal in size. There is normal right ventricular global systolic function. Right Atrium: The right atrium is normal in size. Aortic Valve: The aortic valve is trileaflet. The aortic valve dimensionless index is 0.74. There is no evidence of aortic valve regurgitation. The peak instantaneous gradient of the aortic valve is 22 mmHg. The mean gradient of the aortic valve is 11 mmHg. Mitral Valve: The mitral valve is normal in structure. There is no evidence of mitral valve regurgitation. Tricuspid Valve: The tricuspid valve is structurally normal. There is mild tricuspid regurgitation. Pulmonic Valve: The pulmonic valve is structurally normal. There is physiologic pulmonic valve regurgitation. Pericardium: Small  pericardial effusion. Aorta: The aortic root is normal. Systemic Veins: The inferior vena cava appears normal in size, with IVC inspiratory collapse greater than 50%.  CONCLUSIONS:  1. The left ventricular systolic function is normal, with a visually estimated ejection fraction of 70-75%.  2. Spectral Doppler shows a normal pattern of left ventricular diastolic filling.  3. There is normal right ventricular global systolic function.  4. No evidence of mitral valve regurgitation.  5. Mild tricuspid regurgitation is visualized. QUANTITATIVE DATA SUMMARY:  2D MEASUREMENTS:           Normal Ranges: LAs:             2.60 cm   (2.7-4.0cm) IVSd:            0.68 cm   (0.6-1.1cm) LVPWd:           0.53 cm   (0.6-1.1cm) LVIDd:           5.32 cm   (3.9-5.9cm) LV Mass Index:   62.2 g/m2  LV SYSTOLIC FUNCTION BY 2D PLANIMETRY (MOD):                      Normal Ranges: EF-A4C View:    60 % (>=55%) EF-A2C View:    66 % EF-Biplane:     65 % EF-Visual:      73 % LV EF Reported: 73 %  LV DIASTOLIC FUNCTION:           Normal Ranges: MV Peak E:             0.94 m/s  (0.7-1.2 m/s) MV Peak A:             1.04 m/s  (0.42-0.7 m/s) E/A Ratio:             0.91      (1.0-2.2) MV e'                  0.078 m/s (>8.0) MV lateral e'          0.08 m/s MV medial e'           0.07 m/s E/e' Ratio:            12.07     (<8.0) a'                     0.08 m/s  MITRAL VALVE:          Normal Ranges: MV DT:        186 msec (150-240msec)  AORTIC VALVE:                      Normal Ranges: AoV Vmax:                2.35 m/s  (<=1.7m/s) AoV Peak P.1 mmHg (<20mmHg) AoV Mean P.0 mmHg (1.7-11.5mmHg) LVOT Max Cj:            1.95 m/s  (<=1.1m/s) AoV VTI:                 43.90 cm  (18-25cm) LVOT VTI:                32.60 cm LVOT Diameter:           1.90 cm   (1.8-2.4cm) AoV Area, VTI:           2.11 cm2  (2.5-5.5cm2) AoV Area,Vmax:           2.35 cm2  (2.5-4.5cm2) AoV Dimensionless Index: 0.74  RIGHT VENTRICLE: RV s' 0.21 m/s   TRICUSPID VALVE/RVSP:          Normal Ranges: Peak TR Velocity:     3.56 m/s RV Syst Pressure:     59 mmHg  (< 30mmHg) IVC Diam:             1.16 cm  PULMONIC VALVE:          Normal Ranges: PV Accel Time:  124 msec (>120ms) PV Max Cj:     1.3 m/s  (0.6-0.9m/s) PV Max P.9 mmHg  [id number] Vicky Corado MD Electronically signed on 2025 at 3:30:16 PM  ** Final **     XR chest 1 view    Result Date: 2025  Interpreted By:  Jaxon Morales, STUDY: XR CHEST 1 VIEW; 2025 5:38 am   INDICATION: CLINICAL INFORMATION: Signs/Symptoms:Pneumonia follow-up. Previous abnormal chest radiograph. Shortness of breath.   COMPARISON: 2025   ACCESSION NUMBER(S): [id number]   ORDERING CLINICIAN: AMANDA SÁNCHEZ   TECHNIQUE: Portable chest one view.   FINDINGS: The cardiac size is indeterminate in view of the AP projection. Diffuse ground-glass infiltrates are again identified, similar compared to the previous study. No effusions are appreciated.       Diffuse ground-glass infiltrates. There is no interval change when compared to the previous examination.   MACRO: none   Signed by: Jaxon Morales 2025 8:29 AM Dictation workstation:   [id number]    CT angio chest for pulmonary embolism    Result Date: 2025  Interpreted By:  Selin Matthews, STUDY: CT ANGIO CHEST FOR PULMONARY EMBOLISM;  2025 4:10 pm   INDICATION: Signs/Symptoms:severe hypoxia   COMPARISON: Chest x-ray 2025. CT chest 2024   ACCESSION NUMBER(S): [id number]   ORDERING CLINICIAN: NORM LANDAVERDE   TECHNIQUE: Helical data acquisition of the chest was obtained following the uneventful administration of intravenous contrast material. Images were reformatted in axial, coronal, and sagittal planes. MIP images were created and reviewed.   FINDINGS: POTENTIAL LIMITATIONS OF THE STUDY: Motion artifact.   HEART AND VESSELS: The evaluation for filling defects at the pulmonary arteries is degraded by motion artifact. No large central filling  defects to suggest pulmonary embolism. The smaller segmental/subsegmental pulmonary arteries are not well evaluated on this exam.   No thoracic aortic aneurysm. Mild atherosclerotic calcifications are noted at the thoracic aorta.   The heart is mildly enlarged. There is trace pericardial effusion.   MEDIASTINUM AND MANPREET, LOWER NECK AND AXILLA: The visualized thyroid gland is small.   A right paratracheal lymph node measures 10 mm in short axis. A subcarinal lymph node measures 12 mm in short axis. A right hilar lymph node measures 19 mm in short axis. A left hilar lymph node measures 11 mm in short axis. Calcified lymph nodes are noted at the mediastinum and left hilum.   LUNGS AND AIRWAYS: The trachea and central airways are patent.   There are diffuse bilateral ground-glass opacities. No pleural effusion or pneumothorax.   UPPER ABDOMEN: Calcific foci at the spleen are suggestive of granulomas.   CHEST WALL AND OSSEOUS STRUCTURES: Multilevel degenerative changes at the spine. Redemonstration of remote compression deformity with Schmorl's node at the superior endplate of L1.       1. Study degraded by motion artifact. No evidence of large central pulmonary emboli. The smaller segmental/subsegmental pulmonary arteries are not well evaluated on this exam. 2. Diffuse bilateral ground-glass opacities, may be secondary to pulmonary edema and/or multifocal pneumonia. Acute respiratory distress syndrome is in the differential diagnoses. 3. Mild cardiomegaly. Trace pericardial effusion. 4. Mild mediastinal and hilar adenopathy.     MACRO: None.   Signed by: Selin Matthews 1/5/2025 6:42 PM Dictation workstation:   [id number]    US renal complete    Result Date: 1/5/2025  Interpreted By:  Jaxon Morales, STUDY: US RENAL COMPLETE; 1/5/2025 10:29 am   INDICATION: Signs/Symptoms:nancy.   COMPARISON: None   ACCESSION NUMBER(S): [id number]   ORDERING CLINICIAN: NORM LANDAVERDE   TECHNIQUE: Grayscale and color Doppler imaging of  the kidneys   FINDINGS: The right kidney measures 11.0 cm  . The left kidney measures 11.0 cm  .   There is mild increased echogenicity of the renal cortex bilaterally.     No renal stones are identified.  Renal cortex is normal in thickness bilaterally. No hydronephrosis is identified.   Urinary bladder is nonspecific in appearance with no stones or masses identified.       Mild increased renal cortical echogenicity diffusely bilaterally suggesting chronic renal medical disease.   MACRO: none   Signed by: Jaxon Morales 1/5/2025 12:45 PM Dictation workstation:   [id number]    XR chest 1 view    Result Date: 1/5/2025  Interpreted By:  Sincere Slater, STUDY: XR CHEST 1 VIEW;  1/5/2025 8:48 am   INDICATION: Signs/Symptoms:respiratory distress acute.   COMPARISON: 01/02/2025   ACCESSION NUMBER(S): [id number]   ORDERING CLINICIAN: RAMANDEEP ALLRED   FINDINGS: Diffusely increased bilateral airspace consolidation. No visualized pleural effusion. Cardiomediastinal silhouette unchanged. Aortic atherosclerosis. Thoracic degenerative changes with dextroscoliosis.       Diffusely increased bilateral airspace consolidation.   MACRO: None   Signed by: Sincere Slater 1/5/2025 10:08 AM Dictation workstation:   [id number]    XR chest 2 views    Result Date: 1/2/2025  Interpreted By:  Jaxon Morales, STUDY: XR CHEST 2 VIEWS; 1/2/2025 3:05 pm   INDICATION: Signs/Symptoms:fever, productive cough, chest pain.   COMPARISON: 06/14/2022   ACCESSION NUMBER(S): [id number]   ORDERING CLINICIAN: DANIAL COATES   TECHNIQUE: AP and lateral views of the chest were obtained.   FINDINGS: The cardiac silhouette is normal in appearance. There are new patchy bilateral alveolar infiltrates most marked within the upper lobes bilaterally .  No effusions are identified.       Extensive patchy bilateral infiltrates most marked within the upper lobes bilaterally. Findings are suspicious for pneumonia. Follow-up to assure complete clearing is suggested.    MACRO: none   Signed by: Jaxon Morales 1/2/2025 3:43 PM Dictation workstation:   [id number]            Assessment/Plan   Assessment & Plan  Community acquired pneumonia of both upper lobes    #Acute hypoxic respiratory failure  #Bilateral upper lobe infiltrates  #?Pneumonia of the bilateral upper lobes due to unknown organism versus DAH versus pulmonary edema  # ?cardiopulmonary syndrome  #Leukocytosis     Recommendations:  - Patient with progressive respiratory failure despite escalation of ABX. CT chest reviewed by myself and ddx is broad as above. I discussed benefits of intubation and bronchoscopy with the patient and daughter to help to clarify diagnosis.   -- LDH high at 724 -- can consider adding on Bactrim for PCP treatment with adjunctive steroids (already ordered); unclear if there were any risk factors for risk of PJP pneumonia as no steroid or immunosuppressive medications prior to this hospitalization  -- Procal is high at 2.86, so would continue treating for bacterial etiology. Recommend repeating procal daily  -- ESR 77, CRP 35, JOSE slightly positive at 1:80   -- Fungitell, galactomannan, respiratory viral panel pending   -- urine Histo Ag in process  -- c-ANCA in process   -- Recommend CT sinus and neck if patient can safely undergo scan  -- consider diuresis despite uptrending KALEB   -- Continue IV methylpred -- would not escalate to pulse dose steroids iso having not excluded bacterial infection  -- Consider switching from Zosyn to other pseudomonal coverage as Zosyn may contribute to KALEB  -- Appreciate input from ID, Nephrology   -- if patient stabilizes following intubation, can attempt bronchoscopy   -- Will continue to follow with you       Laura Maloney MD

## 2025-01-07 NOTE — CARE PLAN
The patient's goals for the shift include Able to breath better, safety    The clinical goals for the shift include Titrate supplemental O2  down while maintain O2 SAT in normal range    Over the shift, the patient did not make progress toward the following goals. Barriers to progression include. Recommendations to address these barriers include  Problem: Pain - Adult  Goal: Verbalizes/displays adequate comfort level or baseline comfort level  Outcome: Progressing     Problem: Safety - Adult  Goal: Free from fall injury  Outcome: Progressing     Problem: Discharge Planning  Goal: Discharge to home or other facility with appropriate resources  Outcome: Progressing     Problem: Chronic Conditions and Co-morbidities  Goal: Patient's chronic conditions and co-morbidity symptoms are monitored and maintained or improved  Outcome: Progressing     Problem: Respiratory  Goal: Minimal/no exertional discomfort or dyspnea this shift  Outcome: Progressing  Goal: No signs of respiratory distress (eg. Use of accessory muscles. Peds grunting)  Outcome: Progressing  Goal: Verbalize decreased shortness of breath this shift  Outcome: Progressing  Goal: Wean oxygen to maintain O2 saturation per order/standard this shift  Outcome: Progressing     Problem: Skin  Goal: Decreased wound size/increased tissue granulation at next dressing change  Outcome: Progressing  Goal: Participates in plan/prevention/treatment measures  Outcome: Progressing  Goal: Prevent/manage excess moisture  Outcome: Progressing  Goal: Prevent/minimize sheer/friction injuries  Outcome: Progressing  Goal: Promote/optimize nutrition  Outcome: Progressing  Goal: Promote skin healing  Outcome: Progressing   .

## 2025-01-07 NOTE — CARE PLAN
Problem: Pain - Adult  Goal: Verbalizes/displays adequate comfort level or baseline comfort level  Outcome: Progressing     Problem: Safety - Adult  Goal: Free from fall injury  Outcome: Progressing     Problem: Discharge Planning  Goal: Discharge to home or other facility with appropriate resources  Outcome: Progressing     Problem: Chronic Conditions and Co-morbidities  Goal: Patient's chronic conditions and co-morbidity symptoms are monitored and maintained or improved  Outcome: Progressing     Problem: Respiratory  Goal: Minimal/no exertional discomfort or dyspnea this shift  Outcome: Progressing  Goal: No signs of respiratory distress (eg. Use of accessory muscles. Peds grunting)  Outcome: Progressing  Goal: Verbalize decreased shortness of breath this shift  Outcome: Progressing  Goal: Wean oxygen to maintain O2 saturation per order/standard this shift  Outcome: Progressing     Problem: Skin  Goal: Decreased wound size/increased tissue granulation at next dressing change  Outcome: Progressing  Flowsheets (Taken 1/7/2025 1246)  Decreased wound size/increased tissue granulation at next dressing change:   Promote sleep for wound healing   Utilize specialty bed per algorithm   Protective dressings over bony prominences  Goal: Participates in plan/prevention/treatment measures  Outcome: Progressing  Flowsheets (Taken 1/7/2025 1246)  Participates in plan/prevention/treatment measures:   Discuss with provider PT/OT consult   Elevate heels   Increase activity/out of bed for meals  Goal: Prevent/manage excess moisture  Outcome: Progressing  Flowsheets (Taken 1/7/2025 1246)  Prevent/manage excess moisture:   Cleanse incontinence/protect with barrier cream   Moisturize dry skin   Follow provider orders for dressing changes   Monitor for/manage infection if present  Goal: Prevent/minimize sheer/friction injuries  Outcome: Progressing  Flowsheets (Taken 1/7/2025 1246)  Prevent/minimize sheer/friction injuries:   Complete  micro-shifts as needed if patient unable. Adjust patient position to relieve pressure points, not a full turn   Increase activity/out of bed for meals   Use pull sheet   HOB 30 degrees or less   Turn/reposition every 2 hours/use positioning/transfer devices   Utilize specialty bed per algorithm  Goal: Promote/optimize nutrition  Outcome: Progressing  Flowsheets (Taken 1/7/2025 1246)  Promote/optimize nutrition: Discuss with provider if NPO > 2 days  Goal: Promote skin healing  Outcome: Progressing  Flowsheets (Taken 1/7/2025 1246)  Promote skin healing:   Assess skin/pad under line(s)/device(s)   Protective dressings over bony prominences   Turn/reposition every 2 hours/use positioning/transfer devices   Ensure correct size (line/device) and apply per  instructions   Rotate device position/do not position patient on device     Problem: Nutrition  Goal: Nutrition support goals are met within 48 hrs  Outcome: Progressing  Goal: Nutrition support is meeting 75% of nutrient needs  Outcome: Progressing  Goal: Tube feed tolerance  Outcome: Progressing   The patient's goals for the shift include Able to breath better, safety    The clinical goals for the shift include Titrate supplemental O2  down while maintain O2 SAT in normal range

## 2025-01-07 NOTE — PROGRESS NOTES
Occupational Therapy                 Therapy Communication Note    Patient Name: Janet Snow  MRN: [id number]  Department: [facility] 3 E ICU  Room: 19/19-A  Today's Date: 1/7/2025     Discipline: Occupational Therapy    OT Missed Visit: Yes     Missed Visit Reason: Missed Visit Reason: Cancel (Pt not appropriate for participation in therapy at this time d/t poor respiratory status on continous BIPAP)    Missed Time: Cancel    Comment:

## 2025-01-07 NOTE — PROGRESS NOTES
[facility] Critical Care Medicine       Date:  1/7/2025  Patient:  Janet Snow  YOB: 1945  MRN:  [id number]   Admit Date:  1/2/2025    Chief Complaint   Patient presents with    Shortness of Breath         History of Present Illness:  Janet Snow is a 79 y.o. year old female patient with Past Medical History of hypothyroidism, anxiety, depression, hyperlipidemia, sciatica, prior cigarette smoker (30 years, quit 30 years ago) who presented to  ED 1/2 with complaints of shortness of breath. Her symptoms started shortly before [place] which would have been >1 week prior to time of presentation. At this time, she also complained of productive cough with yellow-green colored sputum, difficulty swallowing, chills. She was seen at an urgent care and was prescribed Augmentin, but did not have improvement.      She met SIRS criteria in the ED with fever, tachycardia, and leukocytosis, as well as elevated lactate. She was given rocephin and azithromycin in ED. She was admitted to the regular nursing floor on 2L NC and started on CAP coverage. Pulmonology was consulted and dc azithromycin due to negative urine atypicals. She was reportedly on 3L NC yesterday but would have random episodes of desaturations with coughing fits, but O2 sats would return back to 3L.     On the morning of 1/5, the hospitalist contacted the ICU team to come see her as she became acutely tachypnic, hypoxic with SpO2 in the 60s, came up to the 80s on NRB. She was visibly in respiratory distress, in tripod position at the edge of the bed. ABG 7.37/35/44, confirmed arterial draw. CXR worsening bilateral airspace consolidations compared to CXR 1/2. She was placed on continuous CPAP 8/100% with SpO2 up to the low 90s and urgently transported to the ICU.      She did have a CT chest 7/24 which showed multifocal reticulonodular and ground-glass opacities (since 2020) 2/2 chronic infectious/inflammatory process. Multiple  new-mildly enlarged pulmonary nodules up to 6mm. Multiple unchanged prominent enlarged lymph nodes, likely reactive. Recommended follow-up CT chest in 3-6 months.      Interval ICU Events:  1/5: Pt arrived to ICU on continuous CPAP. Started IV steroids. Work of breathing improved as the day goes on. FiO2 able to be weaned to 80%. Stat CT angio chest obtained to r/o PE and for further differentiation of diffuse infiltrates seen on CXR.      1/6: Attempted to give patient a break from bipap overnight, but she became hypoxic with SpO2 in the 70s immedately. Remains on continuous bipap this morning. Very anxious with coughing fits and becomes acutely SOB with increased WOB during these episodes. CT chest yesterday showing diffuse GGO, will reengage pulmonology for their input. Broaden abx include zyvox.      1/7: Patient had a few desaturations overnight with tachypnea and accessory muscle use. Valium was added to help with her increased anxiety. Patient reports today that she is tired and that her breathing feels worse. We did discuss intubation as well as the risk associated with it. She stated that she wanted to move forward with intubation, but wanted to discuss it with her children. She then had a desaturation to 80% with respiratory distress remained on BiPap with Fi02 of 100%. Call placed to both of her children, with plans for them to come to bedside. Discussed with the patient and her children the option of intubation. Patient wishes to move forward with the understanding that it is not guaranteed that she will be able to be extubated. She states that she only wishes to be intubated for 7 days. All questions answered. Dr. Pandya called to bedside. Patient does with to remain a DNR but is OK with being intubated. Pulmonary following, plan for a bronchoscopy today, will add bactrim to cover for possible PJP.      Medical History:  History reviewed. No pertinent past medical history.  Past Surgical History:    Procedure Laterality Date    BREAST BIOPSY Right     core biopsy 20 years ago    HYSTERECTOMY      MR HEAD ANGIO WO IV CONTRAST  07/14/2018    MR HEAD ANGIO WO IV CONTRAST [facility]     Medications Prior to Admission   Medication Sig Dispense Refill Last Dose/Taking    simvastatin (Zocor) 20 mg tablet Take 1 tablet (20 mg) by mouth once daily at bedtime.   Taking    FLUoxetine (PROzac) 40 mg capsule Take 1 capsule (40 mg) by mouth once daily.       gabapentin (Neurontin) 400 mg capsule Take 1 capsule (400 mg) by mouth 2 times a day.       levothyroxine (Synthroid, Levoxyl) 50 mcg tablet Take 1 tablet (50 mcg) by mouth early in the morning..        Meperidine (pf), Ropinirole, and Meperidine  Social History     Tobacco Use    Smoking status: Former     Types: Cigarettes    Smokeless tobacco: Never   Substance Use Topics    Drug use: Never     No family history on file.    Review of Systems:  14 point review of systems was completed and negative except for those specially mention in my HPI    Physical Exam:    Heart Rate:  []   Temp:  [36.3 °C (97.3 °F)-36.7 °C (98.1 °F)]   Resp:  [19-39]   BP: (104-174)/()   Weight:  [70.8 kg (156 lb 1.4 oz)]   SpO2:  [81 %-97 %]     Physical Exam  Vitals reviewed.   Constitutional:       General: She is in acute distress.      Appearance: She is ill-appearing.   HENT:      Head: Normocephalic and atraumatic.      Right Ear: External ear normal.      Left Ear: External ear normal.      Nose: Nose normal.      Mouth/Throat:      Mouth: Mucous membranes are dry.      Pharynx: Oropharynx is clear.   Eyes:      Pupils: Pupils are equal, round, and reactive to light.   Cardiovascular:      Rate and Rhythm: Regular rhythm. Tachycardia present.      Pulses: Normal pulses.      Heart sounds: Normal heart sounds.   Pulmonary:      Effort: Tachypnea, accessory muscle usage and respiratory distress present.      Breath sounds: Decreased breath sounds present.    Abdominal:      General: Bowel sounds are decreased.      Palpations: Abdomen is soft.      Tenderness: There is no abdominal tenderness.   Musculoskeletal:      Cervical back: Normal range of motion.      Right lower leg: No edema.      Left lower leg: No edema.   Skin:     General: Skin is warm and dry.      Capillary Refill: Capillary refill takes less than 2 seconds.   Neurological:      Mental Status: She is alert.   Psychiatric:         Behavior: Behavior is cooperative.         Cognition and Memory: Cognition normal.         Objective:    I have reviewed all medications, laboratory results, and imaging pertinent for today's encounter    Assessment/Plan:    I am currently managing this critically ill patient for the following problems:    Neuro/Psych/Pain Ctrl/Sedation:  #Daily ETOH use  #Hx anxiety, depression   - Pain Control: acetaminophen PRN  - Hold home prozac while on zyvox  - Start PRN morphine, valium, vistaril for anxiety   - CAM ICU qshift, sleep-wake hygiene, delirium precautions   - Propofol and precedex for sedation  - Start phenobarbital load and taper     Respiratory/ENT:  #Acute hypoxic respiratory failure - vasculitis/DAH vs atypical pneumonia vs acute inflammatory process   #Acute respiratory distress syndrome   #Prior tobacco use   - Intubated 1/7  - Continue solu-medrol   - Duonebs q4hrs, add 3% nebs   - CT angio chest: no pulmonary embolism, diffuse ground glass opacities   - Daily CXR  - Pulmonology following, plan for bronchoscopy today  - Pulm hygiene  -Will diurese today     Cardiovascular:  #Hyperlipidemia   #Sinus tachycardia 2/2 hypoxia   - TTE with EF 70-75%  - Continue statin   - Maintain MAPs >65  - Continuous cardiac monitoring   - EKGs PRN for ACS symptoms, arrhythmias     GI:  #Dysphagia  -Consult dietary for TF recs  -NPO  -PPI  -Colace bowel regimen    Renal/Volume Status (Intra & Extravascular):  #Acute Kidney Injury   #Acute urinary retention   #Hx urge incontinence   -  Baseline Cr 0.9 worsening 2.06  - Hourly I/O's, maintain urine output >0.5cc/kg/hr  - Nephrology following   - Replete electrolytes to maintain K >4.0 and Mg >2.0  - Daily RFP, Mg    Endocrine  #Hypothyroidism   - Change synthroid to IV   - ISS with q4hr glucose checks while NPO  - Hypoglycemia protocol PRN     Infectious Disease:  #Atypical pneumonia   - Antibiotics: rocephin broadened to zosyn and doxycycline, add bactrim for PJP coverage    - Blood cultures: no growth at 2 days   - Urine culture: no growth  - Negative COVID, Influenza, RSV, urine legionella, urine strep pneumoniae, MRSA PCR  - Procal 2.86  - ESR: 77  - CRP: 35  - ID following   - Aspergillus galactomannan, fungitell, respiratory viral panel, histoplasma pending   - Monitor SIRS criteria    Heme/Onc:  #Thrombocytosis - likely acutely reactive   #Anemia  - Platelets 629  - Baseline Hgb WNL, 7.9/24.5  - Transfuse if Hgb <7.0   - Daily CBC    OBGYN/MSK:  #Hx sciatica   - PT/OT eval  - ICU skin protocol, padded pressure points  - Q2hr turns    Ethics/Code Status:  DNRCCA    :  DVT Prophylaxis: SQH & SCDs  GI Prophylaxis: PPI  Bowel Regimen: Colace  Diet: NPO  CVC: none  [place]: none  [place]: none  Restraints: none  Dispo: ICU    Critical Care Time:  65 minutes spent in preparing to see patient (I.e. review of medical records), evaluation of diagnostics (I.e. labs, imaging, etc.), documentation, discussing plan of care with patient/ family/ caregiver, and/ or coordination of care with multidisciplinary team. Time does not include completion of procedure time.     Plan of care discussed with Dr. Ya Scruggs, APRN-CNP  Critical Care Medicine  [facility]

## 2025-01-07 NOTE — PROGRESS NOTES
Speech-Language Pathology                 Therapy Communication Note    Patient Name: Janet Snow  MRN: [id number]  Department: [facility] 3 E ICU  Room: 19/19-A  Today's Date: 1/7/2025     Discipline: Speech Language Pathology          Missed Visit Reason:  Pt intubated 1/7/25; will need new  orders for CSE/MBSS when extubated and medically appropriate     Missed Time: Cancel    Comment:

## 2025-01-07 NOTE — PROGRESS NOTES
Physical Therapy                 Therapy Communication Note    Patient Name: Janet Snow  MRN: [id number]  Department: [facility] 3 E ICU  Room: 19/19-A  Today's Date: 1/7/2025     Discipline: Physical Therapy    PT Missed Visit: Yes     Missed Visit Reason: Missed Visit Reason: Cancel (Pt not appropriate for participation in therapy at this time d/t poor respiratory status.)    Missed Time: Cancel    Comment:

## 2025-01-07 NOTE — PROCEDURES
Endotracheal Intubation     Indication(s):   -Hypoxemic respiratory failure # acute       Airway Assessment    wnl  Abnormal   wnl  x ?L: Look externally for indicators of a difficult airway (such as a misshapen head, facial abnormalities, or neck masses).       ?E: Evaluate mouth opening, thyromental distance, and the distance between the mandible and the thyroid cartilage. Adequate mouth opening and thyromental distance should be the width   of three of the patient's fingers. The distance between the mandible and thyroid cartilage should be the width of two fingers.   Patient with somewhat limited mouth opening      ?M: Mallampati score:    Class I is present when the soft palate, uvula, and pillars are visible  Class II when the soft palate and the uvula are visible  Class III when only the soft palate and base of the uvula are visible  Class IV when only the hard palate is visible.    Unable to assess, given patient on BiPAP prior to intubation     wnl ?O: Obstruction: Signs of airway obstruction (such as stridor, a muffled voice, or difficulty handling secretions)         wnl ?N: Neck mobility:      Induction agents:  -Etomidate 20 mg  -Succinylcholine 70 mg      Procedure:  Patient was placed on 100% FiO2 with a CPAP prior to the intubation, while obtaining the necessary equipment for intubation.   A time-out was completed verifying correct patient, procedure, site, positioning, and equipment.  We had a discussion with the family and the patient prior to intubation to ensure that we understood her wishes.  I ensured appropriate ventilation and alternate airway devices were available.   Sedation was obtained using etomidate and the patient was then given succinylcholine to facilitate endotracheal intubation.   The patient was easily ventilated using an ambu bag.   Once the patient became apneic, a Glidescope was inserted into the oropharynx and vocal cords exposed.   The tip of a 7.5 endotracheal tube (ETT) was  passed through the vocal cords. The hyperangulated stylette was removed by an assistant as I passed the ETT further into the trachea with the balloon roughly 3-4 cm beyond the cords. The balloon was then inflated to the appropriate pressure using a 10 ml syringe.   A BVM with a qualitative CO2 detector was attached to the endotracheal tube and purple-to-yellow color change was visualized x 5+ breaths. Misting was noted in the ETT along with appropriate chest rise. Breath sounds were heard in both lung fields equally.   The endotracheal tube was secured at 21 cm, measured at the teeth.   A chest x-ray was ordered to assess for pneumothorax and verify endotracheal tube placement.   The patient tolerated the procedure well and there were no complications.      Kamini Pandya MD  1/7/2025  10:25 AM

## 2025-01-07 NOTE — CARE PLAN
Problem: Respiratory  Goal: Minimal/no exertional discomfort or dyspnea this shift  Outcome: Progressing  Goal: No signs of respiratory distress (eg. Use of accessory muscles. Peds grunting)  Outcome: Progressing  Goal: Verbalize decreased shortness of breath this shift  Outcome: Progressing  Goal: Wean oxygen to maintain O2 saturation per order/standard this shift  Outcome: Progressing     Problem: Skin  Goal: Decreased wound size/increased tissue granulation at next dressing change  Outcome: Progressing  Goal: Participates in plan/prevention/treatment measures  Outcome: Progressing      Bedside report received from [facility].

## 2025-01-07 NOTE — PROGRESS NOTES
Physical Therapy                 Therapy Communication Note    Patient Name: Janet Snow  MRN: [id number]  Department: [facility] 3 E ICU  Room: 19/19-A  Today's Date: 1/7/2025     Discipline: Physical Therapy    PT Missed Visit: Yes     Missed Visit Reason: Missed Visit Reason: Cancel (Pt intubated 1/7/25; will need new PT orders when medically appropriate.)    Missed Time: Cancel    Comment:

## 2025-01-07 NOTE — CONSULTS
Consults    Reason For Consult  Reason for Consult: communication / medical decision making     History Of Present Illness  Janet Snow is a 79 y.o. female with past medical history of sciatica, anxiety, HLD, prior cigarette smoker (30 years, quit 30 years ago)  and hypothyroidism. Came to our ED with shortness of breath with symptoms started around Dec 24. She went to urgent care, was treated with Augmentin and Mucinex with not much improvement. Was having green/yellow sputum and fevers. Initial concerning labs were WBC 15.0 and trending up, , Lactate originally 2.2, trended down to 8.8, CRP was 35.67, ESR 77. Leukocytes, RBCs, and bacteria were noted in UA, urine and blood cultures - NGTD. Chest XR suspicious for pneumonia.  Sepsis protocol was started in the ED and was given Azithromycin and Zosyn.  CTA chest showed Diffuse bilateral ground-glass opacities, may be secondary to pulmonary edema and/or multifocal pneumonia. Acute respiratory distress syndrome is in the differential diagnoses. Was negative for PE. Now currently on Zosyn, Doxy, and Zyvox. Was originally on 3L O2 NC in ED, had bouts of coughing fits in the ED and upon admission where she would desat into the 80s. Was originally admitted to a RNF, on 1/05 became tachypneic, hypoxic, and in respiratory distress, chest XR with worsening pneumonia, placed on bipap 100%FiO2 and transferred to the ICU. Was originally a DNR CCA DNI, remained on continuous bipap and during breaks desats to the 70s. This morning the increased work of breathing continued desating while on bipap 100% FiO2 to the 80s, the patient was then intubated for respiratory distress. The patient was then agreeable to a short period of intubation for treatment purposes of only 1 week.  Palliative care was therefore consulted for goals of care.       Symptoms (0 - 10, Best to Worst)  [place] Symptom Assessment System  0-10 (Numeric) Pain Score: 0 - No pain    BM in last 48 hours?  "unknown    Spiritual Hx:  Are you spiritual or Lutheran?  no  What’s your félix background? N/A   During difficult times in your life, what have you relied on for strength? Declines spiritual care at thsi time.     Personal/Social History   She reports that she has quit smoking. Her smoking use included cigarettes. She has never used smokeless tobacco. She reports that she does not use drugs. No history on file for alcohol use.    Functional Status    Caregiving/Caregiver Support  Does the patient require assistance in some or all components of his care, including coordination of medical care? No  If Yes, which person serves that role?   N/A    Caregiver emotional or practical needs:      Past Medical History  She has no past medical history on file.    Surgical History  She has a past surgical history that includes MR angio head wo IV contrast (07/14/2018); Hysterectomy; and Breast biopsy (Right).     Family History  No family history on file.  Allergies  Meperidine (pf), Ropinirole, and Meperidine    Review of Systems   Reason unable to perform ROS: patient sedated and intubated.        Physical Exam    Last Recorded Vitals  Blood pressure 145/73, pulse 82, temperature 36.4 °C (97.5 °F), temperature source Temporal, resp. rate (!) 32, height 1.6 m (5' 3\"), weight 70.8 kg (156 lb 1.4 oz), SpO2 97%.    Relevant Results  Results for orders placed or performed during the hospital encounter of 01/02/25 (from the past 24 hours)   B-type natriuretic peptide   Result Value Ref Range     (H) 0 - 99 pg/mL   Lactate dehydrogenase   Result Value Ref Range     (H) 84 - 246 U/L   Blood Gas Arterial Full Panel   Result Value Ref Range    POCT pH, Arterial 7.33 (L) 7.38 - 7.42 pH    POCT pCO2, Arterial 45 (H) 38 - 42 mm Hg    POCT pO2, Arterial 111 (H) 85 - 95 mm Hg    POCT SO2, Arterial 97 94 - 100 %    POCT Oxy Hemoglobin, Arterial 95.5 94.0 - 98.0 %    POCT Hematocrit Calculated, Arterial 27.0 (L) 36.0 - 46.0 % "    POCT Sodium, Arterial 137 136 - 145 mmol/L    POCT Potassium, Arterial 4.2 3.5 - 5.3 mmol/L    POCT Chloride, Arterial 110 (H) 98 - 107 mmol/L    POCT Ionized Calcium, Arterial 1.29 1.10 - 1.33 mmol/L    POCT Glucose, Arterial 137 (H) 74 - 99 mg/dL    POCT Lactate, Arterial 1.1 0.4 - 2.0 mmol/L    POCT Base Excess, Arterial -2.2 (L) -2.0 - 3.0 mmol/L    POCT HCO3 Calculated, Arterial 23.7 22.0 - 26.0 mmol/L    POCT Hemoglobin, Arterial 9.1 (L) 12.0 - 16.0 g/dL    POCT Anion Gap, Arterial 8 (L) 10 - 25 mmo/L    Patient Temperature 37.0 degrees Celsius    FiO2 100 %    Apparatus FACE MASK     Ventilator Mode BiPAP     Ipap CMH2O 14.0 cm H2O    Epap CMH2O 10.0 cm H2O    Site of Arterial Puncture Radial Left     Aba's Test Positive    CBC and Auto Differential   Result Value Ref Range    WBC 21.3 (H) 4.4 - 11.3 x10*3/uL    nRBC 0.0 0.0 - 0.0 /100 WBCs    RBC 2.62 (L) 4.00 - 5.20 x10*6/uL    Hemoglobin 7.9 (L) 12.0 - 16.0 g/dL    Hematocrit 24.5 (L) 36.0 - 46.0 %    MCV 94 80 - 100 fL    MCH 30.2 26.0 - 34.0 pg    MCHC 32.2 32.0 - 36.0 g/dL    RDW 13.1 11.5 - 14.5 %    Platelets 629 (H) 150 - 450 x10*3/uL    Neutrophils % 91.0 40.0 - 80.0 %    Immature Granulocytes %, Automated 0.9 0.0 - 0.9 %    Lymphocytes % 3.3 13.0 - 44.0 %    Monocytes % 4.7 2.0 - 10.0 %    Eosinophils % 0.0 0.0 - 6.0 %    Basophils % 0.1 0.0 - 2.0 %    Neutrophils Absolute 19.35 (H) 1.60 - 5.50 x10*3/uL    Immature Granulocytes Absolute, Automated 0.20 0.00 - 0.50 x10*3/uL    Lymphocytes Absolute 0.70 (L) 0.80 - 3.00 x10*3/uL    Monocytes Absolute 1.01 (H) 0.05 - 0.80 x10*3/uL    Eosinophils Absolute 0.00 0.00 - 0.40 x10*3/uL    Basophils Absolute 0.02 0.00 - 0.10 x10*3/uL   Magnesium   Result Value Ref Range    Magnesium 2.62 (H) 1.60 - 2.40 mg/dL   Renal function panel   Result Value Ref Range    Glucose 126 (H) 74 - 99 mg/dL    Sodium 140 136 - 145 mmol/L    Potassium 3.8 3.5 - 5.3 mmol/L    Chloride 108 (H) 98 - 107 mmol/L    Bicarbonate  23 21 - 32 mmol/L    Anion Gap 13 10 - 20 mmol/L    Urea Nitrogen 50 (H) 6 - 23 mg/dL    Creatinine 2.06 (H) 0.50 - 1.05 mg/dL    eGFR 24 (L) >60 mL/min/1.73m*2    Calcium 8.7 8.6 - 10.3 mg/dL    Phosphorus 3.4 2.5 - 4.9 mg/dL    Albumin 2.9 (L) 3.4 - 5.0 g/dL   Creatine Kinase   Result Value Ref Range    Creatine Kinase 371 (H) 0 - 215 U/L   BLOOD GAS ARTERIAL FULL PANEL   Result Value Ref Range    POCT pH, Arterial 7.26 (L) 7.38 - 7.42 pH    POCT pCO2, Arterial 50 (H) 38 - 42 mm Hg    POCT pO2, Arterial 100 (H) 85 - 95 mm Hg    POCT SO2, Arterial 100 94 - 100 %    POCT Oxy Hemoglobin, Arterial 96.9 94.0 - 98.0 %    POCT Hematocrit Calculated, Arterial 24.0 (L) 36.0 - 46.0 %    POCT Sodium, Arterial 139 136 - 145 mmol/L    POCT Potassium, Arterial 4.3 3.5 - 5.3 mmol/L    POCT Chloride, Arterial 112 (H) 98 - 107 mmol/L    POCT Ionized Calcium, Arterial 1.27 1.10 - 1.33 mmol/L    POCT Glucose, Arterial 109 (H) 74 - 99 mg/dL    POCT Lactate, Arterial 1.6 0.4 - 2.0 mmol/L    POCT Base Excess, Arterial -4.5 (L) -2.0 - 3.0 mmol/L    POCT HCO3 Calculated, Arterial 22.4 22.0 - 26.0 mmol/L    POCT Hemoglobin, Arterial 8.0 (L) 12.0 - 16.0 g/dL    POCT Anion Gap, Arterial 9 (L) 10 - 25 mmo/L    Patient Temperature 37.0 degrees Celsius    FiO2 100 %    Apparatus      Ventilator Mode A/C     Ventilator Rate 20 bpm    Tidal Volume 400 mL    Peep CHM2O 14.0 cm H2O    Site of Arterial Puncture Radial Right     Aba's Test Positive    POCT GLUCOSE   Result Value Ref Range    POCT Glucose 114 (H) 74 - 99 mg/dL   BLOOD GAS ARTERIAL FULL PANEL   Result Value Ref Range    POCT pH, Arterial 7.36 (L) 7.38 - 7.42 pH    POCT pCO2, Arterial 40 38 - 42 mm Hg    POCT pO2, Arterial 164 (H) 85 - 95 mm Hg    POCT SO2, Arterial 98 94 - 100 %    POCT Oxy Hemoglobin, Arterial 95.4 94.0 - 98.0 %    POCT Hematocrit Calculated, Arterial 24.0 (L) 36.0 - 46.0 %    POCT Sodium, Arterial 139 136 - 145 mmol/L    POCT Potassium, Arterial 4.0 3.5 - 5.3  mmol/L    POCT Chloride, Arterial 112 (H) 98 - 107 mmol/L    POCT Ionized Calcium, Arterial 1.27 1.10 - 1.33 mmol/L    POCT Glucose, Arterial 125 (H) 74 - 99 mg/dL    POCT Lactate, Arterial 1.2 0.4 - 2.0 mmol/L    POCT Base Excess, Arterial -2.6 (L) -2.0 - 3.0 mmol/L    POCT HCO3 Calculated, Arterial 22.6 22.0 - 26.0 mmol/L    POCT Hemoglobin, Arterial 8.0 (L) 12.0 - 16.0 g/dL    POCT Anion Gap, Arterial 8 (L) 10 - 25 mmo/L    Patient Temperature 37.0 degrees Celsius    FiO2 100 %    Apparatus      Ventilator Mode A/C     Ventilator Rate 22 bpm    Tidal Volume 400 mL    Peep CHM2O 14.0 cm H2O    Site of Arterial Puncture Brachial Right     Aba's Test Negative    POCT GLUCOSE   Result Value Ref Range    POCT Glucose 210 (H) 74 - 99 mg/dL      XR chest 1 view    Result Date: 1/7/2025  Interpreted By:  Elda Dorado, STUDY: XR CHEST 1 VIEW;  1/7/2025 10:40 am   INDICATION: Signs/Symptoms:intubation.   COMPARISON: 01/07/2025 at 5:45 a.m.   ACCESSION NUMBER(S): [id number]   ORDERING CLINICIAN: KENISHA LEE   FINDINGS: Heart is normal in size.   The patient is intubated. The tip terminates above the noah.   There is diffuse parenchymal infiltration.   There are atherosclerotic changes of the aorta. There are degenerative changes of the spine.   COMPARISON OF FINDING: The patient has undergone interval intubation. The lungs are similar.       Diffuse bilateral parenchymal infiltration. Interval intubation.   MACRO: none   Signed by: Elda Dorado 1/7/2025 11:22 AM Dictation workstation:   [id number]    XR chest 1 view    Result Date: 1/7/2025  Interpreted By:  Jaxon Morales, STUDY: XR CHEST 1 VIEW; 1/7/2025 6:35 am   INDICATION: CLINICAL INFORMATION: Signs/Symptoms:persistent hypoxia, unable to wean from NIV.   COMPARISON: 01/06/2025   ACCESSION NUMBER(S): [id number]   ORDERING CLINICIAN: NORM LANDAVERDE   TECHNIQUE: Portable chest one view.   FINDINGS: The cardiac size is indeterminate in view of the AP  projection. There is continued diffuse ground-glass infiltrate bilaterally. No effusions are identified.       Persistent ground-glass infiltrates diffusely bilaterally. Etiology is unclear with differential to include infectious organisms as well as pulmonary edema and ARDS.   MACRO: none   Signed by: Jaxon Morales 1/7/2025 8:20 AM Dictation workstation:   [id number]    ECG 12 lead    Result Date: 1/6/2025  Normal sinus rhythm Nonspecific ST abnormality Prolonged QT Abnormal ECG No previous ECGs available Confirmed by Vicky Corado ([id number]) on 1/6/2025 4:01:46 PM    Transthoracic Echo (TTE) Complete    Result Date: 1/6/2025           [place] [address]            Phone [phone] TRANSTHORACIC ECHOCARDIOGRAM REPORT Patient Name:       ROBBIE FAJARDO AJ     Reading Physician:    [id number] Vicky Corado MD Study Date:         1/6/2025             Ordering Provider:    [id number] NORM LANDAVERDE MRN/PID:            [id number]             Fellow: Accession#:         [id number]         Nurse: Date of Birth/Age:  1945 / 79 years Sonographer:          Rosalba Leiva                                                                KENNEDY Gender Assigned at  F                    Additional Staff: Birth: Height:             160.02 cm            Admit Date: Weight:             69.85 kg             Admission Status:     Inpatient -                                                                Routine BSA / BMI:          1.73 m2 / 27.28      Department Location:  [facility]                     kg/m2 Blood Pressure: 151 /76 mmHg Study Type:    TRANSTHORACIC ECHO (TTE) COMPLETE Diagnosis/ICD: Hypoxemia-R09.02; Acute respiratory failure with hypoxia-J96.01 Indication:    hypoxemia CPT Codes:     Echo Complete w Full Doppler-[id number] Patient History: Smoker:             Former. BMI:               Overweight 25 - 30 Pertinent History: Resp difficulty, bipap, cough, pna, sciatica, arf, resp                    failure/hypoxia. Study Detail: The following Echo studies were performed: 2D, Doppler, M-Mode and               color flow. Technically challenging study due to prominent lung               artifact, body habitus and patient lying in supine position.               Definity used as a contrast agent for endocardial border               definition. Total contrast used for this procedure was 2 mL via IV               push.  PHYSICIAN INTERPRETATION: Left Ventricle: The left ventricular systolic function is normal, with a visually estimated ejection fraction of 70-75%. There are no regional wall motion abnormalities. The left ventricular cavity size is normal. There is normal septal thickness. Spectral Doppler shows a normal pattern of left ventricular diastolic filling. Left Atrium: The left atrium is normal in size. Right Ventricle: The right ventricle is normal in size. There is normal right ventricular global systolic function. Right Atrium: The right atrium is normal in size. Aortic Valve: The aortic valve is trileaflet. The aortic valve dimensionless index is 0.74. There is no evidence of aortic valve regurgitation. The peak instantaneous gradient of the aortic valve is 22 mmHg. The mean gradient of the aortic valve is 11 mmHg. Mitral Valve: The mitral valve is normal in structure. There is no evidence of mitral valve regurgitation. Tricuspid Valve: The tricuspid valve is structurally normal. There is mild tricuspid regurgitation. Pulmonic Valve: The pulmonic valve is structurally normal. There is physiologic pulmonic valve regurgitation. Pericardium: Small pericardial effusion. Aorta: The aortic root is normal. Systemic Veins: The inferior vena cava appears normal in size, with IVC inspiratory collapse greater than 50%.  CONCLUSIONS:  1. The left ventricular systolic function  is normal, with a visually estimated ejection fraction of 70-75%.  2. Spectral Doppler shows a normal pattern of left ventricular diastolic filling.  3. There is normal right ventricular global systolic function.  4. No evidence of mitral valve regurgitation.  5. Mild tricuspid regurgitation is visualized. QUANTITATIVE DATA SUMMARY:  2D MEASUREMENTS:           Normal Ranges: LAs:             2.60 cm   (2.7-4.0cm) IVSd:            0.68 cm   (0.6-1.1cm) LVPWd:           0.53 cm   (0.6-1.1cm) LVIDd:           5.32 cm   (3.9-5.9cm) LV Mass Index:   62.2 g/m2  LV SYSTOLIC FUNCTION BY 2D PLANIMETRY (MOD):                      Normal Ranges: EF-A4C View:    60 % (>=55%) EF-A2C View:    66 % EF-Biplane:     65 % EF-Visual:      73 % LV EF Reported: 73 %  LV DIASTOLIC FUNCTION:           Normal Ranges: MV Peak E:             0.94 m/s  (0.7-1.2 m/s) MV Peak A:             1.04 m/s  (0.42-0.7 m/s) E/A Ratio:             0.91      (1.0-2.2) MV e'                  0.078 m/s (>8.0) MV lateral e'          0.08 m/s MV medial e'           0.07 m/s E/e' Ratio:            12.07     (<8.0) a'                     0.08 m/s  MITRAL VALVE:          Normal Ranges: MV DT:        186 msec (150-240msec)  AORTIC VALVE:                      Normal Ranges: AoV Vmax:                2.35 m/s  (<=1.7m/s) AoV Peak P.1 mmHg (<20mmHg) AoV Mean P.0 mmHg (1.7-11.5mmHg) LVOT Max Jc:            1.95 m/s  (<=1.1m/s) AoV VTI:                 43.90 cm  (18-25cm) LVOT VTI:                32.60 cm LVOT Diameter:           1.90 cm   (1.8-2.4cm) AoV Area, VTI:           2.11 cm2  (2.5-5.5cm2) AoV Area,Vmax:           2.35 cm2  (2.5-4.5cm2) AoV Dimensionless Index: 0.74  RIGHT VENTRICLE: RV s' 0.21 m/s  TRICUSPID VALVE/RVSP:          Normal Ranges: Peak TR Velocity:     3.56 m/s RV Syst Pressure:     59 mmHg  (< 30mmHg) IVC Diam:             1.16 cm  PULMONIC VALVE:          Normal Ranges: PV Accel Time:  124 msec (>120ms) PV Max  Cj:     1.3 m/s  (0.6-0.9m/s) PV Max P.9 mmHg  [id number] Vicky Corado MD Electronically signed on 2025 at 3:30:16 PM  ** Final **     XR chest 1 view    Result Date: 2025  Interpreted By:  Jaxon Morales, STUDY: XR CHEST 1 VIEW; 2025 5:38 am   INDICATION: CLINICAL INFORMATION: Signs/Symptoms:Pneumonia follow-up. Previous abnormal chest radiograph. Shortness of breath.   COMPARISON: 2025   ACCESSION NUMBER(S): [id number]   ORDERING CLINICIAN: AMANDA SÁNCHEZ   TECHNIQUE: Portable chest one view.   FINDINGS: The cardiac size is indeterminate in view of the AP projection. Diffuse ground-glass infiltrates are again identified, similar compared to the previous study. No effusions are appreciated.       Diffuse ground-glass infiltrates. There is no interval change when compared to the previous examination.   MACRO: none   Signed by: Jaxon Morales 2025 8:29 AM Dictation workstation:   [id number]    CT angio chest for pulmonary embolism    Result Date: 2025  Interpreted By:  Selin Matthews, STUDY: CT ANGIO CHEST FOR PULMONARY EMBOLISM;  2025 4:10 pm   INDICATION: Signs/Symptoms:severe hypoxia   COMPARISON: Chest x-ray 2025. CT chest 2024   ACCESSION NUMBER(S): [id number]   ORDERING CLINICIAN: NORM LANDAVERDE   TECHNIQUE: Helical data acquisition of the chest was obtained following the uneventful administration of intravenous contrast material. Images were reformatted in axial, coronal, and sagittal planes. MIP images were created and reviewed.   FINDINGS: POTENTIAL LIMITATIONS OF THE STUDY: Motion artifact.   HEART AND VESSELS: The evaluation for filling defects at the pulmonary arteries is degraded by motion artifact. No large central filling defects to suggest pulmonary embolism. The smaller segmental/subsegmental pulmonary arteries are not well evaluated on this exam.   No thoracic aortic aneurysm. Mild atherosclerotic calcifications are noted at the thoracic aorta.    The heart is mildly enlarged. There is trace pericardial effusion.   MEDIASTINUM AND MANPREET, LOWER NECK AND AXILLA: The visualized thyroid gland is small.   A right paratracheal lymph node measures 10 mm in short axis. A subcarinal lymph node measures 12 mm in short axis. A right hilar lymph node measures 19 mm in short axis. A left hilar lymph node measures 11 mm in short axis. Calcified lymph nodes are noted at the mediastinum and left hilum.   LUNGS AND AIRWAYS: The trachea and central airways are patent.   There are diffuse bilateral ground-glass opacities. No pleural effusion or pneumothorax.   UPPER ABDOMEN: Calcific foci at the spleen are suggestive of granulomas.   CHEST WALL AND OSSEOUS STRUCTURES: Multilevel degenerative changes at the spine. Redemonstration of remote compression deformity with Schmorl's node at the superior endplate of L1.       1. Study degraded by motion artifact. No evidence of large central pulmonary emboli. The smaller segmental/subsegmental pulmonary arteries are not well evaluated on this exam. 2. Diffuse bilateral ground-glass opacities, may be secondary to pulmonary edema and/or multifocal pneumonia. Acute respiratory distress syndrome is in the differential diagnoses. 3. Mild cardiomegaly. Trace pericardial effusion. 4. Mild mediastinal and hilar adenopathy.     MACRO: None.   Signed by: Selin Matthews 1/5/2025 6:42 PM Dictation workstation:   [id number]    US renal complete    Result Date: 1/5/2025  Interpreted By:  Jaxon Morales, STUDY: US RENAL COMPLETE; 1/5/2025 10:29 am   INDICATION: Signs/Symptoms:nancy.   COMPARISON: None   ACCESSION NUMBER(S): [id number]   ORDERING CLINICIAN: NORM LANDAVERDE   TECHNIQUE: Grayscale and color Doppler imaging of the kidneys   FINDINGS: The right kidney measures 11.0 cm  . The left kidney measures 11.0 cm  .   There is mild increased echogenicity of the renal cortex bilaterally.     No renal stones are identified.  Renal cortex is normal in  thickness bilaterally. No hydronephrosis is identified.   Urinary bladder is nonspecific in appearance with no stones or masses identified.       Mild increased renal cortical echogenicity diffusely bilaterally suggesting chronic renal medical disease.   MACRO: none   Signed by: Jaxon Morales 1/5/2025 12:45 PM Dictation workstation:   [id number]    XR chest 1 view    Result Date: 1/5/2025  Interpreted By:  Sincere Slater, STUDY: XR CHEST 1 VIEW;  1/5/2025 8:48 am   INDICATION: Signs/Symptoms:respiratory distress acute.   COMPARISON: 01/02/2025   ACCESSION NUMBER(S): [id number]   ORDERING CLINICIAN: RAMANDEEP ALLRED   FINDINGS: Diffusely increased bilateral airspace consolidation. No visualized pleural effusion. Cardiomediastinal silhouette unchanged. Aortic atherosclerosis. Thoracic degenerative changes with dextroscoliosis.       Diffusely increased bilateral airspace consolidation.   MACRO: None   Signed by: Sincere Slater 1/5/2025 10:08 AM Dictation workstation:   [id number]    XR chest 2 views    Result Date: 1/2/2025  Interpreted By:  Jaxon Morales, STUDY: XR CHEST 2 VIEWS; 1/2/2025 3:05 pm   INDICATION: Signs/Symptoms:fever, productive cough, chest pain.   COMPARISON: 06/14/2022   ACCESSION NUMBER(S): [id number]   ORDERING CLINICIAN: DANIAL COATES   TECHNIQUE: AP and lateral views of the chest were obtained.   FINDINGS: The cardiac silhouette is normal in appearance. There are new patchy bilateral alveolar infiltrates most marked within the upper lobes bilaterally .  No effusions are identified.       Extensive patchy bilateral infiltrates most marked within the upper lobes bilaterally. Findings are suspicious for pneumonia. Follow-up to assure complete clearing is suggested.   MACRO: none   Signed by: Jaxon Morales 1/2/2025 3:43 PM Dictation workstation:   [id number]      Scheduled medications  docusate sodium, 100 mg, oral, BID  doxycycline, 100 mg, intravenous, q12h  pantoprazole, 40 mg, oral, Daily    Or  esomeprazole, 40 mg, oral, Daily   Or  pantoprazole, 40 mg, intravenous, Daily  [Held by provider] FLUoxetine, 40 mg, oral, Daily  folic acid, 1 mg, oral, Daily  heparin, 5,000 Units, subcutaneous, q8h  insulin lispro, 0-5 Units, subcutaneous, q4h  ipratropium-albuteroL, 3 mL, nebulization, q4h  levothyroxine, 25 mcg, intravenous, q24h ADE  linezolid, 600 mg, intravenous, q12h  methylPREDNISolone sodium succinate (PF), 40 mg, intravenous, q6h  midazolam, 4 mg, intravenous, Once  midazolam, 4 mg, intravenous, Once  multivitamin with minerals, 1 tablet, oral, Daily  oxygen, , inhalation, Continuous - Inhalation  PHENobarbital, 65 mg, intravenous, q8h   Followed by  [START ON 1/9/2025] PHENobarbital, 32.5 mg, intravenous, q8h  piperacillin-tazobactam, 2.25 g, intravenous, q6h  simvastatin, 20 mg, oral, Nightly  sodium chloride, 3 mL, nebulization, q4h  [START ON 1/10/2025] thiamine, 100 mg, oral, Daily  thiamine, 100 mg, intravenous, Daily      Continuous medications  dexmedeTOMIDine, 0-1.5 mcg/kg/hr, Last Rate: 1.5 mcg/kg/hr (01/07/25 1401)  norepinephrine, 0-0.5 mcg/kg/min, Last Rate: Stopped (01/07/25 1136)  propofol, 0-50 mcg/kg/min, Last Rate: 50 mcg/kg/min (01/07/25 1617)      PRN medications  PRN medications: acetaminophen, albuterol, benzonatate, dextrose, dextrose, diazePAM, glucagon, glucagon, hydrOXYzine, morphine, oxygen, polyethylene glycol      Assessment/Plan   IMP:    Acute respiratory failure with hypoxia  Sepsis  Pneumonia  Leukocytosis   KALEB  Palliative care   DNR CCA DNI   The patient currently does not have decision making capacity  The patient is a   Has 2 children Beverly Fregoso and Valeriy Borregol  Beverly is stated POA-stated that she has brought him DPOA HC documents.  I will verify this.    1/7/2025  Goals of care discussion with daughter Beverly.  The daughter stated that she is aware of her mom's wishes.  States that her mom would only want to be intubated for 1 week.  This is  congruent with discussions with the ICU team.  I stated that the palliative care team will be in close follow-up with the family to walk them through this decision making.  We will continue to follow.      I spent 60 minutes in the professional and overall care of this patient.      Nikole Sullivan, APRN-CNP

## 2025-01-07 NOTE — PROGRESS NOTES
Patient not medically clear. Patient intubated on this day. At this time there is not a safe discharge plan. Will follow.      01/07/25 4582   Discharge Planning   Home or Post Acute Services Other (Comment)   Expected Discharge Disposition [place]  (TBD)   Does the patient need discharge transport arranged? Yes   RoundTrip coordination needed? Yes

## 2025-01-07 NOTE — CONSULTS
"Nutrition Assessement Note    Nutrition Assessment    Reason for Assessment: Enteral assessment/recommendation (TF)    Reason for Hospital Admission:  Janet Snow is a 79 y.o. female who is admitted for PNA. Admitted to ICU on continuous BiPAP. Intubated/sedated today. Spoke with CNP, will provide tube feed recommendations at this time.    Malnutrition Screening Tool (MST)  Have you recently lost weight without trying?: No  Weight Loss Score: 0  Have you been eating poorly because of a decreased appetite?: Yes  Malnutrition Score: 1  Nutrition Screen  Stage 3 or 4 Pressure Injury or Multiple Non-Healing Wounds: No  Home Tube Feeding or Total Parenteral Nutrition (TPN): No  Dietitian Consult Needed: No    History reviewed. No pertinent past medical history.   Past Surgical History:   Procedure Laterality Date    BREAST BIOPSY Right     core biopsy 20 years ago    HYSTERECTOMY      MR HEAD ANGIO WO IV CONTRAST  07/14/2018    MR HEAD ANGIO WO IV CONTRAST [facility]       Nutrition History:  Food and Nutrient History: NPO/Vent        Food Allergies/Intolerances:  None  GI Symptoms: None  Oral Problems: None    Anthropometrics:  Ht: 160 cm (5' 3\"), Wt: 70.8 kg (156 lb 1.4 oz), BMI: 27.66  IBW/kg (Dietitian Calculated): 52.27 kg  Percent of IBW: 135 %       Weight Change:  Daily Weight  01/07/25 : 70.8 kg (156 lb 1.4 oz)  07/19/24 : 70.3 kg (155 lb)        Significant Weight Loss: No          Nutrition Focused Physical Exam Findings: defer: vent     Nutrition Significant Labs:  Lab Results   Component Value Date    WBC 21.3 (H) 01/07/2025    HGB 7.9 (L) 01/07/2025    HCT 24.5 (L) 01/07/2025     (H) 01/07/2025    CHOL 161 07/14/2018    TRIG 185 (H) 07/14/2018    HDL 46 (L) 07/14/2018    ALT 27 01/02/2025    AST 32 01/02/2025     01/07/2025    K 3.8 01/07/2025     (H) 01/07/2025    CREATININE 2.06 (H) 01/07/2025    BUN 50 (H) 01/07/2025    CO2 23 01/07/2025    TSH 1.35 01/05/2025    INR " 1.1 06/10/2022    HGBA1C 5.5 01/06/2020     Nutrition Specific Medications:  docusate sodium, 100 mg, oral, BID  doxycycline, 100 mg, intravenous, q12h  pantoprazole, 40 mg, oral, Daily   Or  esomeprazole, 40 mg, oral, Daily   Or  pantoprazole, 40 mg, intravenous, Daily  [Held by provider] FLUoxetine, 40 mg, oral, Daily  folic acid, 1 mg, oral, Daily  furosemide, 20 mg, intravenous, Once  heparin, 5,000 Units, subcutaneous, q8h  ipratropium-albuteroL, 3 mL, nebulization, q4h  levothyroxine, 25 mcg, intravenous, q24h ADE  [Held by provider] levothyroxine, 50 mcg, oral, Daily  linezolid, 600 mg, intravenous, q12h  methylPREDNISolone sodium succinate (PF), 40 mg, intravenous, q6h  midazolam, 4 mg, intravenous, Once  midazolam, 4 mg, intravenous, Once  multivitamin with minerals, 1 tablet, oral, Daily  oxygen, , inhalation, Continuous - Inhalation  PHENobarbital, 260 mg, intravenous, Once  PHENobarbital, 65 mg, intravenous, q8h   Followed by  [START ON 1/9/2025] PHENobarbital, 32.5 mg, intravenous, q8h  piperacillin-tazobactam, 2.25 g, intravenous, q6h  simvastatin, 20 mg, oral, Nightly  sodium chloride, 3 mL, nebulization, q4h  [START ON 1/10/2025] thiamine, 100 mg, oral, Daily  thiamine, 100 mg, intravenous, Daily      dexmedeTOMIDine, 0-1.5 mcg/kg/hr, Last Rate: 1.5 mcg/kg/hr (01/07/25 1100)  norepinephrine, 0-0.5 mcg/kg/min, Last Rate: 0.01 mcg/kg/min (01/07/25 1113)  propofol, 0-50 mcg/kg/min, Last Rate: 50 mcg/kg/min (01/07/25 1028)      Propofol @ 21.2 ml/hr provides: 597 kcals/day    Dietary Orders (From admission, onward)       Start     Ordered    01/05/25 1412  Adult diet Regular; Thin 0  Diet effective now        Question Answer Comment   Diet type Regular    Fluid consistency Thin 0        01/05/25 1412    01/02/25 6656  May Participate in Room Service  ( ROOM SERVICE MAY PARTICIPATE)  Once        Question:  .  Answer:  Yes    01/02/25 1502                   Estimated Needs:   Estimated Energy  Needs  Total Energy Estimated Needs (kCal):  (8290-4370)  Total Estimated Energy Need per Day (kCal/kg):  (22-25)  Method for Estimating Needs: Actual Wt    Estimated Protein Needs  Total Protein Estimated Needs (g):  ()  Total Protein Estimated Needs (g/kg):  (1.2-2.0)  Method for Estimating Needs: Actual Wt    Estimated Fluid Needs  Total Fluid Estimated Needs (mL):  (0807-0724)  Method for Estimating Needs: 1 mL/kcal      Nutrition Diagnosis   Nutrition Diagnosis:  Malnutrition Diagnosis  Patient has Malnutrition Diagnosis: No    Nutrition Diagnosis  Patient has Nutrition Diagnosis: Yes  Diagnosis Status (1): New  Nutrition Diagnosis 1: Inadequate energy intake  Related to (1): decreased ability to consume sufficient energy  As Evidenced by (1): NPO/Mechanical Ventilation     Nutrition Interventions/Recommendations   Nutrition Interventions and Recommendations:    Nutrition Prescription:  Individualized Nutrition Prescription Provided for : 8677-1858 calories,  gm protein to be provided via enteral nutrition    Nutrition Interventions:   Food and/or Nutrient Delivery Interventions  Interventions: Enteral intake  Enteral Intake: Modify composition of enteral nutrition, Modify rate of enteral nutrition  Goal: If tube feed is initiated, recommend: Vital High Protein goal rate @45 mL/Hr to provide 1080 calories (1677 kcals with current rate of sedation), 94 gm protein, 903 mL free water. Suggest start @20 mL/Hr and increase by 10 mL Q4H until goal. Recommend additional 120 mL water flushes Q4H to meet estimated hydration needs    Education Documentation  No documentation found.         Nutrition Monitoring and Evaluation   Monitoring/Evaluation:   Food/Nutrient Related History Monitoring  Monitoring and Evaluation Plan: Enteral and parenteral nutrition intake  Enteral and Parenteral Nutrition Intake: Enteral nutrition intake  Criteria: Monitoring for tube feed initiation       Time Spent/Follow-up:    Follow Up  Time Spent (min): 30 minutes  Last Date of Nutrition Visit: 01/07/25  Nutrition Follow-Up Needed?: 3-5 days  Follow up Comment: 1/10/25

## 2025-01-07 NOTE — PROGRESS NOTES
Occupational Therapy                 Therapy Communication Note    Patient Name: Janet Snow  MRN: [id number]  Department: [facility] 3 E ICU  Room: 19/19-A  Today's Date: 1/7/2025     Discipline: Occupational Therapy          Missed Visit Reason: Missed Visit Reason: Cancel (Pt intubated 1/7/25; will need new OT orders when medically appropriate.)    Missed Time: Cancel    Comment:

## 2025-01-08 LAB
ABO GROUP (TYPE) IN BLOOD: NORMAL
ABO GROUP (TYPE) IN BLOOD: NORMAL
ALBUMIN SERPL BCP-MCNC: 2.8 G/DL (ref 3.4–5)
ALBUMIN: 2.7 G/DL (ref 3.4–5)
ALPHA 1 GLOBULIN: 0.8 G/DL (ref 0.2–0.6)
ALPHA 2 GLOBULIN: 1.1 G/DL (ref 0.4–1.1)
ANION GAP BLDA CALCULATED.4IONS-SCNC: 10 MMO/L (ref 10–25)
ANION GAP BLDA CALCULATED.4IONS-SCNC: 7 MMO/L (ref 10–25)
ANION GAP BLDA CALCULATED.4IONS-SCNC: 7 MMO/L (ref 10–25)
ANION GAP BLDA CALCULATED.4IONS-SCNC: 8 MMO/L (ref 10–25)
ANION GAP SERPL CALCULATED.3IONS-SCNC: 12 MMOL/L (ref 10–20)
ANION GAP SERPL CALCULATED.3IONS-SCNC: 13 MMOL/L (ref 10–20)
ANTIBODY SCREEN: NORMAL
APPARATUS: ABNORMAL
APPARATUS: ABNORMAL
ARTERIAL PATENCY WRIST A: NEGATIVE
ARTERIAL PATENCY WRIST A: NEGATIVE
ASPERGILLUS GALACTOMANNAN EIA,SERUM: 0.04
BASE EXCESS BLDA CALC-SCNC: -0.6 MMOL/L (ref -2–3)
BASE EXCESS BLDA CALC-SCNC: -1.5 MMOL/L (ref -2–3)
BASE EXCESS BLDA CALC-SCNC: -1.8 MMOL/L (ref -2–3)
BASE EXCESS BLDA CALC-SCNC: -2.6 MMOL/L (ref -2–3)
BASOPHILS # BLD AUTO: 0.02 X10*3/UL (ref 0–0.1)
BASOPHILS NFR BLD AUTO: 0.1 %
BETA GLOBULIN: 0.9 G/DL (ref 0.5–1.2)
BODY TEMPERATURE: 37 DEGREES CELSIUS
BUN SERPL-MCNC: 62 MG/DL (ref 6–23)
BUN SERPL-MCNC: 64 MG/DL (ref 6–23)
CA-I BLDA-SCNC: 1.18 MMOL/L (ref 1.1–1.33)
CA-I BLDA-SCNC: 1.2 MMOL/L (ref 1.1–1.33)
CA-I BLDA-SCNC: 1.21 MMOL/L (ref 1.1–1.33)
CA-I BLDA-SCNC: 1.23 MMOL/L (ref 1.1–1.33)
CALCIUM SERPL-MCNC: 7.6 MG/DL (ref 8.6–10.3)
CALCIUM SERPL-MCNC: 8.3 MG/DL (ref 8.6–10.3)
CHLORIDE BLDA-SCNC: 108 MMOL/L (ref 98–107)
CHLORIDE BLDA-SCNC: 109 MMOL/L (ref 98–107)
CHLORIDE BLDA-SCNC: 109 MMOL/L (ref 98–107)
CHLORIDE BLDA-SCNC: 110 MMOL/L (ref 98–107)
CHLORIDE SERPL-SCNC: 105 MMOL/L (ref 98–107)
CHLORIDE SERPL-SCNC: 108 MMOL/L (ref 98–107)
CK SERPL-CCNC: 63 U/L (ref 0–215)
CLARITY FLD: ABNORMAL
CO2 SERPL-SCNC: 22 MMOL/L (ref 21–32)
CO2 SERPL-SCNC: 25 MMOL/L (ref 21–32)
COLOR FLD: COLORLESS
CREAT SERPL-MCNC: 2.44 MG/DL (ref 0.5–1.05)
CREAT SERPL-MCNC: 2.47 MG/DL (ref 0.5–1.05)
EGFRCR SERPLBLD CKD-EPI 2021: 19 ML/MIN/1.73M*2
EGFRCR SERPLBLD CKD-EPI 2021: 20 ML/MIN/1.73M*2
EOSINOPHIL # BLD AUTO: 0 X10*3/UL (ref 0–0.4)
EOSINOPHIL NFR BLD AUTO: 0 %
ERYTHROCYTE [DISTWIDTH] IN BLOOD BY AUTOMATED COUNT: 13.3 % (ref 11.5–14.5)
GAMMA GLOBULIN: 1.2 G/DL (ref 0.5–1.4)
GLUCOSE BLD MANUAL STRIP-MCNC: 111 MG/DL (ref 74–99)
GLUCOSE BLD MANUAL STRIP-MCNC: 122 MG/DL (ref 74–99)
GLUCOSE BLD MANUAL STRIP-MCNC: 123 MG/DL (ref 74–99)
GLUCOSE BLD MANUAL STRIP-MCNC: 226 MG/DL (ref 74–99)
GLUCOSE BLDA-MCNC: 112 MG/DL (ref 74–99)
GLUCOSE BLDA-MCNC: 115 MG/DL (ref 74–99)
GLUCOSE BLDA-MCNC: 182 MG/DL (ref 74–99)
GLUCOSE BLDA-MCNC: 193 MG/DL (ref 74–99)
GLUCOSE SERPL-MCNC: 122 MG/DL (ref 74–99)
GLUCOSE SERPL-MCNC: 200 MG/DL (ref 74–99)
HCO3 BLDA-SCNC: 23.7 MMOL/L (ref 22–26)
HCO3 BLDA-SCNC: 23.7 MMOL/L (ref 22–26)
HCO3 BLDA-SCNC: 24 MMOL/L (ref 22–26)
HCO3 BLDA-SCNC: 24.9 MMOL/L (ref 22–26)
HCT VFR BLD AUTO: 22.7 % (ref 36–46)
HCT VFR BLD EST: 22 % (ref 36–46)
HCT VFR BLD EST: 23 % (ref 36–46)
HCT VFR BLD EST: 23 % (ref 36–46)
HCT VFR BLD EST: 25 % (ref 36–46)
HGB BLD-MCNC: 7.4 G/DL (ref 12–16)
HGB BLDA-MCNC: 7.2 G/DL (ref 12–16)
HGB BLDA-MCNC: 7.7 G/DL (ref 12–16)
HGB BLDA-MCNC: 7.8 G/DL (ref 12–16)
HGB BLDA-MCNC: 8.2 G/DL (ref 12–16)
HOLD SPECIMEN: NORMAL
IMM GRANULOCYTES # BLD AUTO: 0.37 X10*3/UL (ref 0–0.5)
IMM GRANULOCYTES NFR BLD AUTO: 2.2 % (ref 0–0.9)
IMMUNOFIXATION COMMENT: ABNORMAL
INHALED O2 CONCENTRATION: 50 %
INHALED O2 CONCENTRATION: 60 %
INHALED O2 CONCENTRATION: 80 %
INHALED O2 CONCENTRATION: 80 %
LACTATE BLDA-SCNC: 0.9 MMOL/L (ref 0.4–2)
LACTATE BLDA-SCNC: 1 MMOL/L (ref 0.4–2)
LACTATE BLDA-SCNC: 1.2 MMOL/L (ref 0.4–2)
LACTATE BLDA-SCNC: 1.4 MMOL/L (ref 0.4–2)
LYMPHOCYTES # BLD AUTO: 0.89 X10*3/UL (ref 0.8–3)
LYMPHOCYTES NFR BLD AUTO: 5.3 %
MAGNESIUM SERPL-MCNC: 2.56 MG/DL (ref 1.6–2.4)
MCH RBC QN AUTO: 30.2 PG (ref 26–34)
MCHC RBC AUTO-ENTMCNC: 32.6 G/DL (ref 32–36)
MCV RBC AUTO: 93 FL (ref 80–100)
MONOCYTES # BLD AUTO: 0.72 X10*3/UL (ref 0.05–0.8)
MONOCYTES NFR BLD AUTO: 4.3 %
NEUTROPHILS # BLD AUTO: 14.92 X10*3/UL (ref 1.6–5.5)
NEUTROPHILS NFR BLD AUTO: 88.1 %
NRBC BLD-RTO: 0 /100 WBCS (ref 0–0)
OXYHGB MFR BLDA: 96.1 % (ref 94–98)
OXYHGB MFR BLDA: 96.2 % (ref 94–98)
OXYHGB MFR BLDA: 96.3 % (ref 94–98)
OXYHGB MFR BLDA: 96.9 % (ref 94–98)
PATH REVIEW - SERUM IMMUNOFIXATION: ABNORMAL
PATH REVIEW-CELL CT,FLUID: NORMAL
PATH REVIEW-SERUM PROTEIN ELECTROPHORESIS: ABNORMAL
PCO2 BLDA: 41 MM HG (ref 38–42)
PCO2 BLDA: 43 MM HG (ref 38–42)
PCO2 BLDA: 44 MM HG (ref 38–42)
PCO2 BLDA: 50 MM HG (ref 38–42)
PEEP CMH2O: 10 CM H2O
PEEP CMH2O: 10 CM H2O
PEEP CMH2O: 12 CM H2O
PEEP CMH2O: 8 CM H2O
PH BLDA: 7.29 PH (ref 7.38–7.42)
PH BLDA: 7.35 PH (ref 7.38–7.42)
PH BLDA: 7.36 PH (ref 7.38–7.42)
PH BLDA: 7.37 PH (ref 7.38–7.42)
PHOSPHATE SERPL-MCNC: 3.9 MG/DL (ref 2.5–4.9)
PLATELET # BLD AUTO: 534 X10*3/UL (ref 150–450)
PO2 BLDA: 110 MM HG (ref 85–95)
PO2 BLDA: 156 MM HG (ref 85–95)
PO2 BLDA: 164 MM HG (ref 85–95)
PO2 BLDA: 75 MM HG (ref 85–95)
POTASSIUM BLDA-SCNC: 3.7 MMOL/L (ref 3.5–5.3)
POTASSIUM BLDA-SCNC: 3.7 MMOL/L (ref 3.5–5.3)
POTASSIUM BLDA-SCNC: 3.8 MMOL/L (ref 3.5–5.3)
POTASSIUM BLDA-SCNC: 3.9 MMOL/L (ref 3.5–5.3)
POTASSIUM SERPL-SCNC: 3.6 MMOL/L (ref 3.5–5.3)
POTASSIUM SERPL-SCNC: 3.8 MMOL/L (ref 3.5–5.3)
PROCALCITONIN SERPL-MCNC: 2.1 NG/ML
PROTEIN ELECTROPHORESIS COMMENT: ABNORMAL
RBC # BLD AUTO: 2.45 X10*6/UL (ref 4–5.2)
RBC # FLD AUTO: 1000 /UL
RH FACTOR (ANTIGEN D): NORMAL
RH FACTOR (ANTIGEN D): NORMAL
SAO2 % BLDA: 100 % (ref 94–100)
SAO2 % BLDA: 98 % (ref 94–100)
SAO2 % BLDA: 99 % (ref 94–100)
SAO2 % BLDA: 99 % (ref 94–100)
SODIUM BLDA-SCNC: 136 MMOL/L (ref 136–145)
SODIUM BLDA-SCNC: 137 MMOL/L (ref 136–145)
SODIUM BLDA-SCNC: 138 MMOL/L (ref 136–145)
SODIUM BLDA-SCNC: 138 MMOL/L (ref 136–145)
SODIUM SERPL-SCNC: 138 MMOL/L (ref 136–145)
SODIUM SERPL-SCNC: 139 MMOL/L (ref 136–145)
SPECIMEN DRAWN FROM PATIENT: ABNORMAL
TIDAL VOLUME: 370 ML
TIDAL VOLUME: 370 ML
TIDAL VOLUME: 400 ML
TIDAL VOLUME: 400 ML
VENTILATOR MODE: ABNORMAL
VENTILATOR RATE: 24 BPM
VENTILATOR RATE: 26 BPM
WBC # BLD AUTO: 16.9 X10*3/UL (ref 4.4–11.3)
WBC # FLD AUTO: 795 /UL

## 2025-01-08 PROCEDURE — 2500000005 HC RX 250 GENERAL PHARMACY W/O HCPCS

## 2025-01-08 PROCEDURE — 84145 PROCALCITONIN (PCT): CPT | Mod: WESLAB | Performed by: STUDENT IN AN ORGANIZED HEALTH CARE EDUCATION/TRAINING PROGRAM

## 2025-01-08 PROCEDURE — 99233 SBSQ HOSP IP/OBS HIGH 50: CPT

## 2025-01-08 PROCEDURE — 2500000001 HC RX 250 WO HCPCS SELF ADMINISTERED DRUGS (ALT 637 FOR MEDICARE OP)

## 2025-01-08 PROCEDURE — 85025 COMPLETE CBC W/AUTO DIFF WBC: CPT

## 2025-01-08 PROCEDURE — 82947 ASSAY GLUCOSE BLOOD QUANT: CPT

## 2025-01-08 PROCEDURE — 94003 VENT MGMT INPAT SUBQ DAY: CPT

## 2025-01-08 PROCEDURE — 37799 UNLISTED PX VASCULAR SURGERY: CPT | Performed by: NURSE PRACTITIONER

## 2025-01-08 PROCEDURE — 82550 ASSAY OF CK (CPK): CPT | Performed by: INTERNAL MEDICINE

## 2025-01-08 PROCEDURE — 74018 RADEX ABDOMEN 1 VIEW: CPT | Performed by: RADIOLOGY

## 2025-01-08 PROCEDURE — 85018 HEMOGLOBIN: CPT

## 2025-01-08 PROCEDURE — 2500000002 HC RX 250 W HCPCS SELF ADMINISTERED DRUGS (ALT 637 FOR MEDICARE OP, ALT 636 FOR OP/ED)

## 2025-01-08 PROCEDURE — 99291 CRITICAL CARE FIRST HOUR: CPT

## 2025-01-08 PROCEDURE — 2500000004 HC RX 250 GENERAL PHARMACY W/ HCPCS (ALT 636 FOR OP/ED): Performed by: INTERNAL MEDICINE

## 2025-01-08 PROCEDURE — 99232 SBSQ HOSP IP/OBS MODERATE 35: CPT | Performed by: STUDENT IN AN ORGANIZED HEALTH CARE EDUCATION/TRAINING PROGRAM

## 2025-01-08 PROCEDURE — 83605 ASSAY OF LACTIC ACID: CPT

## 2025-01-08 PROCEDURE — 2500000004 HC RX 250 GENERAL PHARMACY W/ HCPCS (ALT 636 FOR OP/ED)

## 2025-01-08 PROCEDURE — 82810 BLOOD GASES O2 SAT ONLY: CPT

## 2025-01-08 PROCEDURE — 36600 WITHDRAWAL OF ARTERIAL BLOOD: CPT

## 2025-01-08 PROCEDURE — 71045 X-RAY EXAM CHEST 1 VIEW: CPT | Performed by: RADIOLOGY

## 2025-01-08 PROCEDURE — 84132 ASSAY OF SERUM POTASSIUM: CPT | Performed by: NURSE PRACTITIONER

## 2025-01-08 PROCEDURE — 82960 TEST FOR G6PD ENZYME: CPT | Mod: WESLAB | Performed by: INTERNAL MEDICINE

## 2025-01-08 PROCEDURE — 2500000004 HC RX 250 GENERAL PHARMACY W/ HCPCS (ALT 636 FOR OP/ED): Mod: JZ

## 2025-01-08 PROCEDURE — 83735 ASSAY OF MAGNESIUM: CPT

## 2025-01-08 PROCEDURE — 82810 BLOOD GASES O2 SAT ONLY: CPT | Performed by: SURGERY

## 2025-01-08 PROCEDURE — 37799 UNLISTED PX VASCULAR SURGERY: CPT

## 2025-01-08 PROCEDURE — 94640 AIRWAY INHALATION TREATMENT: CPT

## 2025-01-08 PROCEDURE — 2020000001 HC ICU ROOM DAILY

## 2025-01-08 PROCEDURE — 82435 ASSAY OF BLOOD CHLORIDE: CPT

## 2025-01-08 PROCEDURE — 2500000004 HC RX 250 GENERAL PHARMACY W/ HCPCS (ALT 636 FOR OP/ED): Performed by: SURGERY

## 2025-01-08 PROCEDURE — 86900 BLOOD TYPING SEROLOGIC ABO: CPT

## 2025-01-08 RX ORDER — SULFAMETHOXAZOLE AND TRIMETHOPRIM 800; 160 MG/1; MG/1
2 TABLET ORAL EVERY 12 HOURS SCHEDULED
Status: DISCONTINUED | OUTPATIENT
Start: 2025-01-08 | End: 2025-01-08

## 2025-01-08 RX ORDER — SULFAMETHOXAZOLE AND TRIMETHOPRIM 800; 160 MG/1; MG/1
2 TABLET ORAL EVERY 12 HOURS SCHEDULED
Status: DISCONTINUED | OUTPATIENT
Start: 2025-01-09 | End: 2025-01-09

## 2025-01-08 RX ORDER — POTASSIUM CHLORIDE 1.5 G/1.58G
40 POWDER, FOR SOLUTION ORAL ONCE
Status: COMPLETED | OUTPATIENT
Start: 2025-01-08 | End: 2025-01-08

## 2025-01-08 RX ORDER — HYDROMORPHONE HYDROCHLORIDE 1 MG/ML
0.6 INJECTION, SOLUTION INTRAMUSCULAR; INTRAVENOUS; SUBCUTANEOUS ONCE
Status: COMPLETED | OUTPATIENT
Start: 2025-01-09 | End: 2025-01-08

## 2025-01-08 RX ORDER — HYDRALAZINE HYDROCHLORIDE 20 MG/ML
5 INJECTION INTRAMUSCULAR; INTRAVENOUS ONCE
Status: COMPLETED | OUTPATIENT
Start: 2025-01-08 | End: 2025-01-08

## 2025-01-08 RX ORDER — FUROSEMIDE 10 MG/ML
20 INJECTION INTRAMUSCULAR; INTRAVENOUS ONCE
Status: COMPLETED | OUTPATIENT
Start: 2025-01-08 | End: 2025-01-08

## 2025-01-08 RX ORDER — ATOVAQUONE 750 MG/5ML
750 SUSPENSION ORAL EVERY 12 HOURS SCHEDULED
Status: DISCONTINUED | OUTPATIENT
Start: 2025-01-08 | End: 2025-01-14

## 2025-01-08 RX ORDER — FENTANYL CITRATE-0.9 % NACL/PF 10 MCG/ML
0-200 PLASTIC BAG, INJECTION (ML) INTRAVENOUS CONTINUOUS
Status: DISCONTINUED | OUTPATIENT
Start: 2025-01-08 | End: 2025-01-08

## 2025-01-08 RX ADMIN — Medication 60 PERCENT: at 07:27

## 2025-01-08 RX ADMIN — SIMVASTATIN 20 MG: 20 TABLET, FILM COATED ORAL at 20:44

## 2025-01-08 RX ADMIN — DOCUSATE SODIUM 100 MG: 50 LIQUID ORAL at 08:38

## 2025-01-08 RX ADMIN — PROPOFOL 50 MCG/KG/MIN: 10 INJECTION, EMULSION INTRAVENOUS at 10:17

## 2025-01-08 RX ADMIN — IPRATROPIUM BROMIDE AND ALBUTEROL SULFATE 3 ML: 2.5; .5 SOLUTION RESPIRATORY (INHALATION) at 11:34

## 2025-01-08 RX ADMIN — HYDROMORPHONE HYDROCHLORIDE 0.6 MG: 1 INJECTION, SOLUTION INTRAMUSCULAR; INTRAVENOUS; SUBCUTANEOUS at 23:38

## 2025-01-08 RX ADMIN — IPRATROPIUM BROMIDE AND ALBUTEROL SULFATE 3 ML: 2.5; .5 SOLUTION RESPIRATORY (INHALATION) at 15:01

## 2025-01-08 RX ADMIN — Medication 50 PERCENT: at 19:34

## 2025-01-08 RX ADMIN — DOCUSATE SODIUM 100 MG: 50 LIQUID ORAL at 20:44

## 2025-01-08 RX ADMIN — Medication 3 ML: at 23:09

## 2025-01-08 RX ADMIN — PROPOFOL 50 MCG/KG/MIN: 10 INJECTION, EMULSION INTRAVENOUS at 04:46

## 2025-01-08 RX ADMIN — IPRATROPIUM BROMIDE AND ALBUTEROL SULFATE 3 ML: 2.5; .5 SOLUTION RESPIRATORY (INHALATION) at 03:10

## 2025-01-08 RX ADMIN — METHYLPREDNISOLONE SODIUM SUCCINATE 40 MG: 40 INJECTION, POWDER, LYOPHILIZED, FOR SOLUTION INTRAMUSCULAR; INTRAVENOUS at 20:44

## 2025-01-08 RX ADMIN — PIPERACILLIN SODIUM AND TAZOBACTAM SODIUM 2.25 G: 2; .25 INJECTION, SOLUTION INTRAVENOUS at 03:35

## 2025-01-08 RX ADMIN — METHYLPREDNISOLONE SODIUM SUCCINATE 40 MG: 40 INJECTION, POWDER, LYOPHILIZED, FOR SOLUTION INTRAMUSCULAR; INTRAVENOUS at 08:38

## 2025-01-08 RX ADMIN — PIPERACILLIN SODIUM AND TAZOBACTAM SODIUM 2.25 G: 2; .25 INJECTION, SOLUTION INTRAVENOUS at 08:53

## 2025-01-08 RX ADMIN — LINEZOLID 600 MG: 600 INJECTION, SOLUTION INTRAVENOUS at 12:15

## 2025-01-08 RX ADMIN — DEXMEDETOMIDINE HYDROCHLORIDE 1.5 MCG/KG/HR: 4 INJECTION, SOLUTION INTRAVENOUS at 12:14

## 2025-01-08 RX ADMIN — DEXMEDETOMIDINE HYDROCHLORIDE 1.5 MCG/KG/HR: 4 INJECTION, SOLUTION INTRAVENOUS at 15:58

## 2025-01-08 RX ADMIN — POTASSIUM CHLORIDE 40 MEQ: 1.5 FOR SOLUTION ORAL at 12:15

## 2025-01-08 RX ADMIN — Medication 3 ML: at 07:27

## 2025-01-08 RX ADMIN — HYDRALAZINE HYDROCHLORIDE 5 MG: 20 INJECTION INTRAMUSCULAR; INTRAVENOUS at 00:38

## 2025-01-08 RX ADMIN — IPRATROPIUM BROMIDE AND ALBUTEROL SULFATE 3 ML: 2.5; .5 SOLUTION RESPIRATORY (INHALATION) at 23:09

## 2025-01-08 RX ADMIN — PHENOBARBITAL SODIUM 65 MG: 65 INJECTION INTRAMUSCULAR; INTRAVENOUS at 12:14

## 2025-01-08 RX ADMIN — LINEZOLID 600 MG: 600 INJECTION, SOLUTION INTRAVENOUS at 01:41

## 2025-01-08 RX ADMIN — SULFAMETHOXAZOLE AND TRIMETHOPRIM 2 TABLET: 800; 160 TABLET ORAL at 16:55

## 2025-01-08 RX ADMIN — DEXMEDETOMIDINE HYDROCHLORIDE 1.5 MCG/KG/HR: 4 INJECTION, SOLUTION INTRAVENOUS at 01:07

## 2025-01-08 RX ADMIN — DOXYCYCLINE 100 MG: 100 INJECTION, POWDER, LYOPHILIZED, FOR SOLUTION INTRAVENOUS at 02:31

## 2025-01-08 RX ADMIN — PROPOFOL 50 MCG/KG/MIN: 10 INJECTION, EMULSION INTRAVENOUS at 14:49

## 2025-01-08 RX ADMIN — HEPARIN SODIUM 5000 UNITS: 5000 INJECTION, SOLUTION INTRAVENOUS; SUBCUTANEOUS at 12:15

## 2025-01-08 RX ADMIN — THIAMINE HYDROCHLORIDE 100 MG: 100 INJECTION, SOLUTION INTRAMUSCULAR; INTRAVENOUS at 08:38

## 2025-01-08 RX ADMIN — Medication 3 ML: at 03:10

## 2025-01-08 RX ADMIN — SULFAMETHOXAZOLE AND TRIMETHOPRIM 1 TABLET: 800; 160 TABLET ORAL at 00:35

## 2025-01-08 RX ADMIN — Medication 3 ML: at 19:34

## 2025-01-08 RX ADMIN — PANTOPRAZOLE SODIUM 40 MG: 40 INJECTION, POWDER, FOR SOLUTION INTRAVENOUS at 08:38

## 2025-01-08 RX ADMIN — PIPERACILLIN SODIUM AND TAZOBACTAM SODIUM 2.25 G: 2; .25 INJECTION, SOLUTION INTRAVENOUS at 20:44

## 2025-01-08 RX ADMIN — FENTANYL CITRATE 25 MCG/HR: 0.05 INJECTION, SOLUTION INTRAMUSCULAR; INTRAVENOUS at 01:32

## 2025-01-08 RX ADMIN — PIPERACILLIN SODIUM AND TAZOBACTAM SODIUM 2.25 G: 2; .25 INJECTION, SOLUTION INTRAVENOUS at 14:29

## 2025-01-08 RX ADMIN — FUROSEMIDE 20 MG: 10 INJECTION, SOLUTION INTRAMUSCULAR; INTRAVENOUS at 12:15

## 2025-01-08 RX ADMIN — PROPOFOL 50 MCG/KG/MIN: 10 INJECTION, EMULSION INTRAVENOUS at 01:55

## 2025-01-08 RX ADMIN — DOXYCYCLINE 100 MG: 100 INJECTION, POWDER, LYOPHILIZED, FOR SOLUTION INTRAVENOUS at 14:29

## 2025-01-08 RX ADMIN — METHYLPREDNISOLONE SODIUM SUCCINATE 40 MG: 40 INJECTION, POWDER, LYOPHILIZED, FOR SOLUTION INTRAMUSCULAR; INTRAVENOUS at 03:35

## 2025-01-08 RX ADMIN — IPRATROPIUM BROMIDE AND ALBUTEROL SULFATE 3 ML: 2.5; .5 SOLUTION RESPIRATORY (INHALATION) at 19:34

## 2025-01-08 RX ADMIN — IPRATROPIUM BROMIDE AND ALBUTEROL SULFATE 3 ML: 2.5; .5 SOLUTION RESPIRATORY (INHALATION) at 07:27

## 2025-01-08 RX ADMIN — HEPARIN SODIUM 5000 UNITS: 5000 INJECTION, SOLUTION INTRAVENOUS; SUBCUTANEOUS at 04:15

## 2025-01-08 RX ADMIN — Medication 3 ML: at 11:34

## 2025-01-08 RX ADMIN — FOLIC ACID 1 MG: 1 TABLET ORAL at 08:39

## 2025-01-08 RX ADMIN — PROPOFOL 50 MCG/KG/MIN: 10 INJECTION, EMULSION INTRAVENOUS at 23:38

## 2025-01-08 RX ADMIN — PROPOFOL 50 MCG/KG/MIN: 10 INJECTION, EMULSION INTRAVENOUS at 18:50

## 2025-01-08 RX ADMIN — DEXMEDETOMIDINE HYDROCHLORIDE 1.5 MCG/KG/HR: 4 INJECTION, SOLUTION INTRAVENOUS at 08:37

## 2025-01-08 RX ADMIN — Medication 1 TABLET: at 08:39

## 2025-01-08 RX ADMIN — PHENOBARBITAL SODIUM 65 MG: 65 INJECTION INTRAMUSCULAR; INTRAVENOUS at 19:54

## 2025-01-08 RX ADMIN — DIAZEPAM 2 MG: 5 INJECTION, SOLUTION INTRAMUSCULAR; INTRAVENOUS at 03:55

## 2025-01-08 RX ADMIN — DEXMEDETOMIDINE HYDROCHLORIDE 1.5 MCG/KG/HR: 4 INJECTION, SOLUTION INTRAVENOUS at 23:38

## 2025-01-08 RX ADMIN — HEPARIN SODIUM 5000 UNITS: 5000 INJECTION, SOLUTION INTRAVENOUS; SUBCUTANEOUS at 19:54

## 2025-01-08 RX ADMIN — Medication 3 ML: at 15:01

## 2025-01-08 RX ADMIN — METHYLPREDNISOLONE SODIUM SUCCINATE 40 MG: 40 INJECTION, POWDER, LYOPHILIZED, FOR SOLUTION INTRAMUSCULAR; INTRAVENOUS at 14:28

## 2025-01-08 RX ADMIN — PHENOBARBITAL SODIUM 65 MG: 65 INJECTION INTRAMUSCULAR; INTRAVENOUS at 03:35

## 2025-01-08 RX ADMIN — INSULIN LISPRO 2 UNITS: 100 INJECTION, SOLUTION INTRAVENOUS; SUBCUTANEOUS at 03:42

## 2025-01-08 RX ADMIN — DEXMEDETOMIDINE HYDROCHLORIDE 1.5 MCG/KG/HR: 4 INJECTION, SOLUTION INTRAVENOUS at 04:46

## 2025-01-08 RX ADMIN — DEXMEDETOMIDINE HYDROCHLORIDE 1.5 MCG/KG/HR: 4 INJECTION, SOLUTION INTRAVENOUS at 19:54

## 2025-01-08 RX ADMIN — LEVOTHYROXINE SODIUM ANHYDROUS 25 MCG: 100 INJECTION, POWDER, LYOPHILIZED, FOR SOLUTION INTRAVENOUS at 08:40

## 2025-01-08 ASSESSMENT — PAIN SCALES - WONG BAKER: WONGBAKER_NUMERICALRESPONSE: NO HURT

## 2025-01-08 ASSESSMENT — PAIN - FUNCTIONAL ASSESSMENT
PAIN_FUNCTIONAL_ASSESSMENT: CPOT (CRITICAL CARE PAIN OBSERVATION TOOL)

## 2025-01-08 NOTE — PROGRESS NOTES
"Janet Snow is a 79 y.o. female on day 6 of admission presenting with Community acquired pneumonia of both upper lobes.    Subjective   Patient intubated/bronched over last 24 hours   Weaned down to PRVC 24/370/60%/8 --- dyssynchronous with the ventilator, taking in 570cc tidal volumes   Attempted to flip to Volume Control this morning -- despite increasing I time, will double trigger the vent with Volume Control  Sedated -- unable to obtain ROS   Pplat improved to 21       Objective     Physical Exam  Gen: elderly female, intubated sedated  HEENT: ETT in place  Lungs: mechanically ventilated BS  CV: RRR   Abd: soft NTND   Neuro: sedated     Last Recorded Vitals  Blood pressure 113/58, pulse 85, temperature 36.5 °C (97.7 °F), resp. rate (!) 28, height 1.6 m (5' 3\"), weight 74.1 kg (163 lb 5.8 oz), SpO2 95%.  Intake/Output last 3 Shifts:  I/O last 3 completed shifts:  In: 2610.8 (35.2 mL/kg) [I.V.:1020.8 (13.8 mL/kg); NG/GT:90; IV Piggyback:1500]  Out: 1685 (22.7 mL/kg) [Urine:1285 (0.5 mL/kg/hr); Emesis/NG output:400]  Weight: 74.1 kg     Relevant Results  Scheduled medications  docusate sodium, 100 mg, oral, BID  doxycycline, 100 mg, intravenous, q12h  pantoprazole, 40 mg, oral, Daily   Or  esomeprazole, 40 mg, oral, Daily   Or  pantoprazole, 40 mg, intravenous, Daily  [Held by provider] FLUoxetine, 40 mg, oral, Daily  folic acid, 1 mg, oral, Daily  heparin, 5,000 Units, subcutaneous, q8h  insulin lispro, 0-5 Units, subcutaneous, q4h  ipratropium-albuteroL, 3 mL, nebulization, q4h  levothyroxine, 25 mcg, intravenous, q24h ADE  linezolid, 600 mg, intravenous, q12h  methylPREDNISolone sodium succinate (PF), 40 mg, intravenous, q6h  midazolam, 4 mg, intravenous, Once  midazolam, 4 mg, intravenous, Once  multivitamin with minerals, 1 tablet, oral, Daily  oxygen, , inhalation, Continuous - Inhalation  PHENobarbital, 65 mg, intravenous, q8h   Followed by  [START ON 1/9/2025] PHENobarbital, 32.5 mg, intravenous, " q8h  piperacillin-tazobactam, 2.25 g, intravenous, q6h  simvastatin, 20 mg, oral, Nightly  sodium chloride, 3 mL, nebulization, q4h  sulfamethoxazole-trimethoprim, 1 tablet, oral, q12h ADE  [START ON 1/10/2025] thiamine, 100 mg, oral, Daily  thiamine, 100 mg, intravenous, Daily      Continuous medications  dexmedeTOMIDine, 0-1.5 mcg/kg/hr, Last Rate: 1.5 mcg/kg/hr (01/08/25 0837)  fentaNYL, 0-200 mcg/hr, Last Rate: 125 mcg/hr (01/08/25 0601)  norepinephrine, 0-0.5 mcg/kg/min, Last Rate: Stopped (01/07/25 1136)  propofol, 0-50 mcg/kg/min, Last Rate: 50 mcg/kg/min (01/08/25 0601)      PRN medications  PRN medications: acetaminophen, albuterol, benzonatate, dextrose, dextrose, diazePAM, glucagon, glucagon, hydrOXYzine, morphine, oxygen, polyethylene glycol    Results for orders placed or performed during the hospital encounter of 01/02/25 (from the past 24 hours)   Procalcitonin   Result Value Ref Range    Procalcitonin 2.55 (H) <=0.07 ng/mL   BLOOD GAS ARTERIAL FULL PANEL   Result Value Ref Range    POCT pH, Arterial 7.26 (L) 7.38 - 7.42 pH    POCT pCO2, Arterial 50 (H) 38 - 42 mm Hg    POCT pO2, Arterial 100 (H) 85 - 95 mm Hg    POCT SO2, Arterial 100 94 - 100 %    POCT Oxy Hemoglobin, Arterial 96.9 94.0 - 98.0 %    POCT Hematocrit Calculated, Arterial 24.0 (L) 36.0 - 46.0 %    POCT Sodium, Arterial 139 136 - 145 mmol/L    POCT Potassium, Arterial 4.3 3.5 - 5.3 mmol/L    POCT Chloride, Arterial 112 (H) 98 - 107 mmol/L    POCT Ionized Calcium, Arterial 1.27 1.10 - 1.33 mmol/L    POCT Glucose, Arterial 109 (H) 74 - 99 mg/dL    POCT Lactate, Arterial 1.6 0.4 - 2.0 mmol/L    POCT Base Excess, Arterial -4.5 (L) -2.0 - 3.0 mmol/L    POCT HCO3 Calculated, Arterial 22.4 22.0 - 26.0 mmol/L    POCT Hemoglobin, Arterial 8.0 (L) 12.0 - 16.0 g/dL    POCT Anion Gap, Arterial 9 (L) 10 - 25 mmo/L    Patient Temperature 37.0 degrees Celsius    FiO2 100 %    Apparatus      Ventilator Mode A/C     Ventilator Rate 20 bpm    Tidal Volume  400 mL    Peep CHM2O 14.0 cm H2O    Site of Arterial Puncture Radial Right     Aba's Test Positive    POCT GLUCOSE   Result Value Ref Range    POCT Glucose 114 (H) 74 - 99 mg/dL   BLOOD GAS ARTERIAL FULL PANEL   Result Value Ref Range    POCT pH, Arterial 7.36 (L) 7.38 - 7.42 pH    POCT pCO2, Arterial 40 38 - 42 mm Hg    POCT pO2, Arterial 164 (H) 85 - 95 mm Hg    POCT SO2, Arterial 98 94 - 100 %    POCT Oxy Hemoglobin, Arterial 95.4 94.0 - 98.0 %    POCT Hematocrit Calculated, Arterial 24.0 (L) 36.0 - 46.0 %    POCT Sodium, Arterial 139 136 - 145 mmol/L    POCT Potassium, Arterial 4.0 3.5 - 5.3 mmol/L    POCT Chloride, Arterial 112 (H) 98 - 107 mmol/L    POCT Ionized Calcium, Arterial 1.27 1.10 - 1.33 mmol/L    POCT Glucose, Arterial 125 (H) 74 - 99 mg/dL    POCT Lactate, Arterial 1.2 0.4 - 2.0 mmol/L    POCT Base Excess, Arterial -2.6 (L) -2.0 - 3.0 mmol/L    POCT HCO3 Calculated, Arterial 22.6 22.0 - 26.0 mmol/L    POCT Hemoglobin, Arterial 8.0 (L) 12.0 - 16.0 g/dL    POCT Anion Gap, Arterial 8 (L) 10 - 25 mmo/L    Patient Temperature 37.0 degrees Celsius    FiO2 100 %    Apparatus      Ventilator Mode A/C     Ventilator Rate 22 bpm    Tidal Volume 400 mL    Peep CHM2O 14.0 cm H2O    Site of Arterial Puncture Brachial Right     Aba's Test Negative    POCT GLUCOSE   Result Value Ref Range    POCT Glucose 210 (H) 74 - 99 mg/dL   AFB Processed   Result Value Ref Range    Extra Tube Hold for add-ons.    Body fluid cell count   Result Value Ref Range    Color, Fluid Colorless Colorless, Straw, Yellow    Clarity, Fluid Hazy (A) Clear    WBC, Fluid 795 See Comment /uL    RBC, Fluid 1,000 0  /uL /uL   BLOOD GAS ARTERIAL FULL PANEL   Result Value Ref Range    POCT pH, Arterial 7.23 (LL) 7.38 - 7.42 pH    POCT pCO2, Arterial 58 (H) 38 - 42 mm Hg    POCT pO2, Arterial 114 (H) 85 - 95 mm Hg    POCT SO2, Arterial 98 94 - 100 %    POCT Oxy Hemoglobin, Arterial 96.7 94.0 - 98.0 %    POCT Hematocrit Calculated, Arterial 23.0  (L) 36.0 - 46.0 %    POCT Sodium, Arterial 137 136 - 145 mmol/L    POCT Potassium, Arterial 3.7 3.5 - 5.3 mmol/L    POCT Chloride, Arterial 110 (H) 98 - 107 mmol/L    POCT Ionized Calcium, Arterial 1.29 1.10 - 1.33 mmol/L    POCT Glucose, Arterial 218 (H) 74 - 99 mg/dL    POCT Lactate, Arterial 1.1 0.4 - 2.0 mmol/L    POCT Base Excess, Arterial -3.3 (L) -2.0 - 3.0 mmol/L    POCT HCO3 Calculated, Arterial 24.3 22.0 - 26.0 mmol/L    POCT Hemoglobin, Arterial 7.7 (L) 12.0 - 16.0 g/dL    POCT Anion Gap, Arterial 6 (L) 10 - 25 mmo/L    Patient Temperature 37.0 degrees Celsius    FiO2 80 %    Apparatus      Ventilator Mode A/C     Ventilator Rate 24 bpm    Tidal Volume 370 mL    Spontaneous Tidal Volume 373 mL    Peep CHM2O 14.0 cm H2O    Critical Called By CORNELIO GARCIA-ACCS     Critical Called To      Critical Call Time 1706     Critical Read Back Y     Site of Arterial Puncture Brachial Right     Aba's Test Negative    BLOOD GAS ARTERIAL FULL PANEL   Result Value Ref Range    POCT pH, Arterial 7.14 (LL) 7.38 - 7.42 pH    POCT pCO2, Arterial 70 (HH) 38 - 42 mm Hg    POCT pO2, Arterial 124 (H) 85 - 95 mm Hg    POCT SO2, Arterial 98 94 - 100 %    POCT Oxy Hemoglobin, Arterial 96.2 94.0 - 98.0 %    POCT Hematocrit Calculated, Arterial 22.0 (L) 36.0 - 46.0 %    POCT Sodium, Arterial 137 136 - 145 mmol/L    POCT Potassium, Arterial 4.0 3.5 - 5.3 mmol/L    POCT Chloride, Arterial 110 (H) 98 - 107 mmol/L    POCT Ionized Calcium, Arterial 1.26 1.10 - 1.33 mmol/L    POCT Glucose, Arterial 230 (H) 74 - 99 mg/dL    POCT Lactate, Arterial 1.0 0.4 - 2.0 mmol/L    POCT Base Excess, Arterial -5.2 (L) -2.0 - 3.0 mmol/L    POCT HCO3 Calculated, Arterial 23.8 22.0 - 26.0 mmol/L    POCT Hemoglobin, Arterial 7.4 (L) 12.0 - 16.0 g/dL    POCT Anion Gap, Arterial 7 (L) 10 - 25 mmo/L    Patient Temperature 37.0 degrees Celsius    FiO2 80 %    Apparatus      Ventilator Mode A/C     Ventilator Rate 24 bpm    Tidal Volume 370 mL    Peep  CHM2O 12.0 cm H2O    Critical Called By CORNELIO RRT-ACCS     Critical Called To      Critical Call Time 1755     Critical Read Back Y     Site of Arterial Puncture Brachial Right     Aba's Test Negative    BLOOD GAS ARTERIAL FULL PANEL   Result Value Ref Range    POCT pH, Arterial 7.17 (LL) 7.38 - 7.42 pH    POCT pCO2, Arterial 69 (H) 38 - 42 mm Hg    POCT pO2, Arterial 111 (H) 85 - 95 mm Hg    POCT SO2, Arterial 98 94 - 100 %    POCT Oxy Hemoglobin, Arterial 96.9 94.0 - 98.0 %    POCT Hematocrit Calculated, Arterial 23.0 (L) 36.0 - 46.0 %    POCT Sodium, Arterial 138 136 - 145 mmol/L    POCT Potassium, Arterial 4.4 3.5 - 5.3 mmol/L    POCT Chloride, Arterial 110 (H) 98 - 107 mmol/L    POCT Ionized Calcium, Arterial 1.29 1.10 - 1.33 mmol/L    POCT Glucose, Arterial 218 (H) 74 - 99 mg/dL    POCT Lactate, Arterial 1.0 0.4 - 2.0 mmol/L    POCT Base Excess, Arterial -3.5 (L) -2.0 - 3.0 mmol/L    POCT HCO3 Calculated, Arterial 25.2 22.0 - 26.0 mmol/L    POCT Hemoglobin, Arterial 7.6 (L) 12.0 - 16.0 g/dL    POCT Anion Gap, Arterial 7 (L) 10 - 25 mmo/L    Patient Temperature 37.0 degrees Celsius    FiO2 80 %    Apparatus      Ventilator Mode      Ventilator Rate 24 bpm    Tidal Volume 400 mL    Peep CHM2O 12.0 cm H2O    Critical Called By ADDISON HARDIN RRT     Critical Called To KEMAR HERNANDEZ MD     Critical Call Time 1931     Critical Read Back Y     Site of Arterial Puncture Radial Right     Aba's Test Positive    POCT GLUCOSE   Result Value Ref Range    POCT Glucose 191 (H) 74 - 99 mg/dL   POCT GLUCOSE   Result Value Ref Range    POCT Glucose 174 (H) 74 - 99 mg/dL   Blood Gas Arterial Full Panel   Result Value Ref Range    POCT pH, Arterial 7.29 (L) 7.38 - 7.42 pH    POCT pCO2, Arterial 50 (H) 38 - 42 mm Hg    POCT pO2, Arterial 156 (H) 85 - 95 mm Hg    POCT SO2, Arterial 99 94 - 100 %    POCT Oxy Hemoglobin, Arterial 96.9 94.0 - 98.0 %    POCT Hematocrit Calculated, Arterial 25.0 (L) 36.0 - 46.0 %    POCT  Sodium, Arterial 138 136 - 145 mmol/L    POCT Potassium, Arterial 3.9 3.5 - 5.3 mmol/L    POCT Chloride, Arterial 110 (H) 98 - 107 mmol/L    POCT Ionized Calcium, Arterial 1.23 1.10 - 1.33 mmol/L    POCT Glucose, Arterial 182 (H) 74 - 99 mg/dL    POCT Lactate, Arterial 1.2 0.4 - 2.0 mmol/L    POCT Base Excess, Arterial -2.6 (L) -2.0 - 3.0 mmol/L    POCT HCO3 Calculated, Arterial 24.0 22.0 - 26.0 mmol/L    POCT Hemoglobin, Arterial 8.2 (L) 12.0 - 16.0 g/dL    POCT Anion Gap, Arterial 8 (L) 10 - 25 mmo/L    Patient Temperature 37.0 degrees Celsius    FiO2 80 %    Apparatus      Ventilator Mode      Ventilator Rate 26 bpm    Tidal Volume 400 mL    Peep CHM2O 12.0 cm H2O    Site of Arterial Puncture Arterial Line    POCT GLUCOSE   Result Value Ref Range    POCT Glucose 226 (H) 74 - 99 mg/dL   CBC and Auto Differential   Result Value Ref Range    WBC 16.9 (H) 4.4 - 11.3 x10*3/uL    nRBC 0.0 0.0 - 0.0 /100 WBCs    RBC 2.45 (L) 4.00 - 5.20 x10*6/uL    Hemoglobin 7.4 (L) 12.0 - 16.0 g/dL    Hematocrit 22.7 (L) 36.0 - 46.0 %    MCV 93 80 - 100 fL    MCH 30.2 26.0 - 34.0 pg    MCHC 32.6 32.0 - 36.0 g/dL    RDW 13.3 11.5 - 14.5 %    Platelets 534 (H) 150 - 450 x10*3/uL    Neutrophils % 88.1 40.0 - 80.0 %    Immature Granulocytes %, Automated 2.2 (H) 0.0 - 0.9 %    Lymphocytes % 5.3 13.0 - 44.0 %    Monocytes % 4.3 2.0 - 10.0 %    Eosinophils % 0.0 0.0 - 6.0 %    Basophils % 0.1 0.0 - 2.0 %    Neutrophils Absolute 14.92 (H) 1.60 - 5.50 x10*3/uL    Immature Granulocytes Absolute, Automated 0.37 0.00 - 0.50 x10*3/uL    Lymphocytes Absolute 0.89 0.80 - 3.00 x10*3/uL    Monocytes Absolute 0.72 0.05 - 0.80 x10*3/uL    Eosinophils Absolute 0.00 0.00 - 0.40 x10*3/uL    Basophils Absolute 0.02 0.00 - 0.10 x10*3/uL   Magnesium   Result Value Ref Range    Magnesium 2.56 (H) 1.60 - 2.40 mg/dL   Renal function panel   Result Value Ref Range    Glucose 200 (H) 74 - 99 mg/dL    Sodium 138 136 - 145 mmol/L    Potassium 3.6 3.5 - 5.3 mmol/L     Chloride 105 98 - 107 mmol/L    Bicarbonate 25 21 - 32 mmol/L    Anion Gap 12 10 - 20 mmol/L    Urea Nitrogen 62 (H) 6 - 23 mg/dL    Creatinine 2.44 (H) 0.50 - 1.05 mg/dL    eGFR 20 (L) >60 mL/min/1.73m*2    Calcium 8.3 (L) 8.6 - 10.3 mg/dL    Phosphorus 3.9 2.5 - 4.9 mg/dL    Albumin 2.8 (L) 3.4 - 5.0 g/dL   BLOOD GAS ARTERIAL FULL PANEL   Result Value Ref Range    POCT pH, Arterial 7.35 (L) 7.38 - 7.42 pH    POCT pCO2, Arterial 43 (H) 38 - 42 mm Hg    POCT pO2, Arterial 164 (H) 85 - 95 mm Hg    POCT SO2, Arterial 100 94 - 100 %    POCT Oxy Hemoglobin, Arterial 96.3 94.0 - 98.0 %    POCT Hematocrit Calculated, Arterial 23.0 (L) 36.0 - 46.0 %    POCT Sodium, Arterial 136 136 - 145 mmol/L    POCT Potassium, Arterial 3.7 3.5 - 5.3 mmol/L    POCT Chloride, Arterial 109 (H) 98 - 107 mmol/L    POCT Ionized Calcium, Arterial 1.21 1.10 - 1.33 mmol/L    POCT Glucose, Arterial 193 (H) 74 - 99 mg/dL    POCT Lactate, Arterial 1.4 0.4 - 2.0 mmol/L    POCT Base Excess, Arterial -1.8 -2.0 - 3.0 mmol/L    POCT HCO3 Calculated, Arterial 23.7 22.0 - 26.0 mmol/L    POCT Hemoglobin, Arterial 7.8 (L) 12.0 - 16.0 g/dL    POCT Anion Gap, Arterial 7 (L) 10 - 25 mmo/L    Patient Temperature 37.0 degrees Celsius    FiO2 80 %    Apparatus      Ventilator Mode      Ventilator Rate 24 bpm    Tidal Volume 400 mL    Peep CHM2O 10.0 cm H2O    Site of Arterial Puncture Arterial Line    POCT GLUCOSE   Result Value Ref Range    POCT Glucose 122 (H) 74 - 99 mg/dL       XR chest abdomen for OG NG placement    Result Date: 1/8/2025  Interpreted By:  Finkelstein, Evan, STUDY: XR CHEST ABDOMEN FOR OG NG PLACEMENT;  1/8/2025 2:41 am two views of the chest.   INDICATION: Signs/Symptoms:OG tube placement.   COMPARISON: None.   ACCESSION NUMBER(S): [id number]   ORDERING CLINICIAN: AMANDA SÁNCHEZ   FINDINGS: Endotracheal tube present with tip approximately 2.4 cm above the noah. OG tube courses below the diaphragm, with tip extending all of the way into  the lower pelvis, beyond the field of view. Extensive patchy airspace opacities throughout the lungs. No sizable pleural effusion or pneumothorax. No acute osseous abnormality.       OG tube courses below the diaphragm with tip extending into the pelvis beyond the field of view. Endotracheal tube tip lies 2.4 cm above the noah. Redemonstrated extensive patchy airspace opacities throughout the lungs concerning for multifocal pneumonia.     MACRO: None.   Signed by: Evan Finkelstein 1/8/2025 3:04 AM Dictation workstation:   [id number]    XR chest 1 view    Result Date: 1/7/2025  Interpreted By:  Isidro Corrales, STUDY: XR CHEST 1 VIEW;  1/7/2025 10:48 pm   INDICATION: Signs/Symptoms:OG tube.   COMPARISON: Chest x-ray 01/07/2025   ACCESSION NUMBER(S): [id number]   ORDERING CLINICIAN: AMANDA SÁCNHEZ   FINDINGS: Enteric tube terminates in the left mid abdomen with side hole below the GE junction, likely within the distal gastric body. Multiple overlying leads are present.   CARDIOMEDIASTINAL SILHOUETTE: Cardiomediastinal silhouette is stable in size and configuration.   LUNGS: Lung apices are excluded from the field of view. There is diffuse bilateral airspace opacities not significantly changed from prior exam.   ABDOMEN: No remarkable upper abdominal findings.   BONES: Multilevel degenerative changes of the spine.       Enteric tube terminates in the left mid abdomen with side hole below the GE junction.   Diffuse bilateral airspace opacities concerning for developing multifocal pneumonia. Continued radiographic follow-up to resolution is advised.   MACRO: None   Signed by: Isidro Corrales 1/7/2025 10:52 PM Dictation workstation:   [id number]    Bronchoscopy    Result Date: 1/7/2025  Table formatting from the original result was not included. Impression The left main stem, left upper lobar bronchus, lingular lobar bronchus, left lower lobar bronchus, right main stem, right upper lobar bronchus, bronchus  intermedius, right middle lobar bronchus and right lower lobar bronchus appeared normal. Bronchoalveolar lavage was performed x1 in the RML and the fluid appeared cloudy Findings The left main stem, left upper lobar bronchus, lingular lobar bronchus, left lower lobar bronchus, right main stem, right upper lobar bronchus, bronchus intermedius, right middle lobar bronchus and right lower lobar bronchus appeared normal. Bronchoalveolar lavage was performed x1 in the RML with 140 mL of saline instilled and a total return of 90 mL. The fluid appeared cloudy. Recommendation There is no recommended follow-up for this procedure. Indication ARDS (adult respiratory distress syndrome) (Multi) Staff Staff Role No Staff Documented Medications See Anesthesia Record. Preprocedure A history and physical has been performed, and patient medication allergies have been reviewed. The patient's tolerance of previous anesthesia has been reviewed. The risks and benefits of the procedure and the sedation options and risks were discussed with the  daughter (medical POA) and son (financial POA) . All questions were answered and informed consent obtained. Details of the Procedure The patient was already under sedation prior to the procedure. The patient's blood pressure, respirations, heart rate, oxygen and ECG were monitored throughout the procedure. The patient experienced no blood loss. The scope was introduced through the endotracheal tube. The procedure was not difficult. The patient tolerated the procedure well. There were no apparent adverse events. Events Procedure Events Event Event Time Specimens * Cannot find log * BAL taken from RML 140cc instilled, retunr 90cc of white cloudy fluid. Mucosa appeared normal and non friable. Some mucous secretions suctioned away. Procedure Location [address] Intensive Care [address] [facility] [address] [phone] Referring Provider Laura PERALTA  MD Haider Procedure Provider Laura PERALTA MD     XR chest 1 view    Result Date: 1/7/2025  Interpreted By:  Elda Dorado, STUDY: XR CHEST 1 VIEW;  1/7/2025 10:40 am   INDICATION: Signs/Symptoms:intubation.   COMPARISON: 01/07/2025 at 5:45 a.m.   ACCESSION NUMBER(S): [id number]   ORDERING CLINICIAN: KENISHA LEE   FINDINGS: Heart is normal in size.   The patient is intubated. The tip terminates above the noah.   There is diffuse parenchymal infiltration.   There are atherosclerotic changes of the aorta. There are degenerative changes of the spine.   COMPARISON OF FINDING: The patient has undergone interval intubation. The lungs are similar.       Diffuse bilateral parenchymal infiltration. Interval intubation.   MACRO: none   Signed by: Elda Dorado 1/7/2025 11:22 AM Dictation workstation:   [id number]    XR chest 1 view    Result Date: 1/7/2025  Interpreted By:  Jaxon Morales, STUDY: XR CHEST 1 VIEW; 1/7/2025 6:35 am   INDICATION: CLINICAL INFORMATION: Signs/Symptoms:persistent hypoxia, unable to wean from NIV.   COMPARISON: 01/06/2025   ACCESSION NUMBER(S): [id number]   ORDERING CLINICIAN: NORM LANDAVERDE   TECHNIQUE: Portable chest one view.   FINDINGS: The cardiac size is indeterminate in view of the AP projection. There is continued diffuse ground-glass infiltrate bilaterally. No effusions are identified.       Persistent ground-glass infiltrates diffusely bilaterally. Etiology is unclear with differential to include infectious organisms as well as pulmonary edema and ARDS.   MACRO: none   Signed by: Jaxon Morales 1/7/2025 8:20 AM Dictation workstation:   [id number]    ECG 12 lead    Result Date: 1/6/2025  Normal sinus rhythm Nonspecific ST abnormality Prolonged QT Abnormal ECG No previous ECGs available Confirmed by Vicky Corado ([id number]) on 1/6/2025 4:01:46 PM    Transthoracic Echo (TTE) Complete    Result Date: 1/6/2025           [place] [address]             Phone [phone] TRANSTHORACIC ECHOCARDIOGRAM REPORT Patient Name:       ROBBIE ROCHA     Reading Physician:    [id number] Vicky Corado MD Study Date:         1/6/2025             Ordering Provider:    [id number] ONRM LANDAVERDE MRN/PID:            [id number]             Fellow: Accession#:         [id number]         Nurse: Date of Birth/Age:  1945 / 79 years Sonographer:          Rosalba Leiva RDCS Gender Assigned at  F                    Additional Staff: Birth: Height:             160.02 cm            Admit Date: Weight:             69.85 kg             Admission Status:     Inpatient -                                                                Routine BSA / BMI:          1.73 m2 / 27.28      Department Location:  [facility]                     kg/m2 Blood Pressure: 151 /76 mmHg Study Type:    TRANSTHORACIC ECHO (TTE) COMPLETE Diagnosis/ICD: Hypoxemia-R09.02; Acute respiratory failure with hypoxia-J96.01 Indication:    hypoxemia CPT Codes:     Echo Complete w Full [id number] Patient History: Smoker:            Former. BMI:               Overweight 25 - 30 Pertinent History: Resp difficulty, bipap, cough, pna, sciatica, arf, resp                    failure/hypoxia. Study Detail: The following Echo studies were performed: 2D, Doppler, M-Mode and               color flow. Technically challenging study due to prominent lung               artifact, body habitus and patient lying in supine position.               Definity used as a contrast agent for endocardial border               definition. Total contrast used for this procedure was 2 mL via IV               push.  PHYSICIAN INTERPRETATION: Left Ventricle: The left ventricular systolic function is normal, with a visually estimated ejection fraction of 70-75%.  There are no regional wall motion abnormalities. The left ventricular cavity size is normal. There is normal septal thickness. Spectral Doppler shows a normal pattern of left ventricular diastolic filling. Left Atrium: The left atrium is normal in size. Right Ventricle: The right ventricle is normal in size. There is normal right ventricular global systolic function. Right Atrium: The right atrium is normal in size. Aortic Valve: The aortic valve is trileaflet. The aortic valve dimensionless index is 0.74. There is no evidence of aortic valve regurgitation. The peak instantaneous gradient of the aortic valve is 22 mmHg. The mean gradient of the aortic valve is 11 mmHg. Mitral Valve: The mitral valve is normal in structure. There is no evidence of mitral valve regurgitation. Tricuspid Valve: The tricuspid valve is structurally normal. There is mild tricuspid regurgitation. Pulmonic Valve: The pulmonic valve is structurally normal. There is physiologic pulmonic valve regurgitation. Pericardium: Small pericardial effusion. Aorta: The aortic root is normal. Systemic Veins: The inferior vena cava appears normal in size, with IVC inspiratory collapse greater than 50%.  CONCLUSIONS:  1. The left ventricular systolic function is normal, with a visually estimated ejection fraction of 70-75%.  2. Spectral Doppler shows a normal pattern of left ventricular diastolic filling.  3. There is normal right ventricular global systolic function.  4. No evidence of mitral valve regurgitation.  5. Mild tricuspid regurgitation is visualized. QUANTITATIVE DATA SUMMARY:  2D MEASUREMENTS:           Normal Ranges: LAs:             2.60 cm   (2.7-4.0cm) IVSd:            0.68 cm   (0.6-1.1cm) LVPWd:           0.53 cm   (0.6-1.1cm) LVIDd:           5.32 cm   (3.9-5.9cm) LV Mass Index:   62.2 g/m2  LV SYSTOLIC FUNCTION BY 2D PLANIMETRY (MOD):                      Normal Ranges: EF-A4C View:    60 % (>=55%) EF-A2C View:    66 % EF-Biplane:     65  % EF-Visual:      73 % LV EF Reported: 73 %  LV DIASTOLIC FUNCTION:           Normal Ranges: MV Peak E:             0.94 m/s  (0.7-1.2 m/s) MV Peak A:             1.04 m/s  (0.42-0.7 m/s) E/A Ratio:             0.91      (1.0-2.2) MV e'                  0.078 m/s (>8.0) MV lateral e'          0.08 m/s MV medial e'           0.07 m/s E/e' Ratio:            12.07     (<8.0) a'                     0.08 m/s  MITRAL VALVE:          Normal Ranges: MV DT:        186 msec (150-240msec)  AORTIC VALVE:                      Normal Ranges: AoV Vmax:                2.35 m/s  (<=1.7m/s) AoV Peak P.1 mmHg (<20mmHg) AoV Mean P.0 mmHg (1.7-11.5mmHg) LVOT Max Cj:            1.95 m/s  (<=1.1m/s) AoV VTI:                 43.90 cm  (18-25cm) LVOT VTI:                32.60 cm LVOT Diameter:           1.90 cm   (1.8-2.4cm) AoV Area, VTI:           2.11 cm2  (2.5-5.5cm2) AoV Area,Vmax:           2.35 cm2  (2.5-4.5cm2) AoV Dimensionless Index: 0.74  RIGHT VENTRICLE: RV s' 0.21 m/s  TRICUSPID VALVE/RVSP:          Normal Ranges: Peak TR Velocity:     3.56 m/s RV Syst Pressure:     59 mmHg  (< 30mmHg) IVC Diam:             1.16 cm  PULMONIC VALVE:          Normal Ranges: PV Accel Time:  124 msec (>120ms) PV Max Cj:     1.3 m/s  (0.6-0.9m/s) PV Max P.9 mmHg  [id number] Vicky Corado MD Electronically signed on 2025 at 3:30:16 PM  ** Final **     XR chest 1 view    Result Date: 2025  Interpreted By:  Jaxon Morales, STUDY: XR CHEST 1 VIEW; 2025 5:38 am   INDICATION: CLINICAL INFORMATION: Signs/Symptoms:Pneumonia follow-up. Previous abnormal chest radiograph. Shortness of breath.   COMPARISON: 2025   ACCESSION NUMBER(S): [id number]   ORDERING CLINICIAN: AMANDA SÁNCHEZ   TECHNIQUE: Portable chest one view.   FINDINGS: The cardiac size is indeterminate in view of the AP projection. Diffuse ground-glass infiltrates are again identified, similar compared to the previous study. No effusions  are appreciated.       Diffuse ground-glass infiltrates. There is no interval change when compared to the previous examination.   MACRO: none   Signed by: Jaxon Morales 1/6/2025 8:29 AM Dictation workstation:   [id number]    CT angio chest for pulmonary embolism    Result Date: 1/5/2025  Interpreted By:  Selin Matthews, STUDY: CT ANGIO CHEST FOR PULMONARY EMBOLISM;  1/5/2025 4:10 pm   INDICATION: Signs/Symptoms:severe hypoxia   COMPARISON: Chest x-ray 01/05/2025. CT chest 07/19/2024   ACCESSION NUMBER(S): [id number]   ORDERING CLINICIAN: NORM LANDAVERDE   TECHNIQUE: Helical data acquisition of the chest was obtained following the uneventful administration of intravenous contrast material. Images were reformatted in axial, coronal, and sagittal planes. MIP images were created and reviewed.   FINDINGS: POTENTIAL LIMITATIONS OF THE STUDY: Motion artifact.   HEART AND VESSELS: The evaluation for filling defects at the pulmonary arteries is degraded by motion artifact. No large central filling defects to suggest pulmonary embolism. The smaller segmental/subsegmental pulmonary arteries are not well evaluated on this exam.   No thoracic aortic aneurysm. Mild atherosclerotic calcifications are noted at the thoracic aorta.   The heart is mildly enlarged. There is trace pericardial effusion.   MEDIASTINUM AND MANPREET, LOWER NECK AND AXILLA: The visualized thyroid gland is small.   A right paratracheal lymph node measures 10 mm in short axis. A subcarinal lymph node measures 12 mm in short axis. A right hilar lymph node measures 19 mm in short axis. A left hilar lymph node measures 11 mm in short axis. Calcified lymph nodes are noted at the mediastinum and left hilum.   LUNGS AND AIRWAYS: The trachea and central airways are patent.   There are diffuse bilateral ground-glass opacities. No pleural effusion or pneumothorax.   UPPER ABDOMEN: Calcific foci at the spleen are suggestive of granulomas.   CHEST WALL AND OSSEOUS  STRUCTURES: Multilevel degenerative changes at the spine. Redemonstration of remote compression deformity with Schmorl's node at the superior endplate of L1.       1. Study degraded by motion artifact. No evidence of large central pulmonary emboli. The smaller segmental/subsegmental pulmonary arteries are not well evaluated on this exam. 2. Diffuse bilateral ground-glass opacities, may be secondary to pulmonary edema and/or multifocal pneumonia. Acute respiratory distress syndrome is in the differential diagnoses. 3. Mild cardiomegaly. Trace pericardial effusion. 4. Mild mediastinal and hilar adenopathy.     MACRO: None.   Signed by: Selin Matthews 1/5/2025 6:42 PM Dictation workstation:   [id number]    US renal complete    Result Date: 1/5/2025  Interpreted By:  Jaxon Morales, STUDY: US RENAL COMPLETE; 1/5/2025 10:29 am   INDICATION: Signs/Symptoms:nancy.   COMPARISON: None   ACCESSION NUMBER(S): [id number]   ORDERING CLINICIAN: NORM LANDAVERDE   TECHNIQUE: Grayscale and color Doppler imaging of the kidneys   FINDINGS: The right kidney measures 11.0 cm  . The left kidney measures 11.0 cm  .   There is mild increased echogenicity of the renal cortex bilaterally.     No renal stones are identified.  Renal cortex is normal in thickness bilaterally. No hydronephrosis is identified.   Urinary bladder is nonspecific in appearance with no stones or masses identified.       Mild increased renal cortical echogenicity diffusely bilaterally suggesting chronic renal medical disease.   MACRO: none   Signed by: Jaxon Morales 1/5/2025 12:45 PM Dictation workstation:   [id number]    XR chest 1 view    Result Date: 1/5/2025  Interpreted By:  Sincere Slater, STUDY: XR CHEST 1 VIEW;  1/5/2025 8:48 am   INDICATION: Signs/Symptoms:respiratory distress acute.   COMPARISON: 01/02/2025   ACCESSION NUMBER(S): [id number]   ORDERING CLINICIAN: RAMANDEEP ALLRED   FINDINGS: Diffusely increased bilateral airspace consolidation. No visualized  pleural effusion. Cardiomediastinal silhouette unchanged. Aortic atherosclerosis. Thoracic degenerative changes with dextroscoliosis.       Diffusely increased bilateral airspace consolidation.   MACRO: None   Signed by: Sincere Slater 1/5/2025 10:08 AM Dictation workstation:   [id number]    XR chest 2 views    Result Date: 1/2/2025  Interpreted By:  Jaxon Morales, STUDY: XR CHEST 2 VIEWS; 1/2/2025 3:05 pm   INDICATION: Signs/Symptoms:fever, productive cough, chest pain.   COMPARISON: 06/14/2022   ACCESSION NUMBER(S): [id number]   ORDERING CLINICIAN: DANIAL COATES   TECHNIQUE: AP and lateral views of the chest were obtained.   FINDINGS: The cardiac silhouette is normal in appearance. There are new patchy bilateral alveolar infiltrates most marked within the upper lobes bilaterally .  No effusions are identified.       Extensive patchy bilateral infiltrates most marked within the upper lobes bilaterally. Findings are suspicious for pneumonia. Follow-up to assure complete clearing is suggested.   MACRO: none   Signed by: Jaxon Morales 1/2/2025 3:43 PM Dictation workstation:   [id number]       Assessment/Plan   Assessment & Plan  Community acquired pneumonia of both upper lobes  # ARDS     Likely multifocal pneumonia as underlying etiology for ARDS. Bronchoscopy ruled on DAH. Procal high at 2.55. Leukocytosis improving to 16. Unfortunately no diff on cell count sent from bronch yesterday.     -- Would attempt to maintain low tidal volume ventilation for lung protection -- at 5'3' 6cc/kg would be 330cc. Consider switching to volume control. Likely needs increased sedation +/- paralytic to avoid double triggering the vent   -- f/u BAL studies    Will continue to follow           I spent 40 minutes in the professional and overall care of this patient.      Laura Maloney MD

## 2025-01-08 NOTE — SIGNIFICANT EVENT
01/07/25 2130   Pre-Procedure Checklist   Emergent Line Insertion Yes   Type of Line to be Placed Introducer   Consent Obtained Yes   Emergency Medication Necessary Yes (order prior to procedure)   Patient Identified with 2 Independent Identifiers Yes   Review of Allergies, Anticoagulation, Relevant Labs, ECG/Telemetry Yes   Risks/Benefits/Alternatives Discussed with Patient/POA/Legal Representative Yes   Stop Sign on Door Yes   Time Out Performed Yes   Catheter Exchange No   Positioning and Preparation Checklist   All People, Including Patient, in the Room with Cap and Mask Yes   Fluoroscopy Used to Identify Vessel and Guide Insertion; Sterile Cover Used No   Ultrasound Used to Identify Vessel and Guide Insertion; Sterile Cover Used Yes   Full Barrier Precautions Followed (Mask, Cap, Gown, Gloves) Yes   Hands Washed Yes   Monitors Attached with Sound Alarms On Yes   Full Body Sterile Drape (Head-to-Toe) Used to Cover Patient Yes   CHG Skin Prep Used and Allowed to Air Dry Prior to Skin Procedure Yes   Procedure Checklist   Blood Aspirated From All Lumens, All Ports Subsequently Flushed Yes   Catheter Caps Placed On All Lumens; Lumens Clamped Yes   Maintain Guidewire Control Throughout, Ensuring Guidewire Removal Yes   Maintain Sterile Field Throughout Insertion Yes   Catheter Secured Yes   Confirmatory Test of Venous Placement Longitudinal ultrasound   Post-Procedure Checklist   Date and Time Written on Dressing Yes   Sharp and Wire Count and Safe Disposal of all Sharps/Wires Yes   Sterile Dressing Applied Per Protocol Yes

## 2025-01-08 NOTE — PROGRESS NOTES
Janet Snow is a 79 y.o. female on day 6 of admission presenting with Community acquired pneumonia of both upper lobes.      Subjective   Patient remains intubated on the ventilator, creatinine trending up to 2.44, nonoliguric however made 1.2 L of urine yesterday.       Objective          Vitals 24HR  Heart Rate:  [70-95]   Temp:  [34.1 °C (93.4 °F)-36.8 °C (98.2 °F)]   Resp:  [16-36]   BP: ()/(51-78)   Weight:  [70.8 kg (156 lb 1.4 oz)-74.1 kg (163 lb 5.8 oz)]   SpO2:  [93 %-99 %]       Intake/Output last 3 Shifts:    Intake/Output Summary (Last 24 hours) at 1/8/2025 1445  Last data filed at 1/8/2025 1215  Gross per 24 hour   Intake 2298.34 ml   Output 1880 ml   Net 418.34 ml       Physical Exam  Constitutional:       Interventions: She is sedated and intubated.   Cardiovascular:      No friction rub.   Pulmonary:      Effort: She is intubated.      Comments: Mechanically ventilated, mildly diminished breath sounds  Abdominal:      General: Bowel sounds are normal.      Palpations: Abdomen is soft.   Musculoskeletal:      Right lower leg: No edema.      Left lower leg: No edema.     Relevant Results  Results for orders placed or performed during the hospital encounter of 01/02/25 (from the past 24 hours)   POCT GLUCOSE   Result Value Ref Range    POCT Glucose 210 (H) 74 - 99 mg/dL   Respiratory Culture/Smear    Specimen: BAL; Fluid   Result Value Ref Range    Gram Stain (4+) Abundant Polymorphonuclear leukocytes     Gram Stain No organisms seen    AFB Processed   Result Value Ref Range    Extra Tube Hold for add-ons.    Body fluid cell count   Result Value Ref Range    Color, Fluid Colorless Colorless, Straw, Yellow    Clarity, Fluid Hazy (A) Clear    WBC, Fluid 795 See Comment /uL    RBC, Fluid 1,000 0  /uL /uL   Pathologist Review-Cell Count,Fluid   Result Value Ref Range    Pathologist Review-Cell Count, Fluid       Predominantly inflammatory cells with smearing artifact.   BLOOD GAS ARTERIAL FULL  PANEL   Result Value Ref Range    POCT pH, Arterial 7.23 (LL) 7.38 - 7.42 pH    POCT pCO2, Arterial 58 (H) 38 - 42 mm Hg    POCT pO2, Arterial 114 (H) 85 - 95 mm Hg    POCT SO2, Arterial 98 94 - 100 %    POCT Oxy Hemoglobin, Arterial 96.7 94.0 - 98.0 %    POCT Hematocrit Calculated, Arterial 23.0 (L) 36.0 - 46.0 %    POCT Sodium, Arterial 137 136 - 145 mmol/L    POCT Potassium, Arterial 3.7 3.5 - 5.3 mmol/L    POCT Chloride, Arterial 110 (H) 98 - 107 mmol/L    POCT Ionized Calcium, Arterial 1.29 1.10 - 1.33 mmol/L    POCT Glucose, Arterial 218 (H) 74 - 99 mg/dL    POCT Lactate, Arterial 1.1 0.4 - 2.0 mmol/L    POCT Base Excess, Arterial -3.3 (L) -2.0 - 3.0 mmol/L    POCT HCO3 Calculated, Arterial 24.3 22.0 - 26.0 mmol/L    POCT Hemoglobin, Arterial 7.7 (L) 12.0 - 16.0 g/dL    POCT Anion Gap, Arterial 6 (L) 10 - 25 mmo/L    Patient Temperature 37.0 degrees Celsius    FiO2 80 %    Apparatus      Ventilator Mode A/C     Ventilator Rate 24 bpm    Tidal Volume 370 mL    Spontaneous Tidal Volume 373 mL    Peep CHM2O 14.0 cm H2O    Critical Called By CORNELIO GARCIA-ACCS     Critical Called To      Critical Call Time 1706     Critical Read Back Y     Site of Arterial Puncture Brachial Right     Aba's Test Negative    BLOOD GAS ARTERIAL FULL PANEL   Result Value Ref Range    POCT pH, Arterial 7.14 (LL) 7.38 - 7.42 pH    POCT pCO2, Arterial 70 (HH) 38 - 42 mm Hg    POCT pO2, Arterial 124 (H) 85 - 95 mm Hg    POCT SO2, Arterial 98 94 - 100 %    POCT Oxy Hemoglobin, Arterial 96.2 94.0 - 98.0 %    POCT Hematocrit Calculated, Arterial 22.0 (L) 36.0 - 46.0 %    POCT Sodium, Arterial 137 136 - 145 mmol/L    POCT Potassium, Arterial 4.0 3.5 - 5.3 mmol/L    POCT Chloride, Arterial 110 (H) 98 - 107 mmol/L    POCT Ionized Calcium, Arterial 1.26 1.10 - 1.33 mmol/L    POCT Glucose, Arterial 230 (H) 74 - 99 mg/dL    POCT Lactate, Arterial 1.0 0.4 - 2.0 mmol/L    POCT Base Excess, Arterial -5.2 (L) -2.0 - 3.0 mmol/L    POCT HCO3  Calculated, Arterial 23.8 22.0 - 26.0 mmol/L    POCT Hemoglobin, Arterial 7.4 (L) 12.0 - 16.0 g/dL    POCT Anion Gap, Arterial 7 (L) 10 - 25 mmo/L    Patient Temperature 37.0 degrees Celsius    FiO2 80 %    Apparatus      Ventilator Mode A/C     Ventilator Rate 24 bpm    Tidal Volume 370 mL    Peep CHM2O 12.0 cm H2O    Critical Called By CORNELIO RRT-ACCS     Critical Called To      Critical Call Time 1755     Critical Read Back Y     Site of Arterial Puncture Brachial Right     Aba's Test Negative    BLOOD GAS ARTERIAL FULL PANEL   Result Value Ref Range    POCT pH, Arterial 7.17 (LL) 7.38 - 7.42 pH    POCT pCO2, Arterial 69 (H) 38 - 42 mm Hg    POCT pO2, Arterial 111 (H) 85 - 95 mm Hg    POCT SO2, Arterial 98 94 - 100 %    POCT Oxy Hemoglobin, Arterial 96.9 94.0 - 98.0 %    POCT Hematocrit Calculated, Arterial 23.0 (L) 36.0 - 46.0 %    POCT Sodium, Arterial 138 136 - 145 mmol/L    POCT Potassium, Arterial 4.4 3.5 - 5.3 mmol/L    POCT Chloride, Arterial 110 (H) 98 - 107 mmol/L    POCT Ionized Calcium, Arterial 1.29 1.10 - 1.33 mmol/L    POCT Glucose, Arterial 218 (H) 74 - 99 mg/dL    POCT Lactate, Arterial 1.0 0.4 - 2.0 mmol/L    POCT Base Excess, Arterial -3.5 (L) -2.0 - 3.0 mmol/L    POCT HCO3 Calculated, Arterial 25.2 22.0 - 26.0 mmol/L    POCT Hemoglobin, Arterial 7.6 (L) 12.0 - 16.0 g/dL    POCT Anion Gap, Arterial 7 (L) 10 - 25 mmo/L    Patient Temperature 37.0 degrees Celsius    FiO2 80 %    Apparatus      Ventilator Mode      Ventilator Rate 24 bpm    Tidal Volume 400 mL    Peep CHM2O 12.0 cm H2O    Critical Called By ADDISON HARDIN RRT     Critical Called To KEMAR HERNANDEZ MD     Critical Call Time 1931     Critical Read Back Y     Site of Arterial Puncture Radial Right     Aba's Test Positive    POCT GLUCOSE   Result Value Ref Range    POCT Glucose 191 (H) 74 - 99 mg/dL   POCT GLUCOSE   Result Value Ref Range    POCT Glucose 174 (H) 74 - 99 mg/dL   Blood Gas Arterial Full Panel   Result Value  Ref Range    POCT pH, Arterial 7.29 (L) 7.38 - 7.42 pH    POCT pCO2, Arterial 50 (H) 38 - 42 mm Hg    POCT pO2, Arterial 156 (H) 85 - 95 mm Hg    POCT SO2, Arterial 99 94 - 100 %    POCT Oxy Hemoglobin, Arterial 96.9 94.0 - 98.0 %    POCT Hematocrit Calculated, Arterial 25.0 (L) 36.0 - 46.0 %    POCT Sodium, Arterial 138 136 - 145 mmol/L    POCT Potassium, Arterial 3.9 3.5 - 5.3 mmol/L    POCT Chloride, Arterial 110 (H) 98 - 107 mmol/L    POCT Ionized Calcium, Arterial 1.23 1.10 - 1.33 mmol/L    POCT Glucose, Arterial 182 (H) 74 - 99 mg/dL    POCT Lactate, Arterial 1.2 0.4 - 2.0 mmol/L    POCT Base Excess, Arterial -2.6 (L) -2.0 - 3.0 mmol/L    POCT HCO3 Calculated, Arterial 24.0 22.0 - 26.0 mmol/L    POCT Hemoglobin, Arterial 8.2 (L) 12.0 - 16.0 g/dL    POCT Anion Gap, Arterial 8 (L) 10 - 25 mmo/L    Patient Temperature 37.0 degrees Celsius    FiO2 80 %    Apparatus      Ventilator Mode      Ventilator Rate 26 bpm    Tidal Volume 400 mL    Peep CHM2O 12.0 cm H2O    Site of Arterial Puncture Arterial Line    POCT GLUCOSE   Result Value Ref Range    POCT Glucose 226 (H) 74 - 99 mg/dL   CBC and Auto Differential   Result Value Ref Range    WBC 16.9 (H) 4.4 - 11.3 x10*3/uL    nRBC 0.0 0.0 - 0.0 /100 WBCs    RBC 2.45 (L) 4.00 - 5.20 x10*6/uL    Hemoglobin 7.4 (L) 12.0 - 16.0 g/dL    Hematocrit 22.7 (L) 36.0 - 46.0 %    MCV 93 80 - 100 fL    MCH 30.2 26.0 - 34.0 pg    MCHC 32.6 32.0 - 36.0 g/dL    RDW 13.3 11.5 - 14.5 %    Platelets 534 (H) 150 - 450 x10*3/uL    Neutrophils % 88.1 40.0 - 80.0 %    Immature Granulocytes %, Automated 2.2 (H) 0.0 - 0.9 %    Lymphocytes % 5.3 13.0 - 44.0 %    Monocytes % 4.3 2.0 - 10.0 %    Eosinophils % 0.0 0.0 - 6.0 %    Basophils % 0.1 0.0 - 2.0 %    Neutrophils Absolute 14.92 (H) 1.60 - 5.50 x10*3/uL    Immature Granulocytes Absolute, Automated 0.37 0.00 - 0.50 x10*3/uL    Lymphocytes Absolute 0.89 0.80 - 3.00 x10*3/uL    Monocytes Absolute 0.72 0.05 - 0.80 x10*3/uL    Eosinophils  Absolute 0.00 0.00 - 0.40 x10*3/uL    Basophils Absolute 0.02 0.00 - 0.10 x10*3/uL   Magnesium   Result Value Ref Range    Magnesium 2.56 (H) 1.60 - 2.40 mg/dL   Renal function panel   Result Value Ref Range    Glucose 200 (H) 74 - 99 mg/dL    Sodium 138 136 - 145 mmol/L    Potassium 3.6 3.5 - 5.3 mmol/L    Chloride 105 98 - 107 mmol/L    Bicarbonate 25 21 - 32 mmol/L    Anion Gap 12 10 - 20 mmol/L    Urea Nitrogen 62 (H) 6 - 23 mg/dL    Creatinine 2.44 (H) 0.50 - 1.05 mg/dL    eGFR 20 (L) >60 mL/min/1.73m*2    Calcium 8.3 (L) 8.6 - 10.3 mg/dL    Phosphorus 3.9 2.5 - 4.9 mg/dL    Albumin 2.8 (L) 3.4 - 5.0 g/dL   Procalcitonin   Result Value Ref Range    Procalcitonin 2.10 (H) <=0.07 ng/mL   BLOOD GAS ARTERIAL FULL PANEL   Result Value Ref Range    POCT pH, Arterial 7.35 (L) 7.38 - 7.42 pH    POCT pCO2, Arterial 43 (H) 38 - 42 mm Hg    POCT pO2, Arterial 164 (H) 85 - 95 mm Hg    POCT SO2, Arterial 100 94 - 100 %    POCT Oxy Hemoglobin, Arterial 96.3 94.0 - 98.0 %    POCT Hematocrit Calculated, Arterial 23.0 (L) 36.0 - 46.0 %    POCT Sodium, Arterial 136 136 - 145 mmol/L    POCT Potassium, Arterial 3.7 3.5 - 5.3 mmol/L    POCT Chloride, Arterial 109 (H) 98 - 107 mmol/L    POCT Ionized Calcium, Arterial 1.21 1.10 - 1.33 mmol/L    POCT Glucose, Arterial 193 (H) 74 - 99 mg/dL    POCT Lactate, Arterial 1.4 0.4 - 2.0 mmol/L    POCT Base Excess, Arterial -1.8 -2.0 - 3.0 mmol/L    POCT HCO3 Calculated, Arterial 23.7 22.0 - 26.0 mmol/L    POCT Hemoglobin, Arterial 7.8 (L) 12.0 - 16.0 g/dL    POCT Anion Gap, Arterial 7 (L) 10 - 25 mmo/L    Patient Temperature 37.0 degrees Celsius    FiO2 80 %    Apparatus      Ventilator Mode      Ventilator Rate 24 bpm    Tidal Volume 400 mL    Peep CHM2O 10.0 cm H2O    Site of Arterial Puncture Arterial Line    POCT GLUCOSE   Result Value Ref Range    POCT Glucose 122 (H) 74 - 99 mg/dL   Blood Gas Arterial Full Panel   Result Value Ref Range    POCT pH, Arterial 7.36 (L) 7.38 - 7.42 pH     POCT pCO2, Arterial 44 (H) 38 - 42 mm Hg    POCT pO2, Arterial 110 (H) 85 - 95 mm Hg    POCT SO2, Arterial 98 94 - 100 %    POCT Oxy Hemoglobin, Arterial 96.1 94.0 - 98.0 %    POCT Hematocrit Calculated, Arterial 23.0 (L) 36.0 - 46.0 %    POCT Sodium, Arterial 137 136 - 145 mmol/L    POCT Potassium, Arterial 3.7 3.5 - 5.3 mmol/L    POCT Chloride, Arterial 109 (H) 98 - 107 mmol/L    POCT Ionized Calcium, Arterial 1.20 1.10 - 1.33 mmol/L    POCT Glucose, Arterial 112 (H) 74 - 99 mg/dL    POCT Lactate, Arterial 1.0 0.4 - 2.0 mmol/L    POCT Base Excess, Arterial -0.6 -2.0 - 3.0 mmol/L    POCT HCO3 Calculated, Arterial 24.9 22.0 - 26.0 mmol/L    POCT Hemoglobin, Arterial 7.7 (L) 12.0 - 16.0 g/dL    POCT Anion Gap, Arterial 7 (L) 10 - 25 mmo/L    Patient Temperature 37.0 degrees Celsius    FiO2 60 %    Ventilator Mode Other     Ventilator Rate 24 bpm    Tidal Volume 370 mL    Peep CHM2O 8.0 cm H2O    Site of Arterial Puncture Arterial Line     Aba's Test Negative    Blood Gas Arterial Full Panel   Result Value Ref Range    POCT pH, Arterial 7.37 (L) 7.38 - 7.42 pH    POCT pCO2, Arterial 41 38 - 42 mm Hg    POCT pO2, Arterial 75 (L) 85 - 95 mm Hg    POCT SO2, Arterial 99 94 - 100 %    POCT Oxy Hemoglobin, Arterial 96.2 94.0 - 98.0 %    POCT Hematocrit Calculated, Arterial 22.0 (L) 36.0 - 46.0 %    POCT Sodium, Arterial 138 136 - 145 mmol/L    POCT Potassium, Arterial 3.8 3.5 - 5.3 mmol/L    POCT Chloride, Arterial 108 (H) 98 - 107 mmol/L    POCT Ionized Calcium, Arterial 1.18 1.10 - 1.33 mmol/L    POCT Glucose, Arterial 115 (H) 74 - 99 mg/dL    POCT Lactate, Arterial 0.9 0.4 - 2.0 mmol/L    POCT Base Excess, Arterial -1.5 -2.0 - 3.0 mmol/L    POCT HCO3 Calculated, Arterial 23.7 22.0 - 26.0 mmol/L    POCT Hemoglobin, Arterial 7.2 (L) 12.0 - 16.0 g/dL    POCT Anion Gap, Arterial 10 10 - 25 mmo/L    Patient Temperature 37.0 degrees Celsius    FiO2 50 %    Ventilator Mode Other     Ventilator Rate 24 bpm    Tidal Volume 370  mL    Peep CHM2O 10.0 cm H2O    Site of Arterial Puncture Arterial Line     Aba's Test Negative    Type and screen   Result Value Ref Range    ABO TYPE AB     Rh TYPE POS     ANTIBODY SCREEN NEG    Basic metabolic panel   Result Value Ref Range    Glucose 122 (H) 74 - 99 mg/dL    Sodium 139 136 - 145 mmol/L    Potassium 3.8 3.5 - 5.3 mmol/L    Chloride 108 (H) 98 - 107 mmol/L    Bicarbonate 22 21 - 32 mmol/L    Anion Gap 13 10 - 20 mmol/L    Urea Nitrogen 64 (H) 6 - 23 mg/dL    Creatinine 2.47 (H) 0.50 - 1.05 mg/dL    eGFR 19 (L) >60 mL/min/1.73m*2    Calcium 7.6 (L) 8.6 - 10.3 mg/dL            Assessment/Plan   Acute kidney injury likely  secondary to the use of IV contrast- Cr trending up, continue to monitor renal function very closely, no indication for dialysis therapy at this point.  I did discuss with the ICU team, infectious disease and pulmonology that if possible I recommend she be switched off of Bactrim DS to an alternate agent as this can certainly put her over the top to worsening kidney function possibly requiring dialysis.  Teams were in agreement to an alternate therapy which will be deferred to infectious disease and pulmonology.  ANCA and monoclonals are still pending.  Monitor CK level  Bilateral pneumonia , antibiotics per infectious disease, see above  Acute respiratory failure, now intubated  Anemia  Hyperlipidemia  Thrombocytosis rule out polycythemia- recommend Hematology consult    Luli Hemphill MD

## 2025-01-08 NOTE — PROCEDURES
Arterial Line Insertion    Indication: Frequent ABG's    Catheter: 20g, 5cm    Procedure  The patient was prepped and draped in the normal sterile fashion.  The ultrasound probe was draped with a sterile probe cover. Using ultrasound guidance, the Left radial artery was identified and cannulated.  There was good pulsatile flow through the needle.  A wire was then introduced into the artery.  The needle was removed and using the seldinger technique, the catheter was advanced over the wire.  The catheter was then secured with sutures and connected to the monitor with a good arterial waveform noted.  A dressing was applied.      Complications: None      The patient tolerated the procedure well.    Linda Dodd PA-C

## 2025-01-08 NOTE — NURSING NOTE
Notified Linda Dodd PA-C that patient continues to breathe over vent with respirations at 32-36/min and SBP is high 170s-180s. Asked if patients PRN Valium could be given to try to help this. OK to give

## 2025-01-08 NOTE — CARE PLAN
Problem: Mechanical Ventilation  Goal: Patient Will Maintain Patent Airway  Outcome: Progressing  Goal: Oral health is maintained or improved  Outcome: Progressing  Goal: Tracheostomy will be managed safely  Outcome: Progressing  Goal: ET tube will be managed safely  Outcome: Progressing  Goal: Ability to express needs and understand communication  Outcome: Progressing  Goal: Mobility/activity is maintained at optimum level for patient  Outcome: Progressing

## 2025-01-08 NOTE — PROGRESS NOTES
Janet Snow is a 79 y.o. female on day 6 of admission presenting with Community acquired pneumonia of both upper lobes.    Subjective   Interval History:   Patient is intubated and sedated  Afebrile  Discussed with primary team        Review of Systems   Unable to perform ROS: Intubated       Objective   Range of Vitals (last 24 hours)  Heart Rate:  [70-95]   Temp:  [34.1 °C (93.4 °F)-36.8 °C (98.2 °F)]   Resp:  [16-36]   BP: ()/(51-78)   Weight:  [70.8 kg (156 lb 1.4 oz)-74.1 kg (163 lb 5.8 oz)]   SpO2:  [93 %-99 %]   Daily Weight  01/08/25 : 74.1 kg (163 lb 5.8 oz)    Body mass index is 28.94 kg/m².    Physical Exam  Constitutional:       Interventions: She is sedated and intubated.   HENT:      Head: Normocephalic and atraumatic.   Eyes:      General: No scleral icterus.     Conjunctiva/sclera: Conjunctivae normal.   Cardiovascular:      Rate and Rhythm: Normal rate and regular rhythm.      Heart sounds: Normal heart sounds.   Pulmonary:      Effort: She is intubated.      Breath sounds: Decreased breath sounds present.   Abdominal:      General: Bowel sounds are normal.      Palpations: Abdomen is soft.   Musculoskeletal:      Cervical back: Neck supple.      Right lower leg: No edema.      Left lower leg: No edema.   Skin:     General: Skin is warm and dry.   Neurological:      Comments: Unable    Psychiatric:      Comments: Unable to assess            Antibiotics  doxycycline (Vibramycin)  mg in 100 mL D5W (ADV/MBP)  linezolid - 600 mg/300 mL  piperacillin-tazobactam - 2.25 gram/50 mL    Relevant Results  Labs  Results from last 72 hours   Lab Units 01/08/25  0455 01/07/25  0416 01/06/25  0433   WBC AUTO x10*3/uL 16.9* 21.3* 21.9*   HEMOGLOBIN g/dL 7.4* 7.9* 8.4*   HEMATOCRIT % 22.7* 24.5* 25.3*   PLATELETS AUTO x10*3/uL 534* 629* 585*   NEUTROS PCT AUTO % 88.1 91.0 92.6   LYMPHS PCT AUTO % 5.3 3.3 3.0   MONOS PCT AUTO % 4.3 4.7 3.2   EOS PCT AUTO % 0.0 0.0 0.0     Results from last 72 hours    Lab Units 01/08/25 0455 01/07/25 0416 01/06/25  0433   SODIUM mmol/L 138 140 137   POTASSIUM mmol/L 3.6 3.8 4.1   CHLORIDE mmol/L 105 108* 107   CO2 mmol/L 25 23 22   BUN mg/dL 62* 50* 35*   CREATININE mg/dL 2.44* 2.06* 1.75*   GLUCOSE mg/dL 200* 126* 141*   CALCIUM mg/dL 8.3* 8.7 8.5*   ANION GAP mmol/L 12 13 12   EGFR mL/min/1.73m*2 20* 24* 29*   PHOSPHORUS mg/dL 3.9 3.4 3.0     Results from last 72 hours   Lab Units 01/08/25 0455 01/07/25 0416 01/06/25  0433   ALBUMIN g/dL 2.8* 2.9* 3.0*     Estimated Creatinine Clearance: 18 mL/min (A) (by C-G formula based on SCr of 2.44 mg/dL (H)).  C-Reactive Protein   Date Value Ref Range Status   01/05/2025 35.67 (H) <1.00 mg/dL Final     CRP   Date Value Ref Range Status   01/04/2020 10.1 (H) 0 - 2.0 MG/DL Final     Comment:     Performed at [place] [id number]     Microbiology  Reviewed-BAL work up pending   Imaging  XR chest abdomen for OG NG placement    Result Date: 1/8/2025  Interpreted By:  Finkelstein, Evan, STUDY: XR CHEST ABDOMEN FOR OG NG PLACEMENT;  1/8/2025 2:41 am two views of the chest.   INDICATION: Signs/Symptoms:OG tube placement.   COMPARISON: None.   ACCESSION NUMBER(S): [id number]   ORDERING CLINICIAN: AMANDA SÁNCHEZ   FINDINGS: Endotracheal tube present with tip approximately 2.4 cm above the noah. OG tube courses below the diaphragm, with tip extending all of the way into the lower pelvis, beyond the field of view. Extensive patchy airspace opacities throughout the lungs. No sizable pleural effusion or pneumothorax. No acute osseous abnormality.       OG tube courses below the diaphragm with tip extending into the pelvis beyond the field of view. Endotracheal tube tip lies 2.4 cm above the noah. Redemonstrated extensive patchy airspace opacities throughout the lungs concerning for multifocal pneumonia.     MACRO: None.   Signed by: Evan Finkelstein 1/8/2025 3:04 AM Dictation workstation:   [id number]    XR  chest 1 view    Result Date: 1/7/2025  Interpreted By:  Isidro Corrales, STUDY: XR CHEST 1 VIEW;  1/7/2025 10:48 pm   INDICATION: Signs/Symptoms:OG tube.   COMPARISON: Chest x-ray 01/07/2025   ACCESSION NUMBER(S): [id number]   ORDERING CLINICIAN: AMANDA SÁNCHEZ   FINDINGS: Enteric tube terminates in the left mid abdomen with side hole below the GE junction, likely within the distal gastric body. Multiple overlying leads are present.   CARDIOMEDIASTINAL SILHOUETTE: Cardiomediastinal silhouette is stable in size and configuration.   LUNGS: Lung apices are excluded from the field of view. There is diffuse bilateral airspace opacities not significantly changed from prior exam.   ABDOMEN: No remarkable upper abdominal findings.   BONES: Multilevel degenerative changes of the spine.       Enteric tube terminates in the left mid abdomen with side hole below the GE junction.   Diffuse bilateral airspace opacities concerning for developing multifocal pneumonia. Continued radiographic follow-up to resolution is advised.   MACRO: None   Signed by: Isidro Corrales 1/7/2025 10:52 PM Dictation workstation:   [id number]    Bronchoscopy    Result Date: 1/7/2025  Table formatting from the original result was not included. Impression The left main stem, left upper lobar bronchus, lingular lobar bronchus, left lower lobar bronchus, right main stem, right upper lobar bronchus, bronchus intermedius, right middle lobar bronchus and right lower lobar bronchus appeared normal. Bronchoalveolar lavage was performed x1 in the RML and the fluid appeared cloudy Findings The left main stem, left upper lobar bronchus, lingular lobar bronchus, left lower lobar bronchus, right main stem, right upper lobar bronchus, bronchus intermedius, right middle lobar bronchus and right lower lobar bronchus appeared normal. Bronchoalveolar lavage was performed x1 in the RML with 140 mL of saline instilled and a total return of 90 mL. The fluid appeared cloudy.  Recommendation There is no recommended follow-up for this procedure. Indication ARDS (adult respiratory distress syndrome) (Multi) Staff Staff Role No Staff Documented Medications See Anesthesia Record. Preprocedure A history and physical has been performed, and patient medication allergies have been reviewed. The patient's tolerance of previous anesthesia has been reviewed. The risks and benefits of the procedure and the sedation options and risks were discussed with the  daughter (medical POA) and son (financial POA) . All questions were answered and informed consent obtained. Details of the Procedure The patient was already under sedation prior to the procedure. The patient's blood pressure, respirations, heart rate, oxygen and ECG were monitored throughout the procedure. The patient experienced no blood loss. The scope was introduced through the endotracheal tube. The procedure was not difficult. The patient tolerated the procedure well. There were no apparent adverse events. Events Procedure Events Event Event Time Specimens * Cannot find log * BAL taken from RML 140cc instilled, retunr 90cc of white cloudy fluid. Mucosa appeared normal and non friable. Some mucous secretions suctioned away. Procedure Location [address] Intensive [facility] [address] [facility] [id number] [phone] Referring Provider Laura Maloney MD Procedure Provider Laura PERALTA MD     XR chest 1 view    Result Date: 1/7/2025  Interpreted By:  Elda Dorado, STUDY: XR CHEST 1 VIEW;  1/7/2025 10:40 am   INDICATION: Signs/Symptoms:intubation.   COMPARISON: 01/07/2025 at 5:45 a.m.   ACCESSION NUMBER(S): [id number]   ORDERING CLINICIAN: KENISHA LEE   FINDINGS: Heart is normal in size.   The patient is intubated. The tip terminates above the noah.   There is diffuse parenchymal infiltration.   There are atherosclerotic changes of the aorta. There are degenerative changes of the spine.    COMPARISON OF FINDING: The patient has undergone interval intubation. The lungs are similar.       Diffuse bilateral parenchymal infiltration. Interval intubation.   MACRO: none   Signed by: Elda Dorado 1/7/2025 11:22 AM Dictation workstation:   [id number]    XR chest 1 view    Result Date: 1/7/2025  Interpreted By:  Jaxon Morales, STUDY: XR CHEST 1 VIEW; 1/7/2025 6:35 am   INDICATION: CLINICAL INFORMATION: Signs/Symptoms:persistent hypoxia, unable to wean from NIV.   COMPARISON: 01/06/2025   ACCESSION NUMBER(S): [id number]   ORDERING CLINICIAN: NORM LANDAVERDE   TECHNIQUE: Portable chest one view.   FINDINGS: The cardiac size is indeterminate in view of the AP projection. There is continued diffuse ground-glass infiltrate bilaterally. No effusions are identified.       Persistent ground-glass infiltrates diffusely bilaterally. Etiology is unclear with differential to include infectious organisms as well as pulmonary edema and ARDS.   MACRO: none   Signed by: Jaxon Morales 1/7/2025 8:20 AM Dictation workstation:   [id number]    ECG 12 lead    Result Date: 1/6/2025  Normal sinus rhythm Nonspecific ST abnormality Prolonged QT Abnormal ECG No previous ECGs available Confirmed by Vicky Corado ([id number]) on 1/6/2025 4:01:46 PM    Transthoracic Echo (TTE) Complete    Result Date: 1/6/2025           [place] [address]            Phone [phone] TRANSTHORACIC ECHOCARDIOGRAM REPORT Patient Name:       ROBBIE ROCHA     Reading Physician:    [id number] Vicky Corado MD Study Date:         1/6/2025             Ordering Provider:    [id number] NORM LANDAVERDE MRN/PID:            [id number]             Fellow: Accession#:         [id number]         Nurse: Date of Birth/Age:  1945 / 79 years Sonographer:          Rosalba Leiva                                                                 RDCS Gender Assigned at  F                    Additional Staff: Birth: Height:             160.02 cm            Admit Date: Weight:             69.85 kg             Admission Status:     Inpatient -                                                                Routine BSA / BMI:          1.73 m2 / 27.28      Department Location:  [facility]                     kg/m2 Blood Pressure: 151 /76 mmHg Study Type:    TRANSTHORACIC ECHO (TTE) COMPLETE Diagnosis/ICD: Hypoxemia-R09.02; Acute respiratory failure with hypoxia-J96.01 Indication:    hypoxemia CPT Codes:     Echo Complete w Full [id number] Patient History: Smoker:            Former. BMI:               Overweight 25 - 30 Pertinent History: Resp difficulty, bipap, cough, pna, sciatica, arf, resp                    failure/hypoxia. Study Detail: The following Echo studies were performed: 2D, Doppler, M-Mode and               color flow. Technically challenging study due to prominent lung               artifact, body habitus and patient lying in supine position.               Definity used as a contrast agent for endocardial border               definition. Total contrast used for this procedure was 2 mL via IV               push.  PHYSICIAN INTERPRETATION: Left Ventricle: The left ventricular systolic function is normal, with a visually estimated ejection fraction of 70-75%. There are no regional wall motion abnormalities. The left ventricular cavity size is normal. There is normal septal thickness. Spectral Doppler shows a normal pattern of left ventricular diastolic filling. Left Atrium: The left atrium is normal in size. Right Ventricle: The right ventricle is normal in size. There is normal right ventricular global systolic function. Right Atrium: The right atrium is normal in size. Aortic Valve: The aortic valve is trileaflet. The aortic valve dimensionless index is 0.74. There is no evidence of aortic valve regurgitation.  The peak instantaneous gradient of the aortic valve is 22 mmHg. The mean gradient of the aortic valve is 11 mmHg. Mitral Valve: The mitral valve is normal in structure. There is no evidence of mitral valve regurgitation. Tricuspid Valve: The tricuspid valve is structurally normal. There is mild tricuspid regurgitation. Pulmonic Valve: The pulmonic valve is structurally normal. There is physiologic pulmonic valve regurgitation. Pericardium: Small pericardial effusion. Aorta: The aortic root is normal. Systemic Veins: The inferior vena cava appears normal in size, with IVC inspiratory collapse greater than 50%.  CONCLUSIONS:  1. The left ventricular systolic function is normal, with a visually estimated ejection fraction of 70-75%.  2. Spectral Doppler shows a normal pattern of left ventricular diastolic filling.  3. There is normal right ventricular global systolic function.  4. No evidence of mitral valve regurgitation.  5. Mild tricuspid regurgitation is visualized. QUANTITATIVE DATA SUMMARY:  2D MEASUREMENTS:           Normal Ranges: LAs:             2.60 cm   (2.7-4.0cm) IVSd:            0.68 cm   (0.6-1.1cm) LVPWd:           0.53 cm   (0.6-1.1cm) LVIDd:           5.32 cm   (3.9-5.9cm) LV Mass Index:   62.2 g/m2  LV SYSTOLIC FUNCTION BY 2D PLANIMETRY (MOD):                      Normal Ranges: EF-A4C View:    60 % (>=55%) EF-A2C View:    66 % EF-Biplane:     65 % EF-Visual:      73 % LV EF Reported: 73 %  LV DIASTOLIC FUNCTION:           Normal Ranges: MV Peak E:             0.94 m/s  (0.7-1.2 m/s) MV Peak A:             1.04 m/s  (0.42-0.7 m/s) E/A Ratio:             0.91      (1.0-2.2) MV e'                  0.078 m/s (>8.0) MV lateral e'          0.08 m/s MV medial e'           0.07 m/s E/e' Ratio:            12.07     (<8.0) a'                     0.08 m/s  MITRAL VALVE:          Normal Ranges: MV DT:        186 msec (150-240msec)  AORTIC VALVE:                      Normal Ranges: AoV Vmax:                2.35  m/s  (<=1.7m/s) AoV Peak P.1 mmHg (<20mmHg) AoV Mean P.0 mmHg (1.7-11.5mmHg) LVOT Max Jc:            1.95 m/s  (<=1.1m/s) AoV VTI:                 43.90 cm  (18-25cm) LVOT VTI:                32.60 cm LVOT Diameter:           1.90 cm   (1.8-2.4cm) AoV Area, VTI:           2.11 cm2  (2.5-5.5cm2) AoV Area,Vmax:           2.35 cm2  (2.5-4.5cm2) AoV Dimensionless Index: 0.74  RIGHT VENTRICLE: RV s' 0.21 m/s  TRICUSPID VALVE/RVSP:          Normal Ranges: Peak TR Velocity:     3.56 m/s RV Syst Pressure:     59 mmHg  (< 30mmHg) IVC Diam:             1.16 cm  PULMONIC VALVE:          Normal Ranges: PV Accel Time:  124 msec (>120ms) PV Max Cj:     1.3 m/s  (0.6-0.9m/s) PV Max P.9 mmHg  [id number] Vicky Corado MD Electronically signed on 2025 at 3:30:16 PM  ** Final **     XR chest 1 view    Result Date: 2025  Interpreted By:  Jaxon Morales, STUDY: XR CHEST 1 VIEW; 2025 5:38 am   INDICATION: CLINICAL INFORMATION: Signs/Symptoms:Pneumonia follow-up. Previous abnormal chest radiograph. Shortness of breath.   COMPARISON: 2025   ACCESSION NUMBER(S): [id number]   ORDERING CLINICIAN: AMANDA SÁNCHEZ   TECHNIQUE: Portable chest one view.   FINDINGS: The cardiac size is indeterminate in view of the AP projection. Diffuse ground-glass infiltrates are again identified, similar compared to the previous study. No effusions are appreciated.       Diffuse ground-glass infiltrates. There is no interval change when compared to the previous examination.   MACRO: none   Signed by: Jaxon Morales 2025 8:29 AM Dictation workstation:   [id number]    CT angio chest for pulmonary embolism    Result Date: 2025  Interpreted By:  Selin Matthews, STUDY: CT ANGIO CHEST FOR PULMONARY EMBOLISM;  2025 4:10 pm   INDICATION: Signs/Symptoms:severe hypoxia   COMPARISON: Chest x-ray 2025. CT chest 2024   ACCESSION NUMBER(S): [id number]   ORDERING CLINICIAN: NORM LANDAVERDE    TECHNIQUE: Helical data acquisition of the chest was obtained following the uneventful administration of intravenous contrast material. Images were reformatted in axial, coronal, and sagittal planes. MIP images were created and reviewed.   FINDINGS: POTENTIAL LIMITATIONS OF THE STUDY: Motion artifact.   HEART AND VESSELS: The evaluation for filling defects at the pulmonary arteries is degraded by motion artifact. No large central filling defects to suggest pulmonary embolism. The smaller segmental/subsegmental pulmonary arteries are not well evaluated on this exam.   No thoracic aortic aneurysm. Mild atherosclerotic calcifications are noted at the thoracic aorta.   The heart is mildly enlarged. There is trace pericardial effusion.   MEDIASTINUM AND MANPREET, LOWER NECK AND AXILLA: The visualized thyroid gland is small.   A right paratracheal lymph node measures 10 mm in short axis. A subcarinal lymph node measures 12 mm in short axis. A right hilar lymph node measures 19 mm in short axis. A left hilar lymph node measures 11 mm in short axis. Calcified lymph nodes are noted at the mediastinum and left hilum.   LUNGS AND AIRWAYS: The trachea and central airways are patent.   There are diffuse bilateral ground-glass opacities. No pleural effusion or pneumothorax.   UPPER ABDOMEN: Calcific foci at the spleen are suggestive of granulomas.   CHEST WALL AND OSSEOUS STRUCTURES: Multilevel degenerative changes at the spine. Redemonstration of remote compression deformity with Schmorl's node at the superior endplate of L1.       1. Study degraded by motion artifact. No evidence of large central pulmonary emboli. The smaller segmental/subsegmental pulmonary arteries are not well evaluated on this exam. 2. Diffuse bilateral ground-glass opacities, may be secondary to pulmonary edema and/or multifocal pneumonia. Acute respiratory distress syndrome is in the differential diagnoses. 3. Mild cardiomegaly. Trace pericardial effusion. 4.  Mild mediastinal and hilar adenopathy.     MACRO: None.   Signed by: Selin Matthews 1/5/2025 6:42 PM Dictation workstation:   [id number]    US renal complete    Result Date: 1/5/2025  Interpreted By:  Jaxon Morales, STUDY: US RENAL COMPLETE; 1/5/2025 10:29 am   INDICATION: Signs/Symptoms:nancy.   COMPARISON: None   ACCESSION NUMBER(S): [id number]   ORDERING CLINICIAN: NORM LANDAVERDE   TECHNIQUE: Grayscale and color Doppler imaging of the kidneys   FINDINGS: The right kidney measures 11.0 cm  . The left kidney measures 11.0 cm  .   There is mild increased echogenicity of the renal cortex bilaterally.     No renal stones are identified.  Renal cortex is normal in thickness bilaterally. No hydronephrosis is identified.   Urinary bladder is nonspecific in appearance with no stones or masses identified.       Mild increased renal cortical echogenicity diffusely bilaterally suggesting chronic renal medical disease.   MACRO: none   Signed by: Jaxon Morales 1/5/2025 12:45 PM Dictation workstation:   [id number]    XR chest 1 view    Result Date: 1/5/2025  Interpreted By:  Sincere Slater, STUDY: XR CHEST 1 VIEW;  1/5/2025 8:48 am   INDICATION: Signs/Symptoms:respiratory distress acute.   COMPARISON: 01/02/2025   ACCESSION NUMBER(S): [id number]   ORDERING CLINICIAN: RAMANDEEP ALLRED   FINDINGS: Diffusely increased bilateral airspace consolidation. No visualized pleural effusion. Cardiomediastinal silhouette unchanged. Aortic atherosclerosis. Thoracic degenerative changes with dextroscoliosis.       Diffusely increased bilateral airspace consolidation.   MACRO: None   Signed by: Sincere Slater 1/5/2025 10:08 AM Dictation workstation:   [id number]    XR chest 2 views    Result Date: 1/2/2025  Interpreted By:  Jaxon Morales, STUDY: XR CHEST 2 VIEWS; 1/2/2025 3:05 pm   INDICATION: Signs/Symptoms:fever, productive cough, chest pain.   COMPARISON: 06/14/2022   ACCESSION NUMBER(S): [id number]   ORDERING CLINICIAN: DANIAL COATES    TECHNIQUE: AP and lateral views of the chest were obtained.   FINDINGS: The cardiac silhouette is normal in appearance. There are new patchy bilateral alveolar infiltrates most marked within the upper lobes bilaterally .  No effusions are identified.       Extensive patchy bilateral infiltrates most marked within the upper lobes bilaterally. Findings are suspicious for pneumonia. Follow-up to assure complete clearing is suggested.   MACRO: none   Signed by: Jaxon Morales 1/2/2025 3:43 PM Dictation workstation:   [id number]     Assessment/Plan   Acute hypoxic respiratory failure, interval intubation  Sepsis  Acute kidney injury-interval worsening  Leukocytosis, multifactorial-resolving  Pneumonia-gram-positive versus gram-negative versus atypical-negative nasal MRSA PCR     Continue IV Zosyn  Continue IV doxycycline  Continue Zyvox - discussed with intensivist team, recommended discontinuation of fentanyl as this interacts with zyvox.  This has since been discontinued, recommend   Placed on bactrim for PJP coverage, discussed with primary team regarding dose adjustment due to worsening renal status.  Changed to mepron pending assays.  Vent management  Repeat BMP today   Follow-up respiratory viral panel  Follow-up pending work-up  Monitor renal function  Follow-up BAL workup  Supportive care  Monitor temperature and WBC    Discussed with Dr Sanchez    Total time spent caring for the patient today was 30 minutes.  This includes time spent before the visit reviewing the chart, time spent during the visit, and time spent after the visit on documentation.           Helen Moscoso, APRN-CNP

## 2025-01-08 NOTE — CARE PLAN
The patient's goals for the shift include EMIR    The clinical goals for the shift include Maintain hemodynamic stability; maintain synchronization with vent      Problem: Pain - Adult  Goal: Verbalizes/displays adequate comfort level or baseline comfort level  Outcome: Progressing     Problem: Safety - Adult  Goal: Free from fall injury  Outcome: Progressing     Problem: Discharge Planning  Goal: Discharge to home or other facility with appropriate resources  Outcome: Progressing     Problem: Chronic Conditions and Co-morbidities  Goal: Patient's chronic conditions and co-morbidity symptoms are monitored and maintained or improved  Outcome: Progressing     Problem: Respiratory  Goal: Minimal/no exertional discomfort or dyspnea this shift  Outcome: Progressing  Goal: No signs of respiratory distress (eg. Use of accessory muscles. Peds grunting)  Outcome: Progressing  Goal: Verbalize decreased shortness of breath this shift  Outcome: Progressing  Goal: Wean oxygen to maintain O2 saturation per order/standard this shift  Outcome: Progressing     Problem: Skin  Goal: Decreased wound size/increased tissue granulation at next dressing change  Outcome: Progressing  Flowsheets (Taken 1/7/2025 2315)  Decreased wound size/increased tissue granulation at next dressing change:   Promote sleep for wound healing   Protective dressings over bony prominences   Utilize specialty bed per algorithm  Goal: Participates in plan/prevention/treatment measures  Outcome: Progressing  Flowsheets (Taken 1/7/2025 2315)  Participates in plan/prevention/treatment measures:   Discuss with provider PT/OT consult   Elevate heels  Goal: Prevent/manage excess moisture  Outcome: Progressing  Flowsheets (Taken 1/7/2025 2315)  Prevent/manage excess moisture:   Cleanse incontinence/protect with barrier cream   Moisturize dry skin   Follow provider orders for dressing changes   Monitor for/manage infection if present  Goal: Prevent/minimize sheer/friction  injuries  Outcome: Progressing  Flowsheets (Taken 1/7/2025 2315)  Prevent/minimize sheer/friction injuries:   Complete micro-shifts as needed if patient unable. Adjust patient position to relieve pressure points, not a full turn   Increase activity/out of bed for meals   Use pull sheet   HOB 30 degrees or less   Turn/reposition every 2 hours/use positioning/transfer devices   Utilize specialty bed per algorithm  Goal: Promote/optimize nutrition  Outcome: Progressing  Flowsheets (Taken 1/7/2025 2315)  Promote/optimize nutrition: Monitor/record intake including meals  Goal: Promote skin healing  Outcome: Progressing  Flowsheets (Taken 1/7/2025 2315)  Promote skin healing:   Assess skin/pad under line(s)/device(s)   Protective dressings over bony prominences   Turn/reposition every 2 hours/use positioning/transfer devices   Ensure correct size (line/device) and apply per  instructions   Rotate device position/do not position patient on device     Problem: Nutrition  Goal: Nutrition support goals are met within 48 hrs  Outcome: Progressing  Goal: Nutrition support is meeting 75% of nutrient needs  Outcome: Progressing  Goal: Tube feed tolerance  Outcome: Progressing     Problem: Knowledge Deficit  Goal: Patient/family/caregiver demonstrates understanding of disease process, treatment plan, medications, and discharge instructions  Outcome: Progressing     Problem: Mechanical Ventilation  Goal: Patient Will Maintain Patent Airway  Outcome: Progressing  Goal: Oral health is maintained or improved  Outcome: Progressing  Goal: ET tube will be managed safely  Outcome: Progressing  Goal: Ability to express needs and understand communication  Outcome: Progressing  Goal: Mobility/activity is maintained at optimum level for patient  Outcome: Progressing     Problem: Safety - Medical Restraint  Goal: Remains free of injury from restraints (Restraint for Interference with Medical Device)  1/7/2025 2315 by Barbara Zhang  RN  Flowsheets (Taken 1/7/2025 2315)  Remains free of injury from restraints (restraint for interference with medical device):   Determine that other, less restrictive measures have been tried or would not be effective before applying the restraint   Evaluate the patient's condition at the time of restraint application   Inform patient/family regarding the reason for restraint   Every 2 hours: Monitor safety, psychosocial status, comfort, nutrition and hydration  1/7/2025 2315 by Barbara Zhang RN  Outcome: Progressing  Flowsheets (Taken 1/7/2025 2315)  Remains free of injury from restraints (restraint for interference with medical device):   Determine that other, less restrictive measures have been tried or would not be effective before applying the restraint   Evaluate the patient's condition at the time of restraint application   Inform patient/family regarding the reason for restraint   Every 2 hours: Monitor safety, psychosocial status, comfort, nutrition and hydration  Goal: Free from restraint(s) (Restraint for Interference with Medical Device)  1/7/2025 2315 by Barbara Zhang RN  Flowsheets (Taken 1/7/2025 2315)  Free from restraint(s) (restraint for interference with medical device):   ONCE/SHIFT or MINIMUM Every 12 hours: Assess and document the continuing need for restraints   Every 24 hours: Continued use of restraint requires Licensed Independent Practitioner to perform face to face examination and written order   Identify and implement measures to help patient regain control  1/7/2025 2315 by Barbara Zhang RN  Outcome: Progressing  Flowsheets (Taken 1/7/2025 2315)  Free from restraint(s) (restraint for interference with medical device):   ONCE/SHIFT or MINIMUM Every 12 hours: Assess and document the continuing need for restraints   Every 24 hours: Continued use of restraint requires Licensed Independent Practitioner to perform face to face examination and written order   Identify and implement  measures to help patient regain control

## 2025-01-08 NOTE — PROGRESS NOTES
[facility] Critical Care Medicine       Date:  1/8/2025  Patient:  Janet Snow  YOB: 1945  MRN:  [id number]   Admit Date:  1/2/2025    Chief Complaint   Patient presents with    Shortness of Breath         History of Present Illness:  Janet Snow is a 79 y.o. year old female patient with Past Medical History of hypothyroidism, anxiety, depression, hyperlipidemia, sciatica, prior cigarette smoker (30 years, quit 30 years ago) who presented to  ED 1/2 with complaints of shortness of breath. Her symptoms started shortly before [place] which would have been >1 week prior to time of presentation. At this time, she also complained of productive cough with yellow-green colored sputum, difficulty swallowing, chills. She was seen at an urgent care and was prescribed Augmentin, but did not have improvement.      She met SIRS criteria in the ED with fever, tachycardia, and leukocytosis, as well as elevated lactate. She was given rocephin and azithromycin in ED. She was admitted to the regular nursing floor on 2L NC and started on CAP coverage. Pulmonology was consulted and dc azithromycin due to negative urine atypicals. She was reportedly on 3L NC yesterday but would have random episodes of desaturations with coughing fits, but O2 sats would return back to 3L.     On the morning of 1/5, the hospitalist contacted the ICU team to come see her as she became acutely tachypnic, hypoxic with SpO2 in the 60s, came up to the 80s on NRB. She was visibly in respiratory distress, in tripod position at the edge of the bed. ABG 7.37/35/44, confirmed arterial draw. CXR worsening bilateral airspace consolidations compared to CXR 1/2. She was placed on continuous CPAP 8/100% with SpO2 up to the low 90s and urgently transported to the ICU.      She did have a CT chest 7/24 which showed multifocal reticulonodular and ground-glass opacities (since 2020) 2/2 chronic infectious/inflammatory process. Multiple  new-mildly enlarged pulmonary nodules up to 6mm. Multiple unchanged prominent enlarged lymph nodes, likely reactive. Recommended follow-up CT chest in 3-6 months.      Interval ICU Events:  1/5: Pt arrived to ICU on continuous CPAP. Started IV steroids. Work of breathing improved as the day goes on. FiO2 able to be weaned to 80%. Stat CT angio chest obtained to r/o PE and for further differentiation of diffuse infiltrates seen on CXR.      1/6: Attempted to give patient a break from bipap overnight, but she became hypoxic with SpO2 in the 70s immedately. Remains on continuous bipap this morning. Very anxious with coughing fits and becomes acutely SOB with increased WOB during these episodes. CT chest yesterday showing diffuse GGO, will reengage pulmonology for their input. Broaden abx include zyvox.       1/7: Patient had a few desaturations overnight with tachypnea and accessory muscle use. Valium was added to help with her increased anxiety. Patient reports today that she is tired and that her breathing feels worse. We did discuss intubation as well as the risk associated with it. She stated that she wanted to move forward with intubation, but wanted to discuss it with her children. She then had a desaturation to 80% with respiratory distress remained on BiPap with Fi02 of 100%. Call placed to both of her children, with plans for them to come to bedside. Discussed with the patient and her children the option of intubation. Patient wishes to move forward with the understanding that it is not guaranteed that she will be able to be extubated. She states that she only wishes to be intubated for 7 days. All questions answered. Dr. Pandya called to bedside. Patient does with to remain a DNR but is OK with being intubated. Pulmonary following, plan for a bronchoscopy today, will add bactrim to cover for possible PJP.      1/8: Patient placed on fentanyl drip to help with sedation overnight, patient breathing over the  vent overnight. Will work to try and decrease TV today as ABG allows to meet ARDS Net protocol.     Medical History:  History reviewed. No pertinent past medical history.  Past Surgical History:   Procedure Laterality Date    BREAST BIOPSY Right     core biopsy 20 years ago    HYSTERECTOMY      MR HEAD ANGIO WO IV CONTRAST  07/14/2018    MR HEAD ANGIO WO IV CONTRAST [facility]     Medications Prior to Admission   Medication Sig Dispense Refill Last Dose/Taking    simvastatin (Zocor) 20 mg tablet Take 1 tablet (20 mg) by mouth once daily at bedtime.   Taking    FLUoxetine (PROzac) 40 mg capsule Take 1 capsule (40 mg) by mouth once daily.       gabapentin (Neurontin) 400 mg capsule Take 1 capsule (400 mg) by mouth 2 times a day.       levothyroxine (Synthroid, Levoxyl) 50 mcg tablet Take 1 tablet (50 mcg) by mouth early in the morning..        Meperidine (pf), Ropinirole, and Meperidine  Social History     Tobacco Use    Smoking status: Former     Types: Cigarettes    Smokeless tobacco: Never   Substance Use Topics    Drug use: Never     No family history on file.    Review of Systems:  14 point review of systems was completed and negative except for those specially mention in my HPI    Physical Exam:    Heart Rate:  []   Temp:  [34.1 °C (93.4 °F)-36.8 °C (98.2 °F)]   Resp:  [16-36]   BP: ()/(51-78)   Weight:  [70.8 kg (156 lb 1.4 oz)-74.1 kg (163 lb 5.8 oz)]   SpO2:  [92 %-99 %]     Physical Exam  Vitals reviewed.   Constitutional:       Appearance: She is ill-appearing.      Interventions: She is sedated, intubated and restrained.   HENT:      Head: Normocephalic and atraumatic.      Right Ear: External ear normal.      Left Ear: External ear normal.      Nose: Nose normal.      Comments: NGT right nare     Mouth/Throat:      Mouth: Mucous membranes are dry.      Pharynx: Oropharynx is clear.      Comments: ETT   Eyes:      Pupils: Pupils are equal, round, and reactive to light.    Cardiovascular:      Rate and Rhythm: Normal rate and regular rhythm.      Pulses: Normal pulses.      Heart sounds: Normal heart sounds.   Pulmonary:      Effort: She is intubated.      Breath sounds: Decreased breath sounds present.   Abdominal:      General: Bowel sounds are decreased.      Palpations: Abdomen is soft.   Genitourinary:     Comments: Indwelling urinary catheter   Musculoskeletal:      Right lower leg: No edema.      Left lower leg: No edema.   Skin:     General: Skin is warm and dry.      Capillary Refill: Capillary refill takes less than 2 seconds.   Neurological:      Comments: Intubated and sedated    Psychiatric:      Comments: EMIR         Objective:    I have reviewed all medications, laboratory results, and imaging pertinent for today's encounter    Assessment/Plan:    I am currently managing this critically ill patient for the following problems:    Neuro/Psych/Pain Ctrl/Sedation:  #Daily ETOH use  #Hx anxiety, depression   - Pain Control: acetaminophen PRN  - Hold home prozac while on zyvox  - CAM ICU qshift, sleep-wake hygiene, delirium precautions   - Propofol and precedex for sedation  - Dilaudid PRN CPOT >3  - Avoid use of Fentanyl while on zyvox d/t increased risk of serotonin syndrome  - Continue phenobarbital taper   - Continue Thiamine and multivitamin     Respiratory/ENT:  #Acute hypoxic respiratory failure - vasculitis/DAH vs atypical pneumonia vs acute inflammatory process   #Acute respiratory distress syndrome   #Prior tobacco use   - Intubated 1/7  - Continue solu-medrol   - Duonebs q4hrs, add 3% nebs   - CT angio chest: no pulmonary embolism, diffuse ground glass opacities   - Daily CXR  - Bronchoscopy yesterday, pending bronchial washing results   - Pulm hygiene  - Will diurese today     Cardiovascular:  #Hyperlipidemia   - TTE with EF 70-75%  - Continue statin   - Maintain MAPs >65  - Continuous cardiac monitoring   - EKGs PRN for ACS symptoms, arrhythmias      GI:  #Dysphagia  -Start Tube feed today   -Likely need swallow evaluation when extubated  -NPO  -PPI  -Colace bowel regimen    Renal/Volume Status (Intra & Extravascular):  #Acute Kidney Injury   #Acute urinary retention   #Hx urge incontinence   - Baseline Cr 0.9 worsening 2.06  - Hourly I/O's, maintain urine output >0.5cc/kg/hr  - Nephrology following   - Replete electrolytes to maintain K >4.0 and Mg >2.0  - Daily RFP, Mg  - Renal dosage where indicated    Endocrine  #Hypothyroidism   #Steroid induced hyperglycemia   - continue IV synthroid   - ISS with q4hr glucose checks while NPO  - Hypoglycemia protocol PRN     Infectious Disease:  #Atypical pneumonia   #Rule out PJP Pneumonia   - Antibiotics: rocephin, zosyn and doxycycline, bactrim for PJP coverage    - Pending PJP PCR  - Blood cultures: no growth at 2 days   - Procal 2.86  - ESR: 77  - CRP: 35  - ID following   - Aspergillus galactomannan, fungitell, respiratory viral panel, histoplasma pending   - Bronchial wash pending   - Monitor SIRS criteria    Heme/Onc:  #Thrombocytosis - likely acutely reactive   #Anemia  - Platelets down trending to 534  - Hemoglobin down trending, will get type and screen  - Transfuse if Hgb <7.0   - Daily CBC    OBGYN/MSK:  #Hx sciatica   - PT/OT eval  - ICU skin protocol, padded pressure points  - Q2hr turns    Ethics/Code Status:  DNCA    :  DVT Prophylaxis: SQH & SCDs  GI Prophylaxis: PPI  Bowel Regimen: Colace  Diet: NPO, start TF today   CVC: none  [place]: yes  [place]: yes  Restraints: soft  Dispo: ICU    Critical Care Time:  55 minutes spent in preparing to see patient (I.e. review of medical records), evaluation of diagnostics (I.e. labs, imaging, etc.), documentation, discussing plan of care with patient/ family/ caregiver, and/ or coordination of care with multidisciplinary team. Time does not include completion of procedure time.     Plan of care discussed with Dr. Ya Scruggs,  APRN-CNP  Critical Care Medicine  [facility]

## 2025-01-08 NOTE — ASSESSMENT & PLAN NOTE
# ARDS     Likely multifocal pneumonia as underlying etiology for ARDS. Bronchoscopy ruled on DAH. Procal high at 2.55. Leukocytosis improving to 16. Unfortunately no diff on cell count sent from bronch yesterday.     -- Would attempt to maintain low tidal volume ventilation for lung protection -- at 5'3' 6cc/kg would be 330cc. Consider switching to volume control. Likely needs increased sedation +/- paralytic to avoid double triggering the vent   -- f/u BAL studies    Will continue to follow

## 2025-01-08 NOTE — SIGNIFICANT EVENT
Attempted to flush NG tube to ensure patency. Unable to pass air into tube. Tried to manually irrigate to fix clog in tube and was unsuccessful.   0210- Pulled NG tube and re-inserted a 18F NG into right nare. Asked for CXR to confirm placement.   0237- Confirmed placement with Linda Dodd PA-C with CXR/Abd xray. OK to use

## 2025-01-08 NOTE — CARE PLAN
Problem: Pain - Adult  Goal: Verbalizes/displays adequate comfort level or baseline comfort level  Outcome: Progressing     Problem: Safety - Adult  Goal: Free from fall injury  Outcome: Progressing     Problem: Discharge Planning  Goal: Discharge to home or other facility with appropriate resources  Outcome: Progressing     Problem: Chronic Conditions and Co-morbidities  Goal: Patient's chronic conditions and co-morbidity symptoms are monitored and maintained or improved  Outcome: Progressing     Problem: Respiratory  Goal: Minimal/no exertional discomfort or dyspnea this shift  Outcome: Progressing  Goal: No signs of respiratory distress (eg. Use of accessory muscles. Peds grunting)  Outcome: Progressing  Goal: Verbalize decreased shortness of breath this shift  Outcome: Progressing  Goal: Wean oxygen to maintain O2 saturation per order/standard this shift  Outcome: Progressing     Problem: Skin  Goal: Decreased wound size/increased tissue granulation at next dressing change  Outcome: Progressing  Flowsheets (Taken 1/8/2025 0917)  Decreased wound size/increased tissue granulation at next dressing change:   Promote sleep for wound healing   Protective dressings over bony prominences   Utilize specialty bed per algorithm  Goal: Participates in plan/prevention/treatment measures  Outcome: Progressing  Flowsheets (Taken 1/8/2025 0917)  Participates in plan/prevention/treatment measures:   Discuss with provider PT/OT consult   Elevate heels   Increase activity/out of bed for meals  Goal: Prevent/manage excess moisture  Outcome: Progressing  Flowsheets (Taken 1/8/2025 0917)  Prevent/manage excess moisture:   Cleanse incontinence/protect with barrier cream   Monitor for/manage infection if present   Follow provider orders for dressing changes   Moisturize dry skin  Goal: Prevent/minimize sheer/friction injuries  Outcome: Progressing  Flowsheets (Taken 1/8/2025 0917)  Prevent/minimize sheer/friction injuries:   Complete  micro-shifts as needed if patient unable. Adjust patient position to relieve pressure points, not a full turn   Increase activity/out of bed for meals   Use pull sheet   HOB 30 degrees or less   Turn/reposition every 2 hours/use positioning/transfer devices   Utilize specialty bed per algorithm  Goal: Promote/optimize nutrition  Outcome: Progressing  Flowsheets (Taken 1/8/2025 0917)  Promote/optimize nutrition: Discuss with provider if NPO > 2 days  Goal: Promote skin healing  Outcome: Progressing  Flowsheets (Taken 1/8/2025 0917)  Promote skin healing:   Assess skin/pad under line(s)/device(s)   Protective dressings over bony prominences   Turn/reposition every 2 hours/use positioning/transfer devices   Ensure correct size (line/device) and apply per  instructions   Rotate device position/do not position patient on device     Problem: Nutrition  Goal: Nutrition support goals are met within 48 hrs  Outcome: Progressing  Goal: Nutrition support is meeting 75% of nutrient needs  Outcome: Progressing  Goal: Tube feed tolerance  Outcome: Progressing     Problem: Knowledge Deficit  Goal: Patient/family/caregiver demonstrates understanding of disease process, treatment plan, medications, and discharge instructions  Outcome: Progressing     Problem: Mechanical Ventilation  Goal: Patient Will Maintain Patent Airway  Outcome: Progressing  Goal: Oral health is maintained or improved  Outcome: Progressing  Goal: ET tube will be managed safely  Outcome: Progressing  Goal: Ability to express needs and understand communication  Outcome: Progressing  Goal: Mobility/activity is maintained at optimum level for patient  Outcome: Progressing     Problem: Safety - Medical Restraint  Goal: Remains free of injury from restraints (Restraint for Interference with Medical Device)  Outcome: Progressing  Goal: Free from restraint(s) (Restraint for Interference with Medical Device)  Outcome: Progressing   The patient's goals for  the shift include Able to breath better, safety    The clinical goals for the shift include Maintain hemodynamic stability; maintain synchronization with vent

## 2025-01-08 NOTE — PROGRESS NOTES
Janet Snow is a 79 y.o. female on day 5 of admission presenting with Community acquired pneumonia of both upper lobes.      Subjective   Intubated, Cr trending up to 2.06, made 400 ml of urine yesterday.        Objective          Vitals 24HR  Heart Rate:  []   Temp:  [36.3 °C (97.3 °F)-36.8 °C (98.2 °F)]   Resp:  [16-36]   BP: ()/()   Weight:  [70.8 kg (156 lb 1.4 oz)]   SpO2:  [83 %-97 %]     Intake/Output last 3 Shifts:    Intake/Output Summary (Last 24 hours) at 1/7/2025 2216  Last data filed at 1/7/2025 2209  Gross per 24 hour   Intake 1255.67 ml   Output 880 ml   Net 375.67 ml       Physical Exam  Constitutional:       Interventions: She is sedated and intubated.   Cardiovascular:      Rate and Rhythm: Regular rhythm.      Heart sounds:      No friction rub.   Pulmonary:      Effort: She is intubated.      Comments: Mechanically ventilated, mildly diminished breath sounds  Abdominal:      General: Bowel sounds are normal.      Palpations: Abdomen is soft.   Musculoskeletal:      Right lower leg: No edema.      Left lower leg: No edema.         Relevant Results  Results for orders placed or performed during the hospital encounter of 01/02/25 (from the past 24 hours)   B-type natriuretic peptide   Result Value Ref Range     (H) 0 - 99 pg/mL   Lactate dehydrogenase   Result Value Ref Range     (H) 84 - 246 U/L   Blood Gas Arterial Full Panel   Result Value Ref Range    POCT pH, Arterial 7.33 (L) 7.38 - 7.42 pH    POCT pCO2, Arterial 45 (H) 38 - 42 mm Hg    POCT pO2, Arterial 111 (H) 85 - 95 mm Hg    POCT SO2, Arterial 97 94 - 100 %    POCT Oxy Hemoglobin, Arterial 95.5 94.0 - 98.0 %    POCT Hematocrit Calculated, Arterial 27.0 (L) 36.0 - 46.0 %    POCT Sodium, Arterial 137 136 - 145 mmol/L    POCT Potassium, Arterial 4.2 3.5 - 5.3 mmol/L    POCT Chloride, Arterial 110 (H) 98 - 107 mmol/L    POCT Ionized Calcium, Arterial 1.29 1.10 - 1.33 mmol/L    POCT Glucose, Arterial 137  (H) 74 - 99 mg/dL    POCT Lactate, Arterial 1.1 0.4 - 2.0 mmol/L    POCT Base Excess, Arterial -2.2 (L) -2.0 - 3.0 mmol/L    POCT HCO3 Calculated, Arterial 23.7 22.0 - 26.0 mmol/L    POCT Hemoglobin, Arterial 9.1 (L) 12.0 - 16.0 g/dL    POCT Anion Gap, Arterial 8 (L) 10 - 25 mmo/L    Patient Temperature 37.0 degrees Celsius    FiO2 100 %    Apparatus FACE MASK     Ventilator Mode BiPAP     Ipap CMH2O 14.0 cm H2O    Epap CMH2O 10.0 cm H2O    Site of Arterial Puncture Radial Left     Aba's Test Positive    CBC and Auto Differential   Result Value Ref Range    WBC 21.3 (H) 4.4 - 11.3 x10*3/uL    nRBC 0.0 0.0 - 0.0 /100 WBCs    RBC 2.62 (L) 4.00 - 5.20 x10*6/uL    Hemoglobin 7.9 (L) 12.0 - 16.0 g/dL    Hematocrit 24.5 (L) 36.0 - 46.0 %    MCV 94 80 - 100 fL    MCH 30.2 26.0 - 34.0 pg    MCHC 32.2 32.0 - 36.0 g/dL    RDW 13.1 11.5 - 14.5 %    Platelets 629 (H) 150 - 450 x10*3/uL    Neutrophils % 91.0 40.0 - 80.0 %    Immature Granulocytes %, Automated 0.9 0.0 - 0.9 %    Lymphocytes % 3.3 13.0 - 44.0 %    Monocytes % 4.7 2.0 - 10.0 %    Eosinophils % 0.0 0.0 - 6.0 %    Basophils % 0.1 0.0 - 2.0 %    Neutrophils Absolute 19.35 (H) 1.60 - 5.50 x10*3/uL    Immature Granulocytes Absolute, Automated 0.20 0.00 - 0.50 x10*3/uL    Lymphocytes Absolute 0.70 (L) 0.80 - 3.00 x10*3/uL    Monocytes Absolute 1.01 (H) 0.05 - 0.80 x10*3/uL    Eosinophils Absolute 0.00 0.00 - 0.40 x10*3/uL    Basophils Absolute 0.02 0.00 - 0.10 x10*3/uL   Magnesium   Result Value Ref Range    Magnesium 2.62 (H) 1.60 - 2.40 mg/dL   Renal function panel   Result Value Ref Range    Glucose 126 (H) 74 - 99 mg/dL    Sodium 140 136 - 145 mmol/L    Potassium 3.8 3.5 - 5.3 mmol/L    Chloride 108 (H) 98 - 107 mmol/L    Bicarbonate 23 21 - 32 mmol/L    Anion Gap 13 10 - 20 mmol/L    Urea Nitrogen 50 (H) 6 - 23 mg/dL    Creatinine 2.06 (H) 0.50 - 1.05 mg/dL    eGFR 24 (L) >60 mL/min/1.73m*2    Calcium 8.7 8.6 - 10.3 mg/dL    Phosphorus 3.4 2.5 - 4.9 mg/dL     Albumin 2.9 (L) 3.4 - 5.0 g/dL   Creatine Kinase   Result Value Ref Range    Creatine Kinase 371 (H) 0 - 215 U/L   BLOOD GAS ARTERIAL FULL PANEL   Result Value Ref Range    POCT pH, Arterial 7.26 (L) 7.38 - 7.42 pH    POCT pCO2, Arterial 50 (H) 38 - 42 mm Hg    POCT pO2, Arterial 100 (H) 85 - 95 mm Hg    POCT SO2, Arterial 100 94 - 100 %    POCT Oxy Hemoglobin, Arterial 96.9 94.0 - 98.0 %    POCT Hematocrit Calculated, Arterial 24.0 (L) 36.0 - 46.0 %    POCT Sodium, Arterial 139 136 - 145 mmol/L    POCT Potassium, Arterial 4.3 3.5 - 5.3 mmol/L    POCT Chloride, Arterial 112 (H) 98 - 107 mmol/L    POCT Ionized Calcium, Arterial 1.27 1.10 - 1.33 mmol/L    POCT Glucose, Arterial 109 (H) 74 - 99 mg/dL    POCT Lactate, Arterial 1.6 0.4 - 2.0 mmol/L    POCT Base Excess, Arterial -4.5 (L) -2.0 - 3.0 mmol/L    POCT HCO3 Calculated, Arterial 22.4 22.0 - 26.0 mmol/L    POCT Hemoglobin, Arterial 8.0 (L) 12.0 - 16.0 g/dL    POCT Anion Gap, Arterial 9 (L) 10 - 25 mmo/L    Patient Temperature 37.0 degrees Celsius    FiO2 100 %    Apparatus      Ventilator Mode A/C     Ventilator Rate 20 bpm    Tidal Volume 400 mL    Peep CHM2O 14.0 cm H2O    Site of Arterial Puncture Radial Right     Aba's Test Positive    POCT GLUCOSE   Result Value Ref Range    POCT Glucose 114 (H) 74 - 99 mg/dL   BLOOD GAS ARTERIAL FULL PANEL   Result Value Ref Range    POCT pH, Arterial 7.36 (L) 7.38 - 7.42 pH    POCT pCO2, Arterial 40 38 - 42 mm Hg    POCT pO2, Arterial 164 (H) 85 - 95 mm Hg    POCT SO2, Arterial 98 94 - 100 %    POCT Oxy Hemoglobin, Arterial 95.4 94.0 - 98.0 %    POCT Hematocrit Calculated, Arterial 24.0 (L) 36.0 - 46.0 %    POCT Sodium, Arterial 139 136 - 145 mmol/L    POCT Potassium, Arterial 4.0 3.5 - 5.3 mmol/L    POCT Chloride, Arterial 112 (H) 98 - 107 mmol/L    POCT Ionized Calcium, Arterial 1.27 1.10 - 1.33 mmol/L    POCT Glucose, Arterial 125 (H) 74 - 99 mg/dL    POCT Lactate, Arterial 1.2 0.4 - 2.0 mmol/L    POCT Base Excess,  Arterial -2.6 (L) -2.0 - 3.0 mmol/L    POCT HCO3 Calculated, Arterial 22.6 22.0 - 26.0 mmol/L    POCT Hemoglobin, Arterial 8.0 (L) 12.0 - 16.0 g/dL    POCT Anion Gap, Arterial 8 (L) 10 - 25 mmo/L    Patient Temperature 37.0 degrees Celsius    FiO2 100 %    Apparatus      Ventilator Mode A/C     Ventilator Rate 22 bpm    Tidal Volume 400 mL    Peep CHM2O 14.0 cm H2O    Site of Arterial Puncture Brachial Right     Aba's Test Negative    POCT GLUCOSE   Result Value Ref Range    POCT Glucose 210 (H) 74 - 99 mg/dL   BLOOD GAS ARTERIAL FULL PANEL   Result Value Ref Range    POCT pH, Arterial 7.23 (LL) 7.38 - 7.42 pH    POCT pCO2, Arterial 58 (H) 38 - 42 mm Hg    POCT pO2, Arterial 114 (H) 85 - 95 mm Hg    POCT SO2, Arterial 98 94 - 100 %    POCT Oxy Hemoglobin, Arterial 96.7 94.0 - 98.0 %    POCT Hematocrit Calculated, Arterial 23.0 (L) 36.0 - 46.0 %    POCT Sodium, Arterial 137 136 - 145 mmol/L    POCT Potassium, Arterial 3.7 3.5 - 5.3 mmol/L    POCT Chloride, Arterial 110 (H) 98 - 107 mmol/L    POCT Ionized Calcium, Arterial 1.29 1.10 - 1.33 mmol/L    POCT Glucose, Arterial 218 (H) 74 - 99 mg/dL    POCT Lactate, Arterial 1.1 0.4 - 2.0 mmol/L    POCT Base Excess, Arterial -3.3 (L) -2.0 - 3.0 mmol/L    POCT HCO3 Calculated, Arterial 24.3 22.0 - 26.0 mmol/L    POCT Hemoglobin, Arterial 7.7 (L) 12.0 - 16.0 g/dL    POCT Anion Gap, Arterial 6 (L) 10 - 25 mmo/L    Patient Temperature 37.0 degrees Celsius    FiO2 80 %    Apparatus      Ventilator Mode A/C     Ventilator Rate 24 bpm    Tidal Volume 370 mL    Spontaneous Tidal Volume 373 mL    Peep CHM2O 14.0 cm H2O    Critical Called By CORNELIO GARCIA-ACCS     Critical Called To      Critical Call Time 1706     Critical Read Back Y     Site of Arterial Puncture Brachial Right     Aba's Test Negative    BLOOD GAS ARTERIAL FULL PANEL   Result Value Ref Range    POCT pH, Arterial 7.14 (LL) 7.38 - 7.42 pH    POCT pCO2, Arterial 70 (HH) 38 - 42 mm Hg    POCT pO2, Arterial  124 (H) 85 - 95 mm Hg    POCT SO2, Arterial 98 94 - 100 %    POCT Oxy Hemoglobin, Arterial 96.2 94.0 - 98.0 %    POCT Hematocrit Calculated, Arterial 22.0 (L) 36.0 - 46.0 %    POCT Sodium, Arterial 137 136 - 145 mmol/L    POCT Potassium, Arterial 4.0 3.5 - 5.3 mmol/L    POCT Chloride, Arterial 110 (H) 98 - 107 mmol/L    POCT Ionized Calcium, Arterial 1.26 1.10 - 1.33 mmol/L    POCT Glucose, Arterial 230 (H) 74 - 99 mg/dL    POCT Lactate, Arterial 1.0 0.4 - 2.0 mmol/L    POCT Base Excess, Arterial -5.2 (L) -2.0 - 3.0 mmol/L    POCT HCO3 Calculated, Arterial 23.8 22.0 - 26.0 mmol/L    POCT Hemoglobin, Arterial 7.4 (L) 12.0 - 16.0 g/dL    POCT Anion Gap, Arterial 7 (L) 10 - 25 mmo/L    Patient Temperature 37.0 degrees Celsius    FiO2 80 %    Apparatus      Ventilator Mode A/C     Ventilator Rate 24 bpm    Tidal Volume 370 mL    Peep CHM2O 12.0 cm H2O    Critical Called By CORNELIO GARCIA-ACCS     Critical Called To      Critical Call Time 1755     Critical Read Back Y     Site of Arterial Puncture Brachial Right     Aba's Test Negative    BLOOD GAS ARTERIAL FULL PANEL   Result Value Ref Range    POCT pH, Arterial 7.17 (LL) 7.38 - 7.42 pH    POCT pCO2, Arterial 69 (H) 38 - 42 mm Hg    POCT pO2, Arterial 111 (H) 85 - 95 mm Hg    POCT SO2, Arterial 98 94 - 100 %    POCT Oxy Hemoglobin, Arterial 96.9 94.0 - 98.0 %    POCT Hematocrit Calculated, Arterial 23.0 (L) 36.0 - 46.0 %    POCT Sodium, Arterial 138 136 - 145 mmol/L    POCT Potassium, Arterial 4.4 3.5 - 5.3 mmol/L    POCT Chloride, Arterial 110 (H) 98 - 107 mmol/L    POCT Ionized Calcium, Arterial 1.29 1.10 - 1.33 mmol/L    POCT Glucose, Arterial 218 (H) 74 - 99 mg/dL    POCT Lactate, Arterial 1.0 0.4 - 2.0 mmol/L    POCT Base Excess, Arterial -3.5 (L) -2.0 - 3.0 mmol/L    POCT HCO3 Calculated, Arterial 25.2 22.0 - 26.0 mmol/L    POCT Hemoglobin, Arterial 7.6 (L) 12.0 - 16.0 g/dL    POCT Anion Gap, Arterial 7 (L) 10 - 25 mmo/L    Patient Temperature 37.0  degrees Celsius    FiO2 80 %    Apparatus      Ventilator Mode      Ventilator Rate 24 bpm    Tidal Volume 400 mL    Peep CHM2O 12.0 cm H2O    Critical Called By ADDISON HARDIN RRT     Critical Called To KEMAR HERNANDEZ MD     Critical Call Time 1931     Critical Read Back Y     Site of Arterial Puncture Radial Right     Aba's Test Positive    POCT GLUCOSE   Result Value Ref Range    POCT Glucose 191 (H) 74 - 99 mg/dL            Assessment/Plan   Acute kidney injury secondary to the use of IV contrast- Cr trending up, continue to monitor renal function very closely, no indication for dialysis therapy at this point  Bilateral pneumonia , on IV antibiotics per infectious disease  Acute respiratory failure, now intubated  Anemia  Hyperlipidemia  Thrombocytosis rule out polycythemia- recommend Hematology consult    Luli Hemphill MD

## 2025-01-08 NOTE — PROGRESS NOTES
"Janet Snow is a 79 y.o. female on day 6 of admission presenting with Community acquired pneumonia of both upper lobes.    Subjective   The patient was seen and examined. Currently intubated on 50% FiO2. OG to LIS. FiO2 down to 50% today from 80% yesterday. Precedex and Propofol currently infusing.     Objective     Physical Exam  Vitals and nursing note reviewed.   Constitutional:       Appearance: She is ill-appearing.   HENT:      Head: Normocephalic and atraumatic.      Mouth/Throat:      Mouth: Mucous membranes are moist.   Eyes:      Pupils: Pupils are equal, round, and reactive to light.   Cardiovascular:      Rate and Rhythm: Normal rate and regular rhythm.   Pulmonary:      Effort: Respiratory distress present.   Abdominal:      General: Bowel sounds are normal.   Genitourinary:     Comments: West catheter   Musculoskeletal:      Cervical back: Neck supple. No rigidity.      Right lower leg: No edema.      Left lower leg: No edema.   Skin:     General: Skin is dry.      Capillary Refill: Capillary refill takes 2 to 3 seconds.   Neurological:      Mental Status: She is disoriented.         Last Recorded Vitals  Blood pressure 113/58, pulse 85, temperature 36.5 °C (97.7 °F), resp. rate (!) 33, height 1.6 m (5' 3\"), weight 74.1 kg (163 lb 5.8 oz), SpO2 95%.  Intake/Output last 3 Shifts:  I/O last 3 completed shifts:  In: 2610.8 (35.2 mL/kg) [I.V.:1020.8 (13.8 mL/kg); NG/GT:90; IV Piggyback:1500]  Out: 1685 (22.7 mL/kg) [Urine:1285 (0.5 mL/kg/hr); Emesis/NG output:400]  Weight: 74.1 kg     Relevant Results  Results for orders placed or performed during the hospital encounter of 01/02/25 (from the past 24 hours)   BLOOD GAS ARTERIAL FULL PANEL   Result Value Ref Range    POCT pH, Arterial 7.36 (L) 7.38 - 7.42 pH    POCT pCO2, Arterial 40 38 - 42 mm Hg    POCT pO2, Arterial 164 (H) 85 - 95 mm Hg    POCT SO2, Arterial 98 94 - 100 %    POCT Oxy Hemoglobin, Arterial 95.4 94.0 - 98.0 %    POCT Hematocrit " Calculated, Arterial 24.0 (L) 36.0 - 46.0 %    POCT Sodium, Arterial 139 136 - 145 mmol/L    POCT Potassium, Arterial 4.0 3.5 - 5.3 mmol/L    POCT Chloride, Arterial 112 (H) 98 - 107 mmol/L    POCT Ionized Calcium, Arterial 1.27 1.10 - 1.33 mmol/L    POCT Glucose, Arterial 125 (H) 74 - 99 mg/dL    POCT Lactate, Arterial 1.2 0.4 - 2.0 mmol/L    POCT Base Excess, Arterial -2.6 (L) -2.0 - 3.0 mmol/L    POCT HCO3 Calculated, Arterial 22.6 22.0 - 26.0 mmol/L    POCT Hemoglobin, Arterial 8.0 (L) 12.0 - 16.0 g/dL    POCT Anion Gap, Arterial 8 (L) 10 - 25 mmo/L    Patient Temperature 37.0 degrees Celsius    FiO2 100 %    Apparatus      Ventilator Mode A/C     Ventilator Rate 22 bpm    Tidal Volume 400 mL    Peep CHM2O 14.0 cm H2O    Site of Arterial Puncture Brachial Right     Aba's Test Negative    POCT GLUCOSE   Result Value Ref Range    POCT Glucose 210 (H) 74 - 99 mg/dL   AFB Processed   Result Value Ref Range    Extra Tube Hold for add-ons.    Body fluid cell count   Result Value Ref Range    Color, Fluid Colorless Colorless, Straw, Yellow    Clarity, Fluid Hazy (A) Clear    WBC, Fluid 795 See Comment /uL    RBC, Fluid 1,000 0  /uL /uL   BLOOD GAS ARTERIAL FULL PANEL   Result Value Ref Range    POCT pH, Arterial 7.23 (LL) 7.38 - 7.42 pH    POCT pCO2, Arterial 58 (H) 38 - 42 mm Hg    POCT pO2, Arterial 114 (H) 85 - 95 mm Hg    POCT SO2, Arterial 98 94 - 100 %    POCT Oxy Hemoglobin, Arterial 96.7 94.0 - 98.0 %    POCT Hematocrit Calculated, Arterial 23.0 (L) 36.0 - 46.0 %    POCT Sodium, Arterial 137 136 - 145 mmol/L    POCT Potassium, Arterial 3.7 3.5 - 5.3 mmol/L    POCT Chloride, Arterial 110 (H) 98 - 107 mmol/L    POCT Ionized Calcium, Arterial 1.29 1.10 - 1.33 mmol/L    POCT Glucose, Arterial 218 (H) 74 - 99 mg/dL    POCT Lactate, Arterial 1.1 0.4 - 2.0 mmol/L    POCT Base Excess, Arterial -3.3 (L) -2.0 - 3.0 mmol/L    POCT HCO3 Calculated, Arterial 24.3 22.0 - 26.0 mmol/L    POCT Hemoglobin, Arterial 7.7 (L)  12.0 - 16.0 g/dL    POCT Anion Gap, Arterial 6 (L) 10 - 25 mmo/L    Patient Temperature 37.0 degrees Celsius    FiO2 80 %    Apparatus      Ventilator Mode A/C     Ventilator Rate 24 bpm    Tidal Volume 370 mL    Spontaneous Tidal Volume 373 mL    Peep CHM2O 14.0 cm H2O    Critical Called [facility]     Critical Called To      Critical Call Time 1706     Critical Read Back Y     Site of Arterial Puncture Brachial Right     Aba's Test Negative    BLOOD GAS ARTERIAL FULL PANEL   Result Value Ref Range    POCT pH, Arterial 7.14 (LL) 7.38 - 7.42 pH    POCT pCO2, Arterial 70 (HH) 38 - 42 mm Hg    POCT pO2, Arterial 124 (H) 85 - 95 mm Hg    POCT SO2, Arterial 98 94 - 100 %    POCT Oxy Hemoglobin, Arterial 96.2 94.0 - 98.0 %    POCT Hematocrit Calculated, Arterial 22.0 (L) 36.0 - 46.0 %    POCT Sodium, Arterial 137 136 - 145 mmol/L    POCT Potassium, Arterial 4.0 3.5 - 5.3 mmol/L    POCT Chloride, Arterial 110 (H) 98 - 107 mmol/L    POCT Ionized Calcium, Arterial 1.26 1.10 - 1.33 mmol/L    POCT Glucose, Arterial 230 (H) 74 - 99 mg/dL    POCT Lactate, Arterial 1.0 0.4 - 2.0 mmol/L    POCT Base Excess, Arterial -5.2 (L) -2.0 - 3.0 mmol/L    POCT HCO3 Calculated, Arterial 23.8 22.0 - 26.0 mmol/L    POCT Hemoglobin, Arterial 7.4 (L) 12.0 - 16.0 g/dL    POCT Anion Gap, Arterial 7 (L) 10 - 25 mmo/L    Patient Temperature 37.0 degrees Celsius    FiO2 80 %    Apparatus      Ventilator Mode A/C     Ventilator Rate 24 bpm    Tidal Volume 370 mL    Peep CHM2O 12.0 cm H2O    Critical Called By CORNELIO GARCIA-[facility]     Critical Called To      Critical Call Time 1751     Critical Read Back Y     Site of Arterial Puncture Brachial Right     Aba's Test Negative    BLOOD GAS ARTERIAL FULL PANEL   Result Value Ref Range    POCT pH, Arterial 7.17 (LL) 7.38 - 7.42 pH    POCT pCO2, Arterial 69 (H) 38 - 42 mm Hg    POCT pO2, Arterial 111 (H) 85 - 95 mm Hg    POCT SO2, Arterial 98 94 - 100 %    POCT Oxy Hemoglobin,  Arterial 96.9 94.0 - 98.0 %    POCT Hematocrit Calculated, Arterial 23.0 (L) 36.0 - 46.0 %    POCT Sodium, Arterial 138 136 - 145 mmol/L    POCT Potassium, Arterial 4.4 3.5 - 5.3 mmol/L    POCT Chloride, Arterial 110 (H) 98 - 107 mmol/L    POCT Ionized Calcium, Arterial 1.29 1.10 - 1.33 mmol/L    POCT Glucose, Arterial 218 (H) 74 - 99 mg/dL    POCT Lactate, Arterial 1.0 0.4 - 2.0 mmol/L    POCT Base Excess, Arterial -3.5 (L) -2.0 - 3.0 mmol/L    POCT HCO3 Calculated, Arterial 25.2 22.0 - 26.0 mmol/L    POCT Hemoglobin, Arterial 7.6 (L) 12.0 - 16.0 g/dL    POCT Anion Gap, Arterial 7 (L) 10 - 25 mmo/L    Patient Temperature 37.0 degrees Celsius    FiO2 80 %    Apparatus      Ventilator Mode      Ventilator Rate 24 bpm    Tidal Volume 400 mL    Peep CHM2O 12.0 cm H2O    Critical Called By ADDISON HARDIN RRT     Critical Called To KEMAR HERNANDEZ MD     Critical Call Time 1931     Critical Read Back Y     Site of Arterial Puncture Radial Right     Aba's Test Positive    POCT GLUCOSE   Result Value Ref Range    POCT Glucose 191 (H) 74 - 99 mg/dL   POCT GLUCOSE   Result Value Ref Range    POCT Glucose 174 (H) 74 - 99 mg/dL   Blood Gas Arterial Full Panel   Result Value Ref Range    POCT pH, Arterial 7.29 (L) 7.38 - 7.42 pH    POCT pCO2, Arterial 50 (H) 38 - 42 mm Hg    POCT pO2, Arterial 156 (H) 85 - 95 mm Hg    POCT SO2, Arterial 99 94 - 100 %    POCT Oxy Hemoglobin, Arterial 96.9 94.0 - 98.0 %    POCT Hematocrit Calculated, Arterial 25.0 (L) 36.0 - 46.0 %    POCT Sodium, Arterial 138 136 - 145 mmol/L    POCT Potassium, Arterial 3.9 3.5 - 5.3 mmol/L    POCT Chloride, Arterial 110 (H) 98 - 107 mmol/L    POCT Ionized Calcium, Arterial 1.23 1.10 - 1.33 mmol/L    POCT Glucose, Arterial 182 (H) 74 - 99 mg/dL    POCT Lactate, Arterial 1.2 0.4 - 2.0 mmol/L    POCT Base Excess, Arterial -2.6 (L) -2.0 - 3.0 mmol/L    POCT HCO3 Calculated, Arterial 24.0 22.0 - 26.0 mmol/L    POCT Hemoglobin, Arterial 8.2 (L) 12.0 - 16.0 g/dL     POCT Anion Gap, Arterial 8 (L) 10 - 25 mmo/L    Patient Temperature 37.0 degrees Celsius    FiO2 80 %    Apparatus      Ventilator Mode      Ventilator Rate 26 bpm    Tidal Volume 400 mL    Peep CHM2O 12.0 cm H2O    Site of Arterial Puncture Arterial Line    POCT GLUCOSE   Result Value Ref Range    POCT Glucose 226 (H) 74 - 99 mg/dL   CBC and Auto Differential   Result Value Ref Range    WBC 16.9 (H) 4.4 - 11.3 x10*3/uL    nRBC 0.0 0.0 - 0.0 /100 WBCs    RBC 2.45 (L) 4.00 - 5.20 x10*6/uL    Hemoglobin 7.4 (L) 12.0 - 16.0 g/dL    Hematocrit 22.7 (L) 36.0 - 46.0 %    MCV 93 80 - 100 fL    MCH 30.2 26.0 - 34.0 pg    MCHC 32.6 32.0 - 36.0 g/dL    RDW 13.3 11.5 - 14.5 %    Platelets 534 (H) 150 - 450 x10*3/uL    Neutrophils % 88.1 40.0 - 80.0 %    Immature Granulocytes %, Automated 2.2 (H) 0.0 - 0.9 %    Lymphocytes % 5.3 13.0 - 44.0 %    Monocytes % 4.3 2.0 - 10.0 %    Eosinophils % 0.0 0.0 - 6.0 %    Basophils % 0.1 0.0 - 2.0 %    Neutrophils Absolute 14.92 (H) 1.60 - 5.50 x10*3/uL    Immature Granulocytes Absolute, Automated 0.37 0.00 - 0.50 x10*3/uL    Lymphocytes Absolute 0.89 0.80 - 3.00 x10*3/uL    Monocytes Absolute 0.72 0.05 - 0.80 x10*3/uL    Eosinophils Absolute 0.00 0.00 - 0.40 x10*3/uL    Basophils Absolute 0.02 0.00 - 0.10 x10*3/uL   Magnesium   Result Value Ref Range    Magnesium 2.56 (H) 1.60 - 2.40 mg/dL   Renal function panel   Result Value Ref Range    Glucose 200 (H) 74 - 99 mg/dL    Sodium 138 136 - 145 mmol/L    Potassium 3.6 3.5 - 5.3 mmol/L    Chloride 105 98 - 107 mmol/L    Bicarbonate 25 21 - 32 mmol/L    Anion Gap 12 10 - 20 mmol/L    Urea Nitrogen 62 (H) 6 - 23 mg/dL    Creatinine 2.44 (H) 0.50 - 1.05 mg/dL    eGFR 20 (L) >60 mL/min/1.73m*2    Calcium 8.3 (L) 8.6 - 10.3 mg/dL    Phosphorus 3.9 2.5 - 4.9 mg/dL    Albumin 2.8 (L) 3.4 - 5.0 g/dL   BLOOD GAS ARTERIAL FULL PANEL   Result Value Ref Range    POCT pH, Arterial 7.35 (L) 7.38 - 7.42 pH    POCT pCO2, Arterial 43 (H) 38 - 42 mm Hg    POCT  pO2, Arterial 164 (H) 85 - 95 mm Hg    POCT SO2, Arterial 100 94 - 100 %    POCT Oxy Hemoglobin, Arterial 96.3 94.0 - 98.0 %    POCT Hematocrit Calculated, Arterial 23.0 (L) 36.0 - 46.0 %    POCT Sodium, Arterial 136 136 - 145 mmol/L    POCT Potassium, Arterial 3.7 3.5 - 5.3 mmol/L    POCT Chloride, Arterial 109 (H) 98 - 107 mmol/L    POCT Ionized Calcium, Arterial 1.21 1.10 - 1.33 mmol/L    POCT Glucose, Arterial 193 (H) 74 - 99 mg/dL    POCT Lactate, Arterial 1.4 0.4 - 2.0 mmol/L    POCT Base Excess, Arterial -1.8 -2.0 - 3.0 mmol/L    POCT HCO3 Calculated, Arterial 23.7 22.0 - 26.0 mmol/L    POCT Hemoglobin, Arterial 7.8 (L) 12.0 - 16.0 g/dL    POCT Anion Gap, Arterial 7 (L) 10 - 25 mmo/L    Patient Temperature 37.0 degrees Celsius    FiO2 80 %    Apparatus      Ventilator Mode      Ventilator Rate 24 bpm    Tidal Volume 400 mL    Peep CHM2O 10.0 cm H2O    Site of Arterial Puncture Arterial Line    POCT GLUCOSE   Result Value Ref Range    POCT Glucose 122 (H) 74 - 99 mg/dL   Blood Gas Arterial Full Panel   Result Value Ref Range    POCT pH, Arterial 7.36 (L) 7.38 - 7.42 pH    POCT pCO2, Arterial 44 (H) 38 - 42 mm Hg    POCT pO2, Arterial 110 (H) 85 - 95 mm Hg    POCT SO2, Arterial 98 94 - 100 %    POCT Oxy Hemoglobin, Arterial 96.1 94.0 - 98.0 %    POCT Hematocrit Calculated, Arterial 23.0 (L) 36.0 - 46.0 %    POCT Sodium, Arterial 137 136 - 145 mmol/L    POCT Potassium, Arterial 3.7 3.5 - 5.3 mmol/L    POCT Chloride, Arterial 109 (H) 98 - 107 mmol/L    POCT Ionized Calcium, Arterial 1.20 1.10 - 1.33 mmol/L    POCT Glucose, Arterial 112 (H) 74 - 99 mg/dL    POCT Lactate, Arterial 1.0 0.4 - 2.0 mmol/L    POCT Base Excess, Arterial -0.6 -2.0 - 3.0 mmol/L    POCT HCO3 Calculated, Arterial 24.9 22.0 - 26.0 mmol/L    POCT Hemoglobin, Arterial 7.7 (L) 12.0 - 16.0 g/dL    POCT Anion Gap, Arterial 7 (L) 10 - 25 mmo/L    Patient Temperature 37.0 degrees Celsius    FiO2 60 %    Ventilator Mode Other     Ventilator Rate 24  bpm    Tidal Volume 370 mL    Peep CHM2O 8.0 cm H2O    Site of Arterial Puncture Arterial Line     Aba's Test Negative       XR chest abdomen for OG NG placement    Result Date: 1/8/2025  Interpreted By:  Finkelstein, Evan, STUDY: XR CHEST ABDOMEN FOR OG NG PLACEMENT;  1/8/2025 2:41 am two views of the chest.   INDICATION: Signs/Symptoms:OG tube placement.   COMPARISON: None.   ACCESSION NUMBER(S): [id number]   ORDERING CLINICIAN: AMANDA SÁNCHEZ   FINDINGS: Endotracheal tube present with tip approximately 2.4 cm above the noah. OG tube courses below the diaphragm, with tip extending all of the way into the lower pelvis, beyond the field of view. Extensive patchy airspace opacities throughout the lungs. No sizable pleural effusion or pneumothorax. No acute osseous abnormality.       OG tube courses below the diaphragm with tip extending into the pelvis beyond the field of view. Endotracheal tube tip lies 2.4 cm above the noah. Redemonstrated extensive patchy airspace opacities throughout the lungs concerning for multifocal pneumonia.     MACRO: None.   Signed by: Evan Finkelstein 1/8/2025 3:04 AM Dictation workstation:   [id number]    XR chest 1 view    Result Date: 1/7/2025  Interpreted By:  Isidro Corrales, STUDY: XR CHEST 1 VIEW;  1/7/2025 10:48 pm   INDICATION: Signs/Symptoms:OG tube.   COMPARISON: Chest x-ray 01/07/2025   ACCESSION NUMBER(S): [id number]   ORDERING CLINICIAN: AMANDA SÁNCHEZ   FINDINGS: Enteric tube terminates in the left mid abdomen with side hole below the GE junction, likely within the distal gastric body. Multiple overlying leads are present.   CARDIOMEDIASTINAL SILHOUETTE: Cardiomediastinal silhouette is stable in size and configuration.   LUNGS: Lung apices are excluded from the field of view. There is diffuse bilateral airspace opacities not significantly changed from prior exam.   ABDOMEN: No remarkable upper abdominal findings.   BONES: Multilevel degenerative changes of the  spine.       Enteric tube terminates in the left mid abdomen with side hole below the GE junction.   Diffuse bilateral airspace opacities concerning for developing multifocal pneumonia. Continued radiographic follow-up to resolution is advised.   MACRO: None   Signed by: Isidro Corrales 1/7/2025 10:52 PM Dictation workstation:   [id number]    Bronchoscopy    Result Date: 1/7/2025  Table formatting from the original result was not included. Impression The left main stem, left upper lobar bronchus, lingular lobar bronchus, left lower lobar bronchus, right main stem, right upper lobar bronchus, bronchus intermedius, right middle lobar bronchus and right lower lobar bronchus appeared normal. Bronchoalveolar lavage was performed x1 in the RML and the fluid appeared cloudy Findings The left main stem, left upper lobar bronchus, lingular lobar bronchus, left lower lobar bronchus, right main stem, right upper lobar bronchus, bronchus intermedius, right middle lobar bronchus and right lower lobar bronchus appeared normal. Bronchoalveolar lavage was performed x1 in the RML with 140 mL of saline instilled and a total return of 90 mL. The fluid appeared cloudy. Recommendation There is no recommended follow-up for this procedure. Indication ARDS (adult respiratory distress syndrome) (Multi) Staff Staff Role No Staff Documented Medications See Anesthesia Record. Preprocedure A history and physical has been performed, and patient medication allergies have been reviewed. The patient's tolerance of previous anesthesia has been reviewed. The risks and benefits of the procedure and the sedation options and risks were discussed with the  daughter (medical POA) and son (financial POA) . All questions were answered and informed consent obtained. Details of the Procedure The patient was already under sedation prior to the procedure. The patient's blood pressure, respirations, heart rate, oxygen and ECG were monitored throughout the  procedure. The patient experienced no blood loss. The scope was introduced through the endotracheal tube. The procedure was not difficult. The patient tolerated the procedure well. There were no apparent adverse events. Events Procedure Events Event Event Time Specimens * Cannot find log * BAL taken from RML 140cc instilled, retunr 90cc of white cloudy fluid. Mucosa appeared normal and non friable. Some mucous secretions suctioned away. Procedure Location [facility] [id number] [address] [facility] [id number] [phone] Referring Provider Laura Maloney MD Procedure Provider Laura PERALTA MD     XR chest 1 view    Result Date: 1/7/2025  Interpreted By:  Elda Dorado, STUDY: XR CHEST 1 VIEW;  1/7/2025 10:40 am   INDICATION: Signs/Symptoms:intubation.   COMPARISON: 01/07/2025 at 5:45 a.m.   ACCESSION NUMBER(S): [id number]   ORDERING CLINICIAN: KENISHA LEE   FINDINGS: Heart is normal in size.   The patient is intubated. The tip terminates above the noah.   There is diffuse parenchymal infiltration.   There are atherosclerotic changes of the aorta. There are degenerative changes of the spine.   COMPARISON OF FINDING: The patient has undergone interval intubation. The lungs are similar.       Diffuse bilateral parenchymal infiltration. Interval intubation.   MACRO: none   Signed by: Elda Dorado 1/7/2025 11:22 AM Dictation workstation:   [id number]    XR chest 1 view    Result Date: 1/7/2025  Interpreted By:  Jaxon Morales, STUDY: XR CHEST 1 VIEW; 1/7/2025 6:35 am   INDICATION: CLINICAL INFORMATION: Signs/Symptoms:persistent hypoxia, unable to wean from NIV.   COMPARISON: 01/06/2025   ACCESSION NUMBER(S): [id number]   ORDERING CLINICIAN: NORM LANDAVERDE   TECHNIQUE: Portable chest one view.   FINDINGS: The cardiac size is indeterminate in view of the AP projection. There is continued diffuse ground-glass infiltrate bilaterally. No effusions are  identified.       Persistent ground-glass infiltrates diffusely bilaterally. Etiology is unclear with differential to include infectious organisms as well as pulmonary edema and ARDS.   MACRO: none   Signed by: Jaxon Morales 1/7/2025 8:20 AM Dictation workstation:   [id number]    ECG 12 lead    Result Date: 1/6/2025  Normal sinus rhythm Nonspecific ST abnormality Prolonged QT Abnormal ECG No previous ECGs available Confirmed by Vicky Corado ([id number]) on 1/6/2025 4:01:46 PM    Transthoracic Echo (TTE) Complete    Result Date: 1/6/2025           [place] [address]            Phone [phone] TRANSTHORACIC ECHOCARDIOGRAM REPORT Patient Name:       ROBBIE ROCHA     Reading Physician:    [id number] Vicky Corado MD Study Date:         1/6/2025             Ordering Provider:    [id number] NORM LANDAVERDE MRN/PID:            [id number]             Fellow: Accession#:         [id number]         Nurse: Date of Birth/Age:  1945 / 79 years Sonographer:          Rosalba Leiva                                                                KENNEDY Gender Assigned at  F                    Additional Staff: Birth: Height:             160.02 cm            Admit Date: Weight:             69.85 kg             Admission Status:     Inpatient -                                                                Routine BSA / BMI:          1.73 m2 / 27.28      Department Location:  [facility], [place], operated by [facility] ICU                     kg/m2 Blood Pressure: 151 /76 mmHg Study Type:    TRANSTHORACIC ECHO (TTE) COMPLETE Diagnosis/ICD: Hypoxemia-R09.02; Acute respiratory failure with hypoxia-J96.01 Indication:    hypoxemia CPT Codes:     Echo Complete w Full [id number] Patient History: Smoker:            Former. BMI:               Overweight 25 - 30 Pertinent History: Resp difficulty, bipap,  cough, pna, sciatica, arf, resp                    failure/hypoxia. Study Detail: The following Echo studies were performed: 2D, Doppler, M-Mode and               color flow. Technically challenging study due to prominent lung               artifact, body habitus and patient lying in supine position.               Definity used as a contrast agent for endocardial border               definition. Total contrast used for this procedure was 2 mL via IV               push.  PHYSICIAN INTERPRETATION: Left Ventricle: The left ventricular systolic function is normal, with a visually estimated ejection fraction of 70-75%. There are no regional wall motion abnormalities. The left ventricular cavity size is normal. There is normal septal thickness. Spectral Doppler shows a normal pattern of left ventricular diastolic filling. Left Atrium: The left atrium is normal in size. Right Ventricle: The right ventricle is normal in size. There is normal right ventricular global systolic function. Right Atrium: The right atrium is normal in size. Aortic Valve: The aortic valve is trileaflet. The aortic valve dimensionless index is 0.74. There is no evidence of aortic valve regurgitation. The peak instantaneous gradient of the aortic valve is 22 mmHg. The mean gradient of the aortic valve is 11 mmHg. Mitral Valve: The mitral valve is normal in structure. There is no evidence of mitral valve regurgitation. Tricuspid Valve: The tricuspid valve is structurally normal. There is mild tricuspid regurgitation. Pulmonic Valve: The pulmonic valve is structurally normal. There is physiologic pulmonic valve regurgitation. Pericardium: Small pericardial effusion. Aorta: The aortic root is normal. Systemic Veins: The inferior vena cava appears normal in size, with IVC inspiratory collapse greater than 50%.  CONCLUSIONS:  1. The left ventricular systolic function is normal, with a visually estimated ejection fraction of 70-75%.  2. Spectral Doppler  shows a normal pattern of left ventricular diastolic filling.  3. There is normal right ventricular global systolic function.  4. No evidence of mitral valve regurgitation.  5. Mild tricuspid regurgitation is visualized. QUANTITATIVE DATA SUMMARY:  2D MEASUREMENTS:           Normal Ranges: LAs:             2.60 cm   (2.7-4.0cm) IVSd:            0.68 cm   (0.6-1.1cm) LVPWd:           0.53 cm   (0.6-1.1cm) LVIDd:           5.32 cm   (3.9-5.9cm) LV Mass Index:   62.2 g/m2  LV SYSTOLIC FUNCTION BY 2D PLANIMETRY (MOD):                      Normal Ranges: EF-A4C View:    60 % (>=55%) EF-A2C View:    66 % EF-Biplane:     65 % EF-Visual:      73 % LV EF Reported: 73 %  LV DIASTOLIC FUNCTION:           Normal Ranges: MV Peak E:             0.94 m/s  (0.7-1.2 m/s) MV Peak A:             1.04 m/s  (0.42-0.7 m/s) E/A Ratio:             0.91      (1.0-2.2) MV e'                  0.078 m/s (>8.0) MV lateral e'          0.08 m/s MV medial e'           0.07 m/s E/e' Ratio:            12.07     (<8.0) a'                     0.08 m/s  MITRAL VALVE:          Normal Ranges: MV DT:        186 msec (150-240msec)  AORTIC VALVE:                      Normal Ranges: AoV Vmax:                2.35 m/s  (<=1.7m/s) AoV Peak P.1 mmHg (<20mmHg) AoV Mean P.0 mmHg (1.7-11.5mmHg) LVOT Max Cj:            1.95 m/s  (<=1.1m/s) AoV VTI:                 43.90 cm  (18-25cm) LVOT VTI:                32.60 cm LVOT Diameter:           1.90 cm   (1.8-2.4cm) AoV Area, VTI:           2.11 cm2  (2.5-5.5cm2) AoV Area,Vmax:           2.35 cm2  (2.5-4.5cm2) AoV Dimensionless Index: 0.74  RIGHT VENTRICLE: RV s' 0.21 m/s  TRICUSPID VALVE/RVSP:          Normal Ranges: Peak TR Velocity:     3.56 m/s RV Syst Pressure:     59 mmHg  (< 30mmHg) IVC Diam:             1.16 cm  PULMONIC VALVE:          Normal Ranges: PV Accel Time:  124 msec (>120ms) PV Max Cj:     1.3 m/s  (0.6-0.9m/s) PV Max P.9 mmHg  [id number] Vicky Corado MD  Electronically signed on 1/6/2025 at 3:30:16 PM  ** Final **     XR chest 1 view    Result Date: 1/6/2025  Interpreted By:  Jaxon Morales, STUDY: XR CHEST 1 VIEW; 1/6/2025 5:38 am   INDICATION: CLINICAL INFORMATION: Signs/Symptoms:Pneumonia follow-up. Previous abnormal chest radiograph. Shortness of breath.   COMPARISON: 01/05/2025   ACCESSION NUMBER(S): [id number]   ORDERING CLINICIAN: AMANDA SÁNCHEZ   TECHNIQUE: Portable chest one view.   FINDINGS: The cardiac size is indeterminate in view of the AP projection. Diffuse ground-glass infiltrates are again identified, similar compared to the previous study. No effusions are appreciated.       Diffuse ground-glass infiltrates. There is no interval change when compared to the previous examination.   MACRO: none   Signed by: Jaxon Morales 1/6/2025 8:29 AM Dictation workstation:   [id number]    CT angio chest for pulmonary embolism    Result Date: 1/5/2025  Interpreted By:  Selin Matthews, STUDY: CT ANGIO CHEST FOR PULMONARY EMBOLISM;  1/5/2025 4:10 pm   INDICATION: Signs/Symptoms:severe hypoxia   COMPARISON: Chest x-ray 01/05/2025. CT chest 07/19/2024   ACCESSION NUMBER(S): [id number]   ORDERING CLINICIAN: NORM LANDAVERDE   TECHNIQUE: Helical data acquisition of the chest was obtained following the uneventful administration of intravenous contrast material. Images were reformatted in axial, coronal, and sagittal planes. MIP images were created and reviewed.   FINDINGS: POTENTIAL LIMITATIONS OF THE STUDY: Motion artifact.   HEART AND VESSELS: The evaluation for filling defects at the pulmonary arteries is degraded by motion artifact. No large central filling defects to suggest pulmonary embolism. The smaller segmental/subsegmental pulmonary arteries are not well evaluated on this exam.   No thoracic aortic aneurysm. Mild atherosclerotic calcifications are noted at the thoracic aorta.   The heart is mildly enlarged. There is trace pericardial effusion.    MEDIASTINUM AND MANPREET, LOWER NECK AND AXILLA: The visualized thyroid gland is small.   A right paratracheal lymph node measures 10 mm in short axis. A subcarinal lymph node measures 12 mm in short axis. A right hilar lymph node measures 19 mm in short axis. A left hilar lymph node measures 11 mm in short axis. Calcified lymph nodes are noted at the mediastinum and left hilum.   LUNGS AND AIRWAYS: The trachea and central airways are patent.   There are diffuse bilateral ground-glass opacities. No pleural effusion or pneumothorax.   UPPER ABDOMEN: Calcific foci at the spleen are suggestive of granulomas.   CHEST WALL AND OSSEOUS STRUCTURES: Multilevel degenerative changes at the spine. Redemonstration of remote compression deformity with Schmorl's node at the superior endplate of L1.       1. Study degraded by motion artifact. No evidence of large central pulmonary emboli. The smaller segmental/subsegmental pulmonary arteries are not well evaluated on this exam. 2. Diffuse bilateral ground-glass opacities, may be secondary to pulmonary edema and/or multifocal pneumonia. Acute respiratory distress syndrome is in the differential diagnoses. 3. Mild cardiomegaly. Trace pericardial effusion. 4. Mild mediastinal and hilar adenopathy.     MACRO: None.   Signed by: Selin Matthews 1/5/2025 6:42 PM Dictation workstation:   [id number]    US renal complete    Result Date: 1/5/2025  Interpreted By:  Jaxon Morales, STUDY: US RENAL COMPLETE; 1/5/2025 10:29 am   INDICATION: Signs/Symptoms:nancy.   COMPARISON: None   ACCESSION NUMBER(S): [id number]   ORDERING CLINICIAN: NORM LANDAVERDE   TECHNIQUE: Grayscale and color Doppler imaging of the kidneys   FINDINGS: The right kidney measures 11.0 cm  . The left kidney measures 11.0 cm  .   There is mild increased echogenicity of the renal cortex bilaterally.     No renal stones are identified.  Renal cortex is normal in thickness bilaterally. No hydronephrosis is identified.   Urinary  bladder is nonspecific in appearance with no stones or masses identified.       Mild increased renal cortical echogenicity diffusely bilaterally suggesting chronic renal medical disease.   MACRO: none   Signed by: Jaxon Morales 1/5/2025 12:45 PM Dictation workstation:   [id number]    XR chest 1 view    Result Date: 1/5/2025  Interpreted By:  Sincere Slater, STUDY: XR CHEST 1 VIEW;  1/5/2025 8:48 am   INDICATION: Signs/Symptoms:respiratory distress acute.   COMPARISON: 01/02/2025   ACCESSION NUMBER(S): [id number]   ORDERING CLINICIAN: RAMANDEEP ALLRED   FINDINGS: Diffusely increased bilateral airspace consolidation. No visualized pleural effusion. Cardiomediastinal silhouette unchanged. Aortic atherosclerosis. Thoracic degenerative changes with dextroscoliosis.       Diffusely increased bilateral airspace consolidation.   MACRO: None   Signed by: Sincere Slater 1/5/2025 10:08 AM Dictation workstation:   [id number]    XR chest 2 views    Result Date: 1/2/2025  Interpreted By:  Jaxon Morales, STUDY: XR CHEST 2 VIEWS; 1/2/2025 3:05 pm   INDICATION: Signs/Symptoms:fever, productive cough, chest pain.   COMPARISON: 06/14/2022   ACCESSION NUMBER(S): [id number]   ORDERING CLINICIAN: DANIAL COATES   TECHNIQUE: AP and lateral views of the chest were obtained.   FINDINGS: The cardiac silhouette is normal in appearance. There are new patchy bilateral alveolar infiltrates most marked within the upper lobes bilaterally .  No effusions are identified.       Extensive patchy bilateral infiltrates most marked within the upper lobes bilaterally. Findings are suspicious for pneumonia. Follow-up to assure complete clearing is suggested.   MACRO: none   Signed by: Jaxon Morales 1/2/2025 3:43 PM Dictation workstation:   [id number]      Scheduled medications  docusate sodium, 100 mg, oral, BID  doxycycline, 100 mg, intravenous, q12h  pantoprazole, 40 mg, oral, Daily   Or  esomeprazole, 40 mg, oral, Daily   Or  pantoprazole, 40 mg,  intravenous, Daily  [Held by provider] FLUoxetine, 40 mg, oral, Daily  folic acid, 1 mg, oral, Daily  furosemide, 20 mg, intravenous, Once  heparin, 5,000 Units, subcutaneous, q8h  insulin lispro, 0-5 Units, subcutaneous, q4h  ipratropium-albuteroL, 3 mL, nebulization, q4h  levothyroxine, 25 mcg, intravenous, q24h ADE  linezolid, 600 mg, intravenous, q12h  methylPREDNISolone sodium succinate (PF), 40 mg, intravenous, q6h  midazolam, 4 mg, intravenous, Once  midazolam, 4 mg, intravenous, Once  multivitamin with minerals, 1 tablet, oral, Daily  oxygen, , inhalation, Continuous - Inhalation  PHENobarbital, 65 mg, intravenous, q8h   Followed by  [START ON 1/9/2025] PHENobarbital, 32.5 mg, intravenous, q8h  piperacillin-tazobactam, 2.25 g, intravenous, q6h  simvastatin, 20 mg, oral, Nightly  sodium chloride, 3 mL, nebulization, q4h  sulfamethoxazole-trimethoprim, 2 tablet, oral, q12h ADE  [START ON 1/10/2025] thiamine, 100 mg, oral, Daily  thiamine, 100 mg, intravenous, Daily      Continuous medications  dexmedeTOMIDine, 0-1.5 mcg/kg/hr, Last Rate: 1.5 mcg/kg/hr (01/08/25 1123)  propofol, 0-50 mcg/kg/min, Last Rate: 50 mcg/kg/min (01/08/25 1123)      PRN medications  PRN medications: acetaminophen, albuterol, benzonatate, dextrose, dextrose, diazePAM, glucagon, glucagon, HYDROmorphone, hydrOXYzine, morphine, oxygen, polyethylene glycol     Assessment/Plan   Assessment & Plan  Community acquired pneumonia of both upper lobes    Acute respiratory failure with hypoxia  Vasculitis/DAH vs. Atypical pneumonia vs. Acute inflammatory process.  Smoke a pack a day for 30yrs/quit 30yrs ago  Currently intubated with FiO2 50% down from 80%   On IV steroids   Neg Resp PCR - COVID, flu, RSV  Sepsis  Blood culture, NGTD  Urine culture, NGTD   ESR  CRP 3  Respiratory culture pending  MRSA PCR - Negative    Pneumonia  Chest XR with Redemonstrated extensive patchy airspace opacities throughout the lungs concerning for multifocal  pneumonia.  CTA chest without PE, but showing Diffuse bilateral ground-glass opacities, may be secondary to pulmonary edema and/or multifocal pneumonia. Acute respiratory distress syndrome is in the differential diagnoses. Mild cardiomegaly. Trace pericardial effusion.   TTE with LVEF 70-75%  ProCalc uptrending now 2.86    Leukocytosis   ID following - on Zosyn, Zyvox, Mepron, Doxycycline  WBC 16.9    KALEB  Cr 2.47  West catheter urinary output was 1250 over the last 24hrs.   Limit nephrotoxic medications.     Palliative care   DNR CCA DNI   The patient currently does not have decision making capacity  The patient is a   Has 2 children Beverly Fregoso and Valeriy Paul is HCPOA Valeriy is alternate. I did verify this.     1/8/2025  Discussion with daughter Beverly at bedside. The patient had a hard time being sedated. Beverly did ask for spiritual care now, I did put in a consult. States she is agreeable to her mother's wishes. We will continue to follow.     1/7/2025  Goals of care discussion with daughter Beverly.  The daughter stated that she is aware of her mom's wishes.  States that her mom would only want to be intubated for 1 week.  This is congruent with discussions with the ICU team.  I stated that the palliative care team will be in close follow-up with the family to walk them through this decision making.  We will continue to follow.       I spent 60 minutes in the professional and overall care of this patient.      Nikole Sullivan, APRN-CNP

## 2025-01-09 LAB
ADENOVIRUS RVP, VIRC: NOT DETECTED
ALBUMIN SERPL BCP-MCNC: 2.8 G/DL (ref 3.4–5)
ALP SERPL-CCNC: 86 U/L (ref 33–136)
ALP SERPL-CCNC: 86 U/L (ref 33–136)
ALT SERPL W P-5'-P-CCNC: 26 U/L (ref 7–45)
ALT SERPL W P-5'-P-CCNC: 26 U/L (ref 7–45)
ANCA AB PATTERN SER IF-IMP: ABNORMAL
ANCA IGG TITR SER IF: ABNORMAL {TITER}
ANION GAP BLDA CALCULATED.4IONS-SCNC: 10 MMO/L (ref 10–25)
ANION GAP BLDA CALCULATED.4IONS-SCNC: 11 MMO/L (ref 10–25)
ANION GAP SERPL CALCULATED.3IONS-SCNC: 14 MMOL/L (ref 10–20)
ANION GAP SERPL CALCULATED.3IONS-SCNC: 16 MMOL/L (ref 10–20)
ARTERIAL PATENCY WRIST A: NEGATIVE
ARTERIAL PATENCY WRIST A: NEGATIVE
AST SERPL W P-5'-P-CCNC: 24 U/L (ref 9–39)
AST SERPL W P-5'-P-CCNC: 24 U/L (ref 9–39)
BASE EXCESS BLDA CALC-SCNC: -2.4 MMOL/L (ref -2–3)
BASE EXCESS BLDA CALC-SCNC: -3.2 MMOL/L (ref -2–3)
BASOPHILS # BLD AUTO: 0.01 X10*3/UL (ref 0–0.1)
BASOPHILS NFR BLD AUTO: 0.1 %
BILIRUB DIRECT SERPL-MCNC: 0.7 MG/DL (ref 0–0.3)
BILIRUB SERPL-MCNC: 1.2 MG/DL (ref 0–1.2)
BILIRUB SERPL-MCNC: 1.2 MG/DL (ref 0–1.2)
BODY TEMPERATURE: 37 DEGREES CELSIUS
BODY TEMPERATURE: 37 DEGREES CELSIUS
BUN SERPL-MCNC: 73 MG/DL (ref 6–23)
BUN SERPL-MCNC: 77 MG/DL (ref 6–23)
CA-I BLDA-SCNC: 1.11 MMOL/L (ref 1.1–1.33)
CA-I BLDA-SCNC: 1.13 MMOL/L (ref 1.1–1.33)
CALCIUM SERPL-MCNC: 7.7 MG/DL (ref 8.6–10.3)
CALCIUM SERPL-MCNC: 7.9 MG/DL (ref 8.6–10.3)
CHLORIDE BLDA-SCNC: 108 MMOL/L (ref 98–107)
CHLORIDE BLDA-SCNC: 108 MMOL/L (ref 98–107)
CHLORIDE SERPL-SCNC: 105 MMOL/L (ref 98–107)
CHLORIDE SERPL-SCNC: 105 MMOL/L (ref 98–107)
CO2 SERPL-SCNC: 22 MMOL/L (ref 21–32)
CO2 SERPL-SCNC: 23 MMOL/L (ref 21–32)
CREAT SERPL-MCNC: 3.01 MG/DL (ref 0.5–1.05)
CREAT SERPL-MCNC: 3.01 MG/DL (ref 0.5–1.05)
EGFRCR SERPLBLD CKD-EPI 2021: 15 ML/MIN/1.73M*2
EGFRCR SERPLBLD CKD-EPI 2021: 15 ML/MIN/1.73M*2
ENTEROVIRUS/RHINOVIRUS RVP, VIRC: NOT DETECTED
EOSINOPHIL # BLD AUTO: 0 X10*3/UL (ref 0–0.4)
EOSINOPHIL NFR BLD AUTO: 0 %
ERYTHROCYTE [DISTWIDTH] IN BLOOD BY AUTOMATED COUNT: 13.6 % (ref 11.5–14.5)
FUNGITELL BETA-D GLUCAN,SERUM: <31 PG/ML
G6PD RBC QL: NORMAL
GLUCOSE BLD MANUAL STRIP-MCNC: 108 MG/DL (ref 74–99)
GLUCOSE BLD MANUAL STRIP-MCNC: 111 MG/DL (ref 74–99)
GLUCOSE BLD MANUAL STRIP-MCNC: 112 MG/DL (ref 74–99)
GLUCOSE BLD MANUAL STRIP-MCNC: 118 MG/DL (ref 74–99)
GLUCOSE BLD MANUAL STRIP-MCNC: 123 MG/DL (ref 74–99)
GLUCOSE BLD MANUAL STRIP-MCNC: 138 MG/DL (ref 74–99)
GLUCOSE BLDA-MCNC: 118 MG/DL (ref 74–99)
GLUCOSE BLDA-MCNC: 120 MG/DL (ref 74–99)
GLUCOSE SERPL-MCNC: 126 MG/DL (ref 74–99)
GLUCOSE SERPL-MCNC: 131 MG/DL (ref 74–99)
H CAPSUL AG UR QL: NOT DETECTED
HCO3 BLDA-SCNC: 22.1 MMOL/L (ref 22–26)
HCO3 BLDA-SCNC: 22.5 MMOL/L (ref 22–26)
HCT VFR BLD AUTO: 22.2 % (ref 36–46)
HCT VFR BLD EST: 20 % (ref 36–46)
HCT VFR BLD EST: 23 % (ref 36–46)
HGB BLD-MCNC: 7.5 G/DL (ref 12–16)
HGB BLDA-MCNC: 6.7 G/DL (ref 12–16)
HGB BLDA-MCNC: 7.5 G/DL (ref 12–16)
HMPV RNA SPEC QL NAA+PROBE: NOT DETECTED
HUMAN BOCAVIRUS RVP, VIRC: NOT DETECTED
HUMAN CORONAVIRUS RVP, VIRC: NOT DETECTED
IMM GRANULOCYTES # BLD AUTO: 0.67 X10*3/UL (ref 0–0.5)
IMM GRANULOCYTES NFR BLD AUTO: 4 % (ref 0–0.9)
INFLUENZA A , VIRC: NOT DETECTED
INFLUENZA A H1N1-09 , VIRC: NOT DETECTED
INFLUENZA B PCR, VIRC: NOT DETECTED
INHALED O2 CONCENTRATION: 60 %
INHALED O2 CONCENTRATION: 60 %
LABORATORY COMMENT REPORT: NORMAL
LABORATORY COMMENT REPORT: NORMAL
LACTATE BLDA-SCNC: 0.9 MMOL/L (ref 0.4–2)
LACTATE BLDA-SCNC: 1 MMOL/L (ref 0.4–2)
LYMPHOCYTES # BLD AUTO: 0.7 X10*3/UL (ref 0.8–3)
LYMPHOCYTES NFR BLD AUTO: 4.2 %
MAGNESIUM SERPL-MCNC: 2.41 MG/DL (ref 1.6–2.4)
MCH RBC QN AUTO: 30.7 PG (ref 26–34)
MCHC RBC AUTO-ENTMCNC: 33.8 G/DL (ref 32–36)
MCV RBC AUTO: 91 FL (ref 80–100)
METAPNEUMOVIRUS , VIRC: NOT DETECTED
MONOCYTES # BLD AUTO: 1.2 X10*3/UL (ref 0.05–0.8)
MONOCYTES NFR BLD AUTO: 7.2 %
MYELOPEROXIDASE AB SER-ACNC: 20 AU/ML (ref 0–19)
NEUTROPHILS # BLD AUTO: 14.1 X10*3/UL (ref 1.6–5.5)
NEUTROPHILS NFR BLD AUTO: 84.5 %
NRBC BLD-RTO: 0.1 /100 WBCS (ref 0–0)
OXYHGB MFR BLDA: 97.4 % (ref 94–98)
OXYHGB MFR BLDA: 97.6 % (ref 94–98)
PARAINFLUENZA PCR, VIRC: NOT DETECTED
PATH REPORT.FINAL DX SPEC: NORMAL
PATH REPORT.GROSS SPEC: NORMAL
PATH REPORT.RELEVANT HX SPEC: NORMAL
PATH REPORT.TOTAL CANCER: NORMAL
PC PRESSURE CONTROL: 10 CM H2O
PCO2 BLDA: 38 MM HG (ref 38–42)
PCO2 BLDA: 40 MM HG (ref 38–42)
PEEP CMH2O: 12 CM H2O
PEEP CMH2O: 12 CM H2O
PH BLDA: 7.35 PH (ref 7.38–7.42)
PH BLDA: 7.38 PH (ref 7.38–7.42)
PHOSPHATE SERPL-MCNC: 4.3 MG/DL (ref 2.5–4.9)
PLATELET # BLD AUTO: 535 X10*3/UL (ref 150–450)
PO2 BLDA: 120 MM HG (ref 85–95)
PO2 BLDA: 134 MM HG (ref 85–95)
POTASSIUM BLDA-SCNC: 4.7 MMOL/L (ref 3.5–5.3)
POTASSIUM BLDA-SCNC: 4.7 MMOL/L (ref 3.5–5.3)
POTASSIUM SERPL-SCNC: 4.3 MMOL/L (ref 3.5–5.3)
POTASSIUM SERPL-SCNC: 4.3 MMOL/L (ref 3.5–5.3)
PROT SERPL-MCNC: 6.1 G/DL (ref 6.4–8.2)
PROT SERPL-MCNC: 6.1 G/DL (ref 6.4–8.2)
PROTEINASE3 AB SER-ACNC: 0 AU/ML (ref 0–19)
RBC # BLD AUTO: 2.44 X10*6/UL (ref 4–5.2)
RESIDENT REVIEW: NORMAL
RSV PCR, RVP, VIRC: NOT DETECTED
SAO2 % BLDA: 100 % (ref 94–100)
SAO2 % BLDA: 99 % (ref 94–100)
SCAN RESULT: NORMAL
SODIUM BLDA-SCNC: 136 MMOL/L (ref 136–145)
SODIUM BLDA-SCNC: 136 MMOL/L (ref 136–145)
SODIUM SERPL-SCNC: 138 MMOL/L (ref 136–145)
SODIUM SERPL-SCNC: 139 MMOL/L (ref 136–145)
SPECIMEN DRAWN FROM PATIENT: ABNORMAL
SPECIMEN DRAWN FROM PATIENT: ABNORMAL
TIDAL VOLUME: 370 ML
VENTILATOR MODE: ABNORMAL
VENTILATOR MODE: ABNORMAL
VENTILATOR RATE: 24 BPM
VENTILATOR RATE: 24 BPM
WBC # BLD AUTO: 16.7 X10*3/UL (ref 4.4–11.3)

## 2025-01-09 PROCEDURE — 71045 X-RAY EXAM CHEST 1 VIEW: CPT | Performed by: RADIOLOGY

## 2025-01-09 PROCEDURE — 37799 UNLISTED PX VASCULAR SURGERY: CPT

## 2025-01-09 PROCEDURE — 87798 DETECT AGENT NOS DNA AMP: CPT | Mod: WESLAB

## 2025-01-09 PROCEDURE — 82040 ASSAY OF SERUM ALBUMIN: CPT

## 2025-01-09 PROCEDURE — 2500000004 HC RX 250 GENERAL PHARMACY W/ HCPCS (ALT 636 FOR OP/ED)

## 2025-01-09 PROCEDURE — 99291 CRITICAL CARE FIRST HOUR: CPT | Performed by: NURSE PRACTITIONER

## 2025-01-09 PROCEDURE — 94003 VENT MGMT INPAT SUBQ DAY: CPT

## 2025-01-09 PROCEDURE — 84295 ASSAY OF SERUM SODIUM: CPT

## 2025-01-09 PROCEDURE — 2500000004 HC RX 250 GENERAL PHARMACY W/ HCPCS (ALT 636 FOR OP/ED): Performed by: INTERNAL MEDICINE

## 2025-01-09 PROCEDURE — 2500000005 HC RX 250 GENERAL PHARMACY W/O HCPCS

## 2025-01-09 PROCEDURE — 2500000001 HC RX 250 WO HCPCS SELF ADMINISTERED DRUGS (ALT 637 FOR MEDICARE OP)

## 2025-01-09 PROCEDURE — 2500000001 HC RX 250 WO HCPCS SELF ADMINISTERED DRUGS (ALT 637 FOR MEDICARE OP): Performed by: INTERNAL MEDICINE

## 2025-01-09 PROCEDURE — 84100 ASSAY OF PHOSPHORUS: CPT

## 2025-01-09 PROCEDURE — 99232 SBSQ HOSP IP/OBS MODERATE 35: CPT | Performed by: NURSE PRACTITIONER

## 2025-01-09 PROCEDURE — 2500000002 HC RX 250 W HCPCS SELF ADMINISTERED DRUGS (ALT 637 FOR MEDICARE OP, ALT 636 FOR OP/ED)

## 2025-01-09 PROCEDURE — 83516 IMMUNOASSAY NONANTIBODY: CPT | Performed by: INTERNAL MEDICINE

## 2025-01-09 PROCEDURE — 2500000005 HC RX 250 GENERAL PHARMACY W/O HCPCS: Performed by: SURGERY

## 2025-01-09 PROCEDURE — 94640 AIRWAY INHALATION TREATMENT: CPT

## 2025-01-09 PROCEDURE — 2500000001 HC RX 250 WO HCPCS SELF ADMINISTERED DRUGS (ALT 637 FOR MEDICARE OP): Performed by: NURSE PRACTITIONER

## 2025-01-09 PROCEDURE — 36600 WITHDRAWAL OF ARTERIAL BLOOD: CPT

## 2025-01-09 PROCEDURE — 2020000001 HC ICU ROOM DAILY

## 2025-01-09 PROCEDURE — 99232 SBSQ HOSP IP/OBS MODERATE 35: CPT | Performed by: INTERNAL MEDICINE

## 2025-01-09 PROCEDURE — 82330 ASSAY OF CALCIUM: CPT

## 2025-01-09 PROCEDURE — 85025 COMPLETE CBC W/AUTO DIFF WBC: CPT

## 2025-01-09 PROCEDURE — 82947 ASSAY GLUCOSE BLOOD QUANT: CPT

## 2025-01-09 PROCEDURE — 83735 ASSAY OF MAGNESIUM: CPT

## 2025-01-09 PROCEDURE — 37799 UNLISTED PX VASCULAR SURGERY: CPT | Performed by: INTERNAL MEDICINE

## 2025-01-09 PROCEDURE — 83605 ASSAY OF LACTIC ACID: CPT

## 2025-01-09 PROCEDURE — 74018 RADEX ABDOMEN 1 VIEW: CPT | Performed by: RADIOLOGY

## 2025-01-09 PROCEDURE — 2500000004 HC RX 250 GENERAL PHARMACY W/ HCPCS (ALT 636 FOR OP/ED): Performed by: SURGERY

## 2025-01-09 RX ORDER — LEVOTHYROXINE SODIUM 50 UG/1
50 TABLET ORAL DAILY
Status: CANCELLED | OUTPATIENT
Start: 2025-01-10

## 2025-01-09 RX ORDER — PHENOBARBITAL 32.4 MG/1
64.8 TABLET ORAL 3 TIMES DAILY
Status: COMPLETED | OUTPATIENT
Start: 2025-01-09 | End: 2025-01-09

## 2025-01-09 RX ORDER — HYDROMORPHONE HYDROCHLORIDE 1 MG/ML
0.6 INJECTION, SOLUTION INTRAMUSCULAR; INTRAVENOUS; SUBCUTANEOUS EVERY 2 HOUR PRN
Status: DISCONTINUED | OUTPATIENT
Start: 2025-01-09 | End: 2025-01-13

## 2025-01-09 RX ORDER — LACTULOSE 10 G/15ML
20 SOLUTION ORAL ONCE
Status: COMPLETED | OUTPATIENT
Start: 2025-01-09 | End: 2025-01-09

## 2025-01-09 RX ORDER — PHENOBARBITAL 32.4 MG/1
32.4 TABLET ORAL 3 TIMES DAILY
Status: COMPLETED | OUTPATIENT
Start: 2025-01-09 | End: 2025-01-11

## 2025-01-09 RX ORDER — LACTULOSE 10 G/15ML
20 SOLUTION ORAL DAILY PRN
Status: DISCONTINUED | OUTPATIENT
Start: 2025-01-09 | End: 2025-01-14

## 2025-01-09 RX ORDER — BISACODYL 10 MG/1
10 SUPPOSITORY RECTAL ONCE
Status: COMPLETED | OUTPATIENT
Start: 2025-01-09 | End: 2025-01-09

## 2025-01-09 RX ADMIN — PANTOPRAZOLE SODIUM 40 MG: 40 INJECTION, POWDER, FOR SOLUTION INTRAVENOUS at 08:42

## 2025-01-09 RX ADMIN — DOCUSATE SODIUM 100 MG: 50 LIQUID ORAL at 21:32

## 2025-01-09 RX ADMIN — HEPARIN SODIUM 5000 UNITS: 5000 INJECTION, SOLUTION INTRAVENOUS; SUBCUTANEOUS at 14:10

## 2025-01-09 RX ADMIN — PHENOBARBITAL 64.8 MG: 32.4 TABLET ORAL at 13:43

## 2025-01-09 RX ADMIN — BISACODYL 10 MG: 10 SUPPOSITORY RECTAL at 16:17

## 2025-01-09 RX ADMIN — PHENOBARBITAL SODIUM 65 MG: 65 INJECTION INTRAMUSCULAR; INTRAVENOUS at 03:20

## 2025-01-09 RX ADMIN — PROPOFOL 30 MCG/KG/MIN: 10 INJECTION, EMULSION INTRAVENOUS at 14:22

## 2025-01-09 RX ADMIN — ATOVAQUONE 750 MG: 750 SUSPENSION ORAL at 21:33

## 2025-01-09 RX ADMIN — Medication 3 ML: at 12:16

## 2025-01-09 RX ADMIN — Medication 3 ML: at 23:16

## 2025-01-09 RX ADMIN — DEXMEDETOMIDINE HYDROCHLORIDE 1.5 MCG/KG/HR: 4 INJECTION, SOLUTION INTRAVENOUS at 03:38

## 2025-01-09 RX ADMIN — METHYLPREDNISOLONE SODIUM SUCCINATE 40 MG: 40 INJECTION, POWDER, LYOPHILIZED, FOR SOLUTION INTRAMUSCULAR; INTRAVENOUS at 03:19

## 2025-01-09 RX ADMIN — LEVOTHYROXINE SODIUM ANHYDROUS 25 MCG: 100 INJECTION, POWDER, LYOPHILIZED, FOR SOLUTION INTRAVENOUS at 08:41

## 2025-01-09 RX ADMIN — HEPARIN SODIUM 5000 UNITS: 5000 INJECTION, SOLUTION INTRAVENOUS; SUBCUTANEOUS at 03:19

## 2025-01-09 RX ADMIN — METHYLPREDNISOLONE SODIUM SUCCINATE 40 MG: 40 INJECTION, POWDER, LYOPHILIZED, FOR SOLUTION INTRAMUSCULAR; INTRAVENOUS at 08:47

## 2025-01-09 RX ADMIN — Medication 1 TABLET: at 08:48

## 2025-01-09 RX ADMIN — IPRATROPIUM BROMIDE AND ALBUTEROL SULFATE 3 ML: 2.5; .5 SOLUTION RESPIRATORY (INHALATION) at 12:16

## 2025-01-09 RX ADMIN — SIMVASTATIN 20 MG: 20 TABLET, FILM COATED ORAL at 21:32

## 2025-01-09 RX ADMIN — Medication 45 PERCENT: at 19:37

## 2025-01-09 RX ADMIN — Medication 3 ML: at 19:37

## 2025-01-09 RX ADMIN — HEPARIN SODIUM 5000 UNITS: 5000 INJECTION, SOLUTION INTRAVENOUS; SUBCUTANEOUS at 20:59

## 2025-01-09 RX ADMIN — LINEZOLID 600 MG: 600 INJECTION, SOLUTION INTRAVENOUS at 01:18

## 2025-01-09 RX ADMIN — PIPERACILLIN SODIUM AND TAZOBACTAM SODIUM 2.25 G: 2; .25 INJECTION, SOLUTION INTRAVENOUS at 03:20

## 2025-01-09 RX ADMIN — THIAMINE HYDROCHLORIDE 100 MG: 100 INJECTION, SOLUTION INTRAMUSCULAR; INTRAVENOUS at 08:46

## 2025-01-09 RX ADMIN — METHYLPREDNISOLONE SODIUM SUCCINATE 40 MG: 40 INJECTION, POWDER, LYOPHILIZED, FOR SOLUTION INTRAMUSCULAR; INTRAVENOUS at 16:16

## 2025-01-09 RX ADMIN — Medication 3 ML: at 15:12

## 2025-01-09 RX ADMIN — PIPERACILLIN SODIUM AND TAZOBACTAM SODIUM 2.25 G: 2; .25 INJECTION, SOLUTION INTRAVENOUS at 09:37

## 2025-01-09 RX ADMIN — Medication 60 PERCENT: at 07:45

## 2025-01-09 RX ADMIN — IPRATROPIUM BROMIDE AND ALBUTEROL SULFATE 3 ML: 2.5; .5 SOLUTION RESPIRATORY (INHALATION) at 23:16

## 2025-01-09 RX ADMIN — METHYLPREDNISOLONE SODIUM SUCCINATE 40 MG: 40 INJECTION, POWDER, LYOPHILIZED, FOR SOLUTION INTRAMUSCULAR; INTRAVENOUS at 21:32

## 2025-01-09 RX ADMIN — Medication 3.7 MG/HR: at 00:56

## 2025-01-09 RX ADMIN — PROPOFOL 50 MCG/KG/MIN: 10 INJECTION, EMULSION INTRAVENOUS at 22:46

## 2025-01-09 RX ADMIN — FOLIC ACID 1 MG: 1 TABLET ORAL at 08:48

## 2025-01-09 RX ADMIN — DOXYCYCLINE 100 MG: 100 INJECTION, POWDER, LYOPHILIZED, FOR SOLUTION INTRAVENOUS at 02:05

## 2025-01-09 RX ADMIN — PROPOFOL 50 MCG/KG/MIN: 10 INJECTION, EMULSION INTRAVENOUS at 08:24

## 2025-01-09 RX ADMIN — ATOVAQUONE 750 MG: 750 SUSPENSION ORAL at 13:43

## 2025-01-09 RX ADMIN — IPRATROPIUM BROMIDE AND ALBUTEROL SULFATE 3 ML: 2.5; .5 SOLUTION RESPIRATORY (INHALATION) at 15:12

## 2025-01-09 RX ADMIN — DEXMEDETOMIDINE HYDROCHLORIDE 1.5 MCG/KG/HR: 4 INJECTION, SOLUTION INTRAVENOUS at 07:27

## 2025-01-09 RX ADMIN — PROPOFOL 50 MCG/KG/MIN: 10 INJECTION, EMULSION INTRAVENOUS at 03:39

## 2025-01-09 RX ADMIN — Medication 3 ML: at 04:20

## 2025-01-09 RX ADMIN — Medication 3 ML: at 07:45

## 2025-01-09 RX ADMIN — PROPOFOL 50 MCG/KG/MIN: 10 INJECTION, EMULSION INTRAVENOUS at 18:54

## 2025-01-09 RX ADMIN — DOCUSATE SODIUM 100 MG: 50 LIQUID ORAL at 08:48

## 2025-01-09 RX ADMIN — IPRATROPIUM BROMIDE AND ALBUTEROL SULFATE 3 ML: 2.5; .5 SOLUTION RESPIRATORY (INHALATION) at 04:20

## 2025-01-09 RX ADMIN — IPRATROPIUM BROMIDE AND ALBUTEROL SULFATE 3 ML: 2.5; .5 SOLUTION RESPIRATORY (INHALATION) at 19:37

## 2025-01-09 RX ADMIN — PIPERACILLIN SODIUM AND TAZOBACTAM SODIUM 2.25 G: 2; .25 INJECTION, SOLUTION INTRAVENOUS at 16:17

## 2025-01-09 RX ADMIN — PIPERACILLIN SODIUM AND TAZOBACTAM SODIUM 2.25 G: 2; .25 INJECTION, SOLUTION INTRAVENOUS at 21:33

## 2025-01-09 RX ADMIN — LACTULOSE 20 G: 10 SOLUTION ORAL at 13:44

## 2025-01-09 RX ADMIN — POLYETHYLENE GLYCOL 3350 17 G: 17 POWDER, FOR SOLUTION ORAL at 13:44

## 2025-01-09 RX ADMIN — PHENOBARBITAL 32.4 MG: 32.4 TABLET ORAL at 21:40

## 2025-01-09 RX ADMIN — IPRATROPIUM BROMIDE AND ALBUTEROL SULFATE 3 ML: 2.5; .5 SOLUTION RESPIRATORY (INHALATION) at 07:45

## 2025-01-09 ASSESSMENT — PAIN SCALES - WONG BAKER: WONGBAKER_NUMERICALRESPONSE: NO HURT

## 2025-01-09 ASSESSMENT — PAIN - FUNCTIONAL ASSESSMENT
PAIN_FUNCTIONAL_ASSESSMENT: CPOT (CRITICAL CARE PAIN OBSERVATION TOOL)

## 2025-01-09 NOTE — PROGRESS NOTES
Patient not medically clear. Patient remains intubated. Palliative care following. At this time there is not a safe discharge plan in place. Will follow.      01/09/25 1505   Discharge Planning   Home or Post Acute Services Other (Comment)   Expected Discharge Disposition [place]  (TBD)   Does the patient need discharge transport arranged? Yes   RoundTrip coordination needed? Yes

## 2025-01-09 NOTE — CARE PLAN
Problem: Skin  Goal: Decreased wound size/increased tissue granulation at next dressing change  Flowsheets (Taken 1/9/2025 1244)  Decreased wound size/increased tissue granulation at next dressing change:   Promote sleep for wound healing   Protective dressings over bony prominences   Utilize specialty bed per algorithm  Goal: Participates in plan/prevention/treatment measures  Flowsheets (Taken 1/9/2025 1244)  Participates in plan/prevention/treatment measures:   Discuss with provider PT/OT consult   Elevate heels   Increase activity/out of bed for meals  Goal: Prevent/manage excess moisture  Flowsheets (Taken 1/9/2025 1244)  Prevent/manage excess moisture:   Cleanse incontinence/protect with barrier cream   Monitor for/manage infection if present   Follow provider orders for dressing changes   Moisturize dry skin  Goal: Prevent/minimize sheer/friction injuries  Flowsheets (Taken 1/9/2025 1244)  Prevent/minimize sheer/friction injuries:   Complete micro-shifts as needed if patient unable. Adjust patient position to relieve pressure points, not a full turn   Increase activity/out of bed for meals   Use pull sheet   HOB 30 degrees or less   Turn/reposition every 2 hours/use positioning/transfer devices   Utilize specialty bed per algorithm  Goal: Promote/optimize nutrition  Flowsheets (Taken 1/9/2025 1244)  Promote/optimize nutrition: Discuss with provider if NPO > 2 days  Goal: Promote skin healing  Flowsheets (Taken 1/9/2025 1244)  Promote skin healing:   Assess skin/pad under line(s)/device(s)   Protective dressings over bony prominences   Turn/reposition every 2 hours/use positioning/transfer devices   Ensure correct size (line/device) and apply per  instructions   Rotate device position/do not position patient on device   The patient's goals for the shift include Able to breath better, safety    The clinical goals for the shift include Maintain hemodynamic stability

## 2025-01-09 NOTE — PROGRESS NOTES
"Janet Snow is a 79 y.o. female on day 7 of admission presenting with Community acquired pneumonia of both upper lobes.    Subjective   Currently intubated on 50% FiO2. OG to LIS. Precedex and Propofol currently infusing. Ketamine added overnight for tachpnea.     Objective     Physical Exam  Vitals and nursing note reviewed.   Constitutional:       Appearance: She is ill-appearing.   HENT:      Head: Normocephalic and atraumatic.      Mouth/Throat:      Mouth: Mucous membranes are moist.   Eyes:      Pupils: Pupils are equal, round, and reactive to light.   Cardiovascular:      Rate and Rhythm: Normal rate and regular rhythm.   Pulmonary:      Effort: Respiratory distress present.   Abdominal:      General: Bowel sounds are normal.   Genitourinary:     Comments: West catheter   Musculoskeletal:      Cervical back: Neck supple. No rigidity.      Right lower leg: No edema.      Left lower leg: No edema.   Skin:     General: Skin is dry.      Capillary Refill: Capillary refill takes 2 to 3 seconds.   Neurological:      Comments: sedated         Last Recorded Vitals  Blood pressure 113/58, pulse 80, temperature 37.3 °C (99.1 °F), resp. rate (!) 32, height 1.6 m (5' 3\"), weight 74.8 kg (164 lb 14.5 oz), SpO2 96%.  Intake/Output last 3 Shifts:  I/O last 3 completed shifts:  In: 4215 (56.4 mL/kg) [I.V.:2125 (28.4 mL/kg); NG/GT:90; IV Piggyback:2000]  Out: 3245 (43.4 mL/kg) [Urine:1945 (0.7 mL/kg/hr); Emesis/NG output:1300]  Weight: 74.8 kg     Relevant Results  Results for orders placed or performed during the hospital encounter of 01/02/25 (from the past 24 hours)   VERIFY ABO/Rh Group Test   Result Value Ref Range    ABO TYPE AB     Rh TYPE POS    POCT GLUCOSE   Result Value Ref Range    POCT Glucose 123 (H) 74 - 99 mg/dL   POCT GLUCOSE   Result Value Ref Range    POCT Glucose 123 (H) 74 - 99 mg/dL   POCT GLUCOSE   Result Value Ref Range    POCT Glucose 111 (H) 74 - 99 mg/dL   POCT GLUCOSE   Result Value Ref Range "    POCT Glucose 123 (H) 74 - 99 mg/dL   POCT GLUCOSE   Result Value Ref Range    POCT Glucose 138 (H) 74 - 99 mg/dL   CBC and Auto Differential   Result Value Ref Range    WBC 16.7 (H) 4.4 - 11.3 x10*3/uL    nRBC 0.1 (H) 0.0 - 0.0 /100 WBCs    RBC 2.44 (L) 4.00 - 5.20 x10*6/uL    Hemoglobin 7.5 (L) 12.0 - 16.0 g/dL    Hematocrit 22.2 (L) 36.0 - 46.0 %    MCV 91 80 - 100 fL    MCH 30.7 26.0 - 34.0 pg    MCHC 33.8 32.0 - 36.0 g/dL    RDW 13.6 11.5 - 14.5 %    Platelets 535 (H) 150 - 450 x10*3/uL    Neutrophils % 84.5 40.0 - 80.0 %    Immature Granulocytes %, Automated 4.0 (H) 0.0 - 0.9 %    Lymphocytes % 4.2 13.0 - 44.0 %    Monocytes % 7.2 2.0 - 10.0 %    Eosinophils % 0.0 0.0 - 6.0 %    Basophils % 0.1 0.0 - 2.0 %    Neutrophils Absolute 14.10 (H) 1.60 - 5.50 x10*3/uL    Immature Granulocytes Absolute, Automated 0.67 (H) 0.00 - 0.50 x10*3/uL    Lymphocytes Absolute 0.70 (L) 0.80 - 3.00 x10*3/uL    Monocytes Absolute 1.20 (H) 0.05 - 0.80 x10*3/uL    Eosinophils Absolute 0.00 0.00 - 0.40 x10*3/uL    Basophils Absolute 0.01 0.00 - 0.10 x10*3/uL   Magnesium   Result Value Ref Range    Magnesium 2.41 (H) 1.60 - 2.40 mg/dL   Renal function panel   Result Value Ref Range    Glucose 131 (H) 74 - 99 mg/dL    Sodium 138 136 - 145 mmol/L    Potassium 4.3 3.5 - 5.3 mmol/L    Chloride 105 98 - 107 mmol/L    Bicarbonate 23 21 - 32 mmol/L    Anion Gap 14 10 - 20 mmol/L    Urea Nitrogen 73 (H) 6 - 23 mg/dL    Creatinine 3.01 (H) 0.50 - 1.05 mg/dL    eGFR 15 (L) >60 mL/min/1.73m*2    Calcium 7.9 (L) 8.6 - 10.3 mg/dL    Phosphorus 4.3 2.5 - 4.9 mg/dL    Albumin 2.8 (L) 3.4 - 5.0 g/dL   Comprehensive metabolic panel   Result Value Ref Range    Glucose 126 (H) 74 - 99 mg/dL    Sodium 139 136 - 145 mmol/L    Potassium 4.3 3.5 - 5.3 mmol/L    Chloride 105 98 - 107 mmol/L    Bicarbonate 22 21 - 32 mmol/L    Anion Gap 16 10 - 20 mmol/L    Urea Nitrogen 77 (H) 6 - 23 mg/dL    Creatinine 3.01 (H) 0.50 - 1.05 mg/dL    eGFR 15 (L) >60  mL/min/1.73m*2    Calcium 7.7 (L) 8.6 - 10.3 mg/dL    Albumin 2.8 (L) 3.4 - 5.0 g/dL    Alkaline Phosphatase 86 33 - 136 U/L    Total Protein 6.1 (L) 6.4 - 8.2 g/dL    AST 24 9 - 39 U/L    Bilirubin, Total 1.2 0.0 - 1.2 mg/dL    ALT 26 7 - 45 U/L   Hepatic function panel   Result Value Ref Range    Albumin 2.8 (L) 3.4 - 5.0 g/dL    Bilirubin, Total 1.2 0.0 - 1.2 mg/dL    Bilirubin, Direct 0.7 (H) 0.0 - 0.3 mg/dL    Alkaline Phosphatase 86 33 - 136 U/L    ALT 26 7 - 45 U/L    AST 24 9 - 39 U/L    Total Protein 6.1 (L) 6.4 - 8.2 g/dL   POCT GLUCOSE   Result Value Ref Range    POCT Glucose 108 (H) 74 - 99 mg/dL   Blood Gas Arterial Full Panel   Result Value Ref Range    POCT pH, Arterial 7.38 7.38 - 7.42 pH    POCT pCO2, Arterial 38 38 - 42 mm Hg    POCT pO2, Arterial 134 (H) 85 - 95 mm Hg    POCT SO2, Arterial 99 94 - 100 %    POCT Oxy Hemoglobin, Arterial 97.4 94.0 - 98.0 %    POCT Hematocrit Calculated, Arterial 23.0 (L) 36.0 - 46.0 %    POCT Sodium, Arterial 136 136 - 145 mmol/L    POCT Potassium, Arterial 4.7 3.5 - 5.3 mmol/L    POCT Chloride, Arterial 108 (H) 98 - 107 mmol/L    POCT Ionized Calcium, Arterial 1.11 1.10 - 1.33 mmol/L    POCT Glucose, Arterial 120 (H) 74 - 99 mg/dL    POCT Lactate, Arterial 1.0 0.4 - 2.0 mmol/L    POCT Base Excess, Arterial -2.4 (L) -2.0 - 3.0 mmol/L    POCT HCO3 Calculated, Arterial 22.5 22.0 - 26.0 mmol/L    POCT Hemoglobin, Arterial 7.5 (L) 12.0 - 16.0 g/dL    POCT Anion Gap, Arterial 10 10 - 25 mmo/L    Patient Temperature 37.0 degrees Celsius    FiO2 60 %    Ventilator Mode Other     Ventilator Rate 24 bpm    Tidal Volume 370 mL    Peep CHM2O 12.0 cm H2O    Site of Arterial Puncture Arterial Line     Aba's Test Negative    POCT GLUCOSE   Result Value Ref Range    POCT Glucose 111 (H) 74 - 99 mg/dL   Blood Gas Arterial Full Panel   Result Value Ref Range    POCT pH, Arterial 7.35 (L) 7.38 - 7.42 pH    POCT pCO2, Arterial 40 38 - 42 mm Hg    POCT pO2, Arterial 120 (H) 85 - 95  mm Hg    POCT SO2, Arterial 100 94 - 100 %    POCT Oxy Hemoglobin, Arterial 97.6 94.0 - 98.0 %    POCT Hematocrit Calculated, Arterial 20.0 (L) 36.0 - 46.0 %    POCT Sodium, Arterial 136 136 - 145 mmol/L    POCT Potassium, Arterial 4.7 3.5 - 5.3 mmol/L    POCT Chloride, Arterial 108 (H) 98 - 107 mmol/L    POCT Ionized Calcium, Arterial 1.13 1.10 - 1.33 mmol/L    POCT Glucose, Arterial 118 (H) 74 - 99 mg/dL    POCT Lactate, Arterial 0.9 0.4 - 2.0 mmol/L    POCT Base Excess, Arterial -3.2 (L) -2.0 - 3.0 mmol/L    POCT HCO3 Calculated, Arterial 22.1 22.0 - 26.0 mmol/L    POCT Hemoglobin, Arterial 6.7 (L) 12.0 - 16.0 g/dL    POCT Anion Gap, Arterial 11 10 - 25 mmo/L    Patient Temperature 37.0 degrees Celsius    FiO2 60 %    Ventilator Mode A/C PC     Ventilator Rate 24 bpm    PC Pressure Control 10.0 cm H2O    Peep CHM2O 12.0 cm H2O    Site of Arterial Puncture Arterial Line     Aba's Test Negative       XR chest abdomen for OG NG placement    Result Date: 1/8/2025  Interpreted By:  Finkelstein, Evan, STUDY: XR CHEST ABDOMEN FOR OG NG PLACEMENT;  1/8/2025 2:41 am two views of the chest.   INDICATION: Signs/Symptoms:OG tube placement.   COMPARISON: None.   ACCESSION NUMBER(S): [id number]   ORDERING CLINICIAN: AMANDA SÁNCHEZ   FINDINGS: Endotracheal tube present with tip approximately 2.4 cm above the noah. OG tube courses below the diaphragm, with tip extending all of the way into the lower pelvis, beyond the field of view. Extensive patchy airspace opacities throughout the lungs. No sizable pleural effusion or pneumothorax. No acute osseous abnormality.       OG tube courses below the diaphragm with tip extending into the pelvis beyond the field of view. Endotracheal tube tip lies 2.4 cm above the noah. Redemonstrated extensive patchy airspace opacities throughout the lungs concerning for multifocal pneumonia.     MACRO: None.   Signed by: Evan Finkelstein 1/8/2025 3:04 AM Dictation workstation:    [id number]    XR chest 1 view    Result Date: 1/7/2025  Interpreted By:  Isidro Corrales, STUDY: XR CHEST 1 VIEW;  1/7/2025 10:48 pm   INDICATION: Signs/Symptoms:OG tube.   COMPARISON: Chest x-ray 01/07/2025   ACCESSION NUMBER(S): [id number]   ORDERING CLINICIAN: AMANDA SÁNCHEZ   FINDINGS: Enteric tube terminates in the left mid abdomen with side hole below the GE junction, likely within the distal gastric body. Multiple overlying leads are present.   CARDIOMEDIASTINAL SILHOUETTE: Cardiomediastinal silhouette is stable in size and configuration.   LUNGS: Lung apices are excluded from the field of view. There is diffuse bilateral airspace opacities not significantly changed from prior exam.   ABDOMEN: No remarkable upper abdominal findings.   BONES: Multilevel degenerative changes of the spine.       Enteric tube terminates in the left mid abdomen with side hole below the GE junction.   Diffuse bilateral airspace opacities concerning for developing multifocal pneumonia. Continued radiographic follow-up to resolution is advised.   MACRO: None   Signed by: Isidro Corrales 1/7/2025 10:52 PM Dictation workstation:   [id number]    Bronchoscopy    Result Date: 1/7/2025  Table formatting from the original result was not included. Impression The left main stem, left upper lobar bronchus, lingular lobar bronchus, left lower lobar bronchus, right main stem, right upper lobar bronchus, bronchus intermedius, right middle lobar bronchus and right lower lobar bronchus appeared normal. Bronchoalveolar lavage was performed x1 in the RML and the fluid appeared cloudy Findings The left main stem, left upper lobar bronchus, lingular lobar bronchus, left lower lobar bronchus, right main stem, right upper lobar bronchus, bronchus intermedius, right middle lobar bronchus and right lower lobar bronchus appeared normal. Bronchoalveolar lavage was performed x1 in the RML with 140 mL of saline instilled and a total return of 90 mL. The  fluid appeared cloudy. Recommendation There is no recommended follow-up for this procedure. Indication ARDS (adult respiratory distress syndrome) (Multi) Staff Staff Role No Staff Documented Medications See Anesthesia Record. Preprocedure A history and physical has been performed, and patient medication allergies have been reviewed. The patient's tolerance of previous anesthesia has been reviewed. The risks and benefits of the procedure and the sedation options and risks were discussed with the  daughter (medical POA) and son (financial POA) . All questions were answered and informed consent obtained. Details of the Procedure The patient was already under sedation prior to the procedure. The patient's blood pressure, respirations, heart rate, oxygen and ECG were monitored throughout the procedure. The patient experienced no blood loss. The scope was introduced through the endotracheal tube. The procedure was not difficult. The patient tolerated the procedure well. There were no apparent adverse events. Events Procedure Events Event Event Time Specimens * Cannot find log * BAL taken from RML 140cc instilled, retunr 90cc of white cloudy fluid. Mucosa appeared normal and non friable. Some mucous secretions suctioned away. Procedure Location [address] Intensive [facility] [address] [facility] [id number] [phone] Referring Provider Laura Maloney MD Procedure Provider Laura PERALTA MD     XR chest 1 view    Result Date: 1/7/2025  Interpreted By:  Elda Dorado, STUDY: XR CHEST 1 VIEW;  1/7/2025 10:40 am   INDICATION: Signs/Symptoms:intubation.   COMPARISON: 01/07/2025 at 5:45 a.m.   ACCESSION NUMBER(S): [id number]   ORDERING CLINICIAN: KENISHA LEE   FINDINGS: Heart is normal in size.   The patient is intubated. The tip terminates above the noah.   There is diffuse parenchymal infiltration.   There are atherosclerotic changes of the aorta. There are  degenerative changes of the spine.   COMPARISON OF FINDING: The patient has undergone interval intubation. The lungs are similar.       Diffuse bilateral parenchymal infiltration. Interval intubation.   MACRO: none   Signed by: Elda Dorado 1/7/2025 11:22 AM Dictation workstation:   [id number]    XR chest 1 view    Result Date: 1/7/2025  Interpreted By:  Jaxon Morales, STUDY: XR CHEST 1 VIEW; 1/7/2025 6:35 am   INDICATION: CLINICAL INFORMATION: Signs/Symptoms:persistent hypoxia, unable to wean from NIV.   COMPARISON: 01/06/2025   ACCESSION NUMBER(S): [id number]   ORDERING CLINICIAN: NORM LANDAVERDE   TECHNIQUE: Portable chest one view.   FINDINGS: The cardiac size is indeterminate in view of the AP projection. There is continued diffuse ground-glass infiltrate bilaterally. No effusions are identified.       Persistent ground-glass infiltrates diffusely bilaterally. Etiology is unclear with differential to include infectious organisms as well as pulmonary edema and ARDS.   MACRO: none   Signed by: Jaxon Morales 1/7/2025 8:20 AM Dictation workstation:   [id number]    ECG 12 lead    Result Date: 1/6/2025  Normal sinus rhythm Nonspecific ST abnormality Prolonged QT Abnormal ECG No previous ECGs available Confirmed by Vicky Corado ([id number]) on 1/6/2025 4:01:46 PM    Transthoracic Echo (TTE) Complete    Result Date: 1/6/2025           [place] [address]            Phone [phone] TRANSTHORACIC ECHOCARDIOGRAM REPORT Patient Name:       ROBBIE FAJARDO [facility]     Reading Physician:    [id number] Vicky Corado MD Study Date:         1/6/2025             Ordering Provider:    [id number] NORM LANDAVERDE MRN/PID:            [id number]             Fellow: Accession#:         [id number]         Nurse: Date of Birth/Age:  1945 / 79 years Sonographer:          Rosalba  Leiva                                                                [facility] Gender Assigned at  F                    Additional Staff: Birth: Height:             160.02 cm            Admit Date: Weight:             69.85 kg             Admission Status:     Inpatient -                                                                Routine BSA / BMI:          1.73 m2 / 27.28      Department Location:  [facility]                     kg/m2 Blood Pressure: 151 /76 mmHg Study Type:    TRANSTHORACIC ECHO (TTE) COMPLETE Diagnosis/ICD: Hypoxemia-R09.02; Acute respiratory failure with hypoxia-J96.01 Indication:    hypoxemia CPT Codes:     Echo Complete w Full [id number] Patient History: Smoker:            Former. BMI:               Overweight 25 - 30 Pertinent History: Resp difficulty, bipap, cough, pna, sciatica, arf, resp                    failure/hypoxia. Study Detail: The following Echo studies were performed: 2D, Doppler, M-Mode and               color flow. Technically challenging study due to prominent lung               artifact, body habitus and patient lying in supine position.               Definity used as a contrast agent for endocardial border               definition. Total contrast used for this procedure was 2 mL via IV               push.  PHYSICIAN INTERPRETATION: Left Ventricle: The left ventricular systolic function is normal, with a visually estimated ejection fraction of 70-75%. There are no regional wall motion abnormalities. The left ventricular cavity size is normal. There is normal septal thickness. Spectral Doppler shows a normal pattern of left ventricular diastolic filling. Left Atrium: The left atrium is normal in size. Right Ventricle: The right ventricle is normal in size. There is normal right ventricular global systolic function. Right Atrium: The right atrium is normal in size. Aortic Valve: The aortic valve is trileaflet. The aortic valve dimensionless index is 0.74. There is no  evidence of aortic valve regurgitation. The peak instantaneous gradient of the aortic valve is 22 mmHg. The mean gradient of the aortic valve is 11 mmHg. Mitral Valve: The mitral valve is normal in structure. There is no evidence of mitral valve regurgitation. Tricuspid Valve: The tricuspid valve is structurally normal. There is mild tricuspid regurgitation. Pulmonic Valve: The pulmonic valve is structurally normal. There is physiologic pulmonic valve regurgitation. Pericardium: Small pericardial effusion. Aorta: The aortic root is normal. Systemic Veins: The inferior vena cava appears normal in size, with IVC inspiratory collapse greater than 50%.  CONCLUSIONS:  1. The left ventricular systolic function is normal, with a visually estimated ejection fraction of 70-75%.  2. Spectral Doppler shows a normal pattern of left ventricular diastolic filling.  3. There is normal right ventricular global systolic function.  4. No evidence of mitral valve regurgitation.  5. Mild tricuspid regurgitation is visualized. QUANTITATIVE DATA SUMMARY:  2D MEASUREMENTS:           Normal Ranges: LAs:             2.60 cm   (2.7-4.0cm) IVSd:            0.68 cm   (0.6-1.1cm) LVPWd:           0.53 cm   (0.6-1.1cm) LVIDd:           5.32 cm   (3.9-5.9cm) LV Mass Index:   62.2 g/m2  LV SYSTOLIC FUNCTION BY 2D PLANIMETRY (MOD):                      Normal Ranges: EF-A4C View:    60 % (>=55%) EF-A2C View:    66 % EF-Biplane:     65 % EF-Visual:      73 % LV EF Reported: 73 %  LV DIASTOLIC FUNCTION:           Normal Ranges: MV Peak E:             0.94 m/s  (0.7-1.2 m/s) MV Peak A:             1.04 m/s  (0.42-0.7 m/s) E/A Ratio:             0.91      (1.0-2.2) MV e'                  0.078 m/s (>8.0) MV lateral e'          0.08 m/s MV medial e'           0.07 m/s E/e' Ratio:            12.07     (<8.0) a'                     0.08 m/s  MITRAL VALVE:          Normal Ranges: MV DT:        186 msec (150-240msec)  AORTIC VALVE:                       Normal Ranges: AoV Vmax:                2.35 m/s  (<=1.7m/s) AoV Peak P.1 mmHg (<20mmHg) AoV Mean P.0 mmHg (1.7-11.5mmHg) LVOT Max Cj:            1.95 m/s  (<=1.1m/s) AoV VTI:                 43.90 cm  (18-25cm) LVOT VTI:                32.60 cm LVOT Diameter:           1.90 cm   (1.8-2.4cm) AoV Area, VTI:           2.11 cm2  (2.5-5.5cm2) AoV Area,Vmax:           2.35 cm2  (2.5-4.5cm2) AoV Dimensionless Index: 0.74  RIGHT VENTRICLE: RV s' 0.21 m/s  TRICUSPID VALVE/RVSP:          Normal Ranges: Peak TR Velocity:     3.56 m/s RV Syst Pressure:     59 mmHg  (< 30mmHg) IVC Diam:             1.16 cm  PULMONIC VALVE:          Normal Ranges: PV Accel Time:  124 msec (>120ms) PV Max Cj:     1.3 m/s  (0.6-0.9m/s) PV Max P.9 mmHg  [id number] Vicky Corado MD Electronically signed on 2025 at 3:30:16 PM  ** Final **     XR chest 1 view    Result Date: 2025  Interpreted By:  Jaxon Morales, STUDY: XR CHEST 1 VIEW; 2025 5:38 am   INDICATION: CLINICAL INFORMATION: Signs/Symptoms:Pneumonia follow-up. Previous abnormal chest radiograph. Shortness of breath.   COMPARISON: 2025   ACCESSION NUMBER(S): [id number]   ORDERING CLINICIAN: AMANDA SÁNCHEZ   TECHNIQUE: Portable chest one view.   FINDINGS: The cardiac size is indeterminate in view of the AP projection. Diffuse ground-glass infiltrates are again identified, similar compared to the previous study. No effusions are appreciated.       Diffuse ground-glass infiltrates. There is no interval change when compared to the previous examination.   MACRO: none   Signed by: Jaxon Morales 2025 8:29 AM Dictation workstation:   [id number]    CT angio chest for pulmonary embolism    Result Date: 2025  Interpreted By:  Selin Matthews, STUDY: CT ANGIO CHEST FOR PULMONARY EMBOLISM;  2025 4:10 pm   INDICATION: Signs/Symptoms:severe hypoxia   COMPARISON: Chest x-ray 2025. CT chest 2024   ACCESSION NUMBER(S):  [id number]   ORDERING CLINICIAN: NORM LANDAVERDE   TECHNIQUE: Helical data acquisition of the chest was obtained following the uneventful administration of intravenous contrast material. Images were reformatted in axial, coronal, and sagittal planes. MIP images were created and reviewed.   FINDINGS: POTENTIAL LIMITATIONS OF THE STUDY: Motion artifact.   HEART AND VESSELS: The evaluation for filling defects at the pulmonary arteries is degraded by motion artifact. No large central filling defects to suggest pulmonary embolism. The smaller segmental/subsegmental pulmonary arteries are not well evaluated on this exam.   No thoracic aortic aneurysm. Mild atherosclerotic calcifications are noted at the thoracic aorta.   The heart is mildly enlarged. There is trace pericardial effusion.   MEDIASTINUM AND MANPREET, LOWER NECK AND AXILLA: The visualized thyroid gland is small.   A right paratracheal lymph node measures 10 mm in short axis. A subcarinal lymph node measures 12 mm in short axis. A right hilar lymph node measures 19 mm in short axis. A left hilar lymph node measures 11 mm in short axis. Calcified lymph nodes are noted at the mediastinum and left hilum.   LUNGS AND AIRWAYS: The trachea and central airways are patent.   There are diffuse bilateral ground-glass opacities. No pleural effusion or pneumothorax.   UPPER ABDOMEN: Calcific foci at the spleen are suggestive of granulomas.   CHEST WALL AND OSSEOUS STRUCTURES: Multilevel degenerative changes at the spine. Redemonstration of remote compression deformity with Schmorl's node at the superior endplate of L1.       1. Study degraded by motion artifact. No evidence of large central pulmonary emboli. The smaller segmental/subsegmental pulmonary arteries are not well evaluated on this exam. 2. Diffuse bilateral ground-glass opacities, may be secondary to pulmonary edema and/or multifocal pneumonia. Acute respiratory distress syndrome is in the differential diagnoses.  3. Mild cardiomegaly. Trace pericardial effusion. 4. Mild mediastinal and hilar adenopathy.     MACRO: None.   Signed by: Selin Matthews 1/5/2025 6:42 PM Dictation workstation:   [id number]    US renal complete    Result Date: 1/5/2025  Interpreted By:  Jaxon Morales, STUDY: US RENAL COMPLETE; 1/5/2025 10:29 am   INDICATION: Signs/Symptoms:nancy.   COMPARISON: None   ACCESSION NUMBER(S): [id number]   ORDERING CLINICIAN: NORM LANDAVERDE   TECHNIQUE: Grayscale and color Doppler imaging of the kidneys   FINDINGS: The right kidney measures 11.0 cm  . The left kidney measures 11.0 cm  .   There is mild increased echogenicity of the renal cortex bilaterally.     No renal stones are identified.  Renal cortex is normal in thickness bilaterally. No hydronephrosis is identified.   Urinary bladder is nonspecific in appearance with no stones or masses identified.       Mild increased renal cortical echogenicity diffusely bilaterally suggesting chronic renal medical disease.   MACRO: none   Signed by: Jaxon Morales 1/5/2025 12:45 PM Dictation workstation:   [id number]    XR chest 1 view    Result Date: 1/5/2025  Interpreted By:  Sincere Slater, STUDY: XR CHEST 1 VIEW;  1/5/2025 8:48 am   INDICATION: Signs/Symptoms:respiratory distress acute.   COMPARISON: 01/02/2025   ACCESSION NUMBER(S): [id number]   ORDERING CLINICIAN: RAMANDEEP ALLRED   FINDINGS: Diffusely increased bilateral airspace consolidation. No visualized pleural effusion. Cardiomediastinal silhouette unchanged. Aortic atherosclerosis. Thoracic degenerative changes with dextroscoliosis.       Diffusely increased bilateral airspace consolidation.   MACRO: None   Signed by: Sincere Slater 1/5/2025 10:08 AM Dictation workstation:   [id number]    XR chest 2 views    Result Date: 1/2/2025  Interpreted By:  Jaxon Morales, STUDY: XR CHEST 2 VIEWS; 1/2/2025 3:05 pm   INDICATION: Signs/Symptoms:fever, productive cough, chest pain.   COMPARISON: 06/14/2022   ACCESSION NUMBER(S):  [id number]   ORDERING CLINICIAN: DANIAL COATES   TECHNIQUE: AP and lateral views of the chest were obtained.   FINDINGS: The cardiac silhouette is normal in appearance. There are new patchy bilateral alveolar infiltrates most marked within the upper lobes bilaterally .  No effusions are identified.       Extensive patchy bilateral infiltrates most marked within the upper lobes bilaterally. Findings are suspicious for pneumonia. Follow-up to assure complete clearing is suggested.   MACRO: none   Signed by: Jaxon Morales 1/2/2025 3:43 PM Dictation workstation:   [id number]      Scheduled medications  atovaquone, 750 mg, oral, q12h ADE  docusate sodium, 100 mg, oral, BID  pantoprazole, 40 mg, oral, Daily   Or  esomeprazole, 40 mg, oral, Daily   Or  pantoprazole, 40 mg, intravenous, Daily  [Held by provider] FLUoxetine, 40 mg, oral, Daily  folic acid, 1 mg, oral, Daily  heparin, 5,000 Units, subcutaneous, q8h  insulin lispro, 0-5 Units, subcutaneous, q4h  ipratropium-albuteroL, 3 mL, nebulization, q4h  lactulose, 20 g, oral, Once  levothyroxine, 25 mcg, intravenous, q24h ADE  methylPREDNISolone sodium succinate (PF), 40 mg, intravenous, q6h  midazolam, 4 mg, intravenous, Once  midazolam, 4 mg, intravenous, Once  multivitamin with minerals, 1 tablet, oral, Daily  oxygen, , inhalation, Continuous - Inhalation  PHENobarbitaL, 64.8 mg, oral, TID   Followed by  PHENobarbitaL, 32.4 mg, oral, TID  piperacillin-tazobactam, 2.25 g, intravenous, q6h  simvastatin, 20 mg, oral, Nightly  sodium chloride, 3 mL, nebulization, q4h  [START ON 1/10/2025] thiamine, 100 mg, oral, Daily      Continuous medications  ketamine, 0-150 mg/hr, Last Rate: 7.3 mg/hr (01/09/25 0927)  propofol, 0-50 mcg/kg/min, Last Rate: 30 mcg/kg/min (01/09/25 1114)      PRN medications  PRN medications: acetaminophen, albuterol, dextrose, dextrose, glucagon, glucagon, HYDROmorphone, [Held by provider] hydrOXYzine, lactulose, oxygen, polyethylene glycol      Assessment/Plan   Assessment & Plan  Community acquired pneumonia of both upper lobes    Acute respiratory failure with hypoxia  Vasculitis/DAH vs. Atypical pneumonia vs. Acute inflammatory process.  Smoke a pack a day for 30yrs/quit 30yrs ago  Currently intubated with FiO2 50% down from 80%   On IV steroids, antibiotics  Neg Resp PCR - COVID, flu, RSV  Sepsis  Blood culture, NGTD  Urine culture, NGTD   Respiratory culture pending  MRSA PCR - Negative    Pneumonia  Chest XR with Redemonstrated extensive patchy airspace opacities throughout the lungs concerning for multifocal pneumonia.  CTA chest without PE, but showing Diffuse bilateral ground-glass opacities, may be secondary to pulmonary edema and/or multifocal pneumonia. Acute respiratory distress syndrome is in the differential diagnoses. Mild cardiomegaly. Trace pericardial effusion.   TTE with LVEF 70-75%  ProCalc uptrending now 2.86    Leukocytosis   ID following - on Zosyn, Zyvox, Mepron, Doxycycline  WBC 16.9    KALEB  Cr 2.47  West catheter urinary output was 1250 over the last 24hrs.   Limit nephrotoxic medications.     Palliative care   DNR CCA DNI   The patient currently does not have decision making capacity  The patient is a   Has 2 children Beverly Fregoso and Valeriy Gwendolyn  Beverly is HCPOA Valeriy is alternate. I did verify this.     1/9  Daughter Beverly at bedside, UO declining, creatinine mildly worsened, NG currently to LIWS, starting laxatives and trickle tube feeds today to stimulate gut. Diuretics on hold.  Currently stable on vent, better with addition of ketamine and adjustment of vent settings for comfort.  Cussed with attending service and daughter, continue aggressive treatment model of care for specified 7 days currently day 3 on ventilator, no dialysis.    Daughter tells me that mother was highly functional prior to hospitalization and would want to maintain this high quality of life, she would accept no significant  limitation to her quality of life or function.    1/8/2025  Discussion with daughter Beverly at bedside. The patient had a hard time being sedated. Beverly did ask for spiritual care now, I did put in a consult. States she is agreeable to her mother's wishes. We will continue to follow.     1/7/2025  Goals of care discussion with daughter Beverly.  The daughter stated that she is aware of her mom's wishes.  States that her mom would only want to be intubated for 1 week.  This is congruent with discussions with the ICU team.  I stated that the palliative care team will be in close follow-up with the family to walk them through this decision making.  We will continue to follow.       I spent 30 minutes in the professional and overall care of this patient.      Arcelia Bhandari, NIKITA-CNP

## 2025-01-09 NOTE — CARE PLAN
The patient's goals for the shift include EMIR    The clinical goals for the shift include Maintain hemodynamic stability      Problem: Pain - Adult  Goal: Verbalizes/displays adequate comfort level or baseline comfort level  Outcome: Progressing     Problem: Safety - Adult  Goal: Free from fall injury  Outcome: Progressing     Problem: Discharge Planning  Goal: Discharge to home or other facility with appropriate resources  Outcome: Progressing     Problem: Chronic Conditions and Co-morbidities  Goal: Patient's chronic conditions and co-morbidity symptoms are monitored and maintained or improved  Outcome: Progressing     Problem: Respiratory  Goal: Minimal/no exertional discomfort or dyspnea this shift  Outcome: Progressing  Goal: No signs of respiratory distress (eg. Use of accessory muscles. Peds grunting)  Outcome: Progressing  Goal: Verbalize decreased shortness of breath this shift  Outcome: Progressing  Goal: Wean oxygen to maintain O2 saturation per order/standard this shift  Outcome: Progressing     Problem: Skin  Goal: Decreased wound size/increased tissue granulation at next dressing change  Outcome: Progressing  Flowsheets (Taken 1/8/2025 2100)  Decreased wound size/increased tissue granulation at next dressing change:   Promote sleep for wound healing   Protective dressings over bony prominences   Utilize specialty bed per algorithm  Goal: Participates in plan/prevention/treatment measures  Outcome: Progressing  Flowsheets (Taken 1/8/2025 2100)  Participates in plan/prevention/treatment measures:   Discuss with provider PT/OT consult   Elevate heels  Goal: Prevent/manage excess moisture  Outcome: Progressing  Flowsheets (Taken 1/8/2025 2100)  Prevent/manage excess moisture:   Cleanse incontinence/protect with barrier cream   Moisturize dry skin   Follow provider orders for dressing changes   Monitor for/manage infection if present  Goal: Prevent/minimize sheer/friction injuries  Outcome:  Progressing  Flowsheets (Taken 1/8/2025 2100)  Prevent/minimize sheer/friction injuries:   Complete micro-shifts as needed if patient unable. Adjust patient position to relieve pressure points, not a full turn   Increase activity/out of bed for meals   Use pull sheet   HOB 30 degrees or less   Turn/reposition every 2 hours/use positioning/transfer devices   Utilize specialty bed per algorithm  Goal: Promote/optimize nutrition  Outcome: Progressing  Flowsheets (Taken 1/8/2025 2100)  Promote/optimize nutrition: Monitor/record intake including meals  Goal: Promote skin healing  Outcome: Progressing  Flowsheets (Taken 1/8/2025 2100)  Promote skin healing:   Assess skin/pad under line(s)/device(s)   Protective dressings over bony prominences   Turn/reposition every 2 hours/use positioning/transfer devices   Ensure correct size (line/device) and apply per  instructions   Rotate device position/do not position patient on device     Problem: Nutrition  Goal: Nutrition support goals are met within 48 hrs  Outcome: Progressing  Goal: Nutrition support is meeting 75% of nutrient needs  Outcome: Progressing  Goal: Tube feed tolerance  Outcome: Progressing     Problem: Knowledge Deficit  Goal: Patient/family/caregiver demonstrates understanding of disease process, treatment plan, medications, and discharge instructions  Outcome: Progressing     Problem: Mechanical Ventilation  Goal: Patient Will Maintain Patent Airway  Outcome: Progressing  Goal: Oral health is maintained or improved  Outcome: Progressing  Goal: ET tube will be managed safely  Outcome: Progressing  Goal: Ability to express needs and understand communication  Outcome: Progressing  Goal: Mobility/activity is maintained at optimum level for patient  Outcome: Progressing     Problem: Safety - Medical Restraint  Goal: Remains free of injury from restraints (Restraint for Interference with Medical Device)  Outcome: Progressing  Flowsheets (Taken 1/8/2025  2100)  Remains free of injury from restraints (restraint for interference with medical device):   Determine that other, less restrictive measures have been tried or would not be effective before applying the restraint   Evaluate the patient's condition at the time of restraint application   Inform patient/family regarding the reason for restraint   Every 2 hours: Monitor safety, psychosocial status, comfort, nutrition and hydration  Goal: Free from restraint(s) (Restraint for Interference with Medical Device)  Outcome: Progressing  Flowsheets (Taken 1/8/2025 2100)  Free from restraint(s) (restraint for interference with medical device):   ONCE/SHIFT or MINIMUM Every 12 hours: Assess and document the continuing need for restraints   Every 24 hours: Continued use of restraint requires Licensed Independent Practitioner to perform face to face examination and written order   Identify and implement measures to help patient regain control

## 2025-01-09 NOTE — PROGRESS NOTES
Pulmonary Daily Progress Note   Subjective    Janet Snow is a 79 y.o. year old female patient known with hypothyroidism and anxiety admitted on 1/2/2025 with multilobar pneumonia with progressive hypoxemic respiratory failure requiring mechanical ventilation    Interval History:  She underwent bronchoscopy.  Results of which are noted.  The return of the fluid was cloudy.  Differential is not yet available.  Cultures are still pending  Continues to be heavily sedated -   No reported events by team    Med    Scheduled medications  atovaquone, 750 mg, oral, q12h ADE  docusate sodium, 100 mg, oral, BID  pantoprazole, 40 mg, oral, Daily   Or  esomeprazole, 40 mg, oral, Daily   Or  pantoprazole, 40 mg, intravenous, Daily  [Held by provider] FLUoxetine, 40 mg, oral, Daily  folic acid, 1 mg, oral, Daily  heparin, 5,000 Units, subcutaneous, q8h  insulin lispro, 0-5 Units, subcutaneous, q4h  ipratropium-albuteroL, 3 mL, nebulization, q4h  levothyroxine, 25 mcg, intravenous, q24h [facility]  methylPREDNISolone sodium succinate (PF), 40 mg, intravenous, q6h  midazolam, 4 mg, intravenous, Once  midazolam, 4 mg, intravenous, Once  multivitamin with minerals, 1 tablet, oral, Daily  oxygen, , inhalation, Continuous - Inhalation  PHENobarbital, 65 mg, intravenous, q8h   Followed by  PHENobarbital, 32.5 mg, intravenous, q8h  piperacillin-tazobactam, 2.25 g, intravenous, q6h  simvastatin, 20 mg, oral, Nightly  sodium chloride, 3 mL, nebulization, q4h  [START ON 1/10/2025] thiamine, 100 mg, oral, Daily    Continuous medications  dexmedeTOMIDine, 0-1.5 mcg/kg/hr, Last Rate: Stopped (01/09/25 0925)  ketamine, 0-150 mg/hr, Last Rate: 7.3 mg/hr (01/09/25 0927)  propofol, 0-50 mcg/kg/min, Last Rate: 50 mcg/kg/min (01/09/25 0824)    PRN medications  PRN medications: acetaminophen, albuterol, dextrose, dextrose, glucagon, glucagon, HYDROmorphone, [Held by provider] hydrOXYzine, oxygen, polyethylene glycol     Objective    Blood pressure  "113/58, pulse 80, temperature 37.3 °C (99.1 °F), resp. rate (!) 30, height 1.6 m (5' 3\"), weight 74.8 kg (164 lb 14.5 oz), SpO2 96%.   Physical Exam   GENERAL: heavily sedated. Dyssynchronous with ventilator  OROPHARYNX: ETT at 23 cm . NG with dark fluid on suctioning  NECK: no JVD, midline trachea without stridor.   LUNGS: Clear anteriorly . no wheezing. no crackles or rhonchi  CARDIAC: Regular rate and rhythm  ABDOMEN: soft. Minimal bowel sounds  EXTREMITIES: No edema,    NEURO: sedated    Vent Mode: Pressure regulated volume control/assist control  FiO2 (%):  [50 %-60 %] 60 %  S RR:  [24] 24  S VT:  [370 mL] 370 mL  PEEP/CPAP (cm H2O):  [10 cm H20-12 cm H20] 12 cm H20  MAP (cm H2O):  [12-15] 14    Intake/Output Summary (Last 24 hours) at 1/9/2025 1038  Last data filed at 1/9/2025 0927  Gross per 24 hour   Intake 2253.87 ml   Output 2405 ml   Net -151.13 ml     Labs:   Results from last 72 hours   Lab Units 01/09/25  0509 01/08/25  1221 01/08/25  0455 01/07/25  0416   SODIUM mmol/L 139  138 139 138 140   POTASSIUM mmol/L 4.3  4.3 3.8 3.6 3.8   CHLORIDE mmol/L 105  105 108* 105 108*   CO2 mmol/L 22  23 22 25 23   BUN mg/dL 77*  73* 64* 62* 50*   CREATININE mg/dL 3.01*  3.01* 2.47* 2.44* 2.06*   GLUCOSE mg/dL 126*  131* 122* 200* 126*   CALCIUM mg/dL 7.7*  7.9* 7.6* 8.3* 8.7   ANION GAP mmol/L 16  14 13 12 13   EGFR mL/min/1.73m*2 15*  15* 19* 20* 24*   PHOSPHORUS mg/dL 4.3  --  3.9 3.4      Results from last 72 hours   Lab Units 01/09/25  0509 01/08/25  0455 01/07/25  0416   WBC AUTO x10*3/uL 16.7* 16.9* 21.3*   HEMOGLOBIN g/dL 7.5* 7.4* 7.9*   HEMATOCRIT % 22.2* 22.7* 24.5*   PLATELETS AUTO x10*3/uL 535* 534* 629*   NEUTROS PCT AUTO % 84.5 88.1 91.0   LYMPHS PCT AUTO % 4.2 5.3 3.3   MONOS PCT AUTO % 7.2 4.3 4.7   EOS PCT AUTO % 0.0 0.0 0.0      Results from last 72 hours   Lab Units 01/09/25  0749 01/08/25  1219 01/08/25  0908   POCT PH, ARTERIAL pH 7.38 7.37* 7.36*   POCT PCO2, ARTERIAL mm Hg 38 41 44* "   POCT PO2, ARTERIAL mm Hg 134* 75* 110*   POCT SO2, ARTERIAL % 99 99 98     Micro/ID:   Lab Results   Component Value Date    URINECULTURE No growth 01/02/2025    BLOODCULT No growth at 4 days -  FINAL REPORT 01/02/2025    BLOODCULT No growth at 4 days -  FINAL REPORT 01/02/2025       Impression   Janet Snow is a 79 y.o. year old female patient is being seen by the pulmonary service for   Acute hypoxic respiratory failure presumed to be related to community-acquired pneumonia with lung injury.  Pathogen is not yet identified awaiting BAL results  Acute kidney injury in part related to diuresis    Recommendations   As follows:  Switch MV to PC -   Call lab to add Metapneumovirus to be checked  Aim for an even fluid balance  Keep MAP close to 70 given kidney injury  Antibiotics as per ID  Awaiting labs  Critical care team managing the rest of the organs    Dexter Wilson MD   01/09/25 at 10:38 AM     Disclaimer: Documentation completed with the information available at the time of input. Parts of this note may have been scribed or generated using voice dictation software, Dragon.  Homophonic errors may exist.  Please contact me directly if clarification is needed. The times in the chart may not be reflective of actual patient care times, interventions, or procedures. Documentation occurs after the physical care of the patient.

## 2025-01-09 NOTE — CARE PLAN
RT present at rounds. Changed pt to PC/AC mode. Tolerating well. Plan to wean FiO2 as tolerated.    RT to follow

## 2025-01-09 NOTE — PROGRESS NOTES
Janet Snow is a 79 y.o. female on day 7 of admission presenting with Community acquired pneumonia of both upper lobes.    Subjective   Interval History:   Afebrile  Sedated, intubated      Nonoliguric    Review of Systems   Unable to perform ROS: Intubated       Objective   Range of Vitals (last 24 hours)  Heart Rate:  [80-95]   Temp:  [36.6 °C (97.9 °F)-37.4 °C (99.3 °F)]   Resp:  [15-40]   Weight:  [74.1 kg (163 lb 5.8 oz)-74.8 kg (164 lb 14.5 oz)]   SpO2:  [82 %-96 %]   Daily Weight  01/09/25 : 74.8 kg (164 lb 14.5 oz)    Body mass index is 29.21 kg/m².    Physical Exam  Constitutional:       Interventions: She is sedated and intubated.   HENT:      Head: Normocephalic and atraumatic.   Eyes:      General: No scleral icterus.     Conjunctiva/sclera: Conjunctivae normal.   Cardiovascular:      Rate and Rhythm: Normal rate and regular rhythm.      Heart sounds: Normal heart sounds.   Pulmonary:      Effort: She is intubated.      Breath sounds: Decreased breath sounds present.   Abdominal:      General: Bowel sounds are normal.      Palpations: Abdomen is soft.   Musculoskeletal:      Cervical back: Neck supple.      Right lower leg: No edema.      Left lower leg: No edema.   Skin:     General: Skin is warm and dry.   Neurological:      Comments: Unable    Psychiatric:      Comments: Unable to assess      Antibiotics  doxycycline (Vibramycin)  mg in 100 mL D5W (ADV/MBP)  linezolid - 600 mg/300 mL  piperacillin-tazobactam - 2.25 gram/50 mL  sulfamethoxazole-trimethoprim - 800-160 mg    Relevant Results  Labs  Results from last 72 hours   Lab Units 01/09/25  0509 01/08/25  0455 01/07/25  0416   WBC AUTO x10*3/uL 16.7* 16.9* 21.3*   HEMOGLOBIN g/dL 7.5* 7.4* 7.9*   HEMATOCRIT % 22.2* 22.7* 24.5*   PLATELETS AUTO x10*3/uL 535* 534* 629*   NEUTROS PCT AUTO % 84.5 88.1 91.0   LYMPHS PCT AUTO % 4.2 5.3 3.3   MONOS PCT AUTO % 7.2 4.3 4.7   EOS PCT AUTO % 0.0 0.0 0.0     Results from last 72 hours   Lab Units  01/09/25  0509 01/08/25  1221 01/08/25  0455 01/07/25  0416   SODIUM mmol/L 139  138 139 138 140   POTASSIUM mmol/L 4.3  4.3 3.8 3.6 3.8   CHLORIDE mmol/L 105  105 108* 105 108*   CO2 mmol/L 22  23 22 25 23   BUN mg/dL 77*  73* 64* 62* 50*   CREATININE mg/dL 3.01*  3.01* 2.47* 2.44* 2.06*   GLUCOSE mg/dL 126*  131* 122* 200* 126*   CALCIUM mg/dL 7.7*  7.9* 7.6* 8.3* 8.7   ANION GAP mmol/L 16  14 13 12 13   EGFR mL/min/1.73m*2 15*  15* 19* 20* 24*   PHOSPHORUS mg/dL 4.3  --  3.9 3.4     Results from last 72 hours   Lab Units 01/09/25  0509 01/08/25  0455 01/07/25  0416   ALK PHOS U/L 86  86  --   --    BILIRUBIN TOTAL mg/dL 1.2  1.2  --   --    BILIRUBIN DIRECT mg/dL 0.7*  --   --    PROTEIN TOTAL g/dL 6.1*  6.1*  --   --    ALT U/L 26  26  --   --    AST U/L 24  24  --   --    ALBUMIN g/dL 2.8*  2.8*  2.8* 2.8* 2.9*     Estimated Creatinine Clearance: 14.7 mL/min (A) (by C-G formula based on SCr of 3.01 mg/dL (H)).  C-Reactive Protein   Date Value Ref Range Status   01/05/2025 35.67 (H) <1.00 mg/dL Final     CRP   Date Value Ref Range Status   01/04/2020 10.1 (H) 0 - 2.0 MG/DL Final     Comment:     Performed at [place] [id number]     Microbiology  No results found for the last 90 days.  Reviewed-negative Fungitell/respiratory viral panel/galactomannan    Imaging  XR abdomen 1 view    Result Date: 1/9/2025  Interpreted By:  Jaxon Morales, STUDY: XR ABDOMEN 1 VIEW; 1/9/2025 9:31 am   INDICATION: Signs/Symptoms:c/f ileus.   COMPARISON: 01/08/2025   ACCESSION NUMBER(S): [id number]   ORDERING CLINICIAN: JOYR RIDDLE   TECHNIQUE: KUB, portable, one view   FINDINGS: Motion artifact obscures the bowel-gas pattern significantly. A nasogastric tube extends into the epigastrium with the tip overlying the mid abdomen.       Exam is limited due to marked motion artifact obscuring the bowel gas pattern. Nasogastric tube extends into the stomach.   MACRO: none   Signed by: Jaxon  Andrew 1/9/2025 10:05 AM Dictation workstation:   [id number]    XR chest 1 view    Result Date: 1/9/2025  Interpreted By:  Stefany Edwards, STUDY: XR CHEST 1 VIEW 1/9/2025 6:30 am   INDICATION: Signs/Symptoms:persistent hypoxia, unable to wean   COMPARISON: 01/08/2025   ACCESSION NUMBER(S): [id number]   ORDERING CLINICIAN: NORM LANDAVERDE   TECHNIQUE: AP erect view of the chest at bedside   FINDINGS: The tip of the endotracheal tube is located 2 cm above noah with nasogastric tube descending below diaphragm. The cardiac size is borderline enlarged. When compared with yesterday's study, the infiltrate throughout the right lung is unchanged. Infiltrate within the left lung appears slightly improved. No pleural abnormality is seen.       Stable diffuse infiltrate throughout right lung with some mild improvement in the diffuse infiltrate seen in left lung.   Signed by: Stefany Edwards 1/9/2025 8:23 AM Dictation workstation:   [id number]    XR chest abdomen for OG NG placement    Result Date: 1/8/2025  Interpreted By:  Finkelstein, Evan, STUDY: XR CHEST ABDOMEN FOR OG NG PLACEMENT;  1/8/2025 2:41 am two views of the chest.   INDICATION: Signs/Symptoms:OG tube placement.   COMPARISON: None.   ACCESSION NUMBER(S): [id number]   ORDERING CLINICIAN: AMANDA SÁNCHEZ   FINDINGS: Endotracheal tube present with tip approximately 2.4 cm above the noah. OG tube courses below the diaphragm, with tip extending all of the way into the lower pelvis, beyond the field of view. Extensive patchy airspace opacities throughout the lungs. No sizable pleural effusion or pneumothorax. No acute osseous abnormality.       OG tube courses below the diaphragm with tip extending into the pelvis beyond the field of view. Endotracheal tube tip lies 2.4 cm above the noah. Redemonstrated extensive patchy airspace opacities throughout the lungs concerning for multifocal pneumonia.     MACRO: None.   Signed by: Evan Finkelstein 1/8/2025 3:04 AM  Dictation workstation:   [id number]    XR chest 1 view    Result Date: 1/7/2025  Interpreted By:  Isidro Corrales, STUDY: XR CHEST 1 VIEW;  1/7/2025 10:48 pm   INDICATION: Signs/Symptoms:OG tube.   COMPARISON: Chest x-ray 01/07/2025   ACCESSION NUMBER(S): [id number]   ORDERING CLINICIAN: AMANDA SÁNCHEZ   FINDINGS: Enteric tube terminates in the left mid abdomen with side hole below the GE junction, likely within the distal gastric body. Multiple overlying leads are present.   CARDIOMEDIASTINAL SILHOUETTE: Cardiomediastinal silhouette is stable in size and configuration.   LUNGS: Lung apices are excluded from the field of view. There is diffuse bilateral airspace opacities not significantly changed from prior exam.   ABDOMEN: No remarkable upper abdominal findings.   BONES: Multilevel degenerative changes of the spine.       Enteric tube terminates in the left mid abdomen with side hole below the GE junction.   Diffuse bilateral airspace opacities concerning for developing multifocal pneumonia. Continued radiographic follow-up to resolution is advised.   MACRO: None   Signed by: Isidro Corrales 1/7/2025 10:52 PM Dictation workstation:   [id number]    Bronchoscopy    Result Date: 1/7/2025  Table formatting from the original result was not included. Impression The left main stem, left upper lobar bronchus, lingular lobar bronchus, left lower lobar bronchus, right main stem, right upper lobar bronchus, bronchus intermedius, right middle lobar bronchus and right lower lobar bronchus appeared normal. Bronchoalveolar lavage was performed x1 in the RML and the fluid appeared cloudy Findings The left main stem, left upper lobar bronchus, lingular lobar bronchus, left lower lobar bronchus, right main stem, right upper lobar bronchus, bronchus intermedius, right middle lobar bronchus and right lower lobar bronchus appeared normal. Bronchoalveolar lavage was performed x1 in the RML with 140 mL of saline instilled and a total  return of 90 mL. The fluid appeared cloudy. Recommendation There is no recommended follow-up for this procedure. Indication ARDS (adult respiratory distress syndrome) (Multi) Staff Staff Role No Staff Documented Medications See Anesthesia Record. Preprocedure A history and physical has been performed, and patient medication allergies have been reviewed. The patient's tolerance of previous anesthesia has been reviewed. The risks and benefits of the procedure and the sedation options and risks were discussed with the  daughter (medical POA) and son (financial POA) . All questions were answered and informed consent obtained. Details of the Procedure The patient was already under sedation prior to the procedure. The patient's blood pressure, respirations, heart rate, oxygen and ECG were monitored throughout the procedure. The patient experienced no blood loss. The scope was introduced through the endotracheal tube. The procedure was not difficult. The patient tolerated the procedure well. There were no apparent adverse events. Events Procedure Events Event Event Time Specimens * Cannot find log * BAL taken from RML 140cc instilled, retunr 90cc of white cloudy fluid. Mucosa appeared normal and non friable. Some mucous secretions suctioned away. Procedure Location [address] Intensive [facility], [place] [address] [facility] [id number] [phone] Referring Provider Laura Maloney MD Procedure Provider Laura PERALTA MD     XR chest 1 view    Result Date: 1/7/2025  Interpreted By:  Elda Dorado, STUDY: XR CHEST 1 VIEW;  1/7/2025 10:40 am   INDICATION: Signs/Symptoms:intubation.   COMPARISON: 01/07/2025 at 5:45 a.m.   ACCESSION NUMBER(S): [id number]   ORDERING CLINICIAN: KENISHA LEE   FINDINGS: Heart is normal in size.   The patient is intubated. The tip terminates above the noah.   There is diffuse parenchymal infiltration.   There are atherosclerotic changes of the aorta.  There are degenerative changes of the spine.   COMPARISON OF FINDING: The patient has undergone interval intubation. The lungs are similar.       Diffuse bilateral parenchymal infiltration. Interval intubation.   MACRO: none   Signed by: Elda Dorado 1/7/2025 11:22 AM Dictation workstation:   [id number]    XR chest 1 view    Result Date: 1/7/2025  Interpreted By:  Jaxon Morales, STUDY: XR CHEST 1 VIEW; 1/7/2025 6:35 am   INDICATION: CLINICAL INFORMATION: Signs/Symptoms:persistent hypoxia, unable to wean from NIV.   COMPARISON: 01/06/2025   ACCESSION NUMBER(S): [id number]   ORDERING CLINICIAN: NORM LANDAVERDE   TECHNIQUE: Portable chest one view.   FINDINGS: The cardiac size is indeterminate in view of the AP projection. There is continued diffuse ground-glass infiltrate bilaterally. No effusions are identified.       Persistent ground-glass infiltrates diffusely bilaterally. Etiology is unclear with differential to include infectious organisms as well as pulmonary edema and ARDS.   MACRO: none   Signed by: Jaxon Morales 1/7/2025 8:20 AM Dictation workstation:   [id number]    ECG 12 lead    Result Date: 1/6/2025  Normal sinus rhythm Nonspecific ST abnormality Prolonged QT Abnormal ECG No previous ECGs available Confirmed by Vicky Corado ([id number]) on 1/6/2025 4:01:46 PM    Transthoracic Echo (TTE) Complete    Result Date: 1/6/2025           [place] [address]            Phone [phone] TRANSTHORACIC ECHOCARDIOGRAM REPORT Patient Name:       ROBBIE FAJARDO AJ     Reading Physician:    [id number] Vicky Corado MD Study Date:         1/6/2025             Ordering Provider:    [id number] NORM LANDAVERDE MRN/PID:            [id number]             Fellow: Accession#:         [id number]         Nurse: Date of Birth/Age:  1945 / 79 years Sonographer:           Rosalba Leiva                                                                [facility] Gender Assigned at  F                    Additional Staff: Birth: Height:             160.02 cm            Admit Date: Weight:             69.85 kg             Admission Status:     Inpatient -                                                                Routine BSA / BMI:          1.73 m2 / 27.28      Department Location:  [facility]                     kg/m2 Blood Pressure: 151 /76 mmHg Study Type:    TRANSTHORACIC ECHO (TTE) COMPLETE Diagnosis/ICD: Hypoxemia-R09.02; Acute respiratory failure with hypoxia-J96.01 Indication:    hypoxemia CPT Codes:     Echo Complete w Full [id number] Patient History: Smoker:            Former. BMI:               Overweight 25 - 30 Pertinent History: Resp difficulty, bipap, cough, pna, sciatica, arf, resp                    failure/hypoxia. Study Detail: The following Echo studies were performed: 2D, Doppler, M-Mode and               color flow. Technically challenging study due to prominent lung               artifact, body habitus and patient lying in supine position.               Definity used as a contrast agent for endocardial border               definition. Total contrast used for this procedure was 2 mL via IV               push.  PHYSICIAN INTERPRETATION: Left Ventricle: The left ventricular systolic function is normal, with a visually estimated ejection fraction of 70-75%. There are no regional wall motion abnormalities. The left ventricular cavity size is normal. There is normal septal thickness. Spectral Doppler shows a normal pattern of left ventricular diastolic filling. Left Atrium: The left atrium is normal in size. Right Ventricle: The right ventricle is normal in size. There is normal right ventricular global systolic function. Right Atrium: The right atrium is normal in size. Aortic Valve: The aortic valve is trileaflet. The aortic valve dimensionless index is 0.74. There  is no evidence of aortic valve regurgitation. The peak instantaneous gradient of the aortic valve is 22 mmHg. The mean gradient of the aortic valve is 11 mmHg. Mitral Valve: The mitral valve is normal in structure. There is no evidence of mitral valve regurgitation. Tricuspid Valve: The tricuspid valve is structurally normal. There is mild tricuspid regurgitation. Pulmonic Valve: The pulmonic valve is structurally normal. There is physiologic pulmonic valve regurgitation. Pericardium: Small pericardial effusion. Aorta: The aortic root is normal. Systemic Veins: The inferior vena cava appears normal in size, with IVC inspiratory collapse greater than 50%.  CONCLUSIONS:  1. The left ventricular systolic function is normal, with a visually estimated ejection fraction of 70-75%.  2. Spectral Doppler shows a normal pattern of left ventricular diastolic filling.  3. There is normal right ventricular global systolic function.  4. No evidence of mitral valve regurgitation.  5. Mild tricuspid regurgitation is visualized. QUANTITATIVE DATA SUMMARY:  2D MEASUREMENTS:           Normal Ranges: LAs:             2.60 cm   (2.7-4.0cm) IVSd:            0.68 cm   (0.6-1.1cm) LVPWd:           0.53 cm   (0.6-1.1cm) LVIDd:           5.32 cm   (3.9-5.9cm) LV Mass Index:   62.2 g/m2  LV SYSTOLIC FUNCTION BY 2D PLANIMETRY (MOD):                      Normal Ranges: EF-A4C View:    60 % (>=55%) EF-A2C View:    66 % EF-Biplane:     65 % EF-Visual:      73 % LV EF Reported: 73 %  LV DIASTOLIC FUNCTION:           Normal Ranges: MV Peak E:             0.94 m/s  (0.7-1.2 m/s) MV Peak A:             1.04 m/s  (0.42-0.7 m/s) E/A Ratio:             0.91      (1.0-2.2) MV e'                  0.078 m/s (>8.0) MV lateral e'          0.08 m/s MV medial e'           0.07 m/s E/e' Ratio:            12.07     (<8.0) a'                     0.08 m/s  MITRAL VALVE:          Normal Ranges: MV DT:        186 msec (150-240msec)  AORTIC VALVE:                       Normal Ranges: AoV Vmax:                2.35 m/s  (<=1.7m/s) AoV Peak P.1 mmHg (<20mmHg) AoV Mean P.0 mmHg (1.7-11.5mmHg) LVOT Max Cj:            1.95 m/s  (<=1.1m/s) AoV VTI:                 43.90 cm  (18-25cm) LVOT VTI:                32.60 cm LVOT Diameter:           1.90 cm   (1.8-2.4cm) AoV Area, VTI:           2.11 cm2  (2.5-5.5cm2) AoV Area,Vmax:           2.35 cm2  (2.5-4.5cm2) AoV Dimensionless Index: 0.74  RIGHT VENTRICLE: RV s' 0.21 m/s  TRICUSPID VALVE/RVSP:          Normal Ranges: Peak TR Velocity:     3.56 m/s RV Syst Pressure:     59 mmHg  (< 30mmHg) IVC Diam:             1.16 cm  PULMONIC VALVE:          Normal Ranges: PV Accel Time:  124 msec (>120ms) PV Max Cj:     1.3 m/s  (0.6-0.9m/s) PV Max P.9 mmHg  [id number] Vicky Corado MD Electronically signed on 2025 at 3:30:16 PM  ** Final **     XR chest 1 view    Result Date: 2025  Interpreted By:  Jaxon Morales, STUDY: XR CHEST 1 VIEW; 2025 5:38 am   INDICATION: CLINICAL INFORMATION: Signs/Symptoms:Pneumonia follow-up. Previous abnormal chest radiograph. Shortness of breath.   COMPARISON: 2025   ACCESSION NUMBER(S): [id number]   ORDERING CLINICIAN: AMANDA SÁNCHEZ   TECHNIQUE: Portable chest one view.   FINDINGS: The cardiac size is indeterminate in view of the AP projection. Diffuse ground-glass infiltrates are again identified, similar compared to the previous study. No effusions are appreciated.       Diffuse ground-glass infiltrates. There is no interval change when compared to the previous examination.   MACRO: none   Signed by: Jaxon Morales 2025 8:29 AM Dictation workstation:   [id number]    CT angio chest for pulmonary embolism    Result Date: 2025  Interpreted By:  Selin Matthews, STUDY: CT ANGIO CHEST FOR PULMONARY EMBOLISM;  2025 4:10 pm   INDICATION: Signs/Symptoms:severe hypoxia   COMPARISON: Chest x-ray 2025. CT chest 2024   ACCESSION NUMBER(S):  [id number]   ORDERING CLINICIAN: NORM LANDAVERDE   TECHNIQUE: Helical data acquisition of the chest was obtained following the uneventful administration of intravenous contrast material. Images were reformatted in axial, coronal, and sagittal planes. MIP images were created and reviewed.   FINDINGS: POTENTIAL LIMITATIONS OF THE STUDY: Motion artifact.   HEART AND VESSELS: The evaluation for filling defects at the pulmonary arteries is degraded by motion artifact. No large central filling defects to suggest pulmonary embolism. The smaller segmental/subsegmental pulmonary arteries are not well evaluated on this exam.   No thoracic aortic aneurysm. Mild atherosclerotic calcifications are noted at the thoracic aorta.   The heart is mildly enlarged. There is trace pericardial effusion.   MEDIASTINUM AND MANPREET, LOWER NECK AND AXILLA: The visualized thyroid gland is small.   A right paratracheal lymph node measures 10 mm in short axis. A subcarinal lymph node measures 12 mm in short axis. A right hilar lymph node measures 19 mm in short axis. A left hilar lymph node measures 11 mm in short axis. Calcified lymph nodes are noted at the mediastinum and left hilum.   LUNGS AND AIRWAYS: The trachea and central airways are patent.   There are diffuse bilateral ground-glass opacities. No pleural effusion or pneumothorax.   UPPER ABDOMEN: Calcific foci at the spleen are suggestive of granulomas.   CHEST WALL AND OSSEOUS STRUCTURES: Multilevel degenerative changes at the spine. Redemonstration of remote compression deformity with Schmorl's node at the superior endplate of L1.       1. Study degraded by motion artifact. No evidence of large central pulmonary emboli. The smaller segmental/subsegmental pulmonary arteries are not well evaluated on this exam. 2. Diffuse bilateral ground-glass opacities, may be secondary to pulmonary edema and/or multifocal pneumonia. Acute respiratory distress syndrome is in the differential diagnoses.  3. Mild cardiomegaly. Trace pericardial effusion. 4. Mild mediastinal and hilar adenopathy.     MACRO: None.   Signed by: Selin Matthews 1/5/2025 6:42 PM Dictation workstation:   [id number]    US renal complete    Result Date: 1/5/2025  Interpreted By:  Jaxon Morales, STUDY: US RENAL COMPLETE; 1/5/2025 10:29 am   INDICATION: Signs/Symptoms:nancy.   COMPARISON: None   ACCESSION NUMBER(S): [id number]   ORDERING CLINICIAN: NORM LANDAVERDE   TECHNIQUE: Grayscale and color Doppler imaging of the kidneys   FINDINGS: The right kidney measures 11.0 cm  . The left kidney measures 11.0 cm  .   There is mild increased echogenicity of the renal cortex bilaterally.     No renal stones are identified.  Renal cortex is normal in thickness bilaterally. No hydronephrosis is identified.   Urinary bladder is nonspecific in appearance with no stones or masses identified.       Mild increased renal cortical echogenicity diffusely bilaterally suggesting chronic renal medical disease.   MACRO: none   Signed by: Jaxon Morales 1/5/2025 12:45 PM Dictation workstation:   [id number]    XR chest 1 view    Result Date: 1/5/2025  Interpreted By:  Sincere Slater, STUDY: XR CHEST 1 VIEW;  1/5/2025 8:48 am   INDICATION: Signs/Symptoms:respiratory distress acute.   COMPARISON: 01/02/2025   ACCESSION NUMBER(S): [id number]   ORDERING CLINICIAN: RAMANDEEP ALLRED   FINDINGS: Diffusely increased bilateral airspace consolidation. No visualized pleural effusion. Cardiomediastinal silhouette unchanged. Aortic atherosclerosis. Thoracic degenerative changes with dextroscoliosis.       Diffusely increased bilateral airspace consolidation.   MACRO: None   Signed by: Sincere Slater 1/5/2025 10:08 AM Dictation workstation:   [id number]    XR chest 2 views    Result Date: 1/2/2025  Interpreted By:  Jaxon Morales, STUDY: XR CHEST 2 VIEWS; 1/2/2025 3:05 pm   INDICATION: Signs/Symptoms:fever, productive cough, chest pain.   COMPARISON: 06/14/2022   ACCESSION NUMBER(S):  [id number]   ORDERING CLINICIAN: DANIAL COATES   TECHNIQUE: AP and lateral views of the chest were obtained.   FINDINGS: The cardiac silhouette is normal in appearance. There are new patchy bilateral alveolar infiltrates most marked within the upper lobes bilaterally .  No effusions are identified.       Extensive patchy bilateral infiltrates most marked within the upper lobes bilaterally. Findings are suspicious for pneumonia. Follow-up to assure complete clearing is suggested.   MACRO: none   Signed by: Jaxon Morales 1/2/2025 3:43 PM Dictation workstation:   [id number]     Assessment/Plan   Acute hypoxic respiratory failure, interval intubation  Sepsis  Acute kidney injury-interval worsening  Leukocytosis, multifactorial-resolving  Pneumonia-gram-positive versus gram-negative versus atypical-negative nasal MRSA PCR, negative Fungitell assay, negative 1, BAL workup pending     Continue IV Zosyn  Discontinue doxycycline  Discontinue Zyvox  Continue Mepron for now  Vent management  Monitor renal function  Follow-up BAL workup  Supportive care  Monitor temperature and WBC      Max Sanchez MD

## 2025-01-09 NOTE — PROGRESS NOTES
[facility] Critical Care Medicine       Date:  1/9/2025  Patient:  Janet Snow  YOB: 1945  MRN:  [id number]   Admit Date:  1/2/2025    Chief Complaint   Patient presents with    Shortness of Breath         History of Present Illness:  Janet Snow is a 79 y.o. year old female patient with Past Medical History of hypothyroidism, anxiety, depression, hyperlipidemia, sciatica, prior cigarette smoker (30 years, quit 30 years ago) who presented to  ED 1/2 with complaints of shortness of breath. Her symptoms started shortly before [place] which would have been >1 week prior to time of presentation. At this time, she also complained of productive cough with yellow-green colored sputum, difficulty swallowing, chills. She was seen at an urgent care and was prescribed Augmentin, but did not have improvement.      She met SIRS criteria in the ED with fever, tachycardia, and leukocytosis, as well as elevated lactate. She was given rocephin and azithromycin in ED. She was admitted to the regular nursing floor on 2L NC and started on CAP coverage. Pulmonology was consulted and dc azithromycin due to negative urine atypicals. She was reportedly on 3L NC yesterday but would have random episodes of desaturations with coughing fits, but O2 sats would return back to 3L.     On the morning of 1/5, the hospitalist contacted the ICU team to come see her as she became acutely tachypnic, hypoxic with SpO2 in the 60s, came up to the 80s on NRB. She was visibly in respiratory distress, in tripod position at the edge of the bed. ABG 7.37/35/44, confirmed arterial draw. CXR worsening bilateral airspace consolidations compared to CXR 1/2. She was placed on continuous CPAP 8/100% with SpO2 up to the low 90s and urgently transported to the ICU.      She did have a CT chest 7/24 which showed multifocal reticulonodular and ground-glass opacities (since 2020) 2/2 chronic infectious/inflammatory process. Multiple  new-mildly enlarged pulmonary nodules up to 6mm. Multiple unchanged prominent enlarged lymph nodes, likely reactive. Recommended follow-up CT chest in 3-6 months.      Interval ICU Events:  1/5: Pt arrived to ICU on continuous CPAP. Started IV steroids. Work of breathing improved as the day goes on. FiO2 able to be weaned to 80%. Stat CT angio chest obtained to r/o PE and for further differentiation of diffuse infiltrates seen on CXR.      1/6: Attempted to give patient a break from bipap overnight, but she became hypoxic with SpO2 in the 70s immedately. Remains on continuous bipap this morning. Very anxious with coughing fits and becomes acutely SOB with increased WOB during these episodes. CT chest yesterday showing diffuse GGO, will reengage pulmonology for their input. Broaden abx include zyvox.       1/7: Patient had a few desaturations overnight with tachypnea and accessory muscle use. Valium was added to help with her increased anxiety. Patient reports today that she is tired and that her breathing feels worse. We did discuss intubation as well as the risk associated with it. She stated that she wanted to move forward with intubation, but wanted to discuss it with her children. She then had a desaturation to 80% with respiratory distress remained on BiPap with Fi02 of 100%. Call placed to both of her children, with plans for them to come to bedside. Discussed with the patient and her children the option of intubation. Patient wishes to move forward with the understanding that it is not guaranteed that she will be able to be extubated. She states that she only wishes to be intubated for 7 days. All questions answered. Dr. Pandya called to bedside. Patient does with to remain a DNR but is OK with being intubated. Pulmonary following, plan for a bronchoscopy today, will add bactrim to cover for possible PJP.      1/8: Patient placed on fentanyl drip to help with sedation overnight, patient breathing over the  vent overnight. Will work to try and decrease TV today as ABG allows to meet ARDS Net protocol.     1/9: She remained intermittently very uncomfortable with poor mechanical ventilation synchrony before starting a ketamine infusion overnight.  Oliguric KALEB continues with Scr 3.0 today.  BAL respiratory culture 1/7 without organisms, 4+ pmn's. Hypoactive BS, stool appearing NG output c/f ileus.         Medical History:  History reviewed. No pertinent past medical history.  Past Surgical History:   Procedure Laterality Date    BREAST BIOPSY Right     core biopsy 20 years ago    HYSTERECTOMY      MR HEAD ANGIO WO IV CONTRAST  07/14/2018    MR HEAD ANGIO WO IV CONTRAST [facility]     Medications Prior to Admission   Medication Sig Dispense Refill Last Dose/Taking    simvastatin (Zocor) 20 mg tablet Take 1 tablet (20 mg) by mouth once daily at bedtime.   Taking    FLUoxetine (PROzac) 40 mg capsule Take 1 capsule (40 mg) by mouth once daily.       gabapentin (Neurontin) 400 mg capsule Take 1 capsule (400 mg) by mouth 2 times a day.       levothyroxine (Synthroid, Levoxyl) 50 mcg tablet Take 1 tablet (50 mcg) by mouth early in the morning..        Meperidine (pf), Ropinirole, and Meperidine  Social History     Tobacco Use    Smoking status: Former     Types: Cigarettes    Smokeless tobacco: Never   Substance Use Topics    Drug use: Never     No family history on file.    Review of Systems:  14 point review of systems was completed and negative except for those specially mention in my HPI    Physical Exam:    Heart Rate:  [80-95]   Temp:  [36.6 °C (97.9 °F)-37.4 °C (99.3 °F)]   Resp:  [15-40]   Weight:  [74.1 kg (163 lb 5.8 oz)-74.8 kg (164 lb 14.5 oz)]   SpO2:  [82 %-96 %]     Physical Exam  Vitals reviewed.   Constitutional:       Appearance: She is ill-appearing.      Interventions: She is sedated, intubated and restrained.   HENT:      Head: Normocephalic and atraumatic.      Right Ear: External ear normal.       Left Ear: External ear normal.      Nose: Nose normal.      Comments: NGT right nare     Mouth/Throat:      Mouth: Mucous membranes are dry.      Pharynx: Oropharynx is clear.      Comments: ETT   Eyes:      Pupils: Pupils are equal, round, and reactive to light.   Cardiovascular:      Rate and Rhythm: Normal rate and regular rhythm.      Pulses: Normal pulses.      Heart sounds: Normal heart sounds.   Pulmonary:      Effort: She is intubated.      Breath sounds: Decreased breath sounds present.   Abdominal:      General: Bowel sounds are decreased.      Palpations: Abdomen is soft.   Genitourinary:     Comments: Indwelling urinary catheter   Musculoskeletal:      Right lower leg: No edema.      Left lower leg: No edema.   Skin:     General: Skin is warm and dry.      Capillary Refill: Capillary refill takes less than 2 seconds.   Neurological:      Comments: Intubated and sedated    Psychiatric:      Comments: EMIR         Objective:    I have reviewed all medications, laboratory results, and imaging pertinent for today's encounter    Assessment/Plan:    I am currently managing this critically ill patient for the following problems:    Neuro/Psych/Pain Ctrl/Sedation:  #Daily ETOH use  #Hx anxiety, depression   - Pain Control: acetaminophen PRN  - Hold home prozac while on zyvox  - CAM ICU qshift, sleep-wake hygiene, delirium precautions   - Continue propofol and ketamine for sedation, dc dexmedetomidine   - Dilaudid PRN CPOT >3  - Can resume fentanyl if indicated as linezolid is dc'd today   - Continue phenobarbital taper   - Continue Thiamine and multivitamin     Respiratory/ENT:  #Acute hypoxic respiratory failure - vasculitis/DAH vs atypical pneumonia vs acute inflammatory process.  Intubated 1/7/25    #Acute respiratory distress syndrome.  P/F 223 with PEEP 12  #Prior tobacco use   - Continue solu-medrol   - Duonebs q4hrs, add 3% nebs   - Lung protective vent settings, permissive hypercapnia for pH > 7.25,  permisive hypoxemia for PO2 > 60; agree with [facility] and PEEP 12 for improved comfort  - Daily CXR  - Pulm hygiene    Cardiovascular:  #Hyperlipidemia   - Continue statin   - Maintain MAPs > 70 in setting of KALEB   - Continuous cardiac monitoring   - EKGs PRN for ACS symptoms, arrhythmias     GI:  #Dysphagia  - KUB for ileus assessment    - PPI for ppx   - Increase bowel reg KUB normal   - Start trickle TF if BM this afternoon    Renal/Volume Status (Intra & Extravascular):  #Oliguric Acute Kidney Injury. Baseline Cr 0.9 worsening 2 -> 3.0 today  #Acute urinary retention   #Hx urge incontinence   - Hourly I/O's, maintain urine output >0.5cc/kg/hr  - Appreciate Nephrology Consult. No urgent indication for RRT at the moment.  * Family reports that patient would not consent to RRT.     - Replete electrolytes to maintain K >4.0 and Mg >2.0  - Daily RFP, Mg  - Renal dosage where indicated    Endocrine  #Hypothyroidism   #Steroid induced hyperglycemia   - continue IV synthroid   - ISS with q4hr glucose checks while NPO  - Hypoglycemia protocol PRN     Infectious Disease:  #Atypical pneumonia   #Rule out PJP Pneumonia   - Appreciate ID consult and rec's  - Agree with narrowing empiric coverage.  DC doxycycline, linezolid, continue pip-tazo.    - Continue atovaquone for empiric PJP coverage though this seems pretty low on the DD.       - F/U finalized PJP PCR, aspergillus galactomannan, fungitell, respiratory viral panel, histoplasma pending   - Will see if we can add on metapneumovirus to BAL per Pulmonary Consult rec's   - Monitor SIRS criteria    Heme/Onc:  #Thrombocytosis - likely acutely reactive   #Anemia  - Transfuse if Hgb <7.0   - Daily CBC    OBGYN/MSK:  #Hx sciatica   - PT/OT eval  - ICU skin protocol, padded pressure points  - Q2hr turns    Ethics/Code Status:  DNRCCA    :  DVT Prophylaxis: SQH & SCDs  GI Prophylaxis: PPI  Bowel Regimen: Colace  Diet: NPO, start TF today   CVC:  none  Liz: yes  West: yes  Restraints: soft  Dispo: ICU    Critical Care Time:  65 minutes spent in preparing to see patient (I.e. review of medical records), evaluation of diagnostics (I.e. labs, imaging, etc.), documentation, discussing plan of care with patient/ family/ caregiver, and/ or coordination of care with multidisciplinary team. Time does not include completion of procedure time.     NIKITA Roldan-CNP  Critical Care Medicine  [facility]

## 2025-01-09 NOTE — PROGRESS NOTES
Janet Snow is a 79 y.o. female on day 7 of admission presenting with Community acquired pneumonia of both upper lobes.      Subjective   Remains intubated on the ventilator, urine output today starting to decline, Cr increasing.        Objective          Vitals 24HR  Heart Rate:  [80-95]   Temp:  [36.4 °C (97.5 °F)-37.4 °C (99.3 °F)]   Resp:  [15-42]   Weight:  [74.8 kg (164 lb 14.5 oz)]   SpO2:  [82 %-96 %]       Intake/Output last 3 Shifts:    Intake/Output Summary (Last 24 hours) at 1/9/2025 1652  Last data filed at 1/9/2025 1617  Gross per 24 hour   Intake 2132.75 ml   Output 2040 ml   Net 92.75 ml       Physical Exam  Constitutional:       Interventions: She is sedated and intubated.   Cardiovascular:      No friction rub.   Pulmonary:      Effort: She is intubated.      Comments: Mechanically ventilated, fairly clear breath sounds anteriorly  Abdominal:      General: Bowel sounds are normal.      Palpations: Abdomen is soft.   Musculoskeletal:      Right lower leg: No edema.      Left lower leg: No edema.     Relevant Results  Results for orders placed or performed during the hospital encounter of 01/02/25 (from the past 24 hours)   POCT GLUCOSE   Result Value Ref Range    POCT Glucose 123 (H) 74 - 99 mg/dL   POCT GLUCOSE   Result Value Ref Range    POCT Glucose 123 (H) 74 - 99 mg/dL   POCT GLUCOSE   Result Value Ref Range    POCT Glucose 111 (H) 74 - 99 mg/dL   POCT GLUCOSE   Result Value Ref Range    POCT Glucose 123 (H) 74 - 99 mg/dL   POCT GLUCOSE   Result Value Ref Range    POCT Glucose 138 (H) 74 - 99 mg/dL   CBC and Auto Differential   Result Value Ref Range    WBC 16.7 (H) 4.4 - 11.3 x10*3/uL    nRBC 0.1 (H) 0.0 - 0.0 /100 WBCs    RBC 2.44 (L) 4.00 - 5.20 x10*6/uL    Hemoglobin 7.5 (L) 12.0 - 16.0 g/dL    Hematocrit 22.2 (L) 36.0 - 46.0 %    MCV 91 80 - 100 fL    MCH 30.7 26.0 - 34.0 pg    MCHC 33.8 32.0 - 36.0 g/dL    RDW 13.6 11.5 - 14.5 %    Platelets 535 (H) 150 - 450 x10*3/uL    Neutrophils  % 84.5 40.0 - 80.0 %    Immature Granulocytes %, Automated 4.0 (H) 0.0 - 0.9 %    Lymphocytes % 4.2 13.0 - 44.0 %    Monocytes % 7.2 2.0 - 10.0 %    Eosinophils % 0.0 0.0 - 6.0 %    Basophils % 0.1 0.0 - 2.0 %    Neutrophils Absolute 14.10 (H) 1.60 - 5.50 x10*3/uL    Immature Granulocytes Absolute, Automated 0.67 (H) 0.00 - 0.50 x10*3/uL    Lymphocytes Absolute 0.70 (L) 0.80 - 3.00 x10*3/uL    Monocytes Absolute 1.20 (H) 0.05 - 0.80 x10*3/uL    Eosinophils Absolute 0.00 0.00 - 0.40 x10*3/uL    Basophils Absolute 0.01 0.00 - 0.10 x10*3/uL   Magnesium   Result Value Ref Range    Magnesium 2.41 (H) 1.60 - 2.40 mg/dL   Renal function panel   Result Value Ref Range    Glucose 131 (H) 74 - 99 mg/dL    Sodium 138 136 - 145 mmol/L    Potassium 4.3 3.5 - 5.3 mmol/L    Chloride 105 98 - 107 mmol/L    Bicarbonate 23 21 - 32 mmol/L    Anion Gap 14 10 - 20 mmol/L    Urea Nitrogen 73 (H) 6 - 23 mg/dL    Creatinine 3.01 (H) 0.50 - 1.05 mg/dL    eGFR 15 (L) >60 mL/min/1.73m*2    Calcium 7.9 (L) 8.6 - 10.3 mg/dL    Phosphorus 4.3 2.5 - 4.9 mg/dL    Albumin 2.8 (L) 3.4 - 5.0 g/dL   Comprehensive metabolic panel   Result Value Ref Range    Glucose 126 (H) 74 - 99 mg/dL    Sodium 139 136 - 145 mmol/L    Potassium 4.3 3.5 - 5.3 mmol/L    Chloride 105 98 - 107 mmol/L    Bicarbonate 22 21 - 32 mmol/L    Anion Gap 16 10 - 20 mmol/L    Urea Nitrogen 77 (H) 6 - 23 mg/dL    Creatinine 3.01 (H) 0.50 - 1.05 mg/dL    eGFR 15 (L) >60 mL/min/1.73m*2    Calcium 7.7 (L) 8.6 - 10.3 mg/dL    Albumin 2.8 (L) 3.4 - 5.0 g/dL    Alkaline Phosphatase 86 33 - 136 U/L    Total Protein 6.1 (L) 6.4 - 8.2 g/dL    AST 24 9 - 39 U/L    Bilirubin, Total 1.2 0.0 - 1.2 mg/dL    ALT 26 7 - 45 U/L   Hepatic function panel   Result Value Ref Range    Albumin 2.8 (L) 3.4 - 5.0 g/dL    Bilirubin, Total 1.2 0.0 - 1.2 mg/dL    Bilirubin, Direct 0.7 (H) 0.0 - 0.3 mg/dL    Alkaline Phosphatase 86 33 - 136 U/L    ALT 26 7 - 45 U/L    AST 24 9 - 39 U/L    Total Protein 6.1 (L)  6.4 - 8.2 g/dL   POCT GLUCOSE   Result Value Ref Range    POCT Glucose 108 (H) 74 - 99 mg/dL   Blood Gas Arterial Full Panel   Result Value Ref Range    POCT pH, Arterial 7.38 7.38 - 7.42 pH    POCT pCO2, Arterial 38 38 - 42 mm Hg    POCT pO2, Arterial 134 (H) 85 - 95 mm Hg    POCT SO2, Arterial 99 94 - 100 %    POCT Oxy Hemoglobin, Arterial 97.4 94.0 - 98.0 %    POCT Hematocrit Calculated, Arterial 23.0 (L) 36.0 - 46.0 %    POCT Sodium, Arterial 136 136 - 145 mmol/L    POCT Potassium, Arterial 4.7 3.5 - 5.3 mmol/L    POCT Chloride, Arterial 108 (H) 98 - 107 mmol/L    POCT Ionized Calcium, Arterial 1.11 1.10 - 1.33 mmol/L    POCT Glucose, Arterial 120 (H) 74 - 99 mg/dL    POCT Lactate, Arterial 1.0 0.4 - 2.0 mmol/L    POCT Base Excess, Arterial -2.4 (L) -2.0 - 3.0 mmol/L    POCT HCO3 Calculated, Arterial 22.5 22.0 - 26.0 mmol/L    POCT Hemoglobin, Arterial 7.5 (L) 12.0 - 16.0 g/dL    POCT Anion Gap, Arterial 10 10 - 25 mmo/L    Patient Temperature 37.0 degrees Celsius    FiO2 60 %    Ventilator Mode Other     Ventilator Rate 24 bpm    Tidal Volume 370 mL    Peep CHM2O 12.0 cm H2O    Site of Arterial Puncture Arterial Line     Aba's Test Negative    POCT GLUCOSE   Result Value Ref Range    POCT Glucose 111 (H) 74 - 99 mg/dL   Blood Gas Arterial Full Panel   Result Value Ref Range    POCT pH, Arterial 7.35 (L) 7.38 - 7.42 pH    POCT pCO2, Arterial 40 38 - 42 mm Hg    POCT pO2, Arterial 120 (H) 85 - 95 mm Hg    POCT SO2, Arterial 100 94 - 100 %    POCT Oxy Hemoglobin, Arterial 97.6 94.0 - 98.0 %    POCT Hematocrit Calculated, Arterial 20.0 (L) 36.0 - 46.0 %    POCT Sodium, Arterial 136 136 - 145 mmol/L    POCT Potassium, Arterial 4.7 3.5 - 5.3 mmol/L    POCT Chloride, Arterial 108 (H) 98 - 107 mmol/L    POCT Ionized Calcium, Arterial 1.13 1.10 - 1.33 mmol/L    POCT Glucose, Arterial 118 (H) 74 - 99 mg/dL    POCT Lactate, Arterial 0.9 0.4 - 2.0 mmol/L    POCT Base Excess, Arterial -3.2 (L) -2.0 - 3.0 mmol/L    POCT  HCO3 Calculated, Arterial 22.1 22.0 - 26.0 mmol/L    POCT Hemoglobin, Arterial 6.7 (L) 12.0 - 16.0 g/dL    POCT Anion Gap, Arterial 11 10 - 25 mmo/L    Patient Temperature 37.0 degrees Celsius    FiO2 60 %    Ventilator Mode A/C PC     Ventilator Rate 24 bpm    PC Pressure Control 10.0 cm H2O    Peep CHM2O 12.0 cm H2O    Site of Arterial Puncture Arterial Line     Aba's Test Negative    POCT GLUCOSE   Result Value Ref Range    POCT Glucose 123 (H) 74 - 99 mg/dL            Assessment/Plan   Acute kidney injury likely secondary to the use of IV contrast, may also have ATN- Cr trending up, continue to monitor renal function very closely. I recommended she be switched off of Bactrim DS to an alternate agent as this can worsen kidney function.  Teams were in agreement to an alternate therapy. ANCA, anti-GBM Ab and monoclonals are still pending.  Per the patient's daughter, the patient would not want dialysis.  Bilateral pneumonia , antibiotics per infectious disease, see above  Acute respiratory failure, intubated  Anemia  Hyperlipidemia  Thrombocytosis rule out polycythemia- recommend Hematology consult    Luli Hemphill MD

## 2025-01-09 NOTE — SIGNIFICANT EVENT
5920- Patient started bucking the vent and SpO2 dropped into low 80s. JACK and RT called to bedside. Dilaudid 0.6 mg x 1 dose ordered and given.

## 2025-01-10 LAB
ALBUMIN SERPL BCP-MCNC: 2.8 G/DL (ref 3.4–5)
AMORPH CRY #/AREA UR COMP ASSIST: ABNORMAL /HPF
ANION GAP SERPL CALCULATED.3IONS-SCNC: 17 MMOL/L (ref 10–20)
APPEARANCE UR: ABNORMAL
BACTERIA #/AREA URNS AUTO: ABNORMAL /HPF
BACTERIA SPEC RESP CULT: ABNORMAL
BACTERIA SPEC RESP CULT: NORMAL
BASO STIPL BLD QL SMEAR: PRESENT
BASOPHILS # BLD MANUAL: 0 X10*3/UL (ref 0–0.1)
BASOPHILS NFR BLD MANUAL: 0 %
BILIRUB UR STRIP.AUTO-MCNC: NEGATIVE MG/DL
BUN SERPL-MCNC: 105 MG/DL (ref 6–23)
CALCIUM SERPL-MCNC: 7.6 MG/DL (ref 8.6–10.3)
CAOX CRY #/AREA UR COMP ASSIST: ABNORMAL /HPF
CHLORIDE SERPL-SCNC: 105 MMOL/L (ref 98–107)
CO2 SERPL-SCNC: 22 MMOL/L (ref 21–32)
COLOR UR: ABNORMAL
CREAT SERPL-MCNC: 3.59 MG/DL (ref 0.5–1.05)
EGFRCR SERPLBLD CKD-EPI 2021: 12 ML/MIN/1.73M*2
EOSINOPHIL # BLD MANUAL: 0 X10*3/UL (ref 0–0.4)
EOSINOPHIL NFR BLD MANUAL: 0 %
ERYTHROCYTE [DISTWIDTH] IN BLOOD BY AUTOMATED COUNT: 13.9 % (ref 11.5–14.5)
ERYTHROCYTE [DISTWIDTH] IN BLOOD BY AUTOMATED COUNT: 14.1 % (ref 11.5–14.5)
FUNGUS SPEC CULT: NORMAL
FUNGUS SPEC FUNGUS STN: NORMAL
GLUCOSE BLD MANUAL STRIP-MCNC: 108 MG/DL (ref 74–99)
GLUCOSE BLD MANUAL STRIP-MCNC: 118 MG/DL (ref 74–99)
GLUCOSE BLD MANUAL STRIP-MCNC: 122 MG/DL (ref 74–99)
GLUCOSE BLD MANUAL STRIP-MCNC: 125 MG/DL (ref 74–99)
GLUCOSE BLD MANUAL STRIP-MCNC: 126 MG/DL (ref 74–99)
GLUCOSE BLD MANUAL STRIP-MCNC: 137 MG/DL (ref 74–99)
GLUCOSE BLD MANUAL STRIP-MCNC: 142 MG/DL (ref 74–99)
GLUCOSE SERPL-MCNC: 140 MG/DL (ref 74–99)
GLUCOSE UR STRIP.AUTO-MCNC: NORMAL MG/DL
GRAM STN SPEC: ABNORMAL
GRAM STN SPEC: NORMAL
GRAM STN SPEC: NORMAL
GRAN CASTS #/AREA UR COMP ASSIST: ABNORMAL /LPF
H CAPSUL AG SPEC QL: NOT DETECTED
HCT VFR BLD AUTO: 22.8 % (ref 36–46)
HCT VFR BLD AUTO: 23.4 % (ref 36–46)
HGB BLD-MCNC: 7.5 G/DL (ref 12–16)
HGB BLD-MCNC: 7.6 G/DL (ref 12–16)
IMM GRANULOCYTES # BLD AUTO: 1.37 X10*3/UL (ref 0–0.5)
IMM GRANULOCYTES NFR BLD AUTO: 5.3 % (ref 0–0.9)
KETONES UR STRIP.AUTO-MCNC: NEGATIVE MG/DL
LEUKOCYTE ESTERASE UR QL STRIP.AUTO: ABNORMAL
LYMPHOCYTES # BLD MANUAL: 1.03 X10*3/UL (ref 0.8–3)
LYMPHOCYTES NFR BLD MANUAL: 4 %
MAGNESIUM SERPL-MCNC: 2.62 MG/DL (ref 1.6–2.4)
MCH RBC QN AUTO: 29.6 PG (ref 26–34)
MCH RBC QN AUTO: 30.7 PG (ref 26–34)
MCHC RBC AUTO-ENTMCNC: 32.5 G/DL (ref 32–36)
MCHC RBC AUTO-ENTMCNC: 32.9 G/DL (ref 32–36)
MCV RBC AUTO: 91 FL (ref 80–100)
MCV RBC AUTO: 93 FL (ref 80–100)
MONOCYTES # BLD MANUAL: 0.77 X10*3/UL (ref 0.05–0.8)
MONOCYTES NFR BLD MANUAL: 3 %
MUCOUS THREADS #/AREA URNS AUTO: ABNORMAL /LPF
MYELOCYTES # BLD MANUAL: 0.52 X10*3/UL
MYELOCYTES NFR BLD MANUAL: 2 %
NEUTROPHILS # BLD MANUAL: 23.48 X10*3/UL (ref 1.6–5.5)
NEUTS BAND # BLD MANUAL: 0.26 X10*3/UL (ref 0–0.5)
NEUTS BAND NFR BLD MANUAL: 1 %
NEUTS SEG # BLD MANUAL: 23.22 X10*3/UL (ref 1.6–5)
NEUTS SEG NFR BLD MANUAL: 90 %
NITRITE UR QL STRIP.AUTO: NEGATIVE
NRBC BLD-RTO: 0.1 /100 WBCS (ref 0–0)
NRBC BLD-RTO: 0.1 /100 WBCS (ref 0–0)
PH UR STRIP.AUTO: 5 [PH]
PHOSPHATE SERPL-MCNC: 8.1 MG/DL (ref 2.5–4.9)
PLATELET # BLD AUTO: 556 X10*3/UL (ref 150–450)
PLATELET # BLD AUTO: 603 X10*3/UL (ref 150–450)
POTASSIUM SERPL-SCNC: 4.9 MMOL/L (ref 3.5–5.3)
PROT UR STRIP.AUTO-MCNC: ABNORMAL MG/DL
RBC # BLD AUTO: 2.44 X10*6/UL (ref 4–5.2)
RBC # BLD AUTO: 2.57 X10*6/UL (ref 4–5.2)
RBC # UR STRIP.AUTO: ABNORMAL /UL
RBC #/AREA URNS AUTO: >20 /HPF
RBC MORPH BLD: ABNORMAL
SCAN RESULT: NORMAL
SCHISTOCYTES BLD QL SMEAR: ABNORMAL
SODIUM SERPL-SCNC: 139 MMOL/L (ref 136–145)
SP GR UR STRIP.AUTO: 1.02
SQUAMOUS #/AREA URNS AUTO: ABNORMAL /HPF
TARGETS BLD QL SMEAR: ABNORMAL
TOTAL CELLS COUNTED BLD: 100
UROBILINOGEN UR STRIP.AUTO-MCNC: NORMAL MG/DL
WBC # BLD AUTO: 24.5 X10*3/UL (ref 4.4–11.3)
WBC # BLD AUTO: 25.8 X10*3/UL (ref 4.4–11.3)
WBC #/AREA URNS AUTO: ABNORMAL /HPF

## 2025-01-10 PROCEDURE — 87205 SMEAR GRAM STAIN: CPT | Mod: WESLAB

## 2025-01-10 PROCEDURE — 85027 COMPLETE CBC AUTOMATED: CPT

## 2025-01-10 PROCEDURE — 83735 ASSAY OF MAGNESIUM: CPT

## 2025-01-10 PROCEDURE — 2500000004 HC RX 250 GENERAL PHARMACY W/ HCPCS (ALT 636 FOR OP/ED)

## 2025-01-10 PROCEDURE — 2500000001 HC RX 250 WO HCPCS SELF ADMINISTERED DRUGS (ALT 637 FOR MEDICARE OP)

## 2025-01-10 PROCEDURE — 83516 IMMUNOASSAY NONANTIBODY: CPT | Performed by: INTERNAL MEDICINE

## 2025-01-10 PROCEDURE — 94003 VENT MGMT INPAT SUBQ DAY: CPT

## 2025-01-10 PROCEDURE — 82947 ASSAY GLUCOSE BLOOD QUANT: CPT

## 2025-01-10 PROCEDURE — 37799 UNLISTED PX VASCULAR SURGERY: CPT | Performed by: NURSE PRACTITIONER

## 2025-01-10 PROCEDURE — 83516 IMMUNOASSAY NONANTIBODY: CPT | Performed by: NURSE PRACTITIONER

## 2025-01-10 PROCEDURE — 85027 COMPLETE CBC AUTOMATED: CPT | Performed by: NURSE PRACTITIONER

## 2025-01-10 PROCEDURE — 81001 URINALYSIS AUTO W/SCOPE: CPT | Performed by: INTERNAL MEDICINE

## 2025-01-10 PROCEDURE — 2500000005 HC RX 250 GENERAL PHARMACY W/O HCPCS: Performed by: SURGERY

## 2025-01-10 PROCEDURE — 71045 X-RAY EXAM CHEST 1 VIEW: CPT | Performed by: RADIOLOGY

## 2025-01-10 PROCEDURE — 2500000002 HC RX 250 W HCPCS SELF ADMINISTERED DRUGS (ALT 637 FOR MEDICARE OP, ALT 636 FOR OP/ED)

## 2025-01-10 PROCEDURE — 85007 BL SMEAR W/DIFF WBC COUNT: CPT

## 2025-01-10 PROCEDURE — 31720 CLEARANCE OF AIRWAYS: CPT

## 2025-01-10 PROCEDURE — 99291 CRITICAL CARE FIRST HOUR: CPT | Performed by: NURSE PRACTITIONER

## 2025-01-10 PROCEDURE — 2500000005 HC RX 250 GENERAL PHARMACY W/O HCPCS

## 2025-01-10 PROCEDURE — 99233 SBSQ HOSP IP/OBS HIGH 50: CPT

## 2025-01-10 PROCEDURE — 80069 RENAL FUNCTION PANEL: CPT

## 2025-01-10 PROCEDURE — 94640 AIRWAY INHALATION TREATMENT: CPT

## 2025-01-10 PROCEDURE — 2020000001 HC ICU ROOM DAILY

## 2025-01-10 PROCEDURE — 37799 UNLISTED PX VASCULAR SURGERY: CPT

## 2025-01-10 PROCEDURE — 2500000004 HC RX 250 GENERAL PHARMACY W/ HCPCS (ALT 636 FOR OP/ED): Performed by: INTERNAL MEDICINE

## 2025-01-10 PROCEDURE — 2500000001 HC RX 250 WO HCPCS SELF ADMINISTERED DRUGS (ALT 637 FOR MEDICARE OP): Performed by: INTERNAL MEDICINE

## 2025-01-10 PROCEDURE — 99233 SBSQ HOSP IP/OBS HIGH 50: CPT | Performed by: INTERNAL MEDICINE

## 2025-01-10 RX ADMIN — Medication 3 ML: at 19:48

## 2025-01-10 RX ADMIN — Medication 3 ML: at 03:34

## 2025-01-10 RX ADMIN — PROPOFOL 50 MCG/KG/MIN: 10 INJECTION, EMULSION INTRAVENOUS at 07:12

## 2025-01-10 RX ADMIN — IPRATROPIUM BROMIDE AND ALBUTEROL SULFATE 3 ML: 2.5; .5 SOLUTION RESPIRATORY (INHALATION) at 19:49

## 2025-01-10 RX ADMIN — LEVOTHYROXINE SODIUM ANHYDROUS 25 MCG: 100 INJECTION, POWDER, LYOPHILIZED, FOR SOLUTION INTRAVENOUS at 08:31

## 2025-01-10 RX ADMIN — DOCUSATE SODIUM 100 MG: 50 LIQUID ORAL at 21:56

## 2025-01-10 RX ADMIN — Medication 3 ML: at 11:52

## 2025-01-10 RX ADMIN — SIMVASTATIN 20 MG: 20 TABLET, FILM COATED ORAL at 21:58

## 2025-01-10 RX ADMIN — IPRATROPIUM BROMIDE AND ALBUTEROL SULFATE 3 ML: 2.5; .5 SOLUTION RESPIRATORY (INHALATION) at 11:52

## 2025-01-10 RX ADMIN — PROPOFOL 50 MCG/KG/MIN: 10 INJECTION, EMULSION INTRAVENOUS at 21:58

## 2025-01-10 RX ADMIN — ATOVAQUONE 750 MG: 750 SUSPENSION ORAL at 21:58

## 2025-01-10 RX ADMIN — PROPOFOL 50 MCG/KG/MIN: 10 INJECTION, EMULSION INTRAVENOUS at 17:47

## 2025-01-10 RX ADMIN — ESOMEPRAZOLE MAGNESIUM 40 MG: 40 FOR SUSPENSION ORAL at 08:13

## 2025-01-10 RX ADMIN — ATOVAQUONE 750 MG: 750 SUSPENSION ORAL at 08:14

## 2025-01-10 RX ADMIN — IPRATROPIUM BROMIDE AND ALBUTEROL SULFATE 3 ML: 2.5; .5 SOLUTION RESPIRATORY (INHALATION) at 15:51

## 2025-01-10 RX ADMIN — PHENOBARBITAL 32.4 MG: 32.4 TABLET ORAL at 21:58

## 2025-01-10 RX ADMIN — IPRATROPIUM BROMIDE AND ALBUTEROL SULFATE 3 ML: 2.5; .5 SOLUTION RESPIRATORY (INHALATION) at 03:34

## 2025-01-10 RX ADMIN — Medication 45 PERCENT: at 19:50

## 2025-01-10 RX ADMIN — METHYLPREDNISOLONE SODIUM SUCCINATE 40 MG: 40 INJECTION, POWDER, LYOPHILIZED, FOR SOLUTION INTRAMUSCULAR; INTRAVENOUS at 08:16

## 2025-01-10 RX ADMIN — FOLIC ACID 1 MG: 1 TABLET ORAL at 08:13

## 2025-01-10 RX ADMIN — METHYLPREDNISOLONE SODIUM SUCCINATE 125 MG: 125 INJECTION, POWDER, FOR SOLUTION INTRAMUSCULAR; INTRAVENOUS at 21:58

## 2025-01-10 RX ADMIN — Medication 3 ML: at 15:51

## 2025-01-10 RX ADMIN — THIAMINE HCL TAB 100 MG 100 MG: 100 TAB at 12:10

## 2025-01-10 RX ADMIN — Medication 3 ML: at 08:30

## 2025-01-10 RX ADMIN — HYDROMORPHONE HYDROCHLORIDE 0.6 MG: 1 INJECTION, SOLUTION INTRAMUSCULAR; INTRAVENOUS; SUBCUTANEOUS at 18:34

## 2025-01-10 RX ADMIN — METHYLPREDNISOLONE SODIUM SUCCINATE 125 MG: 125 INJECTION, POWDER, FOR SOLUTION INTRAMUSCULAR; INTRAVENOUS at 16:10

## 2025-01-10 RX ADMIN — PHENOBARBITAL 32.4 MG: 32.4 TABLET ORAL at 16:10

## 2025-01-10 RX ADMIN — Medication 1 TABLET: at 08:13

## 2025-01-10 RX ADMIN — HEPARIN SODIUM 5000 UNITS: 5000 INJECTION, SOLUTION INTRAVENOUS; SUBCUTANEOUS at 21:56

## 2025-01-10 RX ADMIN — HEPARIN SODIUM 5000 UNITS: 5000 INJECTION, SOLUTION INTRAVENOUS; SUBCUTANEOUS at 03:51

## 2025-01-10 RX ADMIN — PIPERACILLIN SODIUM AND TAZOBACTAM SODIUM 2.25 G: 2; .25 INJECTION, SOLUTION INTRAVENOUS at 16:10

## 2025-01-10 RX ADMIN — PHENOBARBITAL 32.4 MG: 32.4 TABLET ORAL at 08:13

## 2025-01-10 RX ADMIN — IPRATROPIUM BROMIDE AND ALBUTEROL SULFATE 3 ML: 2.5; .5 SOLUTION RESPIRATORY (INHALATION) at 08:30

## 2025-01-10 RX ADMIN — PIPERACILLIN SODIUM AND TAZOBACTAM SODIUM 2.25 G: 2; .25 INJECTION, SOLUTION INTRAVENOUS at 03:13

## 2025-01-10 RX ADMIN — PIPERACILLIN SODIUM AND TAZOBACTAM SODIUM 2.25 G: 2; .25 INJECTION, SOLUTION INTRAVENOUS at 09:00

## 2025-01-10 RX ADMIN — METHYLPREDNISOLONE SODIUM SUCCINATE 40 MG: 40 INJECTION, POWDER, LYOPHILIZED, FOR SOLUTION INTRAMUSCULAR; INTRAVENOUS at 02:56

## 2025-01-10 RX ADMIN — PROPOFOL 50 MCG/KG/MIN: 10 INJECTION, EMULSION INTRAVENOUS at 03:36

## 2025-01-10 RX ADMIN — PIPERACILLIN SODIUM AND TAZOBACTAM SODIUM 2.25 G: 2; .25 INJECTION, SOLUTION INTRAVENOUS at 21:56

## 2025-01-10 RX ADMIN — Medication 45 PERCENT: at 08:15

## 2025-01-10 RX ADMIN — PROPOFOL 40 MCG/KG/MIN: 10 INJECTION, EMULSION INTRAVENOUS at 12:10

## 2025-01-10 RX ADMIN — HEPARIN SODIUM 5000 UNITS: 5000 INJECTION, SOLUTION INTRAVENOUS; SUBCUTANEOUS at 12:10

## 2025-01-10 ASSESSMENT — PAIN - FUNCTIONAL ASSESSMENT
PAIN_FUNCTIONAL_ASSESSMENT: CPOT (CRITICAL CARE PAIN OBSERVATION TOOL)

## 2025-01-10 ASSESSMENT — PAIN SCALES - GENERAL
PAINLEVEL_OUTOF10: 0 - NO PAIN
PAINLEVEL_OUTOF10: 0 - NO PAIN

## 2025-01-10 NOTE — PROGRESS NOTES
Spiritual Care Visit  Spiritual Care Request    Reason for Visit:  Routine Visit: Introduction     Request Received From:       Focus of Care:  Visited With: Patient         Refer to :          Spiritual Care Assessment    Spiritual Assessment:                      Care Provided:  Intended Effects: Establish rapport and connectedness, Build relationship of care and support, Demonstrate caring and concern    Sense of Community and or Adventism Affiliation:  Religious         Addressed Needs/Concerns and/or [place] Through:  Adventism Encounters  Adventism Needs: Prayer       Outcome:        Advance Directives:         Spiritual Care Annotation        Annotation: provide supportive care for patient in our ICU. Patient was intubated and was unable to participate in the visit today.   offered a calm reassurance for the patient by communicating directly sharing the patient's current location in our ICU, reassuring them of their being safe in the capable care of the nurses and physicians. During the visit this  provided information about the day and time with information to lesson the patient's social isolation. During the visit not family were present.   offered a prayer of blessing for the patient. Spiritual care will remain available for support as requested or desired.

## 2025-01-10 NOTE — PROGRESS NOTES
Janet Snow is a 79 y.o. female on day 8 of admission presenting with Community acquired pneumonia of both upper lobes.      Subjective   Patient remains intubated on the ventilator, her BUN and creatinine continue to rise.  Net -717 cc yesterday.       Objective          Vitals 24HR  Heart Rate:  []   Temp:  [36.4 °C (97.5 °F)-36.9 °C (98.4 °F)]   Resp:  [13-34]   Weight:  [75.8 kg (167 lb 1.7 oz)]   SpO2:  [90 %-95 %]       Intake/Output last 3 Shifts:    Intake/Output Summary (Last 24 hours) at 1/10/2025 1706  Last data filed at 1/10/2025 1600  Gross per 24 hour   Intake 663.49 ml   Output 1270 ml   Net -606.51 ml       Physical Exam  Constitutional:       Interventions: She is sedated and intubated.   Cardiovascular:      No friction rub.   Pulmonary:      Effort: She is intubated.      Comments: Mechanically ventilated, fairly clear breath sounds anteriorly  Abdominal:      General: Bowel sounds are normal.      Palpations: Abdomen is soft.   Musculoskeletal:    Trace edema.     Relevant Results  Results for orders placed or performed during the hospital encounter of 01/02/25 (from the past 24 hours)   POCT GLUCOSE   Result Value Ref Range    POCT Glucose 118 (H) 74 - 99 mg/dL   POCT GLUCOSE   Result Value Ref Range    POCT Glucose 112 (H) 74 - 99 mg/dL   POCT GLUCOSE   Result Value Ref Range    POCT Glucose 125 (H) 74 - 99 mg/dL   Respiratory Culture/Smear    Specimen: SPUTUM; Fluid   Result Value Ref Range    Respiratory Culture/Smear (A)      Culture not performed. See Gram stain findings. Recollect if clinically indicated.    Gram Stain (A)      Gram stain indicates specimen contains significant salivary contamination.   POCT GLUCOSE   Result Value Ref Range    POCT Glucose 118 (H) 74 - 99 mg/dL   CBC and Auto Differential   Result Value Ref Range    WBC 25.8 (H) 4.4 - 11.3 x10*3/uL    nRBC 0.1 (H) 0.0 - 0.0 /100 WBCs    RBC 2.57 (L) 4.00 - 5.20 x10*6/uL    Hemoglobin 7.6 (L) 12.0 - 16.0 g/dL     Hematocrit 23.4 (L) 36.0 - 46.0 %    MCV 91 80 - 100 fL    MCH 29.6 26.0 - 34.0 pg    MCHC 32.5 32.0 - 36.0 g/dL    RDW 14.1 11.5 - 14.5 %    Platelets 603 (H) 150 - 450 x10*3/uL    Immature Granulocytes %, Automated 5.3 (H) 0.0 - 0.9 %    Immature Granulocytes Absolute, Automated 1.37 (H) 0.00 - 0.50 x10*3/uL   Magnesium   Result Value Ref Range    Magnesium 2.62 (H) 1.60 - 2.40 mg/dL   Renal function panel   Result Value Ref Range    Glucose 140 (H) 74 - 99 mg/dL    Sodium 139 136 - 145 mmol/L    Potassium 4.9 3.5 - 5.3 mmol/L    Chloride 105 98 - 107 mmol/L    Bicarbonate 22 21 - 32 mmol/L    Anion Gap 17 10 - 20 mmol/L    Urea Nitrogen 105 (HH) 6 - 23 mg/dL    Creatinine 3.59 (H) 0.50 - 1.05 mg/dL    eGFR 12 (L) >60 mL/min/1.73m*2    Calcium 7.6 (L) 8.6 - 10.3 mg/dL    Phosphorus 8.1 (H) 2.5 - 4.9 mg/dL    Albumin 2.8 (L) 3.4 - 5.0 g/dL   Manual Differential   Result Value Ref Range    Neutrophils %, Manual 90.0 40.0 - 80.0 %    Bands %, Manual 1.0 0.0 - 5.0 %    Lymphocytes %, Manual 4.0 13.0 - 44.0 %    Monocytes %, Manual 3.0 2.0 - 10.0 %    Eosinophils %, Manual 0.0 0.0 - 6.0 %    Basophils %, Manual 0.0 0.0 - 2.0 %    Myelocytes %, Manual 2.0 0.0 - 0.0 %    Seg Neutrophils Absolute, Manual 23.22 (H) 1.60 - 5.00 x10*3/uL    Bands Absolute, Manual 0.26 0.00 - 0.50 x10*3/uL    Lymphocytes Absolute, Manual 1.03 0.80 - 3.00 x10*3/uL    Monocytes Absolute, Manual 0.77 0.05 - 0.80 x10*3/uL    Eosinophils Absolute, Manual 0.00 0.00 - 0.40 x10*3/uL    Basophils Absolute, Manual 0.00 0.00 - 0.10 x10*3/uL    Myelocytes Absolute, Manual 0.52 0.00 - 0.00 x10*3/uL    Total Cells Counted 100     Neutrophils Absolute, Manual 23.48 (H) 1.60 - 5.50 x10*3/uL    RBC Morphology See Below     RBC Fragments Few     Target Cells Few     Basophilic Stippling Present    POCT GLUCOSE   Result Value Ref Range    POCT Glucose 137 (H) 74 - 99 mg/dL   CBC   Result Value Ref Range    WBC 24.5 (H) 4.4 - 11.3 x10*3/uL    nRBC 0.1 (H) 0.0 -  0.0 /100 WBCs    RBC 2.44 (L) 4.00 - 5.20 x10*6/uL    Hemoglobin 7.5 (L) 12.0 - 16.0 g/dL    Hematocrit 22.8 (L) 36.0 - 46.0 %    MCV 93 80 - 100 fL    MCH 30.7 26.0 - 34.0 pg    MCHC 32.9 32.0 - 36.0 g/dL    RDW 13.9 11.5 - 14.5 %    Platelets 556 (H) 150 - 450 x10*3/uL   POCT GLUCOSE   Result Value Ref Range    POCT Glucose 142 (H) 74 - 99 mg/dL   Urinalysis with Reflex Microscopic   Result Value Ref Range    Color, Urine Light-Yellow Light-Yellow, Yellow, Dark-Yellow    Appearance, Urine Turbid (N) Clear    Specific Gravity, Urine 1.017 1.005 - 1.035    pH, Urine 5.0 5.0, 5.5, 6.0, 6.5, 7.0, 7.5, 8.0    Protein, Urine 20 (TRACE) NEGATIVE, 10 (TRACE), 20 (TRACE) mg/dL    Glucose, Urine Normal Normal mg/dL    Blood, Urine 1.0 (3+) (A) NEGATIVE    Ketones, Urine NEGATIVE NEGATIVE mg/dL    Bilirubin, Urine NEGATIVE NEGATIVE    Urobilinogen, Urine Normal Normal mg/dL    Nitrite, Urine NEGATIVE NEGATIVE    Leukocyte Esterase, Urine 25 Brissa/uL (A) NEGATIVE   Microscopic Only, Urine   Result Value Ref Range    WBC, Urine 11-20 (A) 1-5, NONE /HPF    RBC, Urine >20 (A) NONE, 1-2, 3-5 /HPF    Squamous Epithelial Cells, Urine 1-9 (SPARSE) Reference range not established. /HPF    Bacteria, Urine 1+ (A) NONE SEEN /HPF    Mucus, Urine FEW Reference range not established. 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  I had an in-depth discussion with the patient's children including both her daughter at the bedside and the son who was on the phone, and also discussed as well with the ICU team, pulmonology and palliative care.  I  explained to the family the suspicion for ANCA vasculitis as well as the role of a kidney biopsy, dialysis, immunosuppressive treatment.  I was informed by palliative care this afternoon that the family has elected not to pursue a kidney biopsy and they still do not want to pursue dialysis based on the patient's stated wishes. She is on steroids per Pulmonology.  Bilateral pneumonia , antibiotics per infectious disease, off of Bactrim  Acute respiratory failure, intubated  Anemia  Hyperlipidemia  Thrombocytosis    Luli Hemphill MD

## 2025-01-10 NOTE — CARE PLAN
The patient's goals for the shift include EMIR    The clinical goals for the shift include hemodynamic stability, wean oxygen as tolerated    Problem: Pain - Adult  Goal: Verbalizes/displays adequate comfort level or baseline comfort level  Outcome: Progressing     Problem: Pain - Adult  Goal: Verbalizes/displays adequate comfort level or baseline comfort level  Outcome: Progressing

## 2025-01-10 NOTE — PROGRESS NOTES
Janet Sonw is a 79 y.o. female on day 8 of admission presenting with Community acquired pneumonia of both upper lobes.    Subjective   Interval History:   Patient seen and examined  Sedated, intubated  Interval worsening of renal status        Review of Systems   Unable to perform ROS: Intubated       Objective   Range of Vitals (last 24 hours)  Heart Rate:  []   Temp:  [36.4 °C (97.5 °F)-36.9 °C (98.4 °F)]   Resp:  [13-34]   Weight:  [75.8 kg (167 lb 1.7 oz)]   SpO2:  [91 %-95 %]   Daily Weight  01/10/25 : 75.8 kg (167 lb 1.7 oz)    Body mass index is 29.6 kg/m².    Physical Exam  Constitutional:       Interventions: She is sedated and intubated.   HENT:      Head: Normocephalic and atraumatic.   Eyes:      General: No scleral icterus.     Conjunctiva/sclera: Conjunctivae normal.   Cardiovascular:      Rate and Rhythm: Normal rate and regular rhythm.      Heart sounds: Normal heart sounds.   Pulmonary:      Effort: She is intubated.      Breath sounds: Decreased breath sounds present.   Abdominal:      General: Bowel sounds are normal.      Palpations: Abdomen is soft.   Musculoskeletal:      Cervical back: Neck supple.      Right lower leg: No edema.      Left lower leg: No edema.   Skin:     General: Skin is warm and dry.   Neurological:      Comments: Unable to assess  Psychiatric:      Comments: Unable to assess      Antibiotics  piperacillin-tazobactam - 2.25 gram/50 mL    Relevant Results  Labs  Results from last 72 hours   Lab Units 01/10/25  0859 01/10/25  0545 01/09/25  0509 01/08/25  0455   WBC AUTO x10*3/uL 24.5* 25.8* 16.7* 16.9*   HEMOGLOBIN g/dL 7.5* 7.6* 7.5* 7.4*   HEMATOCRIT % 22.8* 23.4* 22.2* 22.7*   PLATELETS AUTO x10*3/uL 556* 603* 535* 534*   NEUTROS PCT AUTO %  --   --  84.5 88.1   LYMPHO PCT MAN %  --  4.0  --   --    LYMPHS PCT AUTO %  --   --  4.2 5.3   MONO PCT MAN %  --  3.0  --   --    MONOS PCT AUTO %  --   --  7.2 4.3   EOSINO PCT MAN %  --  0.0  --   --    EOS PCT AUTO %   --   --  0.0 0.0     Results from last 72 hours   Lab Units 01/10/25  0545 01/09/25  0509 01/08/25  1221 01/08/25  0455   SODIUM mmol/L 139 139  138 139 138   POTASSIUM mmol/L 4.9 4.3  4.3 3.8 3.6   CHLORIDE mmol/L 105 105  105 108* 105   CO2 mmol/L 22 22  23 22 25   BUN mg/dL 105* 77*  73* 64* 62*   CREATININE mg/dL 3.59* 3.01*  3.01* 2.47* 2.44*   GLUCOSE mg/dL 140* 126*  131* 122* 200*   CALCIUM mg/dL 7.6* 7.7*  7.9* 7.6* 8.3*   ANION GAP mmol/L 17 16  14 13 12   EGFR mL/min/1.73m*2 12* 15*  15* 19* 20*   PHOSPHORUS mg/dL 8.1* 4.3  --  3.9     Results from last 72 hours   Lab Units 01/10/25  0545 01/09/25  0509 01/08/25  0455   ALK PHOS U/L  --  86  86  --    BILIRUBIN TOTAL mg/dL  --  1.2  1.2  --    BILIRUBIN DIRECT mg/dL  --  0.7*  --    PROTEIN TOTAL g/dL  --  6.1*  6.1*  --    ALT U/L  --  26  26  --    AST U/L  --  24  24  --    ALBUMIN g/dL 2.8* 2.8*  2.8*  2.8* 2.8*     Estimated Creatinine Clearance: 12.4 mL/min (A) (by C-G formula based on SCr of 3.59 mg/dL (H)).  C-Reactive Protein   Date Value Ref Range Status   01/05/2025 35.67 (H) <1.00 mg/dL Final     CRP   Date Value Ref Range Status   01/04/2020 10.1 (H) 0 - 2.0 MG/DL Final     Comment:     Performed at [place] [id number]     Microbiology  No results found for the last 90 days.  Reviewed    Imaging  XR chest 1 view    Result Date: 1/10/2025  Interpreted By:  Sherly Fiore, STUDY: XR CHEST 1 VIEW 1/10/2025 10:42 am   INDICATION: Signs/Symptoms:acute hypoxemic respiratory failure, assess lung fields   COMPARISON: 01/09/2025   ACCESSION NUMBER(S): [id number]   ORDERING CLINICIAN: JORY RIDDLE   TECHNIQUE: Single AP view chest   FINDINGS: Examination rotated accentuating the cardiac silhouette. Endotracheal tube tip identified 3.6 cm from the noah. NG tube is in place. There is generalized increased interstitial prominence in bilateral lung fields with alveolar airspace infiltrate with alveolar  component of pulmonary edema suggested as opposed to postinfectious etiology. Correlate with patient's history.             1. Stable lines and tubes   2. Persistent patchy alveolar airspace infiltrate demonstrated within bilateral lung fields stable. No dense airspace consolidation.   Signed by: Sherly Fiore 1/10/2025 1:06 PM Dictation workstation:   [id number]    XR abdomen 1 view    Result Date: 1/9/2025  Interpreted By:  Jaxon Morales, STUDY: XR ABDOMEN 1 VIEW; 1/9/2025 9:31 am   INDICATION: Signs/Symptoms:c/f ileus.   COMPARISON: 01/08/2025   ACCESSION NUMBER(S): [id number]   ORDERING CLINICIAN: JORY RIDDLE   TECHNIQUE: KUB, portable, one view   FINDINGS: Motion artifact obscures the bowel-gas pattern significantly. A nasogastric tube extends into the epigastrium with the tip overlying the mid abdomen.       Exam is limited due to marked motion artifact obscuring the bowel gas pattern. Nasogastric tube extends into the stomach.   MACRO: none   Signed by: Jaxon Morales 1/9/2025 10:05 AM Dictation workstation:   [id number]    XR chest 1 view    Result Date: 1/9/2025  Interpreted By:  Stefany Edwards, STUDY: XR CHEST 1 VIEW 1/9/2025 6:30 am   INDICATION: Signs/Symptoms:persistent hypoxia, unable to wean   COMPARISON: 01/08/2025   ACCESSION NUMBER(S): [id number]   ORDERING CLINICIAN: NORM LANDAVERDE   TECHNIQUE: AP erect view of the chest at bedside   FINDINGS: The tip of the endotracheal tube is located 2 cm above noah with nasogastric tube descending below diaphragm. The cardiac size is borderline enlarged. When compared with yesterday's study, the infiltrate throughout the right lung is unchanged. Infiltrate within the left lung appears slightly improved. No pleural abnormality is seen.       Stable diffuse infiltrate throughout right lung with some mild improvement in the diffuse infiltrate seen in left lung.   Signed by: Stefany Edwards 1/9/2025 8:23 AM Dictation workstation:   [id number]    XR chest  abdomen for OG NG placement    Result Date: 1/8/2025  Interpreted By:  Finkelstein, Evan, STUDY: XR CHEST ABDOMEN FOR OG NG PLACEMENT;  1/8/2025 2:41 am two views of the chest.   INDICATION: Signs/Symptoms:OG tube placement.   COMPARISON: None.   ACCESSION NUMBER(S): [id number]   ORDERING CLINICIAN: AMANDA SÁNCHEZ   FINDINGS: Endotracheal tube present with tip approximately 2.4 cm above the noah. OG tube courses below the diaphragm, with tip extending all of the way into the lower pelvis, beyond the field of view. Extensive patchy airspace opacities throughout the lungs. No sizable pleural effusion or pneumothorax. No acute osseous abnormality.       OG tube courses below the diaphragm with tip extending into the pelvis beyond the field of view. Endotracheal tube tip lies 2.4 cm above the noah. Redemonstrated extensive patchy airspace opacities throughout the lungs concerning for multifocal pneumonia.     MACRO: None.   Signed by: Evan Finkelstein 1/8/2025 3:04 AM Dictation workstation:   [id number]    XR chest 1 view    Result Date: 1/7/2025  Interpreted By:  Isidro Corrales, STUDY: XR CHEST 1 VIEW;  1/7/2025 10:48 pm   INDICATION: Signs/Symptoms:OG tube.   COMPARISON: Chest x-ray 01/07/2025   ACCESSION NUMBER(S): [id number]   ORDERING CLINICIAN: AMANDA SÁNCHEZ   FINDINGS: Enteric tube terminates in the left mid abdomen with side hole below the GE junction, likely within the distal gastric body. Multiple overlying leads are present.   CARDIOMEDIASTINAL SILHOUETTE: Cardiomediastinal silhouette is stable in size and configuration.   LUNGS: Lung apices are excluded from the field of view. There is diffuse bilateral airspace opacities not significantly changed from prior exam.   ABDOMEN: No remarkable upper abdominal findings.   BONES: Multilevel degenerative changes of the spine.       Enteric tube terminates in the left mid abdomen with side hole below the GE junction.   Diffuse bilateral airspace opacities  concerning for developing multifocal pneumonia. Continued radiographic follow-up to resolution is advised.   MACRO: None   Signed by: Isidro Corrales 1/7/2025 10:52 PM Dictation workstation:   [id number]    Bronchoscopy    Result Date: 1/7/2025  Table formatting from the original result was not included. Impression The left main stem, left upper lobar bronchus, lingular lobar bronchus, left lower lobar bronchus, right main stem, right upper lobar bronchus, bronchus intermedius, right middle lobar bronchus and right lower lobar bronchus appeared normal. Bronchoalveolar lavage was performed x1 in the RML and the fluid appeared cloudy Findings The left main stem, left upper lobar bronchus, lingular lobar bronchus, left lower lobar bronchus, right main stem, right upper lobar bronchus, bronchus intermedius, right middle lobar bronchus and right lower lobar bronchus appeared normal. Bronchoalveolar lavage was performed x1 in the RML with 140 mL of saline instilled and a total return of 90 mL. The fluid appeared cloudy. Recommendation There is no recommended follow-up for this procedure. Indication ARDS (adult respiratory distress syndrome) (Multi) Staff Staff Role No Staff Documented Medications See Anesthesia Record. Preprocedure A history and physical has been performed, and patient medication allergies have been reviewed. The patient's tolerance of previous anesthesia has been reviewed. The risks and benefits of the procedure and the sedation options and risks were discussed with the  daughter (medical POA) and son (financial POA) . All questions were answered and informed consent obtained. Details of the Procedure The patient was already under sedation prior to the procedure. The patient's blood pressure, respirations, heart rate, oxygen and ECG were monitored throughout the procedure. The patient experienced no blood loss. The scope was introduced through the endotracheal tube. The procedure was not difficult. The  patient tolerated the procedure well. There were no apparent adverse events. Events Procedure Events Event Event Time Specimens * Cannot find log * BAL taken from RML 140cc instilled, retunr 90cc of white cloudy fluid. Mucosa appeared normal and non friable. Some mucous secretions suctioned away. Procedure Location [facility] [address] [place] [address] [phone] Referring Provider Laura Maloney MD Procedure Provider Laura PERALTA MD     XR chest 1 view    Result Date: 1/7/2025  Interpreted By:  Elda Dorado, STUDY: XR CHEST 1 VIEW;  1/7/2025 10:40 am   INDICATION: Signs/Symptoms:intubation.   COMPARISON: 01/07/2025 at 5:45 a.m.   ACCESSION NUMBER(S): [id number]   ORDERING CLINICIAN: KENISHA LEE   FINDINGS: Heart is normal in size.   The patient is intubated. The tip terminates above the noah.   There is diffuse parenchymal infiltration.   There are atherosclerotic changes of the aorta. There are degenerative changes of the spine.   COMPARISON OF FINDING: The patient has undergone interval intubation. The lungs are similar.       Diffuse bilateral parenchymal infiltration. Interval intubation.   MACRO: none   Signed by: Elda Dorado 1/7/2025 11:22 AM Dictation workstation:   [id number]    XR chest 1 view    Result Date: 1/7/2025  Interpreted By:  Jaxon Morales, STUDY: XR CHEST 1 VIEW; 1/7/2025 6:35 am   INDICATION: CLINICAL INFORMATION: Signs/Symptoms:persistent hypoxia, unable to wean from NIV.   COMPARISON: 01/06/2025   ACCESSION NUMBER(S): [id number]   ORDERING CLINICIAN: NORM LANDAVERDE   TECHNIQUE: Portable chest one view.   FINDINGS: The cardiac size is indeterminate in view of the AP projection. There is continued diffuse ground-glass infiltrate bilaterally. No effusions are identified.       Persistent ground-glass infiltrates diffusely bilaterally. Etiology is unclear with differential to include infectious organisms as  well as pulmonary edema and ARDS.   MACRO: none   Signed by: Jaxon Morales 1/7/2025 8:20 AM Dictation workstation:   [id number]    ECG 12 lead    Result Date: 1/6/2025  Normal sinus rhythm Nonspecific ST abnormality Prolonged QT Abnormal ECG No previous ECGs available Confirmed by Vicky Corado ([id number]) on 1/6/2025 4:01:46 PM    Transthoracic Echo (TTE) Complete    Result Date: 1/6/2025           [place] [address]            Phone [phone] TRANSTHORACIC ECHOCARDIOGRAM REPORT Patient Name:       ROBBIE FAJARDO AJ     Reading Physician:    [id number] Vicky Corado MD Study Date:         1/6/2025             Ordering Provider:    [id number] NORM LANDAVERDE MRN/PID:            [id number]             Fellow: Accession#:         [id number]         Nurse: Date of Birth/Age:  1945 / 79 years Sonographer:          Rosalba Leiva RDCS Gender Assigned at  F                    Additional Staff: Birth: Height:             160.02 cm            Admit Date: Weight:             69.85 kg             Admission Status:     Inpatient -                                                                Routine BSA / BMI:          1.73 m2 / 27.28      Department Location:  [facility]                     kg/m2 Blood Pressure: 151 /76 mmHg Study Type:    TRANSTHORACIC ECHO (TTE) COMPLETE Diagnosis/ICD: Hypoxemia-R09.02; Acute respiratory failure with hypoxia-J96.01 Indication:    hypoxemia CPT Codes:     Echo Complete w Full [id number] Patient History: Smoker:            Former. BMI:               Overweight 25 - 30 Pertinent History: Resp difficulty, bipap, cough, pna, sciatica, arf, resp                    failure/hypoxia. Study Detail: The following Echo studies were performed: 2D, Doppler, M-Mode and                color flow. Technically challenging study due to prominent lung               artifact, body habitus and patient lying in supine position.               Definity used as a contrast agent for endocardial border               definition. Total contrast used for this procedure was 2 mL via IV               push.  PHYSICIAN INTERPRETATION: Left Ventricle: The left ventricular systolic function is normal, with a visually estimated ejection fraction of 70-75%. There are no regional wall motion abnormalities. The left ventricular cavity size is normal. There is normal septal thickness. Spectral Doppler shows a normal pattern of left ventricular diastolic filling. Left Atrium: The left atrium is normal in size. Right Ventricle: The right ventricle is normal in size. There is normal right ventricular global systolic function. Right Atrium: The right atrium is normal in size. Aortic Valve: The aortic valve is trileaflet. The aortic valve dimensionless index is 0.74. There is no evidence of aortic valve regurgitation. The peak instantaneous gradient of the aortic valve is 22 mmHg. The mean gradient of the aortic valve is 11 mmHg. Mitral Valve: The mitral valve is normal in structure. There is no evidence of mitral valve regurgitation. Tricuspid Valve: The tricuspid valve is structurally normal. There is mild tricuspid regurgitation. Pulmonic Valve: The pulmonic valve is structurally normal. There is physiologic pulmonic valve regurgitation. Pericardium: Small pericardial effusion. Aorta: The aortic root is normal. Systemic Veins: The inferior vena cava appears normal in size, with IVC inspiratory collapse greater than 50%.  CONCLUSIONS:  1. The left ventricular systolic function is normal, with a visually estimated ejection fraction of 70-75%.  2. Spectral Doppler shows a normal pattern of left ventricular diastolic filling.  3. There is normal right ventricular global systolic function.  4. No evidence of mitral  valve regurgitation.  5. Mild tricuspid regurgitation is visualized. QUANTITATIVE DATA SUMMARY:  2D MEASUREMENTS:           Normal Ranges: LAs:             2.60 cm   (2.7-4.0cm) IVSd:            0.68 cm   (0.6-1.1cm) LVPWd:           0.53 cm   (0.6-1.1cm) LVIDd:           5.32 cm   (3.9-5.9cm) LV Mass Index:   62.2 g/m2  LV SYSTOLIC FUNCTION BY 2D PLANIMETRY (MOD):                      Normal Ranges: EF-A4C View:    60 % (>=55%) EF-A2C View:    66 % EF-Biplane:     65 % EF-Visual:      73 % LV EF Reported: 73 %  LV DIASTOLIC FUNCTION:           Normal Ranges: MV Peak E:             0.94 m/s  (0.7-1.2 m/s) MV Peak A:             1.04 m/s  (0.42-0.7 m/s) E/A Ratio:             0.91      (1.0-2.2) MV e'                  0.078 m/s (>8.0) MV lateral e'          0.08 m/s MV medial e'           0.07 m/s E/e' Ratio:            12.07     (<8.0) a'                     0.08 m/s  MITRAL VALVE:          Normal Ranges: MV DT:        186 msec (150-240msec)  AORTIC VALVE:                      Normal Ranges: AoV Vmax:                2.35 m/s  (<=1.7m/s) AoV Peak P.1 mmHg (<20mmHg) AoV Mean P.0 mmHg (1.7-11.5mmHg) LVOT Max Cj:            1.95 m/s  (<=1.1m/s) AoV VTI:                 43.90 cm  (18-25cm) LVOT VTI:                32.60 cm LVOT Diameter:           1.90 cm   (1.8-2.4cm) AoV Area, VTI:           2.11 cm2  (2.5-5.5cm2) AoV Area,Vmax:           2.35 cm2  (2.5-4.5cm2) AoV Dimensionless Index: 0.74  RIGHT VENTRICLE: RV s' 0.21 m/s  TRICUSPID VALVE/RVSP:          Normal Ranges: Peak TR Velocity:     3.56 m/s RV Syst Pressure:     59 mmHg  (< 30mmHg) IVC Diam:             1.16 cm  PULMONIC VALVE:          Normal Ranges: PV Accel Time:  124 msec (>120ms) PV Max Cj:     1.3 m/s  (0.6-0.9m/s) PV Max P.9 mmHg  [id number] Vicky Corado MD Electronically signed on 2025 at 3:30:16 PM  ** Final **     XR chest 1 view    Result Date: 2025  Interpreted By:  Jaxon Morales, STUDY: XR CHEST 1  VIEW; 1/6/2025 5:38 am   INDICATION: CLINICAL INFORMATION: Signs/Symptoms:Pneumonia follow-up. Previous abnormal chest radiograph. Shortness of breath.   COMPARISON: 01/05/2025   ACCESSION NUMBER(S): [id number]   ORDERING CLINICIAN: AMANDA SÁNCHEZ   TECHNIQUE: Portable chest one view.   FINDINGS: The cardiac size is indeterminate in view of the AP projection. Diffuse ground-glass infiltrates are again identified, similar compared to the previous study. No effusions are appreciated.       Diffuse ground-glass infiltrates. There is no interval change when compared to the previous examination.   MACRO: none   Signed by: Jaxon Morales 1/6/2025 8:29 AM Dictation workstation:   [id number]    CT angio chest for pulmonary embolism    Result Date: 1/5/2025  Interpreted By:  Selin Matthews, STUDY: CT ANGIO CHEST FOR PULMONARY EMBOLISM;  1/5/2025 4:10 pm   INDICATION: Signs/Symptoms:severe hypoxia   COMPARISON: Chest x-ray 01/05/2025. CT chest 07/19/2024   ACCESSION NUMBER(S): [id number]   ORDERING CLINICIAN: NORM LANDAVERDE   TECHNIQUE: Helical data acquisition of the chest was obtained following the uneventful administration of intravenous contrast material. Images were reformatted in axial, coronal, and sagittal planes. MIP images were created and reviewed.   FINDINGS: POTENTIAL LIMITATIONS OF THE STUDY: Motion artifact.   HEART AND VESSELS: The evaluation for filling defects at the pulmonary arteries is degraded by motion artifact. No large central filling defects to suggest pulmonary embolism. The smaller segmental/subsegmental pulmonary arteries are not well evaluated on this exam.   No thoracic aortic aneurysm. Mild atherosclerotic calcifications are noted at the thoracic aorta.   The heart is mildly enlarged. There is trace pericardial effusion.   MEDIASTINUM AND MANPREET, LOWER NECK AND AXILLA: The visualized thyroid gland is small.   A right paratracheal lymph node measures 10 mm in short axis. A subcarinal lymph  node measures 12 mm in short axis. A right hilar lymph node measures 19 mm in short axis. A left hilar lymph node measures 11 mm in short axis. Calcified lymph nodes are noted at the mediastinum and left hilum.   LUNGS AND AIRWAYS: The trachea and central airways are patent.   There are diffuse bilateral ground-glass opacities. No pleural effusion or pneumothorax.   UPPER ABDOMEN: Calcific foci at the spleen are suggestive of granulomas.   CHEST WALL AND OSSEOUS STRUCTURES: Multilevel degenerative changes at the spine. Redemonstration of remote compression deformity with Schmorl's node at the superior endplate of L1.       1. Study degraded by motion artifact. No evidence of large central pulmonary emboli. The smaller segmental/subsegmental pulmonary arteries are not well evaluated on this exam. 2. Diffuse bilateral ground-glass opacities, may be secondary to pulmonary edema and/or multifocal pneumonia. Acute respiratory distress syndrome is in the differential diagnoses. 3. Mild cardiomegaly. Trace pericardial effusion. 4. Mild mediastinal and hilar adenopathy.     MACRO: None.   Signed by: Selin Matthews 1/5/2025 6:42 PM Dictation workstation:   [id number]    US renal complete    Result Date: 1/5/2025  Interpreted By:  Jaxon Morales, STUDY: US RENAL COMPLETE; 1/5/2025 10:29 am   INDICATION: Signs/Symptoms:nancy.   COMPARISON: None   ACCESSION NUMBER(S): [id number]   ORDERING CLINICIAN: NORM LANDAVERDE   TECHNIQUE: Grayscale and color Doppler imaging of the kidneys   FINDINGS: The right kidney measures 11.0 cm  . The left kidney measures 11.0 cm  .   There is mild increased echogenicity of the renal cortex bilaterally.     No renal stones are identified.  Renal cortex is normal in thickness bilaterally. No hydronephrosis is identified.   Urinary bladder is nonspecific in appearance with no stones or masses identified.       Mild increased renal cortical echogenicity diffusely bilaterally suggesting chronic renal  medical disease.   MACRO: none   Signed by: Jaxon Morales 1/5/2025 12:45 PM Dictation workstation:   [id number]    XR chest 1 view    Result Date: 1/5/2025  Interpreted By:  Sincere Slater, STUDY: XR CHEST 1 VIEW;  1/5/2025 8:48 am   INDICATION: Signs/Symptoms:respiratory distress acute.   COMPARISON: 01/02/2025   ACCESSION NUMBER(S): [id number]   ORDERING CLINICIAN: RAMANDEEP ALLRED   FINDINGS: Diffusely increased bilateral airspace consolidation. No visualized pleural effusion. Cardiomediastinal silhouette unchanged. Aortic atherosclerosis. Thoracic degenerative changes with dextroscoliosis.       Diffusely increased bilateral airspace consolidation.   MACRO: None   Signed by: Sincere Slater 1/5/2025 10:08 AM Dictation workstation:   [id number]    XR chest 2 views    Result Date: 1/2/2025  Interpreted By:  Jaxon Morales, STUDY: XR CHEST 2 VIEWS; 1/2/2025 3:05 pm   INDICATION: Signs/Symptoms:fever, productive cough, chest pain.   COMPARISON: 06/14/2022   ACCESSION NUMBER(S): [id number]   ORDERING CLINICIAN: DANIAL COATES   TECHNIQUE: AP and lateral views of the chest were obtained.   FINDINGS: The cardiac silhouette is normal in appearance. There are new patchy bilateral alveolar infiltrates most marked within the upper lobes bilaterally .  No effusions are identified.       Extensive patchy bilateral infiltrates most marked within the upper lobes bilaterally. Findings are suspicious for pneumonia. Follow-up to assure complete clearing is suggested.   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

## 2025-01-10 NOTE — PROGRESS NOTES
[facility] Critical Care Medicine       Date:  1/10/2025  Patient:  Janet Snow  YOB: 1945  MRN:  [id number]   Admit Date:  1/2/2025    Chief Complaint   Patient presents with    Shortness of Breath         History of Present Illness:  Janet Snow is a 79 y.o. year old female patient with Past Medical History of hypothyroidism, anxiety, depression, hyperlipidemia, sciatica, prior cigarette smoker (30 years, quit 30 years ago) who presented to  ED 1/2 with complaints of shortness of breath. Her symptoms started shortly before Lor[place] which would have been >1 week prior to time of presentation. At this time, she also complained of productive cough with yellow-green colored sputum, difficulty swallowing, chills. She was seen at an urgent care and was prescribed Augmentin, but did not have improvement.      She met SIRS criteria in the ED with fever, tachycardia, and leukocytosis, as well as elevated lactate. She was given rocephin and azithromycin in ED. She was admitted to the regular nursing floor on 2L NC and started on CAP coverage. Pulmonology was consulted and dc azithromycin due to negative urine atypicals. She was reportedly on 3L NC yesterday but would have random episodes of desaturations with coughing fits, but O2 sats would return back to 3L.     On the morning of 1/5, the hospitalist contacted the ICU team to come see her as she became acutely tachypnic, hypoxic with SpO2 in the 60s, came up to the 80s on NRB. She was visibly in respiratory distress, in tripod position at the edge of the bed. ABG 7.37/35/44, confirmed arterial draw. CXR worsening bilateral airspace consolidations compared to CXR 1/2. She was placed on continuous CPAP 8/100% with SpO2 up to the low 90s and urgently transported to the ICU.      She did have a CT chest 7/24 which showed multifocal reticulonodular and ground-glass opacities (since 2020) 2/2 chronic infectious/inflammatory process. Multiple  new-mildly enlarged pulmonary nodules up to 6mm. Multiple unchanged prominent enlarged lymph nodes, likely reactive. Recommended follow-up CT chest in 3-6 months.      Interval ICU Events:  1/5: Pt arrived to ICU on continuous CPAP. Started IV steroids. Work of breathing improved as the day goes on. FiO2 able to be weaned to 80%. Stat CT angio chest obtained to r/o PE and for further differentiation of diffuse infiltrates seen on CXR.      1/6: Attempted to give patient a break from bipap overnight, but she became hypoxic with SpO2 in the 70s immedately. Remains on continuous bipap this morning. Very anxious with coughing fits and becomes acutely SOB with increased WOB during these episodes. CT chest yesterday showing diffuse GGO, will reengage pulmonology for their input. Broaden abx include zyvox.       1/7: Patient had a few desaturations overnight with tachypnea and accessory muscle use. Valium was added to help with her increased anxiety. Patient reports today that she is tired and that her breathing feels worse. We did discuss intubation as well as the risk associated with it. She stated that she wanted to move forward with intubation, but wanted to discuss it with her children. She then had a desaturation to 80% with respiratory distress remained on BiPap with Fi02 of 100%. Call placed to both of her children, with plans for them to come to bedside. Discussed with the patient and her children the option of intubation. Patient wishes to move forward with the understanding that it is not guaranteed that she will be able to be extubated. She states that she only wishes to be intubated for 7 days. All questions answered. Dr. Pandya called to bedside. Patient does with to remain a DNR but is OK with being intubated. Pulmonary following, plan for a bronchoscopy today, will add bactrim to cover for possible PJP.      1/8: Patient placed on fentanyl drip to help with sedation overnight, patient breathing over the  vent overnight. Will work to try and decrease TV today as ABG allows to meet ARDS Net protocol.     1/9: She remained intermittently very uncomfortable with poor mechanical ventilation synchrony before starting a ketamine infusion overnight.  Oliguric KALEB continues with Scr 3.0 today.  BAL respiratory culture 1/7 without organisms, 4+ pmn's. Hypoactive BS, stool appearing NG output c/f ileus.         1/10: Vent mode changed to PC Pi 10 PEEP 12 with improved patient interfacing.  FiO2 was weaned to 45% overnight.  She also appears more comfortable with up titration of ketamine infusion.  Solute clearance continues to worsen despite increased urine output without diuretics.       Medical History:  History reviewed. No pertinent past medical history.  Past Surgical History:   Procedure Laterality Date    BREAST BIOPSY Right     core biopsy 20 years ago    HYSTERECTOMY      MR HEAD ANGIO WO IV CONTRAST  07/14/2018    MR HEAD ANGIO WO IV CONTRAST [facility]     Medications Prior to Admission   Medication Sig Dispense Refill Last Dose/Taking    simvastatin (Zocor) 20 mg tablet Take 1 tablet (20 mg) by mouth once daily at bedtime.   Taking    FLUoxetine (PROzac) 40 mg capsule Take 1 capsule (40 mg) by mouth once daily.       gabapentin (Neurontin) 400 mg capsule Take 1 capsule (400 mg) by mouth 2 times a day.       levothyroxine (Synthroid, Levoxyl) 50 mcg tablet Take 1 tablet (50 mcg) by mouth early in the morning..        Meperidine (pf), Ropinirole, and Meperidine  Social History     Tobacco Use    Smoking status: Former     Types: Cigarettes    Smokeless tobacco: Never   Substance Use Topics    Drug use: Never     No family history on file.    Review of Systems:  14 point review of systems was completed and negative except for those specially mention in my HPI    Physical Exam:    Heart Rate:  []   Temp:  [36.4 °C (97.5 °F)-36.9 °C (98.4 °F)]   Resp:  [18-42]   Weight:  [74.8 kg (164 lb 14.5 oz)-75.8  kg (167 lb 1.7 oz)]   SpO2:  [90 %-95 %]     Physical Exam  Vitals reviewed.   Constitutional:       Appearance: She is ill-appearing.      Interventions: She is sedated, intubated and restrained.   HENT:      Head: Normocephalic and atraumatic.      Right Ear: External ear normal.      Left Ear: External ear normal.      Nose: Nose normal.      Comments: NGT right nare     Mouth/Throat:      Mouth: Mucous membranes are dry.      Pharynx: Oropharynx is clear.      Comments: ETT   Eyes:      Pupils: Pupils are equal, round, and reactive to light.   Cardiovascular:      Rate and Rhythm: Normal rate and regular rhythm.      Pulses: Normal pulses.      Heart sounds: Normal heart sounds.   Pulmonary:      Effort: She is intubated.      Breath sounds: Decreased breath sounds present.   Abdominal:      General: Bowel sounds are decreased.      Palpations: Abdomen is soft.   Genitourinary:     Comments: Indwelling urinary catheter   Musculoskeletal:      Right lower leg: No edema.      Left lower leg: No edema.   Skin:     General: Skin is warm and dry.      Capillary Refill: Capillary refill takes less than 2 seconds.   Neurological:      Comments: Intubated and sedated    Psychiatric:      Comments: EMIR         Objective:    I have reviewed all medications, laboratory results, and imaging pertinent for today's encounter    Assessment/Plan:    I am currently managing this critically ill patient for the following problems:    Neuro/Psych/Pain Ctrl/Sedation:  #Daily ETOH use  #Hx anxiety, depression   - Pain Control: acetaminophen PRN  - Hold home prozac while on zyvox  - CAM ICU qshift, sleep-wake hygiene, delirium precautions   - Continue propofol and ketamine for sedation  - Dilaudid PRN CPOT >3  - Can resume fentanyl when weaning ketamine if indicated as linezolid is dc'd    - Continue phenobarbital taper   - Continue Thiamine and multivitamin     Respiratory/ENT:  #Acute hypoxic respiratory failure with ARDS -  vasculitis/DAH vs infectious pneumonia vs acute inflammatory process.  Intubated 1/7/25    #Prior tobacco use   - Continue solu-medrol   - Duonebs q4hrs, add 3% nebs   - Lung protective vent settings, permissive hypercapnia for pH > 7.25, permisive hypoxemia for PO2 > 60; mechanics are improving and she is tolerating ACPC Pi 10 with improved comfort  - Daily CXR  - Pulm hygiene  - Appreciate Pulmonary Consult, etiology of respiratory failure remains uncertain      Cardiovascular:  #Hyperlipidemia   - Continue statin   - Maintain MAPs > 70 in setting of KALEB   - Continuous cardiac monitoring   - EKGs PRN for ACS symptoms, arrhythmias     GI:  #Dysphagia  - PPI for ppx   - Increase bowel reg KUB normal   - Continue trickle TF, up titrate as indicated      Renal/Volume Status (Intra & Extravascular):  #Oliguric Acute Kidney Injury. Baseline Cr 0.9 worsening 3.0 -> 3.6 today.  Net neg 770cc without diuretics    #Hx urge incontinence   - Hourly I/O's via guerrero catheter; goal even to slightly negative daily    - Appreciate Nephrology Consult.  Family reports that patient would not consent to RRT.     - Replete electrolytes to maintain K >4.0 and Mg >2.0  - Daily RFP, Mg  - Renal dosage where indicated    Endocrine  #Hypothyroidism   #Steroid induced hyperglycemia   - continue IV synthroid   - ISS with q4hr glucose checks while NPO  - Hypoglycemia protocol PRN     Infectious Disease:  #CAP pneumonia   #Rule out PJP Pneumonia   # Increasing leukocytosis   - Appreciate ID consult and rec's.  ABX narrowed to atovaquone and pip-tazo yesterday.   She has received azithro (1/3); ctx (1/3 - 1/4); doxycycline (1/5 - 1/9); linezolid (1/6 - 1/9).    - Continue atovaquone for empiric PJP coverage though this seems pretty low on the DD.       - F/U finalized PJP PCR, respiratory viral panel   - Monitor SIRS criteria    Heme/Onc:  #Thrombocytosis - likely acutely reactive   #Anemia  - Maintain Hgb >7.0   - Daily CBC    OBGYN/MSK:  #Hx  sciatica   - PT/OT eval  - ICU skin protocol, padded pressure points  - Q2hr turns    Ethics/Code Status:  DNRCCA, no RRT     :  DVT Prophylaxis: SQH & SCDs  GI Prophylaxis: PPI  Bowel Regimen: Colace  Diet: NPO, start TF today   CVC: none  [place]: yes  [place]: yes  Restraints: soft  Dispo: ICU    Family:  I spent 10 minutes updating her daughter Beverly bedside yesterday     Critical Care Time:  50 minutes spent in preparing to see patient (I.e. review of medical records), evaluation of diagnostics (I.e. labs, imaging, etc.), documentation, discussing plan of care with patient/ family/ caregiver, and/ or coordination of care with multidisciplinary team. Time does not include completion of procedure time.     NIKITA Roldan-CNP  Critical Care Medicine  [facility]

## 2025-01-10 NOTE — PROGRESS NOTES
"Pulmonary Daily Progress Note   Subjective    Janet Snow is a 79 y.o. year old female patient known with hypothyroidism and anxiety admitted on 1/2/2025 with multilobar pneumonia with progressive hypoxemic respiratory failure requiring mechanical ventilation     Interval History:  Remains on mechanical ventilation.  Overnight oxygen needs decreased   Improved synchrony with pressure control.  Sedation is maintained  Acceptable urine output despite worsening renal parameters  Tolerating enteral feeding  No reported events    Meds    Scheduled medications  atovaquone, 750 mg, oral, q12h ADE  docusate sodium, 100 mg, oral, BID  pantoprazole, 40 mg, oral, Daily   Or  esomeprazole, 40 mg, oral, Daily   Or  pantoprazole, 40 mg, intravenous, Daily  [Held by provider] FLUoxetine, 40 mg, oral, Daily  folic acid, 1 mg, oral, Daily  heparin, 5,000 Units, subcutaneous, q8h  insulin lispro, 0-5 Units, subcutaneous, q4h  ipratropium-albuteroL, 3 mL, nebulization, q4h  levothyroxine, 25 mcg, intravenous, q24h ADE  methylPREDNISolone sodium succinate (PF), 40 mg, intravenous, q6h  multivitamin with minerals, 1 tablet, oral, Daily  oxygen, , inhalation, Continuous - Inhalation  PHENobarbitaL, 32.4 mg, oral, TID  piperacillin-tazobactam, 2.25 g, intravenous, q6h  simvastatin, 20 mg, oral, Nightly  sodium chloride, 3 mL, nebulization, q4h  thiamine, 100 mg, oral, Daily    Continuous medications  ketamine, 0-150 mg/hr, Last Rate: 9.1 mg/hr (01/10/25 0723)  propofol, 0-50 mcg/kg/min, Last Rate: 40 mcg/kg/min (01/10/25 0845)    PRN medications  PRN medications: acetaminophen, albuterol, dextrose, dextrose, glucagon, glucagon, HYDROmorphone, lactulose, oxygen, polyethylene glycol     Objective    Blood pressure 113/58, pulse 81, temperature 36.7 °C (98.1 °F), temperature source Temporal, resp. rate 13, height 1.6 m (5' 3\"), weight 75.8 kg (167 lb 1.7 oz), SpO2 95%.   Physical Exam   GENERAL: sedated  NECK: no JVD, midline trachea " without stridor.    LUNGS: clear breath sounds. no wheezing. no crackles or rhonchi  CARDIAC: Regular rate and rhythm  EXTREMITIES: No edema, no varicose veins  NEURO: RASS -3  SKIN: Skin turgor normal. No rashes or lesions.   --------------------------------------------------------------------------------------  Vent Mode: Pressure control/assist control  FiO2 (%):  [45 %-80 %] 45 %  S RR:  [24] 24  PEEP/CPAP (cm H2O):  [10 cm H20-12 cm H20] 10 cm H20  PIP Set (cm H2O):  [10 cm H2O] 10 cm H2O  MAP (cm H2O):  [16-18] 17  --------------------------------------------------------------------------------------    Intake/Output Summary (Last 24 hours) at 1/10/2025 1044  Last data filed at 1/10/2025 0845  Gross per 24 hour   Intake 843.27 ml   Output 1510 ml   Net -666.73 ml     Labs:   Results from last 72 hours   Lab Units 01/10/25  0545 01/09/25  0509 01/08/25  1221 01/08/25  0455   SODIUM mmol/L 139 139  138 139 138   POTASSIUM mmol/L 4.9 4.3  4.3 3.8 3.6   CHLORIDE mmol/L 105 105  105 108* 105   CO2 mmol/L 22 22  23 22 25   BUN mg/dL 105* 77*  73* 64* 62*   CREATININE mg/dL 3.59* 3.01*  3.01* 2.47* 2.44*   GLUCOSE mg/dL 140* 126*  131* 122* 200*   CALCIUM mg/dL 7.6* 7.7*  7.9* 7.6* 8.3*   ANION GAP mmol/L 17 16  14 13 12   EGFR mL/min/1.73m*2 12* 15*  15* 19* 20*   PHOSPHORUS mg/dL 8.1* 4.3  --  3.9      Results from last 72 hours   Lab Units 01/10/25  0859 01/10/25  0545 01/09/25  0509 01/08/25  0455   WBC AUTO x10*3/uL 24.5* 25.8* 16.7* 16.9*   HEMOGLOBIN g/dL 7.5* 7.6* 7.5* 7.4*   HEMATOCRIT % 22.8* 23.4* 22.2* 22.7*   PLATELETS AUTO x10*3/uL 556* 603* 535* 534*   NEUTROS PCT AUTO %  --   --  84.5 88.1   LYMPHO PCT MAN %  --  4.0  --   --    LYMPHS PCT AUTO %  --   --  4.2 5.3   MONO PCT MAN %  --  3.0  --   --    MONOS PCT AUTO %  --   --  7.2 4.3   EOSINO PCT MAN %  --  0.0  --   --    EOS PCT AUTO %  --   --  0.0 0.0      Results from last 72 hours   Lab Units 01/09/25  1219 01/09/25  0749  01/08/25  1219   POCT PH, ARTERIAL pH 7.35* 7.38 7.37*   POCT PCO2, ARTERIAL mm Hg 40 38 41   POCT PO2, ARTERIAL mm Hg 120* 134* 75*   POCT SO2, ARTERIAL % 100 99 99     Micro/ID:   Lab Results   Component Value Date    URINECULTURE No growth 01/02/2025    BLOODCULT No growth at 4 days -  FINAL REPORT 01/02/2025    BLOODCULT No growth at 4 days -  FINAL REPORT 01/02/2025     Summary of key imaging results from the last 24 hours  Chest x-ray continues to show bilateral infiltrates in the nonspecific pattern involving the interstitium as well as alveolar spaces.  ET tube is in good position    Impression   Janet Snow is a 79 y.o. year old female patient is being seen by the pulmonary service for   Acute hypoxic respiratory failure with multilobar infiltrates.  ARDS with improving oxygen needs.  The working diagnosis is that of an infectious pneumonia that precipitated this lung injury. A pathogen has not been identified   BAL without any growth.  All the viral PCR samples were negative  BAL cytology was also negative.  Speaking against a fungal infection  Increased p-ANCA, there has been a gradual drop in hemoglobin as well raising the possibility of occult alveolar hemorrhage from vasculitis.  Though she remains on steroids, the current dose is insufficient  KALEB - non-oliguric. Auto-diuresing.  Not clear if induced by diuresis or related to vasculitis     Recommendations   As follows:  Check urinalysis -   Re-check  ANCA  Check ESR and CRP  Increase methylprednisolone to 125 mg q 6 hr for the next 3 days - then reassess  No change in antibiotics  Concur fully with the ICU management    Dexter Wilson MD   01/10/25 at 10:44 AM     Disclaimer: Documentation completed with the information available at the time of input. Parts of this note may have been scribed or generated using voice dictation software, Dragon.  Homophonic errors may exist.  Please contact me directly if clarification is needed. The times in the  chart may not be reflective of actual patient care times, interventions, or procedures. Documentation occurs after the physical care of the patient.

## 2025-01-10 NOTE — PROGRESS NOTES
"Janet Snow is a 79 y.o. female on day 8 of admission presenting with Community acquired pneumonia of both upper lobes.    Subjective   Patient was seen and examined.  Ketamine drip infusion.  Urinary output increasing.  Remains intubated and sedated, FiO2 is 40%.  Appears to be much more comfortable.  Daughter present at bedside.    Objective     Physical Exam  Vitals and nursing note reviewed.   Constitutional:       Appearance: She is ill-appearing.   HENT:      Head: Normocephalic and atraumatic.      Mouth/Throat:      Mouth: Mucous membranes are moist.   Eyes:      Pupils: Pupils are equal, round, and reactive to light.   Cardiovascular:      Rate and Rhythm: Normal rate and regular rhythm.   Pulmonary:      Effort: Respiratory distress present.   Abdominal:      General: Bowel sounds are normal.   Genitourinary:     Comments: West catheter   Musculoskeletal:      Cervical back: Neck supple. No rigidity.      Right lower leg: No edema.      Left lower leg: No edema.   Skin:     General: Skin is dry.      Capillary Refill: Capillary refill takes 2 to 3 seconds.   Neurological:      Comments: sedated     Last Recorded Vitals  Blood pressure 113/58, pulse 81, temperature 36.5 °C (97.7 °F), temperature source Temporal, resp. rate (!) 28, height 1.6 m (5' 3\"), weight 75.8 kg (167 lb 1.7 oz), SpO2 92%.  Intake/Output last 3 Shifts:  I/O last 3 completed shifts:  In: 2461 (32.5 mL/kg) [I.V.:1171 (15.4 mL/kg); NG/GT:90; IV Piggyback:1200]  Out: 2810 (37.1 mL/kg) [Urine:2060 (0.8 mL/kg/hr); Emesis/NG output:650; Stool:100]  Weight: 75.8 kg     Relevant Results  Results for orders placed or performed during the hospital encounter of 01/02/25 (from the past 24 hours)   POCT GLUCOSE   Result Value Ref Range    POCT Glucose 123 (H) 74 - 99 mg/dL   POCT GLUCOSE   Result Value Ref Range    POCT Glucose 118 (H) 74 - 99 mg/dL   POCT GLUCOSE   Result Value Ref Range    POCT Glucose 112 (H) 74 - 99 mg/dL   POCT GLUCOSE "   Result Value Ref Range    POCT Glucose 125 (H) 74 - 99 mg/dL   POCT GLUCOSE   Result Value Ref Range    POCT Glucose 118 (H) 74 - 99 mg/dL   CBC and Auto Differential   Result Value Ref Range    WBC 25.8 (H) 4.4 - 11.3 x10*3/uL    nRBC 0.1 (H) 0.0 - 0.0 /100 WBCs    RBC 2.57 (L) 4.00 - 5.20 x10*6/uL    Hemoglobin 7.6 (L) 12.0 - 16.0 g/dL    Hematocrit 23.4 (L) 36.0 - 46.0 %    MCV 91 80 - 100 fL    MCH 29.6 26.0 - 34.0 pg    MCHC 32.5 32.0 - 36.0 g/dL    RDW 14.1 11.5 - 14.5 %    Platelets 603 (H) 150 - 450 x10*3/uL    Immature Granulocytes %, Automated 5.3 (H) 0.0 - 0.9 %    Immature Granulocytes Absolute, Automated 1.37 (H) 0.00 - 0.50 x10*3/uL   Magnesium   Result Value Ref Range    Magnesium 2.62 (H) 1.60 - 2.40 mg/dL   Renal function panel   Result Value Ref Range    Glucose 140 (H) 74 - 99 mg/dL    Sodium 139 136 - 145 mmol/L    Potassium 4.9 3.5 - 5.3 mmol/L    Chloride 105 98 - 107 mmol/L    Bicarbonate 22 21 - 32 mmol/L    Anion Gap 17 10 - 20 mmol/L    Urea Nitrogen 105 (HH) 6 - 23 mg/dL    Creatinine 3.59 (H) 0.50 - 1.05 mg/dL    eGFR 12 (L) >60 mL/min/1.73m*2    Calcium 7.6 (L) 8.6 - 10.3 mg/dL    Phosphorus 8.1 (H) 2.5 - 4.9 mg/dL    Albumin 2.8 (L) 3.4 - 5.0 g/dL   Manual Differential   Result Value Ref Range    Neutrophils %, Manual 90.0 40.0 - 80.0 %    Bands %, Manual 1.0 0.0 - 5.0 %    Lymphocytes %, Manual 4.0 13.0 - 44.0 %    Monocytes %, Manual 3.0 2.0 - 10.0 %    Eosinophils %, Manual 0.0 0.0 - 6.0 %    Basophils %, Manual 0.0 0.0 - 2.0 %    Myelocytes %, Manual 2.0 0.0 - 0.0 %    Seg Neutrophils Absolute, Manual 23.22 (H) 1.60 - 5.00 x10*3/uL    Bands Absolute, Manual 0.26 0.00 - 0.50 x10*3/uL    Lymphocytes Absolute, Manual 1.03 0.80 - 3.00 x10*3/uL    Monocytes Absolute, Manual 0.77 0.05 - 0.80 x10*3/uL    Eosinophils Absolute, Manual 0.00 0.00 - 0.40 x10*3/uL    Basophils Absolute, Manual 0.00 0.00 - 0.10 x10*3/uL    Myelocytes Absolute, Manual 0.52 0.00 - 0.00 x10*3/uL    Total Cells  Counted 100     Neutrophils Absolute, Manual 23.48 (H) 1.60 - 5.50 x10*3/uL    RBC Morphology See Below     RBC Fragments Few     Target Cells Few     Basophilic Stippling Present    POCT GLUCOSE   Result Value Ref Range    POCT Glucose 137 (H) 74 - 99 mg/dL   CBC   Result Value Ref Range    WBC 24.5 (H) 4.4 - 11.3 x10*3/uL    nRBC 0.1 (H) 0.0 - 0.0 /100 WBCs    RBC 2.44 (L) 4.00 - 5.20 x10*6/uL    Hemoglobin 7.5 (L) 12.0 - 16.0 g/dL    Hematocrit 22.8 (L) 36.0 - 46.0 %    MCV 93 80 - 100 fL    MCH 30.7 26.0 - 34.0 pg    MCHC 32.9 32.0 - 36.0 g/dL    RDW 13.9 11.5 - 14.5 %    Platelets 556 (H) 150 - 450 x10*3/uL   POCT GLUCOSE   Result Value Ref Range    POCT Glucose 142 (H) 74 - 99 mg/dL   Urinalysis with Reflex Microscopic   Result Value Ref Range    Color, Urine Light-Yellow Light-Yellow, Yellow, Dark-Yellow    Appearance, Urine Turbid (N) Clear    Specific Gravity, Urine 1.017 1.005 - 1.035    pH, Urine 5.0 5.0, 5.5, 6.0, 6.5, 7.0, 7.5, 8.0    Protein, Urine 20 (TRACE) NEGATIVE, 10 (TRACE), 20 (TRACE) mg/dL    Glucose, Urine Normal Normal mg/dL    Blood, Urine 1.0 (3+) (A) NEGATIVE    Ketones, Urine NEGATIVE NEGATIVE mg/dL    Bilirubin, Urine NEGATIVE NEGATIVE    Urobilinogen, Urine Normal Normal mg/dL    Nitrite, Urine NEGATIVE NEGATIVE    Leukocyte Esterase, Urine 25 Brissa/uL (A) NEGATIVE   Microscopic Only, Urine   Result Value Ref Range    WBC, Urine 11-20 (A) 1-5, NONE /HPF    RBC, Urine >20 (A) NONE, 1-2, 3-5 /HPF    Squamous Epithelial Cells, Urine 1-9 (SPARSE) Reference range not established. /HPF    Bacteria, Urine 1+ (A) NONE SEEN /HPF    Mucus, Urine FEW Reference range not established. /LPF    Fine Granular Casts, Urine 1+ (A) NONE /LPF    Calcium Oxalate Crystals, Urine 1+ NONE, 1+ /HPF    Amorphous Crystals, Urine 1+ NONE, 1+, 2+ /HPF      XR chest 1 view    Result Date: 1/10/2025  Interpreted By:  Sherly Fiore, STUDY: XR CHEST 1 VIEW 1/10/2025 10:42 am   INDICATION: Signs/Symptoms:acute  hypoxemic respiratory failure, assess lung fields   COMPARISON: 01/09/2025   ACCESSION NUMBER(S): [id number]   ORDERING CLINICIAN: JORY RIDDLE   TECHNIQUE: Single AP view chest   FINDINGS: Examination rotated accentuating the cardiac silhouette. Endotracheal tube tip identified 3.6 cm from the noah. NG tube is in place. There is generalized increased interstitial prominence in bilateral lung fields with alveolar airspace infiltrate with alveolar component of pulmonary edema suggested as opposed to postinfectious etiology. Correlate with patient's history.             1. Stable lines and tubes   2. Persistent patchy alveolar airspace infiltrate demonstrated within bilateral lung fields stable. No dense airspace consolidation.   Signed by: Sherly Fiore 1/10/2025 1:06 PM Dictation workstation:   [id number]    XR abdomen 1 view    Result Date: 1/9/2025  Interpreted By:  Jaxon Morales, STUDY: XR ABDOMEN 1 VIEW; 1/9/2025 9:31 am   INDICATION: Signs/Symptoms:c/f ileus.   COMPARISON: 01/08/2025   ACCESSION NUMBER(S): [id number]   ORDERING CLINICIAN: JORY RIDDLE   TECHNIQUE: KUB, portable, one view   FINDINGS: Motion artifact obscures the bowel-gas pattern significantly. A nasogastric tube extends into the epigastrium with the tip overlying the mid abdomen.       Exam is limited due to marked motion artifact obscuring the bowel gas pattern. Nasogastric tube extends into the stomach.   MACRO: none   Signed by: Jaxon Morales 1/9/2025 10:05 AM Dictation workstation:   [id number]    XR chest 1 view    Result Date: 1/9/2025  Interpreted By:  Stefany Edwards, STUDY: XR CHEST 1 VIEW 1/9/2025 6:30 am   INDICATION: Signs/Symptoms:persistent hypoxia, unable to wean   COMPARISON: 01/08/2025   ACCESSION NUMBER(S): [id number]   ORDERING CLINICIAN: NORM LANDAVERDE   TECHNIQUE: AP erect view of the chest at bedside   FINDINGS: The tip of the endotracheal tube is located 2 cm above noah with nasogastric tube descending below  diaphragm. The cardiac size is borderline enlarged. When compared with yesterday's study, the infiltrate throughout the right lung is unchanged. Infiltrate within the left lung appears slightly improved. No pleural abnormality is seen.       Stable diffuse infiltrate throughout right lung with some mild improvement in the diffuse infiltrate seen in left lung.   Signed by: Stefany Edwards 1/9/2025 8:23 AM Dictation workstation:   [id number]      Assessment/Plan   Assessment & Plan  Community acquired pneumonia of both upper lobes    Acute respiratory failure with hypoxia  Vasculitis/DAH vs. Atypical pneumonia vs. Acute inflammatory process.    Smoke a pack a day for 30yrs/quit 30yrs ago - had a chronic moist cough, concerns for COPD.     Had an appointment to speak get evaluated for COPD this month.     Currently intubated with FiO2 50% down from 80%   On IV steroids   Neg Resp PCR - COVID, flu, RSV    Sepsis  Blood culture, NGTD  Urine culture, NGTD   ESR  CRP 3  Respiratory culture NGTD  Fungal culture NGTD  MRSA PCR - Negative     Pneumonia  Chest XR with Redemonstrated extensive patchy airspace opacities throughout the lungs concerning for multifocal pneumonia.  CTA chest without PE, but showing Diffuse bilateral ground-glass opacities, may be secondary to pulmonary edema and/or multifocal pneumonia. Acute respiratory distress syndrome is in the differential diagnoses. Mild cardiomegaly. Trace pericardial effusion.   TTE with LVEF 70-75%  ProCalc uptrending now 2.86       Leukocytosis   ID following - on Zosyn & Mepron  WBC 24.5 and trending up  , CRP 35.67, ESR 77    KALEB  Cr 3.59 and uptrending  Urinary output improving    1/10  Daughter Beverly present at bedside.  Updates given about the patient.  She did reiterate that the family did not want dialysis even if it was temporary. The patient getting tube feeds today. The daughter states that she does not have any questions at this time. States she will  likely have some in a few days if the patient's condition does not improve.     1/9  Daughter Beverly at bedside, UO declining, creatinine mildly worsened, NG currently to LIWS, starting laxatives and trickle tube feeds today to stimulate gut. Diuretics on hold.  Currently stable on vent, better with addition of ketamine and adjustment of vent settings for comfort.  Discussed with attending service and daughter, continue aggressive treatment model of care for specified 7 days currently day 3 on ventilator, no dialysis.     1/8/2025  Discussion with daughter Beverly at bedside. The patient had a hard time being sedated. Beverly did ask for spiritual care now, I did put in a consult. States she is agreeable to her mother's wishes. We will continue to follow.      1/7/2025  Goals of care discussion with daughter Beverly.  The daughter stated that she is aware of her mom's wishes.  States that her mom would only want to be intubated for 1 week.  This is congruent with discussions with the ICU team.  I stated that the palliative care team will be in close follow-up with the family to walk them through this decision making.  We will continue to follow.      I spent 30 minutes in the professional and overall care of this patient.      Nikole Sullivan, APRN-CNP

## 2025-01-10 NOTE — CARE PLAN
The patient's goals for the shift include EMIR    The clinical goals for the shift include Maintain hemodynamic stability; tolerate tube feeds; wean down O2 needs      Problem: Pain - Adult  Goal: Verbalizes/displays adequate comfort level or baseline comfort level  Outcome: Progressing     Problem: Safety - Adult  Goal: Free from fall injury  Outcome: Progressing     Problem: Discharge Planning  Goal: Discharge to home or other facility with appropriate resources  Outcome: Progressing     Problem: Chronic Conditions and Co-morbidities  Goal: Patient's chronic conditions and co-morbidity symptoms are monitored and maintained or improved  Outcome: Progressing     Problem: Respiratory  Goal: Minimal/no exertional discomfort or dyspnea this shift  Outcome: Progressing  Goal: No signs of respiratory distress (eg. Use of accessory muscles. Peds grunting)  Outcome: Progressing  Goal: Verbalize decreased shortness of breath this shift  Outcome: Progressing  Goal: Wean oxygen to maintain O2 saturation per order/standard this shift  Outcome: Progressing     Problem: Skin  Goal: Decreased wound size/increased tissue granulation at next dressing change  Outcome: Progressing  Flowsheets (Taken 1/9/2025 2337)  Decreased wound size/increased tissue granulation at next dressing change:   Promote sleep for wound healing   Protective dressings over bony prominences   Utilize specialty bed per algorithm  Goal: Participates in plan/prevention/treatment measures  Outcome: Progressing  Flowsheets (Taken 1/9/2025 2337)  Participates in plan/prevention/treatment measures:   Discuss with provider PT/OT consult   Elevate heels  Goal: Prevent/manage excess moisture  Outcome: Progressing  Flowsheets (Taken 1/9/2025 2337)  Prevent/manage excess moisture:   Cleanse incontinence/protect with barrier cream   Moisturize dry skin   Follow provider orders for dressing changes   Monitor for/manage infection if present  Goal: Prevent/minimize  sheer/friction injuries  Outcome: Progressing  Flowsheets (Taken 1/9/2025 2337)  Prevent/minimize sheer/friction injuries:   Complete micro-shifts as needed if patient unable. Adjust patient position to relieve pressure points, not a full turn   Increase activity/out of bed for meals   Use pull sheet   Turn/reposition every 2 hours/use positioning/transfer devices   Utilize specialty bed per algorithm   HOB 30 degrees or less  Goal: Promote/optimize nutrition  Outcome: Progressing  Flowsheets (Taken 1/9/2025 2337)  Promote/optimize nutrition:   Monitor/record intake including meals   Consume > 50% meals/supplements  Goal: Promote skin healing  Outcome: Progressing  Flowsheets (Taken 1/9/2025 2337)  Promote skin healing:   Assess skin/pad under line(s)/device(s)   Protective dressings over bony prominences   Turn/reposition every 2 hours/use positioning/transfer devices   Ensure correct size (line/device) and apply per  instructions   Rotate device position/do not position patient on device     Problem: Nutrition  Goal: Nutrition support goals are met within 48 hrs  Outcome: Progressing  Goal: Nutrition support is meeting 75% of nutrient needs  Outcome: Progressing  Goal: Tube feed tolerance  Outcome: Progressing     Problem: Knowledge Deficit  Goal: Patient/family/caregiver demonstrates understanding of disease process, treatment plan, medications, and discharge instructions  Outcome: Progressing     Problem: Mechanical Ventilation  Goal: Patient Will Maintain Patent Airway  Outcome: Progressing  Goal: Oral health is maintained or improved  Outcome: Progressing  Goal: ET tube will be managed safely  Outcome: Progressing  Goal: Ability to express needs and understand communication  Outcome: Progressing  Goal: Mobility/activity is maintained at optimum level for patient  Outcome: Progressing     Problem: Safety - Medical Restraint  Goal: Remains free of injury from restraints (Restraint for Interference  with Medical Device)  Outcome: Progressing  Flowsheets (Taken 1/9/2025 2337)  Remains free of injury from restraints (restraint for interference with medical device):   Determine that other, less restrictive measures have been tried or would not be effective before applying the restraint   Evaluate the patient's condition at the time of restraint application   Inform patient/family regarding the reason for restraint   Every 2 hours: Monitor safety, psychosocial status, comfort, nutrition and hydration  Goal: Free from restraint(s) (Restraint for Interference with Medical Device)  Outcome: Progressing  Flowsheets (Taken 1/9/2025 2337)  Free from restraint(s) (restraint for interference with medical device):   ONCE/SHIFT or MINIMUM Every 12 hours: Assess and document the continuing need for restraints   Every 24 hours: Continued use of restraint requires Licensed Independent Practitioner to perform face to face examination and written order   Identify and implement measures to help patient regain control

## 2025-01-10 NOTE — PROGRESS NOTES
"Nutrition Follow up Note    Nutrition Assessment      Patient remains intubated/sedated. Spoke with RN. Tube feed running at goal without issues.    Nutrition History:  Food and Nutrient History: NPO/Vent     Food Allergies/Intolerances:  None  GI Symptoms: None  Oral Problems: None    Anthropometrics:  Ht: 160 cm (5' 3\"), Wt: 75.8 kg (167 lb 1.7 oz), BMI: 29.61  IBW/kg (Dietitian Calculated): 52.27 kg  Percent of IBW: 135 %     Weight Change:  Daily Weight  01/10/25 : 75.8 kg (167 lb 1.7 oz)  07/19/24 : 70.3 kg (155 lb)     Nutrition Focused Physical Exam Findings:      Nutrition Significant Labs:  Lab Results   Component Value Date    WBC 24.5 (H) 01/10/2025    HGB 7.5 (L) 01/10/2025    HCT 22.8 (L) 01/10/2025     (H) 01/10/2025    CHOL 161 07/14/2018    TRIG 185 (H) 07/14/2018    HDL 46 (L) 07/14/2018    ALT 26 01/09/2025    ALT 26 01/09/2025    AST 24 01/09/2025    AST 24 01/09/2025     01/10/2025    K 4.9 01/10/2025     01/10/2025    CREATININE 3.59 (H) 01/10/2025     (HH) 01/10/2025    CO2 22 01/10/2025    TSH 1.35 01/05/2025    INR 1.1 06/10/2022    HGBA1C 5.5 01/06/2020     Nutrition Specific Medications:  atovaquone, 750 mg, oral, q12h ADE  docusate sodium, 100 mg, oral, BID  pantoprazole, 40 mg, oral, Daily   Or  esomeprazole, 40 mg, oral, Daily   Or  pantoprazole, 40 mg, intravenous, Daily  [Held by provider] FLUoxetine, 40 mg, oral, Daily  folic acid, 1 mg, oral, Daily  heparin, 5,000 Units, subcutaneous, q8h  insulin lispro, 0-5 Units, subcutaneous, q4h  ipratropium-albuteroL, 3 mL, nebulization, q4h  levothyroxine, 25 mcg, intravenous, q24h ADE  methylPREDNISolone sodium succinate (PF), 125 mg, intravenous, q6h  multivitamin with minerals, 1 tablet, oral, Daily  oxygen, , inhalation, Continuous - Inhalation  PHENobarbitaL, 32.4 mg, oral, TID  piperacillin-tazobactam, 2.25 g, intravenous, q6h  simvastatin, 20 mg, oral, Nightly  sodium chloride, 3 mL, nebulization, " q4h  thiamine, 100 mg, oral, Daily      ketamine, 0-150 mg/hr, Last Rate: 9.1 mg/hr (01/10/25 0723)  propofol, 0-50 mcg/kg/min, Last Rate: 40 mcg/kg/min (01/10/25 1210)      Propofol @ 16.99 ml/hr provides: 449 kcals/day    Dietary Orders (From admission, onward)       Start     Ordered    01/09/25 1424  Enteral feeding with NPO 45  Diet effective now        Comments: Start tube feed at 20 ml/hr, increase by 10ml/hr until goal   Question Answer Comment   Tube feeding formula: Vital High Protein    Tube feeding continuous rate (mL/hr): 45        01/09/25 1424    01/02/25 2236  May Participate in Room Service  ( ROOM SERVICE MAY PARTICIPATE)  Once        Question:  .  Answer:  Yes    01/02/25 2235                  Nutrition Support Intake provides: Vital High Protein @45 mL/Hr provides 1080 calories (1529 kcals w/current rate of sedation), 94 gm protein, 903 mL free water     Estimated Needs:   Estimated Energy Needs  Total Energy Estimated Needs (kCal):  (9874-7022)  Total Estimated Energy Need per Day (kCal/kg):  (22-25)  Method for Estimating Needs: Actual Wt    Estimated Protein Needs  Total Protein Estimated Needs (g):  ()  Total Protein Estimated Needs (g/kg):  (1.2-2.0)  Method for Estimating Needs: Actual Wt    Estimated Fluid Needs  Total Fluid Estimated Needs (mL):  (9915-2008)  Method for Estimating Needs: 1 mL/kcal      Nutrition Diagnosis   Nutrition Diagnosis:  Malnutrition Diagnosis  Patient has Malnutrition Diagnosis: No    Nutrition Diagnosis  Patient has Nutrition Diagnosis: Yes  Diagnosis Status (1): Resolved  Nutrition Diagnosis 1: Inadequate energy intake  Related to (1): decreased ability to consume sufficient energy  As Evidenced by (1): NPO/Mechanical Ventilation     Nutrition Interventions/Recommendations   Nutrition Interventions and Recommendations:    Nutrition Prescription:  Individualized Nutrition Prescription Provided for : 6922-4344 calories,  gm protein to be provided  via enteral nutrition    Nutrition Interventions:   Food and/or Nutrient Delivery Interventions  Interventions: Enteral intake  Enteral Intake: Other (Comment)  Goal: provide as ordered    Education Documentation  No documentation found.         Nutrition Monitoring and Evaluation   Monitoring/Evaluation:   Food/Nutrient Related History Monitoring  Monitoring and Evaluation Plan: Enteral and parenteral nutrition intake  Enteral and Parenteral Nutrition Intake: Enteral nutrition intake  Criteria: monitoring tube feed tolerance       Time Spent/Follow-up:   Follow Up  Time Spent (min): 20 minutes  Last Date of Nutrition Visit: 01/10/25  Nutrition Follow-Up Needed?: 3-5 days  Follow up Comment: 1/14/25

## 2025-01-11 LAB
ALBUMIN SERPL BCP-MCNC: 2.6 G/DL (ref 3.4–5)
ALBUMIN SERPL BCP-MCNC: 2.7 G/DL (ref 3.4–5)
ANION GAP BLDA CALCULATED.4IONS-SCNC: 14 MMO/L (ref 10–25)
ANION GAP SERPL CALCULATED.3IONS-SCNC: 20 MMOL/L (ref 10–20)
ANION GAP SERPL CALCULATED.3IONS-SCNC: 20 MMOL/L (ref 10–20)
APPARATUS: ABNORMAL
BASE EXCESS BLDA CALC-SCNC: -6.5 MMOL/L (ref -2–3)
BASOPHILS # BLD MANUAL: 0 X10*3/UL (ref 0–0.1)
BASOPHILS NFR BLD MANUAL: 0 %
BM IGG SER IF-ACNC: 0 AU/ML (ref 0–19)
BODY TEMPERATURE: 37 DEGREES CELSIUS
BUN SERPL-MCNC: 130 MG/DL (ref 6–23)
BUN SERPL-MCNC: 134 MG/DL (ref 6–23)
BURR CELLS BLD QL SMEAR: ABNORMAL
CA-I BLDA-SCNC: 1.07 MMOL/L (ref 1.1–1.33)
CALCIUM SERPL-MCNC: 7.5 MG/DL (ref 8.6–10.3)
CALCIUM SERPL-MCNC: 7.6 MG/DL (ref 8.6–10.3)
CHLORIDE BLDA-SCNC: 105 MMOL/L (ref 98–107)
CHLORIDE SERPL-SCNC: 102 MMOL/L (ref 98–107)
CHLORIDE SERPL-SCNC: 103 MMOL/L (ref 98–107)
CO2 SERPL-SCNC: 21 MMOL/L (ref 21–32)
CO2 SERPL-SCNC: 21 MMOL/L (ref 21–32)
CREAT SERPL-MCNC: 3.68 MG/DL (ref 0.5–1.05)
CREAT SERPL-MCNC: 3.81 MG/DL (ref 0.5–1.05)
CRITICAL CALL TIME: 608
CRITICAL CALLED BY: ABNORMAL
CRITICAL CALLED TO: ABNORMAL
CRITICAL NOTE: ABNORMAL
CRITICAL READ BACK: ABNORMAL
EGFRCR SERPLBLD CKD-EPI 2021: 12 ML/MIN/1.73M*2
EGFRCR SERPLBLD CKD-EPI 2021: 12 ML/MIN/1.73M*2
EOSINOPHIL # BLD MANUAL: 0 X10*3/UL (ref 0–0.4)
EOSINOPHIL NFR BLD MANUAL: 0 %
ERYTHROCYTE [DISTWIDTH] IN BLOOD BY AUTOMATED COUNT: 14.2 % (ref 11.5–14.5)
GLUCOSE BLD MANUAL STRIP-MCNC: 133 MG/DL (ref 74–99)
GLUCOSE BLD MANUAL STRIP-MCNC: 141 MG/DL (ref 74–99)
GLUCOSE BLD MANUAL STRIP-MCNC: 144 MG/DL (ref 74–99)
GLUCOSE BLD MANUAL STRIP-MCNC: 146 MG/DL (ref 74–99)
GLUCOSE BLD MANUAL STRIP-MCNC: 147 MG/DL (ref 74–99)
GLUCOSE BLDA-MCNC: 145 MG/DL (ref 74–99)
GLUCOSE SERPL-MCNC: 136 MG/DL (ref 74–99)
GLUCOSE SERPL-MCNC: 142 MG/DL (ref 74–99)
HCO3 BLDA-SCNC: 20.6 MMOL/L (ref 22–26)
HCT VFR BLD AUTO: 24.4 % (ref 36–46)
HCT VFR BLD EST: 25 % (ref 36–46)
HGB BLD-MCNC: 8 G/DL (ref 12–16)
HGB BLDA-MCNC: 8.2 G/DL (ref 12–16)
IMM GRANULOCYTES # BLD AUTO: 1.42 X10*3/UL (ref 0–0.5)
IMM GRANULOCYTES NFR BLD AUTO: 5.3 % (ref 0–0.9)
INHALED O2 CONCENTRATION: 45 %
LACTATE BLDA-SCNC: 0.9 MMOL/L (ref 0.4–2)
LYMPHOCYTES # BLD MANUAL: 1.06 X10*3/UL (ref 0.8–3)
LYMPHOCYTES NFR BLD MANUAL: 4 %
MAGNESIUM SERPL-MCNC: 2.62 MG/DL (ref 1.6–2.4)
MCH RBC QN AUTO: 30.3 PG (ref 26–34)
MCHC RBC AUTO-ENTMCNC: 32.8 G/DL (ref 32–36)
MCV RBC AUTO: 92 FL (ref 80–100)
METAMYELOCYTES # BLD MANUAL: 0.8 X10*3/UL
METAMYELOCYTES NFR BLD MANUAL: 3 %
MONOCYTES # BLD MANUAL: 0.8 X10*3/UL (ref 0.05–0.8)
MONOCYTES NFR BLD MANUAL: 3 %
MYELOCYTES # BLD MANUAL: 0.53 X10*3/UL
MYELOCYTES NFR BLD MANUAL: 2 %
NEUTS SEG # BLD MANUAL: 23.41 X10*3/UL (ref 1.6–5)
NEUTS SEG NFR BLD MANUAL: 88 %
NRBC BLD-RTO: 0.1 /100 WBCS (ref 0–0)
OXYHGB MFR BLDA: 96.9 % (ref 94–98)
PC PRESSURE CONTROL: 10 CM H2O
PCO2 BLDA: 48 MM HG (ref 38–42)
PEEP CMH2O: 10 CM H2O
PH BLDA: 7.24 PH (ref 7.38–7.42)
PHOSPHATE SERPL-MCNC: 9.7 MG/DL (ref 2.5–4.9)
PHOSPHATE SERPL-MCNC: 9.8 MG/DL (ref 2.5–4.9)
PLATELET # BLD AUTO: 582 X10*3/UL (ref 150–450)
PO2 BLDA: 114 MM HG (ref 85–95)
POLYCHROMASIA BLD QL SMEAR: ABNORMAL
POTASSIUM BLDA-SCNC: 5.5 MMOL/L (ref 3.5–5.3)
POTASSIUM SERPL-SCNC: 4.9 MMOL/L (ref 3.5–5.3)
POTASSIUM SERPL-SCNC: 5.4 MMOL/L (ref 3.5–5.3)
RBC # BLD AUTO: 2.64 X10*6/UL (ref 4–5.2)
RBC MORPH BLD: ABNORMAL
SAO2 % BLDA: 99 % (ref 94–100)
SODIUM BLDA-SCNC: 134 MMOL/L (ref 136–145)
SODIUM SERPL-SCNC: 138 MMOL/L (ref 136–145)
SODIUM SERPL-SCNC: 139 MMOL/L (ref 136–145)
SPECIMEN DRAWN FROM PATIENT: ABNORMAL
TOTAL CELLS COUNTED BLD: 100
VENTILATOR MODE: ABNORMAL
VENTILATOR RATE: 24 BPM
WBC # BLD AUTO: 26.6 X10*3/UL (ref 4.4–11.3)

## 2025-01-11 PROCEDURE — 82805 BLOOD GASES W/O2 SATURATION: CPT

## 2025-01-11 PROCEDURE — 2020000001 HC ICU ROOM DAILY

## 2025-01-11 PROCEDURE — 84100 ASSAY OF PHOSPHORUS: CPT

## 2025-01-11 PROCEDURE — 2500000002 HC RX 250 W HCPCS SELF ADMINISTERED DRUGS (ALT 637 FOR MEDICARE OP, ALT 636 FOR OP/ED)

## 2025-01-11 PROCEDURE — 2500000001 HC RX 250 WO HCPCS SELF ADMINISTERED DRUGS (ALT 637 FOR MEDICARE OP): Performed by: INTERNAL MEDICINE

## 2025-01-11 PROCEDURE — 80069 RENAL FUNCTION PANEL: CPT

## 2025-01-11 PROCEDURE — 82947 ASSAY GLUCOSE BLOOD QUANT: CPT

## 2025-01-11 PROCEDURE — 94640 AIRWAY INHALATION TREATMENT: CPT

## 2025-01-11 PROCEDURE — 36620 INSERTION CATHETER ARTERY: CPT

## 2025-01-11 PROCEDURE — 85027 COMPLETE CBC AUTOMATED: CPT

## 2025-01-11 PROCEDURE — 2500000005 HC RX 250 GENERAL PHARMACY W/O HCPCS: Performed by: SURGERY

## 2025-01-11 PROCEDURE — 83735 ASSAY OF MAGNESIUM: CPT

## 2025-01-11 PROCEDURE — 31720 CLEARANCE OF AIRWAYS: CPT

## 2025-01-11 PROCEDURE — 2500000001 HC RX 250 WO HCPCS SELF ADMINISTERED DRUGS (ALT 637 FOR MEDICARE OP)

## 2025-01-11 PROCEDURE — 2500000004 HC RX 250 GENERAL PHARMACY W/ HCPCS (ALT 636 FOR OP/ED): Performed by: INTERNAL MEDICINE

## 2025-01-11 PROCEDURE — 99291 CRITICAL CARE FIRST HOUR: CPT

## 2025-01-11 PROCEDURE — 99232 SBSQ HOSP IP/OBS MODERATE 35: CPT | Performed by: STUDENT IN AN ORGANIZED HEALTH CARE EDUCATION/TRAINING PROGRAM

## 2025-01-11 PROCEDURE — 2500000004 HC RX 250 GENERAL PHARMACY W/ HCPCS (ALT 636 FOR OP/ED)

## 2025-01-11 PROCEDURE — 36600 WITHDRAWAL OF ARTERIAL BLOOD: CPT

## 2025-01-11 PROCEDURE — 2500000004 HC RX 250 GENERAL PHARMACY W/ HCPCS (ALT 636 FOR OP/ED): Performed by: SURGERY

## 2025-01-11 PROCEDURE — 99233 SBSQ HOSP IP/OBS HIGH 50: CPT | Performed by: NURSE PRACTITIONER

## 2025-01-11 PROCEDURE — 2500000005 HC RX 250 GENERAL PHARMACY W/O HCPCS

## 2025-01-11 PROCEDURE — 37799 UNLISTED PX VASCULAR SURGERY: CPT

## 2025-01-11 PROCEDURE — 85007 BL SMEAR W/DIFF WBC COUNT: CPT

## 2025-01-11 RX ORDER — LABETALOL HYDROCHLORIDE 5 MG/ML
10 INJECTION, SOLUTION INTRAVENOUS EVERY 4 HOURS PRN
Status: DISCONTINUED | OUTPATIENT
Start: 2025-01-11 | End: 2025-01-12

## 2025-01-11 RX ORDER — AMLODIPINE BESYLATE 5 MG/1
5 TABLET ORAL ONCE
Status: COMPLETED | OUTPATIENT
Start: 2025-01-11 | End: 2025-01-11

## 2025-01-11 RX ORDER — AMLODIPINE BESYLATE 5 MG/1
5 TABLET ORAL DAILY
Status: DISCONTINUED | OUTPATIENT
Start: 2025-01-12 | End: 2025-01-11

## 2025-01-11 RX ORDER — SODIUM BICARBONATE 650 MG/1
650 TABLET ORAL 2 TIMES DAILY
Status: COMPLETED | OUTPATIENT
Start: 2025-01-11 | End: 2025-01-13

## 2025-01-11 RX ORDER — AMLODIPINE BESYLATE 10 MG/1
10 TABLET ORAL DAILY
Status: DISCONTINUED | OUTPATIENT
Start: 2025-01-12 | End: 2025-01-14

## 2025-01-11 RX ADMIN — HYDROMORPHONE HYDROCHLORIDE 0.6 MG: 1 INJECTION, SOLUTION INTRAMUSCULAR; INTRAVENOUS; SUBCUTANEOUS at 20:04

## 2025-01-11 RX ADMIN — DOCUSATE SODIUM 100 MG: 50 LIQUID ORAL at 21:42

## 2025-01-11 RX ADMIN — PIPERACILLIN SODIUM AND TAZOBACTAM SODIUM 2.25 G: 2; .25 INJECTION, SOLUTION INTRAVENOUS at 21:44

## 2025-01-11 RX ADMIN — ATOVAQUONE 750 MG: 750 SUSPENSION ORAL at 09:30

## 2025-01-11 RX ADMIN — PROPOFOL 50 MCG/KG/MIN: 10 INJECTION, EMULSION INTRAVENOUS at 18:38

## 2025-01-11 RX ADMIN — FOLIC ACID 1 MG: 1 TABLET ORAL at 09:29

## 2025-01-11 RX ADMIN — ATOVAQUONE 750 MG: 750 SUSPENSION ORAL at 21:43

## 2025-01-11 RX ADMIN — SODIUM ZIRCONIUM CYCLOSILICATE 10 G: 10 POWDER, FOR SUSPENSION ORAL at 14:14

## 2025-01-11 RX ADMIN — PROPOFOL 50 MCG/KG/MIN: 10 INJECTION, EMULSION INTRAVENOUS at 10:20

## 2025-01-11 RX ADMIN — METHYLPREDNISOLONE SODIUM SUCCINATE 125 MG: 125 INJECTION, POWDER, FOR SOLUTION INTRAMUSCULAR; INTRAVENOUS at 21:42

## 2025-01-11 RX ADMIN — PROPOFOL 50 MCG/KG/MIN: 10 INJECTION, EMULSION INTRAVENOUS at 06:04

## 2025-01-11 RX ADMIN — AMLODIPINE BESYLATE 5 MG: 5 TABLET ORAL at 20:07

## 2025-01-11 RX ADMIN — Medication 11.4 MG/HR: at 15:32

## 2025-01-11 RX ADMIN — PROPOFOL 50 MCG/KG/MIN: 10 INJECTION, EMULSION INTRAVENOUS at 01:25

## 2025-01-11 RX ADMIN — HEPARIN SODIUM 5000 UNITS: 5000 INJECTION, SOLUTION INTRAVENOUS; SUBCUTANEOUS at 12:27

## 2025-01-11 RX ADMIN — Medication 3 ML: at 15:09

## 2025-01-11 RX ADMIN — SODIUM ZIRCONIUM CYCLOSILICATE 10 G: 10 POWDER, FOR SUSPENSION ORAL at 06:55

## 2025-01-11 RX ADMIN — IPRATROPIUM BROMIDE AND ALBUTEROL SULFATE 3 ML: 2.5; .5 SOLUTION RESPIRATORY (INHALATION) at 15:09

## 2025-01-11 RX ADMIN — IPRATROPIUM BROMIDE AND ALBUTEROL SULFATE 3 ML: 2.5; .5 SOLUTION RESPIRATORY (INHALATION) at 11:55

## 2025-01-11 RX ADMIN — PIPERACILLIN SODIUM AND TAZOBACTAM SODIUM 2.25 G: 2; .25 INJECTION, SOLUTION INTRAVENOUS at 14:22

## 2025-01-11 RX ADMIN — PHENOBARBITAL 32.4 MG: 32.4 TABLET ORAL at 14:14

## 2025-01-11 RX ADMIN — PHENOBARBITAL 32.4 MG: 32.4 TABLET ORAL at 09:24

## 2025-01-11 RX ADMIN — METHYLPREDNISOLONE SODIUM SUCCINATE 125 MG: 125 INJECTION, POWDER, FOR SOLUTION INTRAMUSCULAR; INTRAVENOUS at 09:41

## 2025-01-11 RX ADMIN — PROPOFOL 50 MCG/KG/MIN: 10 INJECTION, EMULSION INTRAVENOUS at 14:14

## 2025-01-11 RX ADMIN — PROPOFOL 50 MCG/KG/MIN: 10 INJECTION, EMULSION INTRAVENOUS at 23:51

## 2025-01-11 RX ADMIN — PANTOPRAZOLE SODIUM 40 MG: 40 INJECTION, POWDER, FOR SOLUTION INTRAVENOUS at 09:37

## 2025-01-11 RX ADMIN — Medication 45 PERCENT: at 07:51

## 2025-01-11 RX ADMIN — Medication 3 ML: at 11:55

## 2025-01-11 RX ADMIN — METHYLPREDNISOLONE SODIUM SUCCINATE 125 MG: 125 INJECTION, POWDER, FOR SOLUTION INTRAMUSCULAR; INTRAVENOUS at 03:21

## 2025-01-11 RX ADMIN — SODIUM ZIRCONIUM CYCLOSILICATE 10 G: 10 POWDER, FOR SUSPENSION ORAL at 23:17

## 2025-01-11 RX ADMIN — DOCUSATE SODIUM 100 MG: 50 LIQUID ORAL at 09:30

## 2025-01-11 RX ADMIN — THIAMINE HCL TAB 100 MG 100 MG: 100 TAB at 09:29

## 2025-01-11 RX ADMIN — HYDROMORPHONE HYDROCHLORIDE 0.6 MG: 1 INJECTION, SOLUTION INTRAMUSCULAR; INTRAVENOUS; SUBCUTANEOUS at 03:50

## 2025-01-11 RX ADMIN — IPRATROPIUM BROMIDE AND ALBUTEROL SULFATE 3 ML: 2.5; .5 SOLUTION RESPIRATORY (INHALATION) at 00:36

## 2025-01-11 RX ADMIN — HEPARIN SODIUM 5000 UNITS: 5000 INJECTION, SOLUTION INTRAVENOUS; SUBCUTANEOUS at 03:21

## 2025-01-11 RX ADMIN — SODIUM BICARBONATE 650 MG TABLET 650 MG: at 21:42

## 2025-01-11 RX ADMIN — SIMVASTATIN 20 MG: 20 TABLET, FILM COATED ORAL at 21:42

## 2025-01-11 RX ADMIN — SODIUM BICARBONATE 650 MG TABLET 650 MG: at 14:14

## 2025-01-11 RX ADMIN — PIPERACILLIN SODIUM AND TAZOBACTAM SODIUM 2.25 G: 2; .25 INJECTION, SOLUTION INTRAVENOUS at 09:34

## 2025-01-11 RX ADMIN — LEVOTHYROXINE SODIUM ANHYDROUS 25 MCG: 100 INJECTION, POWDER, LYOPHILIZED, FOR SOLUTION INTRAVENOUS at 09:42

## 2025-01-11 RX ADMIN — IPRATROPIUM BROMIDE AND ALBUTEROL SULFATE 3 ML: 2.5; .5 SOLUTION RESPIRATORY (INHALATION) at 19:55

## 2025-01-11 RX ADMIN — Medication 11.4 MG/HR: at 11:41

## 2025-01-11 RX ADMIN — METHYLPREDNISOLONE SODIUM SUCCINATE 125 MG: 125 INJECTION, POWDER, FOR SOLUTION INTRAMUSCULAR; INTRAVENOUS at 14:14

## 2025-01-11 RX ADMIN — IPRATROPIUM BROMIDE AND ALBUTEROL SULFATE 3 ML: 2.5; .5 SOLUTION RESPIRATORY (INHALATION) at 05:39

## 2025-01-11 RX ADMIN — Medication 3 ML: at 00:36

## 2025-01-11 RX ADMIN — IPRATROPIUM BROMIDE AND ALBUTEROL SULFATE 3 ML: 2.5; .5 SOLUTION RESPIRATORY (INHALATION) at 07:50

## 2025-01-11 RX ADMIN — Medication 3 ML: at 07:50

## 2025-01-11 RX ADMIN — HEPARIN SODIUM 5000 UNITS: 5000 INJECTION, SOLUTION INTRAVENOUS; SUBCUTANEOUS at 21:42

## 2025-01-11 RX ADMIN — Medication 35 PERCENT: at 19:54

## 2025-01-11 RX ADMIN — Medication 1 TABLET: at 09:29

## 2025-01-11 RX ADMIN — Medication 3 ML: at 05:39

## 2025-01-11 RX ADMIN — Medication 3 ML: at 19:53

## 2025-01-11 RX ADMIN — PIPERACILLIN SODIUM AND TAZOBACTAM SODIUM 2.25 G: 2; .25 INJECTION, SOLUTION INTRAVENOUS at 03:21

## 2025-01-11 ASSESSMENT — PAIN - FUNCTIONAL ASSESSMENT
PAIN_FUNCTIONAL_ASSESSMENT: CPOT (CRITICAL CARE PAIN OBSERVATION TOOL)

## 2025-01-11 ASSESSMENT — PAIN SCALES - GENERAL
PAINLEVEL_OUTOF10: 0 - NO PAIN
PAINLEVEL_OUTOF10: 7

## 2025-01-11 ASSESSMENT — PAIN DESCRIPTION - DESCRIPTORS: DESCRIPTORS: PATIENT UNABLE TO DESCRIBE

## 2025-01-11 NOTE — PROGRESS NOTES
Janet Snow is a 79 y.o. female on day 9 of admission presenting with Community acquired pneumonia of both upper lobes.      Subjective   Patient remains intubated on the ventilator, her BUN and creatinine continue to rise.         Objective          Vitals 24HR  Heart Rate:  [74-89]   Temp:  [36.3 °C (97.3 °F)-36.6 °C (97.9 °F)]   Resp:  [19-29]   Weight:  [75.8 kg (167 lb 1.7 oz)-77.7 kg (171 lb 4.8 oz)]   SpO2:  [78 %-100 %]       Intake/Output last 3 Shifts:    Intake/Output Summary (Last 24 hours) at 1/11/2025 1039  Last data filed at 1/11/2025 1020  Gross per 24 hour   Intake 1194.47 ml   Output 1180 ml   Net 14.47 ml       Physical Exam  Constitutional:       Interventions: She is sedated and intubated.   Cardiovascular:      No friction rub.   Pulmonary:      Effort: She is intubated.      Comments: Mechanically ventilated, fairly clear breath sounds anteriorly  Abdominal:      General: Bowel sounds are normal.      Palpations: Abdomen is soft.   Musculoskeletal:    Trace edema.     Relevant Results  Results for orders placed or performed during the hospital encounter of 01/02/25 (from the past 24 hours)   POCT GLUCOSE   Result Value Ref Range    POCT Glucose 142 (H) 74 - 99 mg/dL   Urinalysis with Reflex Microscopic   Result Value Ref Range    Color, Urine Light-Yellow Light-Yellow, Yellow, Dark-Yellow    Appearance, Urine Turbid (N) Clear    Specific Gravity, Urine 1.017 1.005 - 1.035    pH, Urine 5.0 5.0, 5.5, 6.0, 6.5, 7.0, 7.5, 8.0    Protein, Urine 20 (TRACE) NEGATIVE, 10 (TRACE), 20 (TRACE) mg/dL    Glucose, Urine Normal Normal mg/dL    Blood, Urine 1.0 (3+) (A) NEGATIVE    Ketones, Urine NEGATIVE NEGATIVE mg/dL    Bilirubin, Urine NEGATIVE NEGATIVE    Urobilinogen, Urine Normal Normal mg/dL    Nitrite, Urine NEGATIVE NEGATIVE    Leukocyte Esterase, Urine 25 Brissa/uL (A) NEGATIVE   Microscopic Only, Urine   Result Value Ref Range    WBC, Urine 11-20 (A) 1-5, NONE /HPF    RBC, Urine >20 (A) NONE,  1-2, 3-5 /HPF    Squamous Epithelial Cells, Urine 1-9 (SPARSE) Reference range not established. /HPF    Bacteria, Urine 1+ (A) NONE SEEN /HPF    Mucus, Urine FEW Reference range not established. /LPF    Fine Granular Casts, Urine 1+ (A) NONE /LPF    Calcium Oxalate Crystals, Urine 1+ NONE, 1+ /HPF    Amorphous Crystals, Urine 1+ NONE, 1+, 2+ /HPF   POCT GLUCOSE   Result Value Ref Range    POCT Glucose 126 (H) 74 - 99 mg/dL   POCT GLUCOSE   Result Value Ref Range    POCT Glucose 108 (H) 74 - 99 mg/dL   POCT GLUCOSE   Result Value Ref Range    POCT Glucose 122 (H) 74 - 99 mg/dL   POCT GLUCOSE   Result Value Ref Range    POCT Glucose 144 (H) 74 - 99 mg/dL   CBC and Auto Differential   Result Value Ref Range    WBC 26.6 (H) 4.4 - 11.3 x10*3/uL    nRBC 0.1 (H) 0.0 - 0.0 /100 WBCs    RBC 2.64 (L) 4.00 - 5.20 x10*6/uL    Hemoglobin 8.0 (L) 12.0 - 16.0 g/dL    Hematocrit 24.4 (L) 36.0 - 46.0 %    MCV 92 80 - 100 fL    MCH 30.3 26.0 - 34.0 pg    MCHC 32.8 32.0 - 36.0 g/dL    RDW 14.2 11.5 - 14.5 %    Platelets 582 (H) 150 - 450 x10*3/uL    Immature Granulocytes %, Automated 5.3 (H) 0.0 - 0.9 %    Immature Granulocytes Absolute, Automated 1.42 (H) 0.00 - 0.50 x10*3/uL   Magnesium   Result Value Ref Range    Magnesium 2.62 (H) 1.60 - 2.40 mg/dL   Renal function panel   Result Value Ref Range    Glucose 136 (H) 74 - 99 mg/dL    Sodium 138 136 - 145 mmol/L    Potassium 5.4 (H) 3.5 - 5.3 mmol/L    Chloride 102 98 - 107 mmol/L    Bicarbonate 21 21 - 32 mmol/L    Anion Gap 20 10 - 20 mmol/L    Urea Nitrogen 130 (HH) 6 - 23 mg/dL    Creatinine 3.68 (H) 0.50 - 1.05 mg/dL    eGFR 12 (L) >60 mL/min/1.73m*2    Calcium 7.5 (L) 8.6 - 10.3 mg/dL    Phosphorus 9.7 (H) 2.5 - 4.9 mg/dL    Albumin 2.7 (L) 3.4 - 5.0 g/dL   Manual Differential   Result Value Ref Range    Neutrophils %, Manual 88.0 40.0 - 80.0 %    Lymphocytes %, Manual 4.0 13.0 - 44.0 %    Monocytes %, Manual 3.0 2.0 - 10.0 %    Eosinophils %, Manual 0.0 0.0 - 6.0 %    Basophils  %, Manual 0.0 0.0 - 2.0 %    Metamyelocytes %, Manual 3.0 0.0 - 0.0 %    Myelocytes %, Manual 2.0 0.0 - 0.0 %    Seg Neutrophils Absolute, Manual 23.41 (H) 1.60 - 5.00 x10*3/uL    Lymphocytes Absolute, Manual 1.06 0.80 - 3.00 x10*3/uL    Monocytes Absolute, Manual 0.80 0.05 - 0.80 x10*3/uL    Eosinophils Absolute, Manual 0.00 0.00 - 0.40 x10*3/uL    Basophils Absolute, Manual 0.00 0.00 - 0.10 x10*3/uL    Metamyelocytes Absolute, Manual 0.80 0.00 - 0.00 x10*3/uL    Myelocytes Absolute, Manual 0.53 0.00 - 0.00 x10*3/uL    Total Cells Counted 100     RBC Morphology See Below     Polychromasia Mild     Jamar Cells Few    Blood Gas Arterial Full Panel   Result Value Ref Range    POCT pH, Arterial 7.24 (LL) 7.38 - 7.42 pH    POCT pCO2, Arterial 48 (H) 38 - 42 mm Hg    POCT pO2, Arterial 114 (H) 85 - 95 mm Hg    POCT SO2, Arterial 99 94 - 100 %    POCT Oxy Hemoglobin, Arterial 96.9 94.0 - 98.0 %    POCT Hematocrit Calculated, Arterial 25.0 (L) 36.0 - 46.0 %    POCT Sodium, Arterial 134 (L) 136 - 145 mmol/L    POCT Potassium, Arterial 5.5 (H) 3.5 - 5.3 mmol/L    POCT Chloride, Arterial 105 98 - 107 mmol/L    POCT Ionized Calcium, Arterial 1.07 (L) 1.10 - 1.33 mmol/L    POCT Glucose, Arterial 145 (H) 74 - 99 mg/dL    POCT Lactate, Arterial 0.9 0.4 - 2.0 mmol/L    POCT Base Excess, Arterial -6.5 (L) -2.0 - 3.0 mmol/L    POCT HCO3 Calculated, Arterial 20.6 (L) 22.0 - 26.0 mmol/L    POCT Hemoglobin, Arterial 8.2 (L) 12.0 - 16.0 g/dL    POCT Anion Gap, Arterial 14 10 - 25 mmo/L    Patient Temperature 37.0 degrees Celsius    FiO2 45 %    Apparatus      Ventilator Mode Other     Ventilator Rate 24 bpm    PC Pressure Control 10.0 cm H2O    Peep CHM2O 10.0 cm H2O    Critical Called By JESSICA     Critical Called To MADAY     Critical Call Time 608     Critical Read Back Y     Critical Note PH     Site of Arterial Puncture Arterial Line    POCT GLUCOSE   Result Value Ref Range    POCT Glucose 141 (H) 74 - 99 mg/dL             Assessment/Plan   Acute kidney injury concerning for ANCA vasculitis given the positive p-ANCA - today I received result of the positive p-ANCA, 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  I had an in-depth discussion with the patient's children including both her daughter at the bedside and the son who was on the phone, and also discussed as well with the ICU team, pulmonology and palliative care.  I explained to the family the suspicion for ANCA vasculitis as well as the role of a kidney biopsy, dialysis, immunosuppressive treatment.  I was informed by palliative care this afternoon that the family has elected not to pursue a kidney biopsy and they still do not want to pursue dialysis based on the patient's stated wishes. She is on steroids per Pulmonology.   Bilateral pneumonia , antibiotics per infectious disease, off of Bactrim  Acute respiratory failure, intubated  Anemia  Hyperlipidemia  Thrombocytosis    Update 01/11  Still intubated.   Cr is still going up.   No biopsy or HD per patient's wishes.   IV steroids.   Lokelma for hyperkaliemia  UOP is 870   Keep monitoring.       Ellis Rosen MD

## 2025-01-11 NOTE — PROGRESS NOTES
Janet Snow is a 79 y.o. female on day 9 of admission presenting with Community acquired pneumonia of both upper lobes.    Subjective   Interval History:   Afebrile  Patient seen and examined  Daughter present  Sedated, intubated        Review of Systems   Unable to perform ROS: Intubated       Objective   Range of Vitals (last 24 hours)  Heart Rate:  [74-89]   Temp:  [36.3 °C (97.3 °F)-36.6 °C (97.9 °F)]   Resp:  [19-29]   Weight:  [75.8 kg (167 lb 1.7 oz)-77.7 kg (171 lb 4.8 oz)]   SpO2:  [78 %-100 %]   Daily Weight  01/11/25 : 77.7 kg (171 lb 4.8 oz)    Body mass index is 30.34 kg/m².    Physical Exam  Constitutional:       Interventions: She is sedated and intubated.   HENT:      Head: Normocephalic and atraumatic.   Eyes:      General: No scleral icterus.     Conjunctiva/sclera: Conjunctivae normal.   Cardiovascular:      Rate and Rhythm: Normal rate and regular rhythm.      Heart sounds: Normal heart sounds.   Pulmonary:      Effort: She is intubated.      Breath sounds: Decreased breath sounds present.   Abdominal:      General: Bowel sounds are normal.      Palpations: Abdomen is soft.   Musculoskeletal:      Cervical back: Neck supple.      Right lower leg: No edema.      Left lower leg: No edema.   Skin:     General: Skin is warm and dry.   Neurological:      Comments: Unable to assess  Psychiatric:      Comments: Unable to assess      Antibiotics  piperacillin-tazobactam - 2.25 gram/50 mL    Relevant Results  Labs  Results from last 72 hours   Lab Units 01/11/25  0450 01/10/25  0859 01/10/25  0545 01/09/25  0509   WBC AUTO x10*3/uL 26.6* 24.5* 25.8* 16.7*   HEMOGLOBIN g/dL 8.0* 7.5* 7.6* 7.5*   HEMATOCRIT % 24.4* 22.8* 23.4* 22.2*   PLATELETS AUTO x10*3/uL 582* 556* 603* 535*   NEUTROS PCT AUTO %  --   --   --  84.5   LYMPHO PCT MAN % 4.0  --  4.0  --    LYMPHS PCT AUTO %  --   --   --  4.2   MONO PCT MAN % 3.0  --  3.0  --    MONOS PCT AUTO %  --   --   --  7.2   EOSINO PCT MAN % 0.0  --  0.0  --     EOS PCT AUTO %  --   --   --  0.0     Results from last 72 hours   Lab Units 01/11/25  0450 01/10/25  0545 01/09/25  0509   SODIUM mmol/L 138 139 139  138   POTASSIUM mmol/L 5.4* 4.9 4.3  4.3   CHLORIDE mmol/L 102 105 105  105   CO2 mmol/L 21 22 22  23   BUN mg/dL 130* 105* 77*  73*   CREATININE mg/dL 3.68* 3.59* 3.01*  3.01*   GLUCOSE mg/dL 136* 140* 126*  131*   CALCIUM mg/dL 7.5* 7.6* 7.7*  7.9*   ANION GAP mmol/L 20 17 16  14   EGFR mL/min/1.73m*2 12* 12* 15*  15*   PHOSPHORUS mg/dL 9.7* 8.1* 4.3     Results from last 72 hours   Lab Units 01/11/25  0450 01/10/25  0545 01/09/25  0509   ALK PHOS U/L  --   --  86  86   BILIRUBIN TOTAL mg/dL  --   --  1.2  1.2   BILIRUBIN DIRECT mg/dL  --   --  0.7*   PROTEIN TOTAL g/dL  --   --  6.1*  6.1*   ALT U/L  --   --  26  26   AST U/L  --   --  24  24   ALBUMIN g/dL 2.7* 2.8* 2.8*  2.8*  2.8*     Estimated Creatinine Clearance: 12.2 mL/min (A) (by C-G formula based on SCr of 3.68 mg/dL (H)).  C-Reactive Protein   Date Value Ref Range Status   01/05/2025 35.67 (H) <1.00 mg/dL Final     CRP   Date Value Ref Range Status   01/04/2020 10.1 (H) 0 - 2.0 MG/DL Final     Comment:     Performed at [place] [id number]     Microbiology  No results found for the last 90 days.      Imaging  XR chest 1 view    Result Date: 1/10/2025  Interpreted By:  Sherly Fiore, STUDY: XR CHEST 1 VIEW 1/10/2025 10:42 am   INDICATION: Signs/Symptoms:acute hypoxemic respiratory failure, assess lung fields   COMPARISON: 01/09/2025   ACCESSION NUMBER(S): [id number]   ORDERING CLINICIAN: JORY RIDDLE   TECHNIQUE: Single AP view chest   FINDINGS: Examination rotated accentuating the cardiac silhouette. Endotracheal tube tip identified 3.6 cm from the noah. NG tube is in place. There is generalized increased interstitial prominence in bilateral lung fields with alveolar airspace infiltrate with alveolar component of pulmonary edema suggested as opposed to  postinfectious etiology. Correlate with patient's history.             1. Stable lines and tubes   2. Persistent patchy alveolar airspace infiltrate demonstrated within bilateral lung fields stable. No dense airspace consolidation.   Signed by: Sherly Fiore 1/10/2025 1:06 PM Dictation workstation:   [id number]    XR abdomen 1 view    Result Date: 1/9/2025  Interpreted By:  Jaxon Morales, STUDY: XR ABDOMEN 1 VIEW; 1/9/2025 9:31 am   INDICATION: Signs/Symptoms:c/f ileus.   COMPARISON: 01/08/2025   ACCESSION NUMBER(S): [id number]   ORDERING CLINICIAN: JORY RIDDLE   TECHNIQUE: KUB, portable, one view   FINDINGS: Motion artifact obscures the bowel-gas pattern significantly. A nasogastric tube extends into the epigastrium with the tip overlying the mid abdomen.       Exam is limited due to marked motion artifact obscuring the bowel gas pattern. Nasogastric tube extends into the stomach.   MACRO: none   Signed by: Jaxon Morales 1/9/2025 10:05 AM Dictation workstation:   [id number]    XR chest 1 view    Result Date: 1/9/2025  Interpreted By:  Stefany Edwards, STUDY: XR CHEST 1 VIEW 1/9/2025 6:30 am   INDICATION: Signs/Symptoms:persistent hypoxia, unable to wean   COMPARISON: 01/08/2025   ACCESSION NUMBER(S): [id number]   ORDERING CLINICIAN: NORM LANDAVERDE   TECHNIQUE: AP erect view of the chest at bedside   FINDINGS: The tip of the endotracheal tube is located 2 cm above noah with nasogastric tube descending below diaphragm. The cardiac size is borderline enlarged. When compared with yesterday's study, the infiltrate throughout the right lung is unchanged. Infiltrate within the left lung appears slightly improved. No pleural abnormality is seen.       Stable diffuse infiltrate throughout right lung with some mild improvement in the diffuse infiltrate seen in left lung.   Signed by: Stefany Edwards 1/9/2025 8:23 AM Dictation workstation:   [id number]    XR chest abdomen for OG NG placement    Result Date:  1/8/2025  Interpreted By:  Finkelstein, Evan, STUDY: XR CHEST ABDOMEN FOR OG NG PLACEMENT;  1/8/2025 2:41 am two views of the chest.   INDICATION: Signs/Symptoms:OG tube placement.   COMPARISON: None.   ACCESSION NUMBER(S): [id number]   ORDERING CLINICIAN: AMANDA SÁNCHEZ   FINDINGS: Endotracheal tube present with tip approximately 2.4 cm above the noah. OG tube courses below the diaphragm, with tip extending all of the way into the lower pelvis, beyond the field of view. Extensive patchy airspace opacities throughout the lungs. No sizable pleural effusion or pneumothorax. No acute osseous abnormality.       OG tube courses below the diaphragm with tip extending into the pelvis beyond the field of view. Endotracheal tube tip lies 2.4 cm above the noah. Redemonstrated extensive patchy airspace opacities throughout the lungs concerning for multifocal pneumonia.     MACRO: None.   Signed by: Evan Finkelstein 1/8/2025 3:04 AM Dictation workstation:   [id number]    XR chest 1 view    Result Date: 1/7/2025  Interpreted By:  Isidro Corrales, STUDY: XR CHEST 1 VIEW;  1/7/2025 10:48 pm   INDICATION: Signs/Symptoms:OG tube.   COMPARISON: Chest x-ray 01/07/2025   ACCESSION NUMBER(S): [id number]   ORDERING CLINICIAN: AMANDA SÁNCHEZ   FINDINGS: Enteric tube terminates in the left mid abdomen with side hole below the GE junction, likely within the distal gastric body. Multiple overlying leads are present.   CARDIOMEDIASTINAL SILHOUETTE: Cardiomediastinal silhouette is stable in size and configuration.   LUNGS: Lung apices are excluded from the field of view. There is diffuse bilateral airspace opacities not significantly changed from prior exam.   ABDOMEN: No remarkable upper abdominal findings.   BONES: Multilevel degenerative changes of the spine.       Enteric tube terminates in the left mid abdomen with side hole below the GE junction.   Diffuse bilateral airspace opacities concerning for developing multifocal  pneumonia. Continued radiographic follow-up to resolution is advised.   MACRO: None   Signed by: Isidro Corrales 1/7/2025 10:52 PM Dictation workstation:   [id number]    Bronchoscopy    Result Date: 1/7/2025  Table formatting from the original result was not included. Impression The left main stem, left upper lobar bronchus, lingular lobar bronchus, left lower lobar bronchus, right main stem, right upper lobar bronchus, bronchus intermedius, right middle lobar bronchus and right lower lobar bronchus appeared normal. Bronchoalveolar lavage was performed x1 in the RML and the fluid appeared cloudy Findings The left main stem, left upper lobar bronchus, lingular lobar bronchus, left lower lobar bronchus, right main stem, right upper lobar bronchus, bronchus intermedius, right middle lobar bronchus and right lower lobar bronchus appeared normal. Bronchoalveolar lavage was performed x1 in the RML with 140 mL of saline instilled and a total return of 90 mL. The fluid appeared cloudy. Recommendation There is no recommended follow-up for this procedure. Indication ARDS (adult respiratory distress syndrome) (Multi) Staff Staff Role No Staff Documented Medications See Anesthesia Record. Preprocedure A history and physical has been performed, and patient medication allergies have been reviewed. The patient's tolerance of previous anesthesia has been reviewed. The risks and benefits of the procedure and the sedation options and risks were discussed with the  daughter (medical POA) and son (financial POA) . All questions were answered and informed consent obtained. Details of the Procedure The patient was already under sedation prior to the procedure. The patient's blood pressure, respirations, heart rate, oxygen and ECG were monitored throughout the procedure. The patient experienced no blood loss. The scope was introduced through the endotracheal tube. The procedure was not difficult. The patient tolerated the procedure well.  There were no apparent adverse events. Events Procedure Events Event Event Time Specimens * Cannot find log * BAL taken from RML 140cc instilled, retunr 90cc of white cloudy fluid. Mucosa appeared normal and non friable. Some mucous secretions suctioned away. Procedure Location [facility] [address] [facility] [address] [place] [address] [phone] Referring Provider Laura Maloney MD Procedure Provider Laura PERALTA MD     XR chest 1 view    Result Date: 1/7/2025  Interpreted By:  Elda Dorado, STUDY: XR CHEST 1 VIEW;  1/7/2025 10:40 am   INDICATION: Signs/Symptoms:intubation.   COMPARISON: 01/07/2025 at 5:45 a.m.   ACCESSION NUMBER(S): [id number]   ORDERING CLINICIAN: KENISHA LEE   FINDINGS: Heart is normal in size.   The patient is intubated. The tip terminates above the noah.   There is diffuse parenchymal infiltration.   There are atherosclerotic changes of the aorta. There are degenerative changes of the spine.   COMPARISON OF FINDING: The patient has undergone interval intubation. The lungs are similar.       Diffuse bilateral parenchymal infiltration. Interval intubation.   MACRO: none   Signed by: Elda Dorado 1/7/2025 11:22 AM Dictation workstation:   [id number]    XR chest 1 view    Result Date: 1/7/2025  Interpreted By:  Jaxon Morales, STUDY: XR CHEST 1 VIEW; 1/7/2025 6:35 am   INDICATION: CLINICAL INFORMATION: Signs/Symptoms:persistent hypoxia, unable to wean from NIV.   COMPARISON: 01/06/2025   ACCESSION NUMBER(S): [id number]   ORDERING CLINICIAN: NORM LANDAVERDE   TECHNIQUE: Portable chest one view.   FINDINGS: The cardiac size is indeterminate in view of the AP projection. There is continued diffuse ground-glass infiltrate bilaterally. No effusions are identified.       Persistent ground-glass infiltrates diffusely bilaterally. Etiology is unclear with differential to include infectious organisms as well as pulmonary edema and ARDS.    MACRO: none   Signed by: Jaxon Morales 1/7/2025 8:20 AM Dictation workstation:   [id number]    ECG 12 lead    Result Date: 1/6/2025  Normal sinus rhythm Nonspecific ST abnormality Prolonged QT Abnormal ECG No previous ECGs available Confirmed by Vicky Corado ([id number]) on 1/6/2025 4:01:46 PM    Transthoracic Echo (TTE) Complete    Result Date: 1/6/2025           [place] [address]            Phone [phone] TRANSTHORACIC ECHOCARDIOGRAM REPORT Patient Name:       ROBBIE ROCHA     Reading Physician:    [id number] Vicky Corado MD Study Date:         1/6/2025             Ordering Provider:    [id number] NORM LANDAVERDE MRN/PID:            [id number]             Fellow: Accession#:         [id number]         Nurse: Date of Birth/Age:  1945 / 79 years Sonographer:          Rosalba Leiva RDCS Gender Assigned at  F                    Additional Staff: Birth: Height:             160.02 cm            Admit Date: Weight:             69.85 kg             Admission Status:     Inpatient -                                                                Routine BSA / BMI:          1.73 m2 / 27.28      Department Location:  [facility] ICU                     kg/m2 Blood Pressure: 151 /76 mmHg Study Type:    TRANSTHORACIC ECHO (TTE) COMPLETE Diagnosis/ICD: Hypoxemia-R09.02; Acute respiratory failure with hypoxia-J96.01 Indication:    hypoxemia CPT Codes:     Echo Complete w Full [id number] Patient History: Smoker:            Former. BMI:               Overweight 25 - 30 Pertinent History: Resp difficulty, bipap, cough, pna, sciatica, arf, resp                    failure/hypoxia. Study Detail: The following Echo studies were performed: 2D, Doppler, M-Mode and               color flow. Technically  challenging study due to prominent lung               artifact, body habitus and patient lying in supine position.               Definity used as a contrast agent for endocardial border               definition. Total contrast used for this procedure was 2 mL via IV               push.  PHYSICIAN INTERPRETATION: Left Ventricle: The left ventricular systolic function is normal, with a visually estimated ejection fraction of 70-75%. There are no regional wall motion abnormalities. The left ventricular cavity size is normal. There is normal septal thickness. Spectral Doppler shows a normal pattern of left ventricular diastolic filling. Left Atrium: The left atrium is normal in size. Right Ventricle: The right ventricle is normal in size. There is normal right ventricular global systolic function. Right Atrium: The right atrium is normal in size. Aortic Valve: The aortic valve is trileaflet. The aortic valve dimensionless index is 0.74. There is no evidence of aortic valve regurgitation. The peak instantaneous gradient of the aortic valve is 22 mmHg. The mean gradient of the aortic valve is 11 mmHg. Mitral Valve: The mitral valve is normal in structure. There is no evidence of mitral valve regurgitation. Tricuspid Valve: The tricuspid valve is structurally normal. There is mild tricuspid regurgitation. Pulmonic Valve: The pulmonic valve is structurally normal. There is physiologic pulmonic valve regurgitation. Pericardium: Small pericardial effusion. Aorta: The aortic root is normal. Systemic Veins: The inferior vena cava appears normal in size, with IVC inspiratory collapse greater than 50%.  CONCLUSIONS:  1. The left ventricular systolic function is normal, with a visually estimated ejection fraction of 70-75%.  2. Spectral Doppler shows a normal pattern of left ventricular diastolic filling.  3. There is normal right ventricular global systolic function.  4. No evidence of mitral valve regurgitation.  5. Mild  tricuspid regurgitation is visualized. QUANTITATIVE DATA SUMMARY:  2D MEASUREMENTS:           Normal Ranges: LAs:             2.60 cm   (2.7-4.0cm) IVSd:            0.68 cm   (0.6-1.1cm) LVPWd:           0.53 cm   (0.6-1.1cm) LVIDd:           5.32 cm   (3.9-5.9cm) LV Mass Index:   62.2 g/m2  LV SYSTOLIC FUNCTION BY 2D PLANIMETRY (MOD):                      Normal Ranges: EF-A4C View:    60 % (>=55%) EF-A2C View:    66 % EF-Biplane:     65 % EF-Visual:      73 % LV EF Reported: 73 %  LV DIASTOLIC FUNCTION:           Normal Ranges: MV Peak E:             0.94 m/s  (0.7-1.2 m/s) MV Peak A:             1.04 m/s  (0.42-0.7 m/s) E/A Ratio:             0.91      (1.0-2.2) MV e'                  0.078 m/s (>8.0) MV lateral e'          0.08 m/s MV medial e'           0.07 m/s E/e' Ratio:            12.07     (<8.0) a'                     0.08 m/s  MITRAL VALVE:          Normal Ranges: MV DT:        186 msec (150-240msec)  AORTIC VALVE:                      Normal Ranges: AoV Vmax:                2.35 m/s  (<=1.7m/s) AoV Peak P.1 mmHg (<20mmHg) AoV Mean P.0 mmHg (1.7-11.5mmHg) LVOT Max Cj:            1.95 m/s  (<=1.1m/s) AoV VTI:                 43.90 cm  (18-25cm) LVOT VTI:                32.60 cm LVOT Diameter:           1.90 cm   (1.8-2.4cm) AoV Area, VTI:           2.11 cm2  (2.5-5.5cm2) AoV Area,Vmax:           2.35 cm2  (2.5-4.5cm2) AoV Dimensionless Index: 0.74  RIGHT VENTRICLE: RV s' 0.21 m/s  TRICUSPID VALVE/RVSP:          Normal Ranges: Peak TR Velocity:     3.56 m/s RV Syst Pressure:     59 mmHg  (< 30mmHg) IVC Diam:             1.16 cm  PULMONIC VALVE:          Normal Ranges: PV Accel Time:  124 msec (>120ms) PV Max Cj:     1.3 m/s  (0.6-0.9m/s) PV Max P.9 mmHg  [id number] Vicky Corado MD Electronically signed on 2025 at 3:30:16 PM  ** Final **     XR chest 1 view    Result Date: 2025  Interpreted By:  Jaxon Morales, STUDY: XR CHEST 1 VIEW; 2025 5:38 am    INDICATION: CLINICAL INFORMATION: Signs/Symptoms:Pneumonia follow-up. Previous abnormal chest radiograph. Shortness of breath.   COMPARISON: 01/05/2025   ACCESSION NUMBER(S): [id number]   ORDERING CLINICIAN: AMANDA SÁNCHEZ   TECHNIQUE: Portable chest one view.   FINDINGS: The cardiac size is indeterminate in view of the AP projection. Diffuse ground-glass infiltrates are again identified, similar compared to the previous study. No effusions are appreciated.       Diffuse ground-glass infiltrates. There is no interval change when compared to the previous examination.   MACRO: none   Signed by: Jaxon Morales 1/6/2025 8:29 AM Dictation workstation:   [id number]    CT angio chest for pulmonary embolism    Result Date: 1/5/2025  Interpreted By:  Selin Matthews, STUDY: CT ANGIO CHEST FOR PULMONARY EMBOLISM;  1/5/2025 4:10 pm   INDICATION: Signs/Symptoms:severe hypoxia   COMPARISON: Chest x-ray 01/05/2025. CT chest 07/19/2024   ACCESSION NUMBER(S): [id number]   ORDERING CLINICIAN: NORM LANDAVERDE   TECHNIQUE: Helical data acquisition of the chest was obtained following the uneventful administration of intravenous contrast material. Images were reformatted in axial, coronal, and sagittal planes. MIP images were created and reviewed.   FINDINGS: POTENTIAL LIMITATIONS OF THE STUDY: Motion artifact.   HEART AND VESSELS: The evaluation for filling defects at the pulmonary arteries is degraded by motion artifact. No large central filling defects to suggest pulmonary embolism. The smaller segmental/subsegmental pulmonary arteries are not well evaluated on this exam.   No thoracic aortic aneurysm. Mild atherosclerotic calcifications are noted at the thoracic aorta.   The heart is mildly enlarged. There is trace pericardial effusion.   MEDIASTINUM AND MANPREET, LOWER NECK AND AXILLA: The visualized thyroid gland is small.   A right paratracheal lymph node measures 10 mm in short axis. A subcarinal lymph node measures 12 mm in  short axis. A right hilar lymph node measures 19 mm in short axis. A left hilar lymph node measures 11 mm in short axis. Calcified lymph nodes are noted at the mediastinum and left hilum.   LUNGS AND AIRWAYS: The trachea and central airways are patent.   There are diffuse bilateral ground-glass opacities. No pleural effusion or pneumothorax.   UPPER ABDOMEN: Calcific foci at the spleen are suggestive of granulomas.   CHEST WALL AND OSSEOUS STRUCTURES: Multilevel degenerative changes at the spine. Redemonstration of remote compression deformity with Schmorl's node at the superior endplate of L1.       1. Study degraded by motion artifact. No evidence of large central pulmonary emboli. The smaller segmental/subsegmental pulmonary arteries are not well evaluated on this exam. 2. Diffuse bilateral ground-glass opacities, may be secondary to pulmonary edema and/or multifocal pneumonia. Acute respiratory distress syndrome is in the differential diagnoses. 3. Mild cardiomegaly. Trace pericardial effusion. 4. Mild mediastinal and hilar adenopathy.     MACRO: None.   Signed by: Selin Matthews 1/5/2025 6:42 PM Dictation workstation:   [id number]    US renal complete    Result Date: 1/5/2025  Interpreted By:  Jaxon Morales, STUDY: US RENAL COMPLETE; 1/5/2025 10:29 am   INDICATION: Signs/Symptoms:nancy.   COMPARISON: None   ACCESSION NUMBER(S): [id number]   ORDERING CLINICIAN: NORM LANDAVERDE   TECHNIQUE: Grayscale and color Doppler imaging of the kidneys   FINDINGS: The right kidney measures 11.0 cm  . The left kidney measures 11.0 cm  .   There is mild increased echogenicity of the renal cortex bilaterally.     No renal stones are identified.  Renal cortex is normal in thickness bilaterally. No hydronephrosis is identified.   Urinary bladder is nonspecific in appearance with no stones or masses identified.       Mild increased renal cortical echogenicity diffusely bilaterally suggesting chronic renal medical disease.    MACRO: none   Signed by: Jaxon Morales 1/5/2025 12:45 PM Dictation workstation:   [id number]    XR chest 1 view    Result Date: 1/5/2025  Interpreted By:  Sincere Slater, STUDY: XR CHEST 1 VIEW;  1/5/2025 8:48 am   INDICATION: Signs/Symptoms:respiratory distress acute.   COMPARISON: 01/02/2025   ACCESSION NUMBER(S): [id number]   ORDERING CLINICIAN: RAMANDEEP ALLRED   FINDINGS: Diffusely increased bilateral airspace consolidation. No visualized pleural effusion. Cardiomediastinal silhouette unchanged. Aortic atherosclerosis. Thoracic degenerative changes with dextroscoliosis.       Diffusely increased bilateral airspace consolidation.   MACRO: None   Signed by: Sincere Slater 1/5/2025 10:08 AM Dictation workstation:   [id number]    XR chest 2 views    Result Date: 1/2/2025  Interpreted By:  Jaxon Morales, STUDY: XR CHEST 2 VIEWS; 1/2/2025 3:05 pm   INDICATION: Signs/Symptoms:fever, productive cough, chest pain.   COMPARISON: 06/14/2022   ACCESSION NUMBER(S): [id number]   ORDERING CLINICIAN: DANIAL COATES   TECHNIQUE: AP and lateral views of the chest were obtained.   FINDINGS: The cardiac silhouette is normal in appearance. There are new patchy bilateral alveolar infiltrates most marked within the upper lobes bilaterally .  No effusions are identified.       Extensive patchy bilateral infiltrates most marked within the upper lobes bilaterally. Findings are suspicious for pneumonia. Follow-up to assure complete clearing is suggested.   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

## 2025-01-11 NOTE — PROGRESS NOTES
[facility] Critical Care Medicine       Date:  1/11/2025  Patient:  Janet Snow  YOB: 1945  MRN:  [id number]   Admit Date:  1/2/2025    Chief Complaint   Patient presents with    Shortness of Breath         History of Present Illness:  Janet Snow is a 79 y.o. year old female patient with Past Medical History of hypothyroidism, anxiety, depression, hyperlipidemia, sciatica, prior cigarette smoker (30 years, quit 30 years ago) who presented to  ED 1/2 with complaints of shortness of breath. Her symptoms started shortly before Lor[place] which would have been >1 week prior to time of presentation. At this time, she also complained of productive cough with yellow-green colored sputum, difficulty swallowing, chills. She was seen at an urgent care and was prescribed Augmentin, but did not have improvement.      She met SIRS criteria in the ED with fever, tachycardia, and leukocytosis, as well as elevated lactate. She was given rocephin and azithromycin in ED. She was admitted to the regular nursing floor on 2L NC and started on CAP coverage. Pulmonology was consulted and dc azithromycin due to negative urine atypicals. She was reportedly on 3L NC yesterday but would have random episodes of desaturations with coughing fits, but O2 sats would return back to 3L.     On the morning of 1/5, the hospitalist contacted the ICU team to come see her as she became acutely tachypnic, hypoxic with SpO2 in the 60s, came up to the 80s on NRB. She was visibly in respiratory distress, in tripod position at the edge of the bed. ABG 7.37/35/44, confirmed arterial draw. CXR worsening bilateral airspace consolidations compared to CXR 1/2. She was placed on continuous CPAP 8/100% with SpO2 up to the low 90s and urgently transported to the ICU.      She did have a CT chest 7/24 which showed multifocal reticulonodular and ground-glass opacities (since 2020) 2/2 chronic infectious/inflammatory process. Multiple  new-mildly enlarged pulmonary nodules up to 6mm. Multiple unchanged prominent enlarged lymph nodes, likely reactive. Recommended follow-up CT chest in 3-6 months.      Interval ICU Events:  1/5: Pt arrived to ICU on continuous CPAP. Started IV steroids. Work of breathing improved as the day goes on. FiO2 able to be weaned to 80%. Stat CT angio chest obtained to r/o PE and for further differentiation of diffuse infiltrates seen on CXR.      1/6: Attempted to give patient a break from bipap overnight, but she became hypoxic with SpO2 in the 70s immedately. Remains on continuous bipap this morning. Very anxious with coughing fits and becomes acutely SOB with increased WOB during these episodes. CT chest yesterday showing diffuse GGO, will reengage pulmonology for their input. Broaden abx include zyvox.       1/7: Patient had a few desaturations overnight with tachypnea and accessory muscle use. Valium was added to help with her increased anxiety. Patient reports today that she is tired and that her breathing feels worse. We did discuss intubation as well as the risk associated with it. She stated that she wanted to move forward with intubation, but wanted to discuss it with her children. She then had a desaturation to 80% with respiratory distress remained on BiPap with Fi02 of 100%. Call placed to both of her children, with plans for them to come to bedside. Discussed with the patient and her children the option of intubation. Patient wishes to move forward with the understanding that it is not guaranteed that she will be able to be extubated. She states that she only wishes to be intubated for 7 days. All questions answered. Dr. Pandya called to bedside. Patient does with to remain a DNR but is OK with being intubated. Pulmonary following, plan for a bronchoscopy today, will add bactrim to cover for possible PJP.      1/8: Patient placed on fentanyl drip to help with sedation overnight, patient breathing over the  vent overnight. Will work to try and decrease TV today as ABG allows to meet ARDS Net protocol.     1/9: She remained intermittently very uncomfortable with poor mechanical ventilation synchrony before starting a ketamine infusion overnight.  Oliguric KALEB continues with Scr 3.0 today.  BAL respiratory culture 1/7 without organisms, 4+ pmn's. Hypoactive BS, stool appearing NG output c/f ileus.         1/10: Vent mode changed to PC Pi 10 PEEP 12 with improved patient interfacing.  FiO2 was weaned to 45% overnight.  She also appears more comfortable with up titration of ketamine infusion.  Solute clearance continues to worsen despite increased urine output without diuretics.       1/11: Patient remains sedated to RASS -4. Oxygen requirements continuing to decrease. Creatinine seems to have plateaud. TF switched to Nepro and Lokelma started. Currently trailing high dose steroids for vasculitis for three days.     Medical History:  History reviewed. No pertinent past medical history.  Past Surgical History:   Procedure Laterality Date    BREAST BIOPSY Right     core biopsy 20 years ago    HYSTERECTOMY      MR HEAD ANGIO WO IV CONTRAST  07/14/2018    MR HEAD ANGIO WO IV CONTRAST [facility]     Medications Prior to Admission   Medication Sig Dispense Refill Last Dose/Taking    simvastatin (Zocor) 20 mg tablet Take 1 tablet (20 mg) by mouth once daily at bedtime.   Taking    FLUoxetine (PROzac) 40 mg capsule Take 1 capsule (40 mg) by mouth once daily.       gabapentin (Neurontin) 400 mg capsule Take 1 capsule (400 mg) by mouth 2 times a day.       levothyroxine (Synthroid, Levoxyl) 50 mcg tablet Take 1 tablet (50 mcg) by mouth early in the morning..        Meperidine (pf), Ropinirole, and Meperidine  Social History     Tobacco Use    Smoking status: Former     Types: Cigarettes    Smokeless tobacco: Never   Substance Use Topics    Drug use: Never     No family history on file.    Review of Systems:  Unable to  complete ROS as patient is intubated and sedated.     Physical Exam:    Heart Rate:  [74-89]   Temp:  [36.3 °C (97.3 °F)-36.6 °C (97.9 °F)]   Resp:  [19-29]   Weight:  [75.8 kg (167 lb 1.7 oz)-77.7 kg (171 lb 4.8 oz)]   SpO2:  [78 %-100 %]     Physical Exam  Vitals reviewed.   Constitutional:       Appearance: She is ill-appearing.      Interventions: She is sedated, intubated and restrained.   HENT:      Head: Normocephalic and atraumatic.      Right Ear: External ear normal.      Left Ear: External ear normal.      Nose: Nose normal.      Comments: NGT right nare     Mouth/Throat:      Mouth: Mucous membranes are dry.      Pharynx: Oropharynx is clear.      Comments: ETT   Eyes:      Pupils: Pupils are equal, round, and reactive to light.   Cardiovascular:      Rate and Rhythm: Normal rate and regular rhythm.      Pulses: Normal pulses.      Heart sounds: Normal heart sounds.   Pulmonary:      Effort: She is intubated.      Breath sounds: Decreased breath sounds present.   Abdominal:      General: Bowel sounds are decreased.      Palpations: Abdomen is soft.   Genitourinary:     Comments: Indwelling urinary catheter   Musculoskeletal:      Right lower leg: No edema.      Left lower leg: No edema.   Skin:     General: Skin is warm and dry.      Capillary Refill: Capillary refill takes less than 2 seconds.   Neurological:      Comments: Intubated and sedated    Psychiatric:      Comments: EMIR         Assessment/Plan:    I am currently managing this critically ill patient for the following problems:    Neuro/Psych/Pain Ctrl/Sedation:  #Daily ETOH use  #Hx anxiety, depression   - Pain Control: acetaminophen PRN  - Hold home prozac  - CAM ICU qshift, sleep-wake hygiene, delirium precautions   - Continue propofol and ketamine for sedation  - Dilaudid PRN CPOT >3  - Continue phenobarbital taper   - Continue Thiamine and multivitamin     Respiratory/ENT:  #Acute hypoxic respiratory failure with ARDS - vasculitis/DAH vs  infectious pneumonia vs acute inflammatory process. Patient was intubated 1/7/25  #Prior tobacco use   - Continue solu-medrol 125 mg QID   - Duonebs q4hrs, add 3% nebs   - Lung protective vent settings, permissive hypercapnia for pH > 7.25, permisive hypoxemia for PO2 > 60; mechanics are improving and she is tolerating ACPC Pi 10 with improved comfort  - Daily CXR  - Pulm hygiene  - Appreciate Pulmonary Consult, etiology of respiratory failure remains uncertain      Cardiovascular:  #Hyperlipidemia   - Continue statin   - Maintain MAPs > 70 in setting of KALEB   - Continuous cardiac monitoring   - EKGs PRN for ACS symptoms, arrhythmias     GI:  #Dysphagia  - PPI for ppx   - Change from vital AF to Nepro    Renal/Volume Status (Intra & Extravascular):  #Oliguric Acute Kidney Injury. Baseline Cr 0.9 worsening 3.6 -> 3.68 today.   #Hx urge incontinence   #Hyperkalemia  #Metabolic acidosis  - Hourly I/O's via guerrero catheter; goal even to slightly negative daily    - Appreciate Nephrology Consult.  Family reports that patient would not consent to RRT.     - Replete electrolytes to maintain K >4.0 and Mg >2.0  - Daily RFP, Mg  - Lokelma started  - Renal dosage where indicated    Endocrine  #Hypothyroidism   #Steroid induced hyperglycemia   - Continue IV synthroid   - ISS with q4hr glucose checks while NPO  - Hypoglycemia protocol PRN     Infectious Disease:  #CAP pneumonia   #Rule out PJP Pneumonia   # Increasing leukocytosis   - Appreciate ID consult and rec's.  ABX narrowed to atovaquone and pip-tazo yesterday.   She has received azithro (1/3); ctx (1/3 - 1/4); doxycycline (1/5 - 1/9); linezolid (1/6 - 1/9).    - Continue atovaquone for empiric PJP coverage though this seems pretty low on the DD.       - F/U finalized PJP PCR, respiratory viral panel   - Monitor SIRS criteria    Heme/Onc:  #Thrombocytosis - likely acutely reactive   #Anemia  - Maintain Hgb >7.0   - Daily CBC    OBGYN/MSK:  #Hx sciatica   - PT/OT eval  -  ICU skin protocol, padded pressure points  - Q2hr turns    Ethics/Code Status:  DNRCCA, no RRT   Patient was intubated 1/7/25 after giving verbal consent infront of her daughter. She mentioned she would only like to be vented for 7 days if no improvements were occurring. If improvements would occur we would discuss with the family to see if they are okay with longer intubation. Her code status will stay DNR DNI for when she is extubated.       :  DVT Prophylaxis: SQH & SCDs  GI Prophylaxis: PPI  Bowel Regimen: Colace  Diet: NPO, start TF today   CVC: none  [place]: yes  [place]: yes  Restraints: soft  Dispo: ICU    Restraints indicated to maintain lines/tubes.  Alternative therapies have been attempted and have been ineffective.  Restrain with soft wrist restraints and side rails up x4 until medical devices discontinued and/or patient able to participate with plan of care.      Critical Care Time:  55 minutes spent in preparing to see patient (I.e. review of medical records), evaluation of diagnostics (I.e. labs, imaging, etc.), documentation, discussing plan of care with patient/ family/ caregiver, and/ or coordination of care with multidisciplinary team. Time does not include completion of procedure time.     Plan Discussed with Attending: AYUSH ShoreC  Critical Care Medicine  [facility]

## 2025-01-11 NOTE — CARE PLAN
The patient's goals for the shift include EMIR    The clinical goals for the shift include Maintain hemodynamic stability      Problem: Pain - Adult  Goal: Verbalizes/displays adequate comfort level or baseline comfort level  Outcome: Progressing     Problem: Safety - Adult  Goal: Free from fall injury  Outcome: Progressing     Problem: Discharge Planning  Goal: Discharge to home or other facility with appropriate resources  Outcome: Progressing     Problem: Chronic Conditions and Co-morbidities  Goal: Patient's chronic conditions and co-morbidity symptoms are monitored and maintained or improved  Outcome: Progressing     Problem: Respiratory  Goal: Minimal/no exertional discomfort or dyspnea this shift  Outcome: Progressing  Goal: No signs of respiratory distress (eg. Use of accessory muscles. Peds grunting)  Outcome: Progressing  Goal: Verbalize decreased shortness of breath this shift  Outcome: Progressing  Goal: Wean oxygen to maintain O2 saturation per order/standard this shift  Outcome: Progressing     Problem: Skin  Goal: Decreased wound size/increased tissue granulation at next dressing change  Outcome: Progressing  Flowsheets (Taken 1/10/2025 2048)  Decreased wound size/increased tissue granulation at next dressing change:   Promote sleep for wound healing   Protective dressings over bony prominences   Utilize specialty bed per algorithm  Goal: Participates in plan/prevention/treatment measures  Outcome: Progressing  Flowsheets (Taken 1/10/2025 2048)  Participates in plan/prevention/treatment measures:   Discuss with provider PT/OT consult   Elevate heels  Goal: Prevent/manage excess moisture  Outcome: Progressing  Flowsheets (Taken 1/10/2025 2048)  Prevent/manage excess moisture:   Cleanse incontinence/protect with barrier cream   Moisturize dry skin   Follow provider orders for dressing changes   Monitor for/manage infection if present  Goal: Prevent/minimize sheer/friction injuries  Outcome:  Progressing  Flowsheets (Taken 1/10/2025 2048)  Prevent/minimize sheer/friction injuries:   Complete micro-shifts as needed if patient unable. Adjust patient position to relieve pressure points, not a full turn   Increase activity/out of bed for meals   Use pull sheet   HOB 30 degrees or less   Turn/reposition every 2 hours/use positioning/transfer devices   Utilize specialty bed per algorithm  Goal: Promote/optimize nutrition  Outcome: Progressing  Flowsheets (Taken 1/10/2025 2048)  Promote/optimize nutrition: Monitor/record intake including meals  Goal: Promote skin healing  Outcome: Progressing  Flowsheets (Taken 1/10/2025 2048)  Promote skin healing:   Protective dressings over bony prominences   Turn/reposition every 2 hours/use positioning/transfer devices   Assess skin/pad under line(s)/device(s)   Ensure correct size (line/device) and apply per  instructions   Rotate device position/do not position patient on device     Problem: Nutrition  Goal: Nutrition support is meeting 75% of nutrient needs  Outcome: Progressing  Goal: Tube feed tolerance  Outcome: Progressing     Problem: Knowledge Deficit  Goal: Patient/family/caregiver demonstrates understanding of disease process, treatment plan, medications, and discharge instructions  Outcome: Progressing     Problem: Mechanical Ventilation  Goal: Patient Will Maintain Patent Airway  Outcome: Progressing  Goal: Oral health is maintained or improved  Outcome: Progressing  Goal: ET tube will be managed safely  Outcome: Progressing  Goal: Ability to express needs and understand communication  Outcome: Progressing  Goal: Mobility/activity is maintained at optimum level for patient  Outcome: Progressing     Problem: Safety - Medical Restraint  Goal: Remains free of injury from restraints (Restraint for Interference with Medical Device)  Outcome: Progressing  Flowsheets (Taken 1/10/2025 2048)  Remains free of injury from restraints (restraint for  interference with medical device):   Determine that other, less restrictive measures have been tried or would not be effective before applying the restraint   Evaluate the patient's condition at the time of restraint application   Inform patient/family regarding the reason for restraint   Every 2 hours: Monitor safety, psychosocial status, comfort, nutrition and hydration  Goal: Free from restraint(s) (Restraint for Interference with Medical Device)  Outcome: Progressing  Flowsheets (Taken 1/10/2025 2048)  Free from restraint(s) (restraint for interference with medical device):   ONCE/SHIFT or MINIMUM Every 12 hours: Assess and document the continuing need for restraints   Every 24 hours: Continued use of restraint requires Licensed Independent Practitioner to perform face to face examination and written order   Identify and implement measures to help patient regain control

## 2025-01-11 NOTE — PROGRESS NOTES
"Pulmonary Daily Progress Note   Subjective    Janet Snow is a 79 y.o. year old female patient known with hypothyroidism and anxiety admitted on 1/2/2025 with multilobar pneumonia with progressive hypoxemic respiratory failure requiring mechanical ventilation     Interval History:  Remains on mechanical ventilation, pressure control mode  Weaned to 35% FiO2  Started on high dose steroids yesterday. Duration is 1/10-1/12 and then consider decreasing the dosing.     Meds    Scheduled medications  atovaquone, 750 mg, oral, q12h ADE  docusate sodium, 100 mg, oral, BID  pantoprazole, 40 mg, oral, Daily   Or  esomeprazole, 40 mg, oral, Daily   Or  pantoprazole, 40 mg, intravenous, Daily  [Held by provider] FLUoxetine, 40 mg, oral, Daily  folic acid, 1 mg, oral, Daily  heparin, 5,000 Units, subcutaneous, q8h  insulin lispro, 0-5 Units, subcutaneous, q4h  ipratropium-albuteroL, 3 mL, nebulization, q4h  levothyroxine, 25 mcg, intravenous, q24h ADE  methylPREDNISolone sodium succinate (PF), 125 mg, intravenous, q6h  multivitamin with minerals, 1 tablet, oral, Daily  oxygen, , inhalation, Continuous - Inhalation  PHENobarbitaL, 32.4 mg, oral, TID  piperacillin-tazobactam, 2.25 g, intravenous, q6h  simvastatin, 20 mg, oral, Nightly  sodium chloride, 3 mL, nebulization, q4h  sodium zirconium cyclosilicate, 10 g, oral, q8h  thiamine, 100 mg, oral, Daily    Continuous medications  ketamine, 0-150 mg/hr, Last Rate: 9.1 mg/hr (01/11/25 0800)  propofol, 0-50 mcg/kg/min, Last Rate: 50 mcg/kg/min (01/11/25 0800)    PRN medications  PRN medications: acetaminophen, albuterol, dextrose, dextrose, glucagon, glucagon, HYDROmorphone, lactulose, oxygen, polyethylene glycol     Objective    Blood pressure 113/58, pulse 77, temperature 36.6 °C (97.9 °F), temperature source Axillary, resp. rate 22, height 1.6 m (5' 3\"), weight 77.7 kg (171 lb 4.8 oz), SpO2 100%.   Physical Exam   GENERAL: sedated  NECK: no JVD, midline trachea without " stridor.    LUNGS: clear breath sounds. no wheezing. no crackles or rhonchi  CARDIAC: Regular rate and rhythm  EXTREMITIES: No edema, no varicose veins  NEURO: RASS -3  SKIN: Skin turgor normal. No rashes or lesions.   --------------------------------------------------------------------------------------  Vent Mode: Pressure control/assist control  FiO2 (%):  [40 %-45 %] 45 %  S RR:  [24] 24  S VT:  [3 mL] 3 mL  PEEP/CPAP (cm H2O):  [10 cm H20] 10 cm H20  PIP Set (cm H2O):  [10 cm H2O] 10 cm H2O  MAP (cm H2O):  [14-16] 14  --------------------------------------------------------------------------------------    Intake/Output Summary (Last 24 hours) at 1/11/2025 0903  Last data filed at 1/11/2025 0800  Gross per 24 hour   Intake 912.97 ml   Output 1165 ml   Net -252.03 ml     Labs:   Results from last 72 hours   Lab Units 01/11/25  0450 01/10/25  0545 01/09/25  0509   SODIUM mmol/L 138 139 139  138   POTASSIUM mmol/L 5.4* 4.9 4.3  4.3   CHLORIDE mmol/L 102 105 105  105   CO2 mmol/L 21 22 22  23   BUN mg/dL 130* 105* 77*  73*   CREATININE mg/dL 3.68* 3.59* 3.01*  3.01*   GLUCOSE mg/dL 136* 140* 126*  131*   CALCIUM mg/dL 7.5* 7.6* 7.7*  7.9*   ANION GAP mmol/L 20 17 16  14   EGFR mL/min/1.73m*2 12* 12* 15*  15*   PHOSPHORUS mg/dL 9.7* 8.1* 4.3      Results from last 72 hours   Lab Units 01/11/25  0450 01/10/25  0859 01/10/25  0545 01/09/25  0509   WBC AUTO x10*3/uL 26.6* 24.5* 25.8* 16.7*   HEMOGLOBIN g/dL 8.0* 7.5* 7.6* 7.5*   HEMATOCRIT % 24.4* 22.8* 23.4* 22.2*   PLATELETS AUTO x10*3/uL 582* 556* 603* 535*   NEUTROS PCT AUTO %  --   --   --  84.5   LYMPHO PCT MAN % 4.0  --  4.0  --    LYMPHS PCT AUTO %  --   --   --  4.2   MONO PCT MAN % 3.0  --  3.0  --    MONOS PCT AUTO %  --   --   --  7.2   EOSINO PCT MAN % 0.0  --  0.0  --    EOS PCT AUTO %  --   --   --  0.0      Results from last 72 hours   Lab Units 01/11/25  0556 01/09/25  1219 01/09/25  0749   POCT PH, ARTERIAL pH 7.24* 7.35* 7.38   POCT PCO2,  ARTERIAL mm Hg 48* 40 38   POCT PO2, ARTERIAL mm Hg 114* 120* 134*   POCT SO2, ARTERIAL % 99 100 99     Micro/ID:   Lab Results   Component Value Date    URINECULTURE No growth 01/02/2025    BLOODCULT No growth at 4 days -  FINAL REPORT 01/02/2025    BLOODCULT No growth at 4 days -  FINAL REPORT 01/02/2025     Summary of key imaging results from the last 24 hours  Chest x-ray continues to show bilateral infiltrates in the nonspecific pattern involving the interstitium as well as alveolar spaces.  ET tube is in good position    Impression   Janet Snow is a 79 y.o. year old female patient is being seen by the pulmonary service for   Acute hypoxic respiratory failure with multilobar infiltrates.  ARDS with improving oxygen needs.  The working diagnosis is that of an infectious pneumonia that precipitated this lung injury. A pathogen has not been identified   BAL without any growth.  All the viral PCR samples were negative  BAL cytology was also negative.  Speaking against a fungal infection  Increased p-ANCA, there has been a gradual drop in hemoglobin as well raising the possibility of occult alveolar hemorrhage from vasculitis.  Though she remains on steroids, the current dose is insufficient  KALEB - non-oliguric. Auto-diuresing.  Not clear if induced by diuresis or related to vasculitis     Recommendations   As follows:    continue methylprednisolone to 125 mg q 6 hr for the next 3 days (1/10-1/12) - then reassess  No change in antibiotics  Concur fully with the ICU management    Suman Frias MD   01/11/25 at 9:03 AM     Disclaimer: Documentation completed with the information available at the time of input. Parts of this note may have been scribed or generated using voice dictation software, Dragon.  Homophonic errors may exist.  Please contact me directly if clarification is needed. The times in the chart may not be reflective of actual patient care times, interventions, or procedures. Documentation  occurs after the physical care of the patient.

## 2025-01-11 NOTE — PROGRESS NOTES
Palliative Care Progress Note    Date of Admission: 1/2/2025    Patient is a 79 y.o. female admitted with Community acquired pneumonia of both upper lobes. No changes overnight. Remains intubated and sedated, now on ketamine in addition to propofol. Daughter present at the bedside. On day 5 of mechanical ventilation.    Mental/Cognitive Status: sedated    Respiratory Status: intubated    Pain Assessment: pt not responsive    Pertinent Symptoms: intermittent episodes hypertension, agitation with sedation holiday and repositioning    Diet/Nutrition: TF started     Bowel Regimen: prn miralax    Patient's current condition/Anticipated Prognosis: guarded.  Family/Healthcare Proxy involvement: children.    Scheduled medications  atovaquone, 750 mg, oral, q12h ADE  docusate sodium, 100 mg, oral, BID  pantoprazole, 40 mg, oral, Daily   Or  esomeprazole, 40 mg, oral, Daily   Or  pantoprazole, 40 mg, intravenous, Daily  [Held by provider] FLUoxetine, 40 mg, oral, Daily  folic acid, 1 mg, oral, Daily  heparin, 5,000 Units, subcutaneous, q8h  insulin lispro, 0-5 Units, subcutaneous, q4h  ipratropium-albuteroL, 3 mL, nebulization, q4h  levothyroxine, 25 mcg, intravenous, q24h ADE  methylPREDNISolone sodium succinate (PF), 125 mg, intravenous, q6h  oxygen, , inhalation, Continuous - Inhalation  PHENobarbitaL, 32.4 mg, oral, TID  piperacillin-tazobactam, 2.25 g, intravenous, q6h  simvastatin, 20 mg, oral, Nightly  sodium bicarbonate, 650 mg, oral, BID  sodium chloride, 3 mL, nebulization, q4h  sodium zirconium cyclosilicate, 10 g, oral, q8h  thiamine, 100 mg, oral, Daily  vitamin B complex-vitamin C-folic acid, 5 mL, oral, Daily      Continuous medications  ketamine, 0-150 mg/hr, Last Rate: 11.4 mg/hr (01/11/25 1300)  propofol, 0-50 mcg/kg/min, Last Rate: 50 mcg/kg/min (01/11/25 1300)      PRN medications  PRN medications: acetaminophen, albuterol, dextrose, dextrose, glucagon, glucagon, HYDROmorphone, lactulose, oxygen,  polyethylene glycol     Results for orders placed or performed during the hospital encounter of 01/02/25 (from the past 24 hours)   POCT GLUCOSE   Result Value Ref Range    POCT Glucose 126 (H) 74 - 99 mg/dL   POCT GLUCOSE   Result Value Ref Range    POCT Glucose 108 (H) 74 - 99 mg/dL   POCT GLUCOSE   Result Value Ref Range    POCT Glucose 122 (H) 74 - 99 mg/dL   POCT GLUCOSE   Result Value Ref Range    POCT Glucose 144 (H) 74 - 99 mg/dL   CBC and Auto Differential   Result Value Ref Range    WBC 26.6 (H) 4.4 - 11.3 x10*3/uL    nRBC 0.1 (H) 0.0 - 0.0 /100 WBCs    RBC 2.64 (L) 4.00 - 5.20 x10*6/uL    Hemoglobin 8.0 (L) 12.0 - 16.0 g/dL    Hematocrit 24.4 (L) 36.0 - 46.0 %    MCV 92 80 - 100 fL    MCH 30.3 26.0 - 34.0 pg    MCHC 32.8 32.0 - 36.0 g/dL    RDW 14.2 11.5 - 14.5 %    Platelets 582 (H) 150 - 450 x10*3/uL    Immature Granulocytes %, Automated 5.3 (H) 0.0 - 0.9 %    Immature Granulocytes Absolute, Automated 1.42 (H) 0.00 - 0.50 x10*3/uL   Magnesium   Result Value Ref Range    Magnesium 2.62 (H) 1.60 - 2.40 mg/dL   Renal function panel   Result Value Ref Range    Glucose 136 (H) 74 - 99 mg/dL    Sodium 138 136 - 145 mmol/L    Potassium 5.4 (H) 3.5 - 5.3 mmol/L    Chloride 102 98 - 107 mmol/L    Bicarbonate 21 21 - 32 mmol/L    Anion Gap 20 10 - 20 mmol/L    Urea Nitrogen 130 (HH) 6 - 23 mg/dL    Creatinine 3.68 (H) 0.50 - 1.05 mg/dL    eGFR 12 (L) >60 mL/min/1.73m*2    Calcium 7.5 (L) 8.6 - 10.3 mg/dL    Phosphorus 9.7 (H) 2.5 - 4.9 mg/dL    Albumin 2.7 (L) 3.4 - 5.0 g/dL   Manual Differential   Result Value Ref Range    Neutrophils %, Manual 88.0 40.0 - 80.0 %    Lymphocytes %, Manual 4.0 13.0 - 44.0 %    Monocytes %, Manual 3.0 2.0 - 10.0 %    Eosinophils %, Manual 0.0 0.0 - 6.0 %    Basophils %, Manual 0.0 0.0 - 2.0 %    Metamyelocytes %, Manual 3.0 0.0 - 0.0 %    Myelocytes %, Manual 2.0 0.0 - 0.0 %    Seg Neutrophils Absolute, Manual 23.41 (H) 1.60 - 5.00 x10*3/uL    Lymphocytes Absolute, Manual 1.06 0.80  - 3.00 x10*3/uL    Monocytes Absolute, Manual 0.80 0.05 - 0.80 x10*3/uL    Eosinophils Absolute, Manual 0.00 0.00 - 0.40 x10*3/uL    Basophils Absolute, Manual 0.00 0.00 - 0.10 x10*3/uL    Metamyelocytes Absolute, Manual 0.80 0.00 - 0.00 x10*3/uL    Myelocytes Absolute, Manual 0.53 0.00 - 0.00 x10*3/uL    Total Cells Counted 100     RBC Morphology See Below     Polychromasia Mild     Jamar Cells Few    Blood Gas Arterial Full Panel   Result Value Ref Range    POCT pH, Arterial 7.24 (LL) 7.38 - 7.42 pH    POCT pCO2, Arterial 48 (H) 38 - 42 mm Hg    POCT pO2, Arterial 114 (H) 85 - 95 mm Hg    POCT SO2, Arterial 99 94 - 100 %    POCT Oxy Hemoglobin, Arterial 96.9 94.0 - 98.0 %    POCT Hematocrit Calculated, Arterial 25.0 (L) 36.0 - 46.0 %    POCT Sodium, Arterial 134 (L) 136 - 145 mmol/L    POCT Potassium, Arterial 5.5 (H) 3.5 - 5.3 mmol/L    POCT Chloride, Arterial 105 98 - 107 mmol/L    POCT Ionized Calcium, Arterial 1.07 (L) 1.10 - 1.33 mmol/L    POCT Glucose, Arterial 145 (H) 74 - 99 mg/dL    POCT Lactate, Arterial 0.9 0.4 - 2.0 mmol/L    POCT Base Excess, Arterial -6.5 (L) -2.0 - 3.0 mmol/L    POCT HCO3 Calculated, Arterial 20.6 (L) 22.0 - 26.0 mmol/L    POCT Hemoglobin, Arterial 8.2 (L) 12.0 - 16.0 g/dL    POCT Anion Gap, Arterial 14 10 - 25 mmo/L    Patient Temperature 37.0 degrees Celsius    FiO2 45 %    Apparatus      Ventilator Mode Other     Ventilator Rate 24 bpm    PC Pressure Control 10.0 cm H2O    Peep CHM2O 10.0 cm H2O    Critical Called By JESSICA     Critical Called To [facility]     Critical Call Time 608     Critical Read Back Y     Critical Note PH     Site of Arterial Puncture Arterial Line    POCT GLUCOSE   Result Value Ref Range    POCT Glucose 141 (H) 74 - 99 mg/dL   POCT GLUCOSE   Result Value Ref Range    POCT Glucose 147 (H) 74 - 99 mg/dL        XR chest 1 view    Result Date: 1/10/2025  Interpreted By:  Sherly Fiore, STUDY: XR CHEST 1 VIEW 1/10/2025 10:42 am   INDICATION:  Signs/Symptoms:acute hypoxemic respiratory failure, assess lung fields   COMPARISON: 01/09/2025   ACCESSION NUMBER(S): [id number]   ORDERING CLINICIAN: JORY RIDDLE   TECHNIQUE: Single AP view chest   FINDINGS: Examination rotated accentuating the cardiac silhouette. Endotracheal tube tip identified 3.6 cm from the noah. NG tube is in place. There is generalized increased interstitial prominence in bilateral lung fields with alveolar airspace infiltrate with alveolar component of pulmonary edema suggested as opposed to postinfectious etiology. Correlate with patient's history.             1. Stable lines and tubes   2. Persistent patchy alveolar airspace infiltrate demonstrated within bilateral lung fields stable. No dense airspace consolidation.   Signed by: Sherly Fiore 1/10/2025 1:06 PM Dictation workstation:   [id number]    XR abdomen 1 view    Result Date: 1/9/2025  Interpreted By:  Jaxon Morales, STUDY: XR ABDOMEN 1 VIEW; 1/9/2025 9:31 am   INDICATION: Signs/Symptoms:c/f ileus.   COMPARISON: 01/08/2025   ACCESSION NUMBER(S): [id number]   ORDERING CLINICIAN: JORY RIDDLE   TECHNIQUE: KUB, portable, one view   FINDINGS: Motion artifact obscures the bowel-gas pattern significantly. A nasogastric tube extends into the epigastrium with the tip overlying the mid abdomen.       Exam is limited due to marked motion artifact obscuring the bowel gas pattern. Nasogastric tube extends into the stomach.   MACRO: none   Signed by: Jaxon Morales 1/9/2025 10:05 AM Dictation workstation:   [id number]    XR chest 1 view    Result Date: 1/9/2025  Interpreted By:  Stefany Edwards, STUDY: XR CHEST 1 VIEW 1/9/2025 6:30 am   INDICATION: Signs/Symptoms:persistent hypoxia, unable to wean   COMPARISON: 01/08/2025   ACCESSION NUMBER(S): [id number]   ORDERING CLINICIAN: NORM LANDAVERDE   TECHNIQUE: AP erect view of the chest at bedside   FINDINGS: The tip of the endotracheal tube is located 2 cm above noah with nasogastric  tube descending below diaphragm. The cardiac size is borderline enlarged. When compared with yesterday's study, the infiltrate throughout the right lung is unchanged. Infiltrate within the left lung appears slightly improved. No pleural abnormality is seen.       Stable diffuse infiltrate throughout right lung with some mild improvement in the diffuse infiltrate seen in left lung.   Signed by: Stefany Edwards 1/9/2025 8:23 AM Dictation workstation:   [id number]    XR chest abdomen for OG NG placement    Result Date: 1/8/2025  Interpreted By:  Finkelstein, Evan, STUDY: XR CHEST ABDOMEN FOR OG NG PLACEMENT;  1/8/2025 2:41 am two views of the chest.   INDICATION: Signs/Symptoms:OG tube placement.   COMPARISON: None.   ACCESSION NUMBER(S): [id number]   ORDERING CLINICIAN: AMANDA SÁNCHEZ   FINDINGS: Endotracheal tube present with tip approximately 2.4 cm above the noah. OG tube courses below the diaphragm, with tip extending all of the way into the lower pelvis, beyond the field of view. Extensive patchy airspace opacities throughout the lungs. No sizable pleural effusion or pneumothorax. No acute osseous abnormality.       OG tube courses below the diaphragm with tip extending into the pelvis beyond the field of view. Endotracheal tube tip lies 2.4 cm above the noah. Redemonstrated extensive patchy airspace opacities throughout the lungs concerning for multifocal pneumonia.     MACRO: None.   Signed by: Evan Finkelstein 1/8/2025 3:04 AM Dictation workstation:   [id number]    XR chest 1 view    Result Date: 1/7/2025  Interpreted By:  Isidro Corrales, STUDY: XR CHEST 1 VIEW;  1/7/2025 10:48 pm   INDICATION: Signs/Symptoms:OG tube.   COMPARISON: Chest x-ray 01/07/2025   ACCESSION NUMBER(S): [id number]   ORDERING CLINICIAN: AMANDA SÁNCHEZ   FINDINGS: Enteric tube terminates in the left mid abdomen with side hole below the GE junction, likely within the distal gastric body. Multiple overlying leads are present.    CARDIOMEDIASTINAL SILHOUETTE: Cardiomediastinal silhouette is stable in size and configuration.   LUNGS: Lung apices are excluded from the field of view. There is diffuse bilateral airspace opacities not significantly changed from prior exam.   ABDOMEN: No remarkable upper abdominal findings.   BONES: Multilevel degenerative changes of the spine.       Enteric tube terminates in the left mid abdomen with side hole below the GE junction.   Diffuse bilateral airspace opacities concerning for developing multifocal pneumonia. Continued radiographic follow-up to resolution is advised.   MACRO: None   Signed by: Isidro Corrales 1/7/2025 10:52 PM Dictation workstation:   [id number]    Bronchoscopy    Result Date: 1/7/2025  Table formatting from the original result was not included. Impression The left main stem, left upper lobar bronchus, lingular lobar bronchus, left lower lobar bronchus, right main stem, right upper lobar bronchus, bronchus intermedius, right middle lobar bronchus and right lower lobar bronchus appeared normal. Bronchoalveolar lavage was performed x1 in the RML and the fluid appeared cloudy Findings The left main stem, left upper lobar bronchus, lingular lobar bronchus, left lower lobar bronchus, right main stem, right upper lobar bronchus, bronchus intermedius, right middle lobar bronchus and right lower lobar bronchus appeared normal. Bronchoalveolar lavage was performed x1 in the RML with 140 mL of saline instilled and a total return of 90 mL. The fluid appeared cloudy. Recommendation There is no recommended follow-up for this procedure. Indication ARDS (adult respiratory distress syndrome) (Multi) Staff Staff Role No Staff Documented Medications See Anesthesia Record. Preprocedure A history and physical has been performed, and patient medication allergies have been reviewed. The patient's tolerance of previous anesthesia has been reviewed. The risks and benefits of the procedure and the sedation  options and risks were discussed with the  daughter (medical POA) and son (financial POA) . All questions were answered and informed consent obtained. Details of the Procedure The patient was already under sedation prior to the procedure. The patient's blood pressure, respirations, heart rate, oxygen and ECG were monitored throughout the procedure. The patient experienced no blood loss. The scope was introduced through the endotracheal tube. The procedure was not difficult. The patient tolerated the procedure well. There were no apparent adverse events. Events Procedure Events Event Event Time Specimens * Cannot find log * BAL taken from RML 140cc instilled, retunr 90cc of white cloudy fluid. Mucosa appeared normal and non friable. Some mucous secretions suctioned away. Procedure Location [address] [address] [facility] [address] [phone] Referring Provider Laura Maloney MD Procedure Provider Laura PERALTA MD     XR chest 1 view    Result Date: 1/7/2025  Interpreted By:  Elda Dorado, STUDY: XR CHEST 1 VIEW;  1/7/2025 10:40 am   INDICATION: Signs/Symptoms:intubation.   COMPARISON: 01/07/2025 at 5:45 a.m.   ACCESSION NUMBER(S): [id number]   ORDERING CLINICIAN: KENISHA LEE   FINDINGS: Heart is normal in size.   The patient is intubated. The tip terminates above the noah.   There is diffuse parenchymal infiltration.   There are atherosclerotic changes of the aorta. There are degenerative changes of the spine.   COMPARISON OF FINDING: The patient has undergone interval intubation. The lungs are similar.       Diffuse bilateral parenchymal infiltration. Interval intubation.   MACRO: none   Signed by: Elda Dorado 1/7/2025 11:22 AM Dictation workstation:   [id number]    XR chest 1 view    Result Date: 1/7/2025  Interpreted By:  Jaxon Morales, STUDY: XR CHEST 1 VIEW; 1/7/2025 6:35 am   INDICATION: CLINICAL INFORMATION:  Signs/Symptoms:persistent hypoxia, unable to wean from NIV.   COMPARISON: 01/06/2025   ACCESSION NUMBER(S): [id number]   ORDERING CLINICIAN: NORM LANDAVERDE   TECHNIQUE: Portable chest one view.   FINDINGS: The cardiac size is indeterminate in view of the AP projection. There is continued diffuse ground-glass infiltrate bilaterally. No effusions are identified.       Persistent ground-glass infiltrates diffusely bilaterally. Etiology is unclear with differential to include infectious organisms as well as pulmonary edema and ARDS.   MACRO: none   Signed by: Jaxon Morales 1/7/2025 8:20 AM Dictation workstation:   [id number]    ECG 12 lead    Result Date: 1/6/2025  Normal sinus rhythm Nonspecific ST abnormality Prolonged QT Abnormal ECG No previous ECGs available Confirmed by Vicky Corado ([id number]) on 1/6/2025 4:01:46 PM    Transthoracic Echo (TTE) Complete    Result Date: 1/6/2025           [place] [address]            Phone [phone] TRANSTHORACIC ECHOCARDIOGRAM REPORT Patient Name:       ROBBIE SCOTTY ROCHA     Reading Physician:    [id number] Vicky Corado MD Study Date:         1/6/2025             Ordering Provider:    [id number] NORM LANDAVERDE MRN/PID:            [id number]             Fellow: Accession#:         [id number]         Nurse: Date of Birth/Age:  1945 / 79 years Sonographer:          Rosalba Leiva RDCS Gender Assigned at  F                    Additional Staff: Birth: Height:             160.02 cm            Admit Date: Weight:             69.85 kg             Admission Status:     Inpatient -                                                                Routine BSA / BMI:          1.73 m2 / 27.28      Department Location:  [facility]                     kg/m2 Blood  Pressure: 151 /76 mmHg Study Type:    TRANSTHORACIC ECHO (TTE) COMPLETE Diagnosis/ICD: Hypoxemia-R09.02; Acute respiratory failure with hypoxia-J96.01 Indication:    hypoxemia CPT Codes:     Echo Complete w Full [id number] Patient History: Smoker:            Former. BMI:               Overweight 25 - 30 Pertinent History: Resp difficulty, bipap, cough, pna, sciatica, arf, resp                    failure/hypoxia. Study Detail: The following Echo studies were performed: 2D, Doppler, M-Mode and               color flow. Technically challenging study due to prominent lung               artifact, body habitus and patient lying in supine position.               Definity used as a contrast agent for endocardial border               definition. Total contrast used for this procedure was 2 mL via IV               push.  PHYSICIAN INTERPRETATION: Left Ventricle: The left ventricular systolic function is normal, with a visually estimated ejection fraction of 70-75%. There are no regional wall motion abnormalities. The left ventricular cavity size is normal. There is normal septal thickness. Spectral Doppler shows a normal pattern of left ventricular diastolic filling. Left Atrium: The left atrium is normal in size. Right Ventricle: The right ventricle is normal in size. There is normal right ventricular global systolic function. Right Atrium: The right atrium is normal in size. Aortic Valve: The aortic valve is trileaflet. The aortic valve dimensionless index is 0.74. There is no evidence of aortic valve regurgitation. The peak instantaneous gradient of the aortic valve is 22 mmHg. The mean gradient of the aortic valve is 11 mmHg. Mitral Valve: The mitral valve is normal in structure. There is no evidence of mitral valve regurgitation. Tricuspid Valve: The tricuspid valve is structurally normal. There is mild tricuspid regurgitation. Pulmonic Valve: The pulmonic valve is structurally normal. There is physiologic pulmonic  valve regurgitation. Pericardium: Small pericardial effusion. Aorta: The aortic root is normal. Systemic Veins: The inferior vena cava appears normal in size, with IVC inspiratory collapse greater than 50%.  CONCLUSIONS:  1. The left ventricular systolic function is normal, with a visually estimated ejection fraction of 70-75%.  2. Spectral Doppler shows a normal pattern of left ventricular diastolic filling.  3. There is normal right ventricular global systolic function.  4. No evidence of mitral valve regurgitation.  5. Mild tricuspid regurgitation is visualized. QUANTITATIVE DATA SUMMARY:  2D MEASUREMENTS:           Normal Ranges: LAs:             2.60 cm   (2.7-4.0cm) IVSd:            0.68 cm   (0.6-1.1cm) LVPWd:           0.53 cm   (0.6-1.1cm) LVIDd:           5.32 cm   (3.9-5.9cm) LV Mass Index:   62.2 g/m2  LV SYSTOLIC FUNCTION BY 2D PLANIMETRY (MOD):                      Normal Ranges: EF-A4C View:    60 % (>=55%) EF-A2C View:    66 % EF-Biplane:     65 % EF-Visual:      73 % LV EF Reported: 73 %  LV DIASTOLIC FUNCTION:           Normal Ranges: MV Peak E:             0.94 m/s  (0.7-1.2 m/s) MV Peak A:             1.04 m/s  (0.42-0.7 m/s) E/A Ratio:             0.91      (1.0-2.2) MV e'                  0.078 m/s (>8.0) MV lateral e'          0.08 m/s MV medial e'           0.07 m/s E/e' Ratio:            12.07     (<8.0) a'                     0.08 m/s  MITRAL VALVE:          Normal Ranges: MV DT:        186 msec (150-240msec)  AORTIC VALVE:                      Normal Ranges: AoV Vmax:                2.35 m/s  (<=1.7m/s) AoV Peak P.1 mmHg (<20mmHg) AoV Mean P.0 mmHg (1.7-11.5mmHg) LVOT Max Cj:            1.95 m/s  (<=1.1m/s) AoV VTI:                 43.90 cm  (18-25cm) LVOT VTI:                32.60 cm LVOT Diameter:           1.90 cm   (1.8-2.4cm) AoV Area, VTI:           2.11 cm2  (2.5-5.5cm2) AoV Area,Vmax:           2.35 cm2  (2.5-4.5cm2) AoV Dimensionless Index: 0.74   RIGHT VENTRICLE: RV s' 0.21 m/s  TRICUSPID VALVE/RVSP:          Normal Ranges: Peak TR Velocity:     3.56 m/s RV Syst Pressure:     59 mmHg  (< 30mmHg) IVC Diam:             1.16 cm  PULMONIC VALVE:          Normal Ranges: PV Accel Time:  124 msec (>120ms) PV Max Cj:     1.3 m/s  (0.6-0.9m/s) PV Max P.9 mmHg  [id number] Vicky Corado MD Electronically signed on 2025 at 3:30:16 PM  ** Final **     XR chest 1 view    Result Date: 2025  Interpreted By:  Jaxon Morales, STUDY: XR CHEST 1 VIEW; 2025 5:38 am   INDICATION: CLINICAL INFORMATION: Signs/Symptoms:Pneumonia follow-up. Previous abnormal chest radiograph. Shortness of breath.   COMPARISON: 2025   ACCESSION NUMBER(S): [id number]   ORDERING CLINICIAN: AMANDA SÁNCHEZ   TECHNIQUE: Portable chest one view.   FINDINGS: The cardiac size is indeterminate in view of the AP projection. Diffuse ground-glass infiltrates are again identified, similar compared to the previous study. No effusions are appreciated.       Diffuse ground-glass infiltrates. There is no interval change when compared to the previous examination.   MACRO: none   Signed by: Jaxon Morales 2025 8:29 AM Dictation workstation:   [id number]    CT angio chest for pulmonary embolism    Result Date: 2025  Interpreted By:  Selin Matthews, STUDY: CT ANGIO CHEST FOR PULMONARY EMBOLISM;  2025 4:10 pm   INDICATION: Signs/Symptoms:severe hypoxia   COMPARISON: Chest x-ray 2025. CT chest 2024   ACCESSION NUMBER(S): [id number]   ORDERING CLINICIAN: NORM LANDAVERDE   TECHNIQUE: Helical data acquisition of the chest was obtained following the uneventful administration of intravenous contrast material. Images were reformatted in axial, coronal, and sagittal planes. MIP images were created and reviewed.   FINDINGS: POTENTIAL LIMITATIONS OF THE STUDY: Motion artifact.   HEART AND VESSELS: The evaluation for filling defects at the pulmonary arteries is degraded by motion  artifact. No large central filling defects to suggest pulmonary embolism. The smaller segmental/subsegmental pulmonary arteries are not well evaluated on this exam.   No thoracic aortic aneurysm. Mild atherosclerotic calcifications are noted at the thoracic aorta.   The heart is mildly enlarged. There is trace pericardial effusion.   MEDIASTINUM AND MANPREET, LOWER NECK AND AXILLA: The visualized thyroid gland is small.   A right paratracheal lymph node measures 10 mm in short axis. A subcarinal lymph node measures 12 mm in short axis. A right hilar lymph node measures 19 mm in short axis. A left hilar lymph node measures 11 mm in short axis. Calcified lymph nodes are noted at the mediastinum and left hilum.   LUNGS AND AIRWAYS: The trachea and central airways are patent.   There are diffuse bilateral ground-glass opacities. No pleural effusion or pneumothorax.   UPPER ABDOMEN: Calcific foci at the spleen are suggestive of granulomas.   CHEST WALL AND OSSEOUS STRUCTURES: Multilevel degenerative changes at the spine. Redemonstration of remote compression deformity with Schmorl's node at the superior endplate of L1.       1. Study degraded by motion artifact. No evidence of large central pulmonary emboli. The smaller segmental/subsegmental pulmonary arteries are not well evaluated on this exam. 2. Diffuse bilateral ground-glass opacities, may be secondary to pulmonary edema and/or multifocal pneumonia. Acute respiratory distress syndrome is in the differential diagnoses. 3. Mild cardiomegaly. Trace pericardial effusion. 4. Mild mediastinal and hilar adenopathy.     MACRO: None.   Signed by: Selin Matthews 1/5/2025 6:42 PM Dictation workstation:   [id number]    US renal complete    Result Date: 1/5/2025  Interpreted By:  Jaxon Morales, STUDY: US RENAL COMPLETE; 1/5/2025 10:29 am   INDICATION: Signs/Symptoms:nancy.   COMPARISON: None   ACCESSION NUMBER(S): [id number]   ORDERING CLINICIAN: NORM LANDAVERDE   TECHNIQUE:  Grayscale and color Doppler imaging of the kidneys   FINDINGS: The right kidney measures 11.0 cm  . The left kidney measures 11.0 cm  .   There is mild increased echogenicity of the renal cortex bilaterally.     No renal stones are identified.  Renal cortex is normal in thickness bilaterally. No hydronephrosis is identified.   Urinary bladder is nonspecific in appearance with no stones or masses identified.       Mild increased renal cortical echogenicity diffusely bilaterally suggesting chronic renal medical disease.   MACRO: none   Signed by: Jaxon Morales 1/5/2025 12:45 PM Dictation workstation:   [id number]    XR chest 1 view    Result Date: 1/5/2025  Interpreted By:  Sincere Slater, STUDY: XR CHEST 1 VIEW;  1/5/2025 8:48 am   INDICATION: Signs/Symptoms:respiratory distress acute.   COMPARISON: 01/02/2025   ACCESSION NUMBER(S): [id number]   ORDERING CLINICIAN: RAMANDEEP ALLRED   FINDINGS: Diffusely increased bilateral airspace consolidation. No visualized pleural effusion. Cardiomediastinal silhouette unchanged. Aortic atherosclerosis. Thoracic degenerative changes with dextroscoliosis.       Diffusely increased bilateral airspace consolidation.   MACRO: None   Signed by: Sincere Slater 1/5/2025 10:08 AM Dictation workstation:   [id number]    XR chest 2 views    Result Date: 1/2/2025  Interpreted By:  Jaxon Morales, STUDY: XR CHEST 2 VIEWS; 1/2/2025 3:05 pm   INDICATION: Signs/Symptoms:fever, productive cough, chest pain.   COMPARISON: 06/14/2022   ACCESSION NUMBER(S): [id number]   ORDERING CLINICIAN: DANIAL COATES   TECHNIQUE: AP and lateral views of the chest were obtained.   FINDINGS: The cardiac silhouette is normal in appearance. There are new patchy bilateral alveolar infiltrates most marked within the upper lobes bilaterally .  No effusions are identified.       Extensive patchy bilateral infiltrates most marked within the upper lobes bilaterally. Findings are suspicious for pneumonia. Follow-up to  assure complete clearing is suggested.   MACRO: none   Signed by: Jaxon Morales 1/2/2025 3:43 PM Dictation workstation:   [id number]       Visit Vitals  /58   Pulse 78   Temp 36.4 °C (97.5 °F) (Axillary)   Resp 26         Physical Exam  Vitals and nursing note reviewed.   Constitutional:       General: She is not in acute distress.     Appearance: She is ill-appearing.   HENT:      Mouth/Throat:      Comments: ETT  Eyes:      Pupils: Pupils are equal, round, and reactive to light.   Cardiovascular:      Rate and Rhythm: Normal rate and regular rhythm.   Pulmonary:      Effort: Pulmonary effort is normal.      Breath sounds: Normal breath sounds.      Comments: On vent 45% FiO2 with PEEP 10  Abdominal:      General: There is no distension.      Palpations: Abdomen is soft.   Musculoskeletal:      Right lower leg: No edema.      Left lower leg: No edema.   Skin:     General: Skin is warm and dry.      Coloration: Skin is pale.      Findings: Bruising present.   Neurological:      Comments: Intubated and sedated on ketamine and propofol. Not responsive to verbal or tactile stimuli          Assessment/Plan   IMP:    CAP - on Zosyn per ID and Mepron per pulm  Acute respiratory failure with hypoxia - remains intubated. ? Of Vasculitis/DAH vs. Atypical pneumonia vs. Acute inflammatory process. On IV steroids  Sepsis - all cultures NGTD. 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 Still having decent UO, but creatinine progressively worsening. Nephrology following. Family declined renal biopsy and dialysis.  Leukocytosis - continues to trend up. ID is following    Palliative Care Encounter  DNR-CCA  Not capable  Daughter Beverly is stated HPOA. Pt also has a son Valeriy.    1/11  Daughter present at the bedside today. Reviewed course. She again reconfirmed previous goals of care. Provided updates. She had questions about extubation and how that would look. I explained that is dependent on  SBTs. If continues to fail, would then be proceeding with palliative/terminal extubation on day 7 per pt wishes. We will follow.    1/10  Daughter Beverly present at bedside.  Updates given about the patient.  She did reiterate that the family did not want dialysis even if it was temporary. The patient getting tube feeds today. The daughter states that she does not have any questions at this time. States she will likely have some in a few days if the patient's condition does not improve.     1/9  Daughter Beverly at bedside, UO declining, creatinine mildly worsened, NG currently to LIWS, starting laxatives and trickle tube feeds today to stimulate gut. Diuretics on hold.  Currently stable on vent, better with addition of ketamine and adjustment of vent settings for comfort.  Discussed with attending service and daughter, continue aggressive treatment model of care for specified 7 days currently day 3 on ventilator, no dialysis.     1/8/2025  Discussion with mae Paul at bedside. The patient had a hard time being sedated. Beverly did ask for spiritual care now, I did put in a consult. States she is agreeable to her mother's wishes. We will continue to follow.      1/7/2025  Goals of care discussion with mae Paul.  The daughter stated that she is aware of her mom's wishes.  States that her mom would only want to be intubated for 1 week.  This is congruent with discussions with the ICU team.  I stated that the palliative care team will be in close follow-up with the family to walk them through this decision making.  We will continue to follow.      Bev Escalera, APRN-CNP

## 2025-01-12 VITALS
BODY MASS INDEX: 30.86 KG/M2 | RESPIRATION RATE: 32 BRPM | WEIGHT: 174.16 LBS | TEMPERATURE: 97.9 F | DIASTOLIC BLOOD PRESSURE: 72 MMHG | OXYGEN SATURATION: 92 % | HEIGHT: 63 IN | HEART RATE: 89 BPM | SYSTOLIC BLOOD PRESSURE: 186 MMHG

## 2025-01-12 LAB
ABO GROUP (TYPE) IN BLOOD: NORMAL
ABO GROUP (TYPE) IN BLOOD: NORMAL
ALBUMIN SERPL BCP-MCNC: 2.5 G/DL (ref 3.4–5)
ALBUMIN SERPL BCP-MCNC: 2.8 G/DL (ref 3.4–5)
ANION GAP BLDA CALCULATED.4IONS-SCNC: 14 MMO/L (ref 10–25)
ANION GAP BLDA CALCULATED.4IONS-SCNC: 16 MMO/L (ref 10–25)
ANION GAP BLDA CALCULATED.4IONS-SCNC: 17 MMO/L (ref 10–25)
ANION GAP BLDA CALCULATED.4IONS-SCNC: 17 MMO/L (ref 10–25)
ANION GAP SERPL CALCULATED.3IONS-SCNC: 21 MMOL/L (ref 10–20)
ANION GAP SERPL CALCULATED.3IONS-SCNC: 23 MMOL/L (ref 10–20)
ANTIBODY SCREEN: NORMAL
APPARATUS: ABNORMAL
APPARATUS: ABNORMAL
ARTERIAL PATENCY WRIST A: NEGATIVE
BASE EXCESS BLDA CALC-SCNC: -1.8 MMOL/L (ref -2–3)
BASE EXCESS BLDA CALC-SCNC: -2.4 MMOL/L (ref -2–3)
BASE EXCESS BLDA CALC-SCNC: -4.8 MMOL/L (ref -2–3)
BASE EXCESS BLDA CALC-SCNC: -6.9 MMOL/L (ref -2–3)
BASOPHILS # BLD AUTO: 0.05 X10*3/UL (ref 0–0.1)
BASOPHILS NFR BLD AUTO: 0.2 %
BODY TEMPERATURE: 37 DEGREES CELSIUS
BUN SERPL-MCNC: 162 MG/DL (ref 6–23)
BUN SERPL-MCNC: 171 MG/DL (ref 6–23)
CA-I BLDA-SCNC: 1.04 MMOL/L (ref 1.1–1.33)
CA-I BLDA-SCNC: 1.06 MMOL/L (ref 1.1–1.33)
CA-I BLDA-SCNC: 1.07 MMOL/L (ref 1.1–1.33)
CA-I BLDA-SCNC: 1.08 MMOL/L (ref 1.1–1.33)
CALCIUM SERPL-MCNC: 7.4 MG/DL (ref 8.6–10.3)
CALCIUM SERPL-MCNC: 8 MG/DL (ref 8.6–10.3)
CHLORIDE BLDA-SCNC: 104 MMOL/L (ref 98–107)
CHLORIDE BLDA-SCNC: 107 MMOL/L (ref 98–107)
CHLORIDE SERPL-SCNC: 101 MMOL/L (ref 98–107)
CHLORIDE SERPL-SCNC: 102 MMOL/L (ref 98–107)
CO2 SERPL-SCNC: 22 MMOL/L (ref 21–32)
CO2 SERPL-SCNC: 22 MMOL/L (ref 21–32)
CREAT SERPL-MCNC: 4.05 MG/DL (ref 0.5–1.05)
CREAT SERPL-MCNC: 4.09 MG/DL (ref 0.5–1.05)
EGFRCR SERPLBLD CKD-EPI 2021: 11 ML/MIN/1.73M*2
EGFRCR SERPLBLD CKD-EPI 2021: 11 ML/MIN/1.73M*2
EOSINOPHIL # BLD AUTO: 0 X10*3/UL (ref 0–0.4)
EOSINOPHIL NFR BLD AUTO: 0 %
ERYTHROCYTE [DISTWIDTH] IN BLOOD BY AUTOMATED COUNT: 14.4 % (ref 11.5–14.5)
GLUCOSE BLD MANUAL STRIP-MCNC: 119 MG/DL (ref 74–99)
GLUCOSE BLD MANUAL STRIP-MCNC: 136 MG/DL (ref 74–99)
GLUCOSE BLD MANUAL STRIP-MCNC: 140 MG/DL (ref 74–99)
GLUCOSE BLD MANUAL STRIP-MCNC: 146 MG/DL (ref 74–99)
GLUCOSE BLD MANUAL STRIP-MCNC: 149 MG/DL (ref 74–99)
GLUCOSE BLDA-MCNC: 119 MG/DL (ref 74–99)
GLUCOSE BLDA-MCNC: 127 MG/DL (ref 74–99)
GLUCOSE BLDA-MCNC: 131 MG/DL (ref 74–99)
GLUCOSE BLDA-MCNC: 135 MG/DL (ref 74–99)
GLUCOSE SERPL-MCNC: 140 MG/DL (ref 74–99)
GLUCOSE SERPL-MCNC: 144 MG/DL (ref 74–99)
HCO3 BLDA-SCNC: 19.3 MMOL/L (ref 22–26)
HCO3 BLDA-SCNC: 20.5 MMOL/L (ref 22–26)
HCO3 BLDA-SCNC: 22.5 MMOL/L (ref 22–26)
HCO3 BLDA-SCNC: 23.1 MMOL/L (ref 22–26)
HCT VFR BLD AUTO: 21.5 % (ref 36–46)
HCT VFR BLD AUTO: 22.7 % (ref 36–46)
HCT VFR BLD EST: 20 % (ref 36–46)
HCT VFR BLD EST: 25 % (ref 36–46)
HCT VFR BLD EST: 25 % (ref 36–46)
HCT VFR BLD EST: 26 % (ref 36–46)
HGB BLD-MCNC: 7.1 G/DL (ref 12–16)
HGB BLD-MCNC: 7.7 G/DL (ref 12–16)
HGB BLDA-MCNC: 6.8 G/DL (ref 12–16)
HGB BLDA-MCNC: 8.3 G/DL (ref 12–16)
HGB BLDA-MCNC: 8.3 G/DL (ref 12–16)
HGB BLDA-MCNC: 8.5 G/DL (ref 12–16)
IMM GRANULOCYTES # BLD AUTO: 1.3 X10*3/UL (ref 0–0.5)
IMM GRANULOCYTES NFR BLD AUTO: 5 % (ref 0–0.9)
INHALED O2 CONCENTRATION: 35 %
LACTATE BLDA-SCNC: 0.9 MMOL/L (ref 0.4–2)
LACTATE BLDA-SCNC: 1.1 MMOL/L (ref 0.4–2)
LACTATE BLDA-SCNC: 1.2 MMOL/L (ref 0.4–2)
LACTATE BLDA-SCNC: 1.2 MMOL/L (ref 0.4–2)
LYMPHOCYTES # BLD AUTO: 0.69 X10*3/UL (ref 0.8–3)
LYMPHOCYTES NFR BLD AUTO: 2.6 %
MAGNESIUM SERPL-MCNC: 2.43 MG/DL (ref 1.6–2.4)
MAGNESIUM SERPL-MCNC: 2.48 MG/DL (ref 1.6–2.4)
MCH RBC QN AUTO: 30.2 PG (ref 26–34)
MCHC RBC AUTO-ENTMCNC: 33 G/DL (ref 32–36)
MCV RBC AUTO: 92 FL (ref 80–100)
MONOCYTES # BLD AUTO: 1.14 X10*3/UL (ref 0.05–0.8)
MONOCYTES NFR BLD AUTO: 4.4 %
NEUTROPHILS # BLD AUTO: 22.98 X10*3/UL (ref 1.6–5.5)
NEUTROPHILS NFR BLD AUTO: 87.8 %
NRBC BLD-RTO: 0.1 /100 WBCS (ref 0–0)
OXYHGB MFR BLDA: 94.2 % (ref 94–98)
OXYHGB MFR BLDA: 94.7 % (ref 94–98)
OXYHGB MFR BLDA: 94.9 % (ref 94–98)
OXYHGB MFR BLDA: 95.6 % (ref 94–98)
PC PRESSURE CONTROL: 10 CM H2O
PC PRESSURE CONTROL: 10 CM H2O
PCO2 BLDA: 38 MM HG (ref 38–42)
PCO2 BLDA: 38 MM HG (ref 38–42)
PCO2 BLDA: 39 MM HG (ref 38–42)
PCO2 BLDA: 41 MM HG (ref 38–42)
PEEP CMH2O: 10 CM H2O
PEEP CMH2O: 8 CM H2O
PH BLDA: 7.28 PH (ref 7.38–7.42)
PH BLDA: 7.34 PH (ref 7.38–7.42)
PH BLDA: 7.38 PH (ref 7.38–7.42)
PH BLDA: 7.38 PH (ref 7.38–7.42)
PHOSPHATE SERPL-MCNC: 9 MG/DL (ref 2.5–4.9)
PHOSPHATE SERPL-MCNC: 9.5 MG/DL (ref 2.5–4.9)
PLATELET # BLD AUTO: 479 X10*3/UL (ref 150–450)
PO2 BLDA: 79 MM HG (ref 85–95)
PO2 BLDA: 80 MM HG (ref 85–95)
PO2 BLDA: 85 MM HG (ref 85–95)
PO2 BLDA: 88 MM HG (ref 85–95)
POTASSIUM BLDA-SCNC: 4.2 MMOL/L (ref 3.5–5.3)
POTASSIUM BLDA-SCNC: 4.2 MMOL/L (ref 3.5–5.3)
POTASSIUM BLDA-SCNC: 4.3 MMOL/L (ref 3.5–5.3)
POTASSIUM BLDA-SCNC: 4.6 MMOL/L (ref 3.5–5.3)
POTASSIUM SERPL-SCNC: 4.1 MMOL/L (ref 3.5–5.3)
POTASSIUM SERPL-SCNC: 4.6 MMOL/L (ref 3.5–5.3)
PRESSURE SUPPORT: 5 CM H2O
RBC # BLD AUTO: 2.35 X10*6/UL (ref 4–5.2)
RH FACTOR (ANTIGEN D): NORMAL
RH FACTOR (ANTIGEN D): NORMAL
SAO2 % BLDA: 96 % (ref 94–100)
SAO2 % BLDA: 97 % (ref 94–100)
SAO2 % BLDA: 97 % (ref 94–100)
SAO2 % BLDA: 98 % (ref 94–100)
SODIUM BLDA-SCNC: 136 MMOL/L (ref 136–145)
SODIUM BLDA-SCNC: 137 MMOL/L (ref 136–145)
SODIUM BLDA-SCNC: 139 MMOL/L (ref 136–145)
SODIUM BLDA-SCNC: 139 MMOL/L (ref 136–145)
SODIUM SERPL-SCNC: 140 MMOL/L (ref 136–145)
SODIUM SERPL-SCNC: 142 MMOL/L (ref 136–145)
SPECIMEN DRAWN FROM PATIENT: ABNORMAL
SPONTANEOUS TIDAL VOLUME: 536 ML
TIDAL VOLUME: 370 ML
TOTAL MINUTE VOLUME: 13.3 LITER
VENTILATOR MODE: ABNORMAL
VENTILATOR RATE: 14 BPM
VENTILATOR RATE: 24 BPM
VENTILATOR RATE: 24 BPM
WBC # BLD AUTO: 26.2 X10*3/UL (ref 4.4–11.3)

## 2025-01-12 PROCEDURE — 85014 HEMATOCRIT: CPT

## 2025-01-12 PROCEDURE — 83735 ASSAY OF MAGNESIUM: CPT

## 2025-01-12 PROCEDURE — 2500000001 HC RX 250 WO HCPCS SELF ADMINISTERED DRUGS (ALT 637 FOR MEDICARE OP)

## 2025-01-12 PROCEDURE — 80069 RENAL FUNCTION PANEL: CPT

## 2025-01-12 PROCEDURE — 2500000004 HC RX 250 GENERAL PHARMACY W/ HCPCS (ALT 636 FOR OP/ED): Performed by: INTERNAL MEDICINE

## 2025-01-12 PROCEDURE — 99231 SBSQ HOSP IP/OBS SF/LOW 25: CPT | Performed by: STUDENT IN AN ORGANIZED HEALTH CARE EDUCATION/TRAINING PROGRAM

## 2025-01-12 PROCEDURE — 2500000002 HC RX 250 W HCPCS SELF ADMINISTERED DRUGS (ALT 637 FOR MEDICARE OP, ALT 636 FOR OP/ED)

## 2025-01-12 PROCEDURE — 99232 SBSQ HOSP IP/OBS MODERATE 35: CPT | Performed by: NURSE PRACTITIONER

## 2025-01-12 PROCEDURE — 36600 WITHDRAWAL OF ARTERIAL BLOOD: CPT

## 2025-01-12 PROCEDURE — 2500000004 HC RX 250 GENERAL PHARMACY W/ HCPCS (ALT 636 FOR OP/ED)

## 2025-01-12 PROCEDURE — 84295 ASSAY OF SERUM SODIUM: CPT

## 2025-01-12 PROCEDURE — 82947 ASSAY GLUCOSE BLOOD QUANT: CPT

## 2025-01-12 PROCEDURE — 2500000001 HC RX 250 WO HCPCS SELF ADMINISTERED DRUGS (ALT 637 FOR MEDICARE OP): Performed by: INTERNAL MEDICINE

## 2025-01-12 PROCEDURE — 94003 VENT MGMT INPAT SUBQ DAY: CPT

## 2025-01-12 PROCEDURE — 85025 COMPLETE CBC W/AUTO DIFF WBC: CPT

## 2025-01-12 PROCEDURE — 2500000005 HC RX 250 GENERAL PHARMACY W/O HCPCS

## 2025-01-12 PROCEDURE — 2020000001 HC ICU ROOM DAILY

## 2025-01-12 PROCEDURE — 71045 X-RAY EXAM CHEST 1 VIEW: CPT | Performed by: RADIOLOGY

## 2025-01-12 PROCEDURE — 37799 UNLISTED PX VASCULAR SURGERY: CPT

## 2025-01-12 PROCEDURE — 31720 CLEARANCE OF AIRWAYS: CPT

## 2025-01-12 PROCEDURE — 94640 AIRWAY INHALATION TREATMENT: CPT

## 2025-01-12 PROCEDURE — 86901 BLOOD TYPING SEROLOGIC RH(D): CPT

## 2025-01-12 PROCEDURE — 99291 CRITICAL CARE FIRST HOUR: CPT

## 2025-01-12 RX ORDER — HYDRALAZINE HYDROCHLORIDE 20 MG/ML
20 INJECTION INTRAMUSCULAR; INTRAVENOUS EVERY 4 HOURS PRN
Status: DISCONTINUED | OUTPATIENT
Start: 2025-01-12 | End: 2025-01-13

## 2025-01-12 RX ORDER — FENTANYL CITRATE 50 UG/ML
50 INJECTION, SOLUTION INTRAMUSCULAR; INTRAVENOUS ONCE
Status: COMPLETED | OUTPATIENT
Start: 2025-01-12 | End: 2025-01-12

## 2025-01-12 RX ORDER — NICARDIPINE HYDROCHLORIDE 0.2 MG/ML
0-15 INJECTION INTRAVENOUS CONTINUOUS
Status: DISCONTINUED | OUTPATIENT
Start: 2025-01-12 | End: 2025-01-14

## 2025-01-12 RX ORDER — SODIUM BICARBONATE 1 MEQ/ML
50 SYRINGE (ML) INTRAVENOUS ONCE
Status: COMPLETED | OUTPATIENT
Start: 2025-01-12 | End: 2025-01-12

## 2025-01-12 RX ORDER — LABETALOL HYDROCHLORIDE 5 MG/ML
20 INJECTION, SOLUTION INTRAVENOUS EVERY 4 HOURS PRN
Status: DISCONTINUED | OUTPATIENT
Start: 2025-01-12 | End: 2025-01-12

## 2025-01-12 RX ORDER — CLONIDINE HYDROCHLORIDE 0.1 MG/1
0.1 TABLET ORAL ONCE
Status: COMPLETED | OUTPATIENT
Start: 2025-01-12 | End: 2025-01-12

## 2025-01-12 RX ORDER — LABETALOL HYDROCHLORIDE 5 MG/ML
30 INJECTION, SOLUTION INTRAVENOUS
Status: DISCONTINUED | OUTPATIENT
Start: 2025-01-12 | End: 2025-01-13

## 2025-01-12 RX ADMIN — IPRATROPIUM BROMIDE AND ALBUTEROL SULFATE 3 ML: 2.5; .5 SOLUTION RESPIRATORY (INHALATION) at 11:30

## 2025-01-12 RX ADMIN — HEPARIN SODIUM 5000 UNITS: 5000 INJECTION, SOLUTION INTRAVENOUS; SUBCUTANEOUS at 20:47

## 2025-01-12 RX ADMIN — Medication 3 ML: at 11:30

## 2025-01-12 RX ADMIN — ATOVAQUONE 750 MG: 750 SUSPENSION ORAL at 08:46

## 2025-01-12 RX ADMIN — SODIUM BICARBONATE 650 MG TABLET 650 MG: at 20:47

## 2025-01-12 RX ADMIN — METHYLPREDNISOLONE SODIUM SUCCINATE 125 MG: 125 INJECTION, POWDER, FOR SOLUTION INTRAMUSCULAR; INTRAVENOUS at 15:35

## 2025-01-12 RX ADMIN — METHYLPREDNISOLONE SODIUM SUCCINATE 125 MG: 125 INJECTION, POWDER, FOR SOLUTION INTRAMUSCULAR; INTRAVENOUS at 08:39

## 2025-01-12 RX ADMIN — Medication 35 PERCENT: at 20:16

## 2025-01-12 RX ADMIN — SODIUM ZIRCONIUM CYCLOSILICATE 10 G: 10 POWDER, FOR SUSPENSION ORAL at 15:35

## 2025-01-12 RX ADMIN — NICARDIPINE HYDROCHLORIDE 2.5 MG/HR: 0.2 INJECTION INTRAVENOUS at 19:50

## 2025-01-12 RX ADMIN — DOCUSATE SODIUM 100 MG: 50 LIQUID ORAL at 08:37

## 2025-01-12 RX ADMIN — PROPOFOL 50 MCG/KG/MIN: 10 INJECTION, EMULSION INTRAVENOUS at 12:56

## 2025-01-12 RX ADMIN — IPRATROPIUM BROMIDE AND ALBUTEROL SULFATE 3 ML: 2.5; .5 SOLUTION RESPIRATORY (INHALATION) at 14:41

## 2025-01-12 RX ADMIN — FOLIC ACID 1 MG: 1 TABLET ORAL at 08:37

## 2025-01-12 RX ADMIN — IPRATROPIUM BROMIDE AND ALBUTEROL SULFATE 3 ML: 2.5; .5 SOLUTION RESPIRATORY (INHALATION) at 20:16

## 2025-01-12 RX ADMIN — PROPOFOL 50 MCG/KG/MIN: 10 INJECTION, EMULSION INTRAVENOUS at 08:12

## 2025-01-12 RX ADMIN — SODIUM ZIRCONIUM CYCLOSILICATE 10 G: 10 POWDER, FOR SUSPENSION ORAL at 23:22

## 2025-01-12 RX ADMIN — HEPARIN SODIUM 5000 UNITS: 5000 INJECTION, SOLUTION INTRAVENOUS; SUBCUTANEOUS at 12:17

## 2025-01-12 RX ADMIN — PIPERACILLIN SODIUM AND TAZOBACTAM SODIUM 2.25 G: 2; .25 INJECTION, SOLUTION INTRAVENOUS at 08:55

## 2025-01-12 RX ADMIN — LABETALOL HYDROCHLORIDE 10 MG: 5 INJECTION INTRAVENOUS at 01:26

## 2025-01-12 RX ADMIN — SODIUM BICARBONATE 50 MEQ: 84 INJECTION INTRAVENOUS at 13:59

## 2025-01-12 RX ADMIN — LABETALOL HYDROCHLORIDE 20 MG: 5 INJECTION INTRAVENOUS at 10:37

## 2025-01-12 RX ADMIN — PROPOFOL 30 MCG/KG/MIN: 10 INJECTION, EMULSION INTRAVENOUS at 17:27

## 2025-01-12 RX ADMIN — HEPARIN SODIUM 5000 UNITS: 5000 INJECTION, SOLUTION INTRAVENOUS; SUBCUTANEOUS at 04:22

## 2025-01-12 RX ADMIN — METHYLPREDNISOLONE SODIUM SUCCINATE 125 MG: 125 INJECTION, POWDER, FOR SOLUTION INTRAMUSCULAR; INTRAVENOUS at 03:24

## 2025-01-12 RX ADMIN — Medication 35 PERCENT: at 07:32

## 2025-01-12 RX ADMIN — LABETALOL HYDROCHLORIDE 30 MG: 5 INJECTION INTRAVENOUS at 17:28

## 2025-01-12 RX ADMIN — PROPOFOL 50 MCG/KG/MIN: 10 INJECTION, EMULSION INTRAVENOUS at 03:26

## 2025-01-12 RX ADMIN — IPRATROPIUM BROMIDE AND ALBUTEROL SULFATE 3 ML: 2.5; .5 SOLUTION RESPIRATORY (INHALATION) at 23:34

## 2025-01-12 RX ADMIN — SIMVASTATIN 20 MG: 20 TABLET, FILM COATED ORAL at 20:49

## 2025-01-12 RX ADMIN — Medication 3 ML: at 00:44

## 2025-01-12 RX ADMIN — Medication 3 ML: at 07:31

## 2025-01-12 RX ADMIN — SODIUM ZIRCONIUM CYCLOSILICATE 10 G: 10 POWDER, FOR SUSPENSION ORAL at 06:43

## 2025-01-12 RX ADMIN — HYDROMORPHONE HYDROCHLORIDE 0.6 MG: 1 INJECTION, SOLUTION INTRAMUSCULAR; INTRAVENOUS; SUBCUTANEOUS at 01:25

## 2025-01-12 RX ADMIN — LEVOTHYROXINE SODIUM ANHYDROUS 25 MCG: 100 INJECTION, POWDER, LYOPHILIZED, FOR SOLUTION INTRAVENOUS at 08:48

## 2025-01-12 RX ADMIN — FENTANYL CITRATE 50 MCG: 50 INJECTION INTRAMUSCULAR; INTRAVENOUS at 12:15

## 2025-01-12 RX ADMIN — Medication 3 ML: at 04:53

## 2025-01-12 RX ADMIN — FLUOXETINE HYDROCHLORIDE 40 MG: 40 CAPSULE ORAL at 08:55

## 2025-01-12 RX ADMIN — AMLODIPINE BESYLATE 10 MG: 10 TABLET ORAL at 08:35

## 2025-01-12 RX ADMIN — IPRATROPIUM BROMIDE AND ALBUTEROL SULFATE 3 ML: 2.5; .5 SOLUTION RESPIRATORY (INHALATION) at 00:44

## 2025-01-12 RX ADMIN — ATOVAQUONE 750 MG: 750 SUSPENSION ORAL at 20:50

## 2025-01-12 RX ADMIN — HYDRALAZINE HYDROCHLORIDE 20 MG: 20 INJECTION INTRAMUSCULAR; INTRAVENOUS at 12:16

## 2025-01-12 RX ADMIN — PANTOPRAZOLE SODIUM 40 MG: 40 INJECTION, POWDER, FOR SOLUTION INTRAVENOUS at 08:42

## 2025-01-12 RX ADMIN — CLONIDINE HYDROCHLORIDE 0.1 MG: 0.1 TABLET ORAL at 19:22

## 2025-01-12 RX ADMIN — DOCUSATE SODIUM 100 MG: 50 LIQUID ORAL at 20:47

## 2025-01-12 RX ADMIN — IPRATROPIUM BROMIDE AND ALBUTEROL SULFATE 3 ML: 2.5; .5 SOLUTION RESPIRATORY (INHALATION) at 07:31

## 2025-01-12 RX ADMIN — PIPERACILLIN SODIUM AND TAZOBACTAM SODIUM 2.25 G: 2; .25 INJECTION, SOLUTION INTRAVENOUS at 03:20

## 2025-01-12 RX ADMIN — THIAMINE HCL TAB 100 MG 100 MG: 100 TAB at 08:37

## 2025-01-12 RX ADMIN — IPRATROPIUM BROMIDE AND ALBUTEROL SULFATE 3 ML: 2.5; .5 SOLUTION RESPIRATORY (INHALATION) at 04:55

## 2025-01-12 RX ADMIN — SODIUM BICARBONATE 650 MG TABLET 650 MG: at 08:36

## 2025-01-12 ASSESSMENT — PAIN - FUNCTIONAL ASSESSMENT

## 2025-01-12 ASSESSMENT — PAIN SCALES - GENERAL
PAINLEVEL_OUTOF10: 6
PAINLEVEL_OUTOF10: 0 - NO PAIN

## 2025-01-12 ASSESSMENT — PAIN DESCRIPTION - DESCRIPTORS: DESCRIPTORS: PATIENT UNABLE TO DESCRIBE

## 2025-01-12 NOTE — PROGRESS NOTES
Janet Snow is a 79 y.o. female on day 10 of admission presenting with Community acquired pneumonia of both upper lobes.    Subjective   Interval History:   Patient seen and examined  Interval increase in creatinine  Sedated, intubated  Afebrile  Patient discussed with primary team    Review of Systems   Unable to perform ROS: Intubated       Objective   Range of Vitals (last 24 hours)  Heart Rate:  [66-94]   Temp:  [36.4 °C (97.5 °F)-36.7 °C (98.1 °F)]   Resp:  [19-37]   BP: (172-187)/(61-65)   Weight:  [79 kg (174 lb 2.6 oz)]   SpO2:  [93 %-98 %]   Daily Weight  01/12/25 : 79 kg (174 lb 2.6 oz)    Body mass index is 30.85 kg/m².    Physical Exam  Constitutional:       Interventions: She is sedated and intubated.   HENT:      Head: Normocephalic and atraumatic.   Eyes:      General: No scleral icterus.     Conjunctiva/sclera: Conjunctivae normal.   Cardiovascular:      Rate and Rhythm: Normal rate and regular rhythm.      Heart sounds: Normal heart sounds.   Pulmonary:      Effort: She is intubated.      Breath sounds: Decreased breath sounds present.   Abdominal:      General: Bowel sounds are normal.      Palpations: Abdomen is soft.   Musculoskeletal:      Cervical back: Neck supple.      Right lower leg: No edema.      Left lower leg: No edema.   Skin:     General: Skin is warm and dry.   Neurological:      Comments: Unable to assess  Psychiatric:      Comments: Unable to assess         Antibiotics  piperacillin-tazobactam - 2.25 gram/50 mL    Relevant Results  Labs  Results from last 72 hours   Lab Units 01/12/25  1029 01/12/25  0423 01/11/25  0450 01/10/25  0859 01/10/25  0545   WBC AUTO x10*3/uL  --  26.2* 26.6* 24.5* 25.8*   HEMOGLOBIN g/dL 7.7* 7.1* 8.0* 7.5* 7.6*   HEMATOCRIT % 22.7* 21.5* 24.4* 22.8* 23.4*   PLATELETS AUTO x10*3/uL  --  479* 582* 556* 603*   NEUTROS PCT AUTO %  --  87.8  --   --   --    LYMPHO PCT MAN %  --   --  4.0  --  4.0   LYMPHS PCT AUTO %  --  2.6  --   --   --    MONO PCT  MAN %  --   --  3.0  --  3.0   MONOS PCT AUTO %  --  4.4  --   --   --    EOSINO PCT MAN %  --   --  0.0  --  0.0   EOS PCT AUTO %  --  0.0  --   --   --      Results from last 72 hours   Lab Units 01/12/25 0423 01/11/25  1413 01/11/25  0450   SODIUM mmol/L 140 139 138   POTASSIUM mmol/L 4.6 4.9 5.4*   CHLORIDE mmol/L 102 103 102   CO2 mmol/L 22 21 21   BUN mg/dL 162* 134* 130*   CREATININE mg/dL 4.05* 3.81* 3.68*   GLUCOSE mg/dL 144* 142* 136*   CALCIUM mg/dL 7.4* 7.6* 7.5*   ANION GAP mmol/L 21* 20 20   EGFR mL/min/1.73m*2 11* 12* 12*   PHOSPHORUS mg/dL 9.5* 9.8* 9.7*     Results from last 72 hours   Lab Units 01/12/25 0423 01/11/25  1413 01/11/25  0450   ALBUMIN g/dL 2.5* 2.6* 2.7*     Estimated Creatinine Clearance: 11.2 mL/min (A) (by C-G formula based on SCr of 4.05 mg/dL (H)).  C-Reactive Protein   Date Value Ref Range Status   01/05/2025 35.67 (H) <1.00 mg/dL Final     CRP   Date Value Ref Range Status   01/04/2020 10.1 (H) 0 - 2.0 MG/DL Final     Comment:     Performed at [place] [id number]     Microbiology  Reviewed-BAL PCR workup pending      Imaging  XR chest 1 view    Result Date: 1/12/2025  Interpreted By:  Jaxon Morales, STUDY: XR CHEST 1 VIEW; 1/12/2025 5:25 am   INDICATION: CLINICAL INFORMATION: Signs/Symptoms:persistent hypoxia, ARDS, eval infiltrates.   COMPARISON: 01/10/2025   ACCESSION NUMBER(S): [id number]   ORDERING CLINICIAN: NORM LANDAVERDE   TECHNIQUE: Portable chest one view.   FINDINGS: The cardiac size is indeterminate in view of the AP projection. There is no change in the ET tube or NG tube appearance. There is no change in the hazy diffuse bilateral infiltrates. No effusions are identified.       No change in the bilateral infiltrates or tube placement. There is no interval change when compared to the previous examination.   MACRO: none   Signed by: Jaxon Morales 1/12/2025 11:03 AM Dictation workstation:   [id number]    XR chest 1 view    Result Date:  1/10/2025  Interpreted By:  Sherly Fiore, STUDY: XR CHEST 1 VIEW 1/10/2025 10:42 am   INDICATION: Signs/Symptoms:acute hypoxemic respiratory failure, assess lung fields   COMPARISON: 01/09/2025   ACCESSION NUMBER(S): [id number]   ORDERING CLINICIAN: JORY RIDDLE   TECHNIQUE: Single AP view chest   FINDINGS: Examination rotated accentuating the cardiac silhouette. Endotracheal tube tip identified 3.6 cm from the noah. NG tube is in place. There is generalized increased interstitial prominence in bilateral lung fields with alveolar airspace infiltrate with alveolar component of pulmonary edema suggested as opposed to postinfectious etiology. Correlate with patient's history.             1. Stable lines and tubes   2. Persistent patchy alveolar airspace infiltrate demonstrated within bilateral lung fields stable. No dense airspace consolidation.   Signed by: Sherly Fiore 1/10/2025 1:06 PM Dictation workstation:   [id number]    XR abdomen 1 view    Result Date: 1/9/2025  Interpreted By:  Jaxon Morales, STUDY: XR ABDOMEN 1 VIEW; 1/9/2025 9:31 am   INDICATION: Signs/Symptoms:c/f ileus.   COMPARISON: 01/08/2025   ACCESSION NUMBER(S): [id number]   ORDERING CLINICIAN: JORY RIDDLE   TECHNIQUE: KUB, portable, one view   FINDINGS: Motion artifact obscures the bowel-gas pattern significantly. A nasogastric tube extends into the epigastrium with the tip overlying the mid abdomen.       Exam is limited due to marked motion artifact obscuring the bowel gas pattern. Nasogastric tube extends into the stomach.   MACRO: none   Signed by: Jaxon Morales 1/9/2025 10:05 AM Dictation workstation:   [id number]    XR chest 1 view    Result Date: 1/9/2025  Interpreted By:  Stefany Edwards, STUDY: XR CHEST 1 VIEW 1/9/2025 6:30 am   INDICATION: Signs/Symptoms:persistent hypoxia, unable to wean   COMPARISON: 01/08/2025   ACCESSION NUMBER(S): [id number]   ORDERING CLINICIAN: NORM LANDAVERDE   TECHNIQUE: AP erect view of the chest  at bedside   FINDINGS: The tip of the endotracheal tube is located 2 cm above noah with nasogastric tube descending below diaphragm. The cardiac size is borderline enlarged. When compared with yesterday's study, the infiltrate throughout the right lung is unchanged. Infiltrate within the left lung appears slightly improved. No pleural abnormality is seen.       Stable diffuse infiltrate throughout right lung with some mild improvement in the diffuse infiltrate seen in left lung.   Signed by: Stefany Edwards 1/9/2025 8:23 AM Dictation workstation:   [id number]    XR chest abdomen for OG NG placement    Result Date: 1/8/2025  Interpreted By:  Finkelstein, Evan, STUDY: XR CHEST ABDOMEN FOR OG NG PLACEMENT;  1/8/2025 2:41 am two views of the chest.   INDICATION: Signs/Symptoms:OG tube placement.   COMPARISON: None.   ACCESSION NUMBER(S): [id number]   ORDERING CLINICIAN: AMANDA SÁNCHEZ   FINDINGS: Endotracheal tube present with tip approximately 2.4 cm above the noah. OG tube courses below the diaphragm, with tip extending all of the way into the lower pelvis, beyond the field of view. Extensive patchy airspace opacities throughout the lungs. No sizable pleural effusion or pneumothorax. No acute osseous abnormality.       OG tube courses below the diaphragm with tip extending into the pelvis beyond the field of view. Endotracheal tube tip lies 2.4 cm above the noah. Redemonstrated extensive patchy airspace opacities throughout the lungs concerning for multifocal pneumonia.     MACRO: None.   Signed by: Evan Finkelstein 1/8/2025 3:04 AM Dictation workstation:   [id number]    XR chest 1 view    Result Date: 1/7/2025  Interpreted By:  Isidro Corrales, STUDY: XR CHEST 1 VIEW;  1/7/2025 10:48 pm   INDICATION: Signs/Symptoms:OG tube.   COMPARISON: Chest x-ray 01/07/2025   ACCESSION NUMBER(S): [id number]   ORDERING CLINICIAN: AMANDA SÁNCHEZ   FINDINGS: Enteric tube terminates in the left mid abdomen with side hole below  the GE junction, likely within the distal gastric body. Multiple overlying leads are present.   CARDIOMEDIASTINAL SILHOUETTE: Cardiomediastinal silhouette is stable in size and configuration.   LUNGS: Lung apices are excluded from the field of view. There is diffuse bilateral airspace opacities not significantly changed from prior exam.   ABDOMEN: No remarkable upper abdominal findings.   BONES: Multilevel degenerative changes of the spine.       Enteric tube terminates in the left mid abdomen with side hole below the GE junction.   Diffuse bilateral airspace opacities concerning for developing multifocal pneumonia. Continued radiographic follow-up to resolution is advised.   MACRO: None   Signed by: Isidro Corrales 1/7/2025 10:52 PM Dictation workstation:   [id number]    Bronchoscopy    Result Date: 1/7/2025  Table formatting from the original result was not included. Impression The left main stem, left upper lobar bronchus, lingular lobar bronchus, left lower lobar bronchus, right main stem, right upper lobar bronchus, bronchus intermedius, right middle lobar bronchus and right lower lobar bronchus appeared normal. Bronchoalveolar lavage was performed x1 in the RML and the fluid appeared cloudy Findings The left main stem, left upper lobar bronchus, lingular lobar bronchus, left lower lobar bronchus, right main stem, right upper lobar bronchus, bronchus intermedius, right middle lobar bronchus and right lower lobar bronchus appeared normal. Bronchoalveolar lavage was performed x1 in the RML with 140 mL of saline instilled and a total return of 90 mL. The fluid appeared cloudy. Recommendation There is no recommended follow-up for this procedure. Indication ARDS (adult respiratory distress syndrome) (Multi) Staff Staff Role No Staff Documented Medications See Anesthesia Record. Preprocedure A history and physical has been performed, and patient medication allergies have been reviewed. The patient's tolerance of  previous anesthesia has been reviewed. The risks and benefits of the procedure and the sedation options and risks were discussed with the  daughter (medical POA) and son (financial POA) . All questions were answered and informed consent obtained. Details of the Procedure The patient was already under sedation prior to the procedure. The patient's blood pressure, respirations, heart rate, oxygen and ECG were monitored throughout the procedure. The patient experienced no blood loss. The scope was introduced through the endotracheal tube. The procedure was not difficult. The patient tolerated the procedure well. There were no apparent adverse events. Events Procedure Events Event Event Time Specimens * Cannot find log * BAL taken from RML 140cc instilled, retunr 90cc of white cloudy fluid. Mucosa appeared normal and non friable. Some mucous secretions suctioned away. Procedure Location [id number] [place] [id number] [phone] Referring Provider Laura Maloney MD Procedure Provider Laura PERALTA MD     XR chest 1 view    Result Date: 1/7/2025  Interpreted By:  Elda Dorado, STUDY: XR CHEST 1 VIEW;  1/7/2025 10:40 am   INDICATION: Signs/Symptoms:intubation.   COMPARISON: 01/07/2025 at 5:45 a.m.   ACCESSION NUMBER(S): [id number]   ORDERING CLINICIAN: KENISHA LEE   FINDINGS: Heart is normal in size.   The patient is intubated. The tip terminates above the noha.   There is diffuse parenchymal infiltration.   There are atherosclerotic changes of the aorta. There are degenerative changes of the spine.   COMPARISON OF FINDING: The patient has undergone interval intubation. The lungs are similar.       Diffuse bilateral parenchymal infiltration. Interval intubation.   MACRO: none   Signed by: Elda Dorado 1/7/2025 11:22 AM Dictation workstation:   [id number]    XR chest 1 view    Result Date: 1/7/2025  Interpreted By:  Jaxon Morales,  STUDY: XR CHEST 1 VIEW; 1/7/2025 6:35 am   INDICATION: CLINICAL INFORMATION: Signs/Symptoms:persistent hypoxia, unable to wean from NIV.   COMPARISON: 01/06/2025   ACCESSION NUMBER(S): [id number]   ORDERING CLINICIAN: NORM LANDAVERDE   TECHNIQUE: Portable chest one view.   FINDINGS: The cardiac size is indeterminate in view of the AP projection. There is continued diffuse ground-glass infiltrate bilaterally. No effusions are identified.       Persistent ground-glass infiltrates diffusely bilaterally. Etiology is unclear with differential to include infectious organisms as well as pulmonary edema and ARDS.   MACRO: none   Signed by: Jaxon Morales 1/7/2025 8:20 AM Dictation workstation:   [id number]    ECG 12 lead    Result Date: 1/6/2025  Normal sinus rhythm Nonspecific ST abnormality Prolonged QT Abnormal ECG No previous ECGs available Confirmed by Vicky Corado ([id number]) on 1/6/2025 4:01:46 PM    Transthoracic Echo (TTE) Complete    Result Date: 1/6/2025           [place] [address]            Phone [phone] TRANSTHORACIC ECHOCARDIOGRAM REPORT Patient Name:       ROBBIE FAJARDO AJ     Reading Physician:    [id number] Vicky Corado MD Study Date:         1/6/2025             Ordering Provider:    [id number] NORM LANDAVERDE MRN/PID:            [id number]             Fellow: Accession#:         [id number]         Nurse: Date of Birth/Age:  1945 / 79 years Sonographer:          Rosalba Leiva RDCS Gender Assigned at  F                    Additional Staff: Birth: Height:             160.02 cm            Admit Date: Weight:             69.85 kg             Admission Status:     Inpatient -                                                                Routine BSA / BMI:          1.73 m2 /  27.28      Department Location:  [facility] ICU                     kg/m2 Blood Pressure: 151 /76 mmHg Study Type:    TRANSTHORACIC ECHO (TTE) COMPLETE Diagnosis/ICD: Hypoxemia-R09.02; Acute respiratory failure with hypoxia-J96.01 Indication:    hypoxemia CPT Codes:     Echo Complete w Full Doppler-[id number] Patient History: Smoker:            Former. BMI:               Overweight 25 - 30 Pertinent History: Resp difficulty, bipap, cough, pna, sciatica, arf, resp                    failure/hypoxia. Study Detail: The following Echo studies were performed: 2D, Doppler, M-Mode and               color flow. Technically challenging study due to prominent lung               artifact, body habitus and patient lying in supine position.               Definity used as a contrast agent for endocardial border               definition. Total contrast used for this procedure was 2 mL via IV               push.  PHYSICIAN INTERPRETATION: Left Ventricle: The left ventricular systolic function is normal, with a visually estimated ejection fraction of 70-75%. There are no regional wall motion abnormalities. The left ventricular cavity size is normal. There is normal septal thickness. Spectral Doppler shows a normal pattern of left ventricular diastolic filling. Left Atrium: The left atrium is normal in size. Right Ventricle: The right ventricle is normal in size. There is normal right ventricular global systolic function. Right Atrium: The right atrium is normal in size. Aortic Valve: The aortic valve is trileaflet. The aortic valve dimensionless index is 0.74. There is no evidence of aortic valve regurgitation. The peak instantaneous gradient of the aortic valve is 22 mmHg. The mean gradient of the aortic valve is 11 mmHg. Mitral Valve: The mitral valve is normal in structure. There is no evidence of mitral valve regurgitation. Tricuspid Valve: The tricuspid valve is structurally normal. There is mild tricuspid regurgitation. Pulmonic  Valve: The pulmonic valve is structurally normal. There is physiologic pulmonic valve regurgitation. Pericardium: Small pericardial effusion. Aorta: The aortic root is normal. Systemic Veins: The inferior vena cava appears normal in size, with IVC inspiratory collapse greater than 50%.  CONCLUSIONS:  1. The left ventricular systolic function is normal, with a visually estimated ejection fraction of 70-75%.  2. Spectral Doppler shows a normal pattern of left ventricular diastolic filling.  3. There is normal right ventricular global systolic function.  4. No evidence of mitral valve regurgitation.  5. Mild tricuspid regurgitation is visualized. QUANTITATIVE DATA SUMMARY:  2D MEASUREMENTS:           Normal Ranges: LAs:             2.60 cm   (2.7-4.0cm) IVSd:            0.68 cm   (0.6-1.1cm) LVPWd:           0.53 cm   (0.6-1.1cm) LVIDd:           5.32 cm   (3.9-5.9cm) LV Mass Index:   62.2 g/m2  LV SYSTOLIC FUNCTION BY 2D PLANIMETRY (MOD):                      Normal Ranges: EF-A4C View:    60 % (>=55%) EF-A2C View:    66 % EF-Biplane:     65 % EF-Visual:      73 % LV EF Reported: 73 %  LV DIASTOLIC FUNCTION:           Normal Ranges: MV Peak E:             0.94 m/s  (0.7-1.2 m/s) MV Peak A:             1.04 m/s  (0.42-0.7 m/s) E/A Ratio:             0.91      (1.0-2.2) MV e'                  0.078 m/s (>8.0) MV lateral e'          0.08 m/s MV medial e'           0.07 m/s E/e' Ratio:            12.07     (<8.0) a'                     0.08 m/s  MITRAL VALVE:          Normal Ranges: MV DT:        186 msec (150-240msec)  AORTIC VALVE:                      Normal Ranges: AoV Vmax:                2.35 m/s  (<=1.7m/s) AoV Peak P.1 mmHg (<20mmHg) AoV Mean P.0 mmHg (1.7-11.5mmHg) LVOT Max Cj:            1.95 m/s  (<=1.1m/s) AoV VTI:                 43.90 cm  (18-25cm) LVOT VTI:                32.60 cm LVOT Diameter:           1.90 cm   (1.8-2.4cm) AoV Area, VTI:           2.11 cm2   (2.5-5.5cm2) AoV Area,Vmax:           2.35 cm2  (2.5-4.5cm2) AoV Dimensionless Index: 0.74  RIGHT VENTRICLE: RV s' 0.21 m/s  TRICUSPID VALVE/RVSP:          Normal Ranges: Peak TR Velocity:     3.56 m/s RV Syst Pressure:     59 mmHg  (< 30mmHg) IVC Diam:             1.16 cm  PULMONIC VALVE:          Normal Ranges: PV Accel Time:  124 msec (>120ms) PV Max Cj:     1.3 m/s  (0.6-0.9m/s) PV Max P.9 mmHg  [id number] Vicky Corado MD Electronically signed on 2025 at 3:30:16 PM  ** Final **     XR chest 1 view    Result Date: 2025  Interpreted By:  Jaxon Morales, STUDY: XR CHEST 1 VIEW; 2025 5:38 am   INDICATION: CLINICAL INFORMATION: Signs/Symptoms:Pneumonia follow-up. Previous abnormal chest radiograph. Shortness of breath.   COMPARISON: 2025   ACCESSION NUMBER(S): [id number]   ORDERING CLINICIAN: AMANDA SÁNCHEZ   TECHNIQUE: Portable chest one view.   FINDINGS: The cardiac size is indeterminate in view of the AP projection. Diffuse ground-glass infiltrates are again identified, similar compared to the previous study. No effusions are appreciated.       Diffuse ground-glass infiltrates. There is no interval change when compared to the previous examination.   MACRO: none   Signed by: Jaxon Morales 2025 8:29 AM Dictation workstation:   [id number]    CT angio chest for pulmonary embolism    Result Date: 2025  Interpreted By:  Selin Matthews, STUDY: CT ANGIO CHEST FOR PULMONARY EMBOLISM;  2025 4:10 pm   INDICATION: Signs/Symptoms:severe hypoxia   COMPARISON: Chest x-ray 2025. CT chest 2024   ACCESSION NUMBER(S): [id number]   ORDERING CLINICIAN: NORM LANDAVERDE   TECHNIQUE: Helical data acquisition of the chest was obtained following the uneventful administration of intravenous contrast material. Images were reformatted in axial, coronal, and sagittal planes. MIP images were created and reviewed.   FINDINGS: POTENTIAL LIMITATIONS OF THE STUDY: Motion artifact.   HEART AND  VESSELS: The evaluation for filling defects at the pulmonary arteries is degraded by motion artifact. No large central filling defects to suggest pulmonary embolism. The smaller segmental/subsegmental pulmonary arteries are not well evaluated on this exam.   No thoracic aortic aneurysm. Mild atherosclerotic calcifications are noted at the thoracic aorta.   The heart is mildly enlarged. There is trace pericardial effusion.   MEDIASTINUM AND MANPREET, LOWER NECK AND AXILLA: The visualized thyroid gland is small.   A right paratracheal lymph node measures 10 mm in short axis. A subcarinal lymph node measures 12 mm in short axis. A right hilar lymph node measures 19 mm in short axis. A left hilar lymph node measures 11 mm in short axis. Calcified lymph nodes are noted at the mediastinum and left hilum.   LUNGS AND AIRWAYS: The trachea and central airways are patent.   There are diffuse bilateral ground-glass opacities. No pleural effusion or pneumothorax.   UPPER ABDOMEN: Calcific foci at the spleen are suggestive of granulomas.   CHEST WALL AND OSSEOUS STRUCTURES: Multilevel degenerative changes at the spine. Redemonstration of remote compression deformity with Schmorl's node at the superior endplate of L1.       1. Study degraded by motion artifact. No evidence of large central pulmonary emboli. The smaller segmental/subsegmental pulmonary arteries are not well evaluated on this exam. 2. Diffuse bilateral ground-glass opacities, may be secondary to pulmonary edema and/or multifocal pneumonia. Acute respiratory distress syndrome is in the differential diagnoses. 3. Mild cardiomegaly. Trace pericardial effusion. 4. Mild mediastinal and hilar adenopathy.     MACRO: None.   Signed by: Selin Matthews 1/5/2025 6:42 PM Dictation workstation:   [id number]    US renal complete    Result Date: 1/5/2025  Interpreted By:  Jaxon Morales, STUDY: US RENAL COMPLETE; 1/5/2025 10:29 am   INDICATION: Signs/Symptoms:nancy.   COMPARISON:  None   ACCESSION NUMBER(S): [id number]   ORDERING CLINICIAN: NORM LANDAVERDE   TECHNIQUE: Grayscale and color Doppler imaging of the kidneys   FINDINGS: The right kidney measures 11.0 cm  . The left kidney measures 11.0 cm  .   There is mild increased echogenicity of the renal cortex bilaterally.     No renal stones are identified.  Renal cortex is normal in thickness bilaterally. No hydronephrosis is identified.   Urinary bladder is nonspecific in appearance with no stones or masses identified.       Mild increased renal cortical echogenicity diffusely bilaterally suggesting chronic renal medical disease.   MACRO: none   Signed by: Jaxon Morales 1/5/2025 12:45 PM Dictation workstation:   [id number]    XR chest 1 view    Result Date: 1/5/2025  Interpreted By:  Sincere Slater, STUDY: XR CHEST 1 VIEW;  1/5/2025 8:48 am   INDICATION: Signs/Symptoms:respiratory distress acute.   COMPARISON: 01/02/2025   ACCESSION NUMBER(S): [id number]   ORDERING CLINICIAN: RAMANDEEP ALLRED   FINDINGS: Diffusely increased bilateral airspace consolidation. No visualized pleural effusion. Cardiomediastinal silhouette unchanged. Aortic atherosclerosis. Thoracic degenerative changes with dextroscoliosis.       Diffusely increased bilateral airspace consolidation.   MACRO: None   Signed by: Sincere Slater 1/5/2025 10:08 AM Dictation workstation:   [id number]    XR chest 2 views    Result Date: 1/2/2025  Interpreted By:  Jaxon Morales, STUDY: XR CHEST 2 VIEWS; 1/2/2025 3:05 pm   INDICATION: Signs/Symptoms:fever, productive cough, chest pain.   COMPARISON: 06/14/2022   ACCESSION NUMBER(S): [id number]   ORDERING CLINICIAN: DANIAL COATES   TECHNIQUE: AP and lateral views of the chest were obtained.   FINDINGS: The cardiac silhouette is normal in appearance. There are new patchy bilateral alveolar infiltrates most marked within the upper lobes bilaterally .  No effusions are identified.       Extensive patchy bilateral infiltrates most marked  within the upper lobes bilaterally. Findings are suspicious for pneumonia. Follow-up to assure complete clearing is suggested.   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

## 2025-01-12 NOTE — PROGRESS NOTES
Janet Snow is a 79 y.o. female on day 10 of admission presenting with Community acquired pneumonia of both upper lobes.      Subjective   Patient remains intubated on the ventilator, her BUN and creatinine continue to rise.         Objective          Vitals 24HR  Heart Rate:  [66-94]   Temp:  [36.4 °C (97.5 °F)-36.6 °C (97.9 °F)]   Resp:  [19-37]   BP: (172-203)/(61-78)   Weight:  [79 kg (174 lb 2.6 oz)]   SpO2:  [93 %-97 %]       Intake/Output last 3 Shifts:    Intake/Output Summary (Last 24 hours) at 1/12/2025 1637  Last data filed at 1/12/2025 1600  Gross per 24 hour   Intake 1970.57 ml   Output 1298 ml   Net 672.57 ml       Physical Exam  Constitutional:       Interventions: She is sedated and intubated.   Cardiovascular:      No friction rub.   Pulmonary:      Effort: She is intubated.      Comments: Mechanically ventilated, fairly clear breath sounds anteriorly  Abdominal:      General: Bowel sounds are normal.      Palpations: Abdomen is soft.   Musculoskeletal:    Trace edema.     Relevant Results  Results for orders placed or performed during the hospital encounter of 01/02/25 (from the past 24 hours)   POCT GLUCOSE   Result Value Ref Range    POCT Glucose 146 (H) 74 - 99 mg/dL   POCT GLUCOSE   Result Value Ref Range    POCT Glucose 146 (H) 74 - 99 mg/dL   POCT GLUCOSE   Result Value Ref Range    POCT Glucose 140 (H) 74 - 99 mg/dL   CBC and Auto Differential   Result Value Ref Range    WBC 26.2 (H) 4.4 - 11.3 x10*3/uL    nRBC 0.1 (H) 0.0 - 0.0 /100 WBCs    RBC 2.35 (L) 4.00 - 5.20 x10*6/uL    Hemoglobin 7.1 (L) 12.0 - 16.0 g/dL    Hematocrit 21.5 (L) 36.0 - 46.0 %    MCV 92 80 - 100 fL    MCH 30.2 26.0 - 34.0 pg    MCHC 33.0 32.0 - 36.0 g/dL    RDW 14.4 11.5 - 14.5 %    Platelets 479 (H) 150 - 450 x10*3/uL    Neutrophils % 87.8 40.0 - 80.0 %    Immature Granulocytes %, Automated 5.0 (H) 0.0 - 0.9 %    Lymphocytes % 2.6 13.0 - 44.0 %    Monocytes % 4.4 2.0 - 10.0 %    Eosinophils % 0.0 0.0 - 6.0 %     Basophils % 0.2 0.0 - 2.0 %    Neutrophils Absolute 22.98 (H) 1.60 - 5.50 x10*3/uL    Immature Granulocytes Absolute, Automated 1.30 (H) 0.00 - 0.50 x10*3/uL    Lymphocytes Absolute 0.69 (L) 0.80 - 3.00 x10*3/uL    Monocytes Absolute 1.14 (H) 0.05 - 0.80 x10*3/uL    Eosinophils Absolute 0.00 0.00 - 0.40 x10*3/uL    Basophils Absolute 0.05 0.00 - 0.10 x10*3/uL   Magnesium   Result Value Ref Range    Magnesium 2.43 (H) 1.60 - 2.40 mg/dL   Renal function panel   Result Value Ref Range    Glucose 144 (H) 74 - 99 mg/dL    Sodium 140 136 - 145 mmol/L    Potassium 4.6 3.5 - 5.3 mmol/L    Chloride 102 98 - 107 mmol/L    Bicarbonate 22 21 - 32 mmol/L    Anion Gap 21 (H) 10 - 20 mmol/L    Urea Nitrogen 162 (HH) 6 - 23 mg/dL    Creatinine 4.05 (H) 0.50 - 1.05 mg/dL    eGFR 11 (L) >60 mL/min/1.73m*2    Calcium 7.4 (L) 8.6 - 10.3 mg/dL    Phosphorus 9.5 (H) 2.5 - 4.9 mg/dL    Albumin 2.5 (L) 3.4 - 5.0 g/dL   Blood Gas Arterial Full Panel   Result Value Ref Range    POCT pH, Arterial 7.28 (L) 7.38 - 7.42 pH    POCT pCO2, Arterial 41 38 - 42 mm Hg    POCT pO2, Arterial 85 85 - 95 mm Hg    POCT SO2, Arterial 96 94 - 100 %    POCT Oxy Hemoglobin, Arterial 94.7 94.0 - 98.0 %    POCT Hematocrit Calculated, Arterial 20.0 (L) 36.0 - 46.0 %    POCT Sodium, Arterial 136 136 - 145 mmol/L    POCT Potassium, Arterial 4.2 3.5 - 5.3 mmol/L    POCT Chloride, Arterial 107 98 - 107 mmol/L    POCT Ionized Calcium, Arterial 1.04 (L) 1.10 - 1.33 mmol/L    POCT Glucose, Arterial 131 (H) 74 - 99 mg/dL    POCT Lactate, Arterial 1.2 0.4 - 2.0 mmol/L    POCT Base Excess, Arterial -6.9 (L) -2.0 - 3.0 mmol/L    POCT HCO3 Calculated, Arterial 19.3 (L) 22.0 - 26.0 mmol/L    POCT Hemoglobin, Arterial 6.8 (L) 12.0 - 16.0 g/dL    POCT Anion Gap, Arterial 14 10 - 25 mmo/L    Patient Temperature 37.0 degrees Celsius    FiO2 35 %    Apparatus      Ventilator Mode Other     Ventilator Rate 24 bpm    PC Pressure Control 10.0 cm H2O    Peep CHM2O 10.0 cm H2O    Site  of Arterial Puncture Arterial Line    POCT GLUCOSE   Result Value Ref Range    POCT Glucose 149 (H) 74 - 99 mg/dL   Hemoglobin and hematocrit, blood   Result Value Ref Range    Hemoglobin 7.7 (L) 12.0 - 16.0 g/dL    Hematocrit 22.7 (L) 36.0 - 46.0 %   Type and screen   Result Value Ref Range    ABO TYPE AB     Rh TYPE POS     ANTIBODY SCREEN NEG    VERIFY ABO/Rh Group Test   Result Value Ref Range    ABO TYPE AB     Rh TYPE POS    Blood Gas Arterial Full Panel   Result Value Ref Range    POCT pH, Arterial 7.34 (L) 7.38 - 7.42 pH    POCT pCO2, Arterial 38 38 - 42 mm Hg    POCT pO2, Arterial 88 85 - 95 mm Hg    POCT SO2, Arterial 98 94 - 100 %    POCT Oxy Hemoglobin, Arterial 95.6 94.0 - 98.0 %    POCT Hematocrit Calculated, Arterial 25.0 (L) 36.0 - 46.0 %    POCT Sodium, Arterial 137 136 - 145 mmol/L    POCT Potassium, Arterial 4.6 3.5 - 5.3 mmol/L    POCT Chloride, Arterial 104 98 - 107 mmol/L    POCT Ionized Calcium, Arterial 1.08 (L) 1.10 - 1.33 mmol/L    POCT Glucose, Arterial 119 (H) 74 - 99 mg/dL    POCT Lactate, Arterial 0.9 0.4 - 2.0 mmol/L    POCT Base Excess, Arterial -4.8 (L) -2.0 - 3.0 mmol/L    POCT HCO3 Calculated, Arterial 20.5 (L) 22.0 - 26.0 mmol/L    POCT Hemoglobin, Arterial 8.3 (L) 12.0 - 16.0 g/dL    POCT Anion Gap, Arterial 17 10 - 25 mmo/L    Patient Temperature 37.0 degrees Celsius    FiO2 35 %    Ventilator Mode A/C PC     Ventilator Rate 24 bpm    PC Pressure Control 10.0 cm H2O    Peep CHM2O 8.0 cm H2O    Site of Arterial Puncture Arterial Line     Aba's Test Negative             Assessment/Plan   Acute kidney injury concerning for ANCA vasculitis given the positive p-ANCA - today I received result of the positive p-ANCA, 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  I had an in-depth discussion with the patient's children including both her daughter at the bedside and the son who was on the phone, and also discussed as well with the ICU team, pulmonology and palliative  care.  I explained to the family the suspicion for ANCA vasculitis as well as the role of a kidney biopsy, dialysis, immunosuppressive treatment.  I was informed by palliative care this afternoon that the family has elected not to pursue a kidney biopsy and they still do not want to pursue dialysis based on the patient's stated wishes. She is on steroids per Pulmonology.   Bilateral pneumonia , antibiotics per infectious disease, off of Bactrim  Acute respiratory failure, intubated  Anemia  Hyperlipidemia  Thrombocytosis    Update 01/11  Still intubated.   Cr is still going up.   No biopsy or HD per patient's wishes.   IV steroids.   Lokelma for hyperkaliemia  UOP is 870   Keep monitoring.     Update 01/12  Still intubated.   Cr is still going up. No biopsy or HD per patient's wishes.   IV steroids per primary team. Might explain high BUN.   K is stable.   UOP is is improving.    Keep monitoring.         Ellis Rosen MD

## 2025-01-12 NOTE — PROGRESS NOTES
"Pulmonary Daily Progress Note   Subjective    Janet Snow is a 79 y.o. year old female patient known with hypothyroidism and anxiety admitted on 1/2/2025 with multilobar pneumonia with progressive hypoxemic respiratory failure requiring mechanical ventilation     Interval History:  Remains on mechanical ventilation, pressure control mode  Remains to 35% FiO2  Started on high dose steroids 1/10.     Meds    Scheduled medications  amLODIPine, 10 mg, oral, Daily  atovaquone, 750 mg, oral, q12h ADE  docusate sodium, 100 mg, oral, BID  pantoprazole, 40 mg, oral, Daily   Or  esomeprazole, 40 mg, oral, Daily   Or  pantoprazole, 40 mg, intravenous, Daily  fentaNYL, 50 mcg, intravenous, Once  FLUoxetine, 40 mg, oral, Daily  folic acid, 1 mg, oral, Daily  heparin, 5,000 Units, subcutaneous, q8h  insulin lispro, 0-5 Units, subcutaneous, q4h  ipratropium-albuteroL, 3 mL, nebulization, q4h  levothyroxine, 25 mcg, intravenous, q24h ADE  methylPREDNISolone sodium succinate (PF), 125 mg, intravenous, q6h  oxygen, , inhalation, Continuous - Inhalation  piperacillin-tazobactam, 2.25 g, intravenous, q6h  simvastatin, 20 mg, oral, Nightly  sodium bicarbonate, 650 mg, oral, BID  sodium chloride, 3 mL, nebulization, q4h  sodium zirconium cyclosilicate, 10 g, oral, q8h  thiamine, 100 mg, oral, Daily    Continuous medications  ketamine, 0-150 mg/hr, Last Rate: 8 mg/hr (01/12/25 1200)  propofol, 0-50 mcg/kg/min, Last Rate: 50 mcg/kg/min (01/12/25 1200)    PRN medications  PRN medications: acetaminophen, albuterol, dextrose, dextrose, glucagon, glucagon, hydrALAZINE, HYDROmorphone, labetaloL, lactulose, oxygen, polyethylene glycol     Objective    Blood pressure (!) 187/65, pulse 72, temperature 36.4 °C (97.5 °F), temperature source Axillary, resp. rate 20, height 1.6 m (5' 3\"), weight 79 kg (174 lb 2.6 oz), SpO2 95%.   Physical Exam   GENERAL: sedated  NECK: no JVD, midline trachea without stridor.    LUNGS: clear breath sounds. no " wheezing. no crackles or rhonchi  CARDIAC: Regular rate and rhythm  EXTREMITIES: No edema, no varicose veins  NEURO: RASS -3  SKIN: Skin turgor normal. No rashes or lesions.   --------------------------------------------------------------------------------------  Vent Mode: Pressure control/assist control  FiO2 (%):  [35 %] 35 %  S RR:  [24] 24  PEEP/CPAP (cm H2O):  [8 cm H20-10 cm H20] 8 cm H20  PIP Set (cm H2O):  [10 cm H2O] 10 cm H2O  MAP (cm H2O):  [12-15] 12  --------------------------------------------------------------------------------------    Intake/Output Summary (Last 24 hours) at 1/12/2025 1214  Last data filed at 1/12/2025 1200  Gross per 24 hour   Intake 1911.16 ml   Output 1266 ml   Net 645.16 ml     Labs:   Results from last 72 hours   Lab Units 01/12/25  0423 01/11/25  1413 01/11/25  0450   SODIUM mmol/L 140 139 138   POTASSIUM mmol/L 4.6 4.9 5.4*   CHLORIDE mmol/L 102 103 102   CO2 mmol/L 22 21 21   BUN mg/dL 162* 134* 130*   CREATININE mg/dL 4.05* 3.81* 3.68*   GLUCOSE mg/dL 144* 142* 136*   CALCIUM mg/dL 7.4* 7.6* 7.5*   ANION GAP mmol/L 21* 20 20   EGFR mL/min/1.73m*2 11* 12* 12*   PHOSPHORUS mg/dL 9.5* 9.8* 9.7*      Results from last 72 hours   Lab Units 01/12/25  1029 01/12/25  0423 01/11/25  0450 01/10/25  0859 01/10/25  0545   WBC AUTO x10*3/uL  --  26.2* 26.6* 24.5* 25.8*   HEMOGLOBIN g/dL 7.7* 7.1* 8.0* 7.5* 7.6*   HEMATOCRIT % 22.7* 21.5* 24.4* 22.8* 23.4*   PLATELETS AUTO x10*3/uL  --  479* 582* 556* 603*   NEUTROS PCT AUTO %  --  87.8  --   --   --    LYMPHO PCT MAN %  --   --  4.0  --  4.0   LYMPHS PCT AUTO %  --  2.6  --   --   --    MONO PCT MAN %  --   --  3.0  --  3.0   MONOS PCT AUTO %  --  4.4  --   --   --    EOSINO PCT MAN %  --   --  0.0  --  0.0   EOS PCT AUTO %  --  0.0  --   --   --       Results from last 72 hours   Lab Units 01/12/25  0450 01/11/25  0556 01/09/25  1219   POCT PH, ARTERIAL pH 7.28* 7.24* 7.35*   POCT PCO2, ARTERIAL mm Hg 41 48* 40   POCT PO2, ARTERIAL mm  Hg 85 114* 120*   POCT SO2, ARTERIAL % 96 99 100     Micro/ID:   Lab Results   Component Value Date    URINECULTURE No growth 01/02/2025    BLOODCULT No growth at 4 days -  FINAL REPORT 01/02/2025    BLOODCULT No growth at 4 days -  FINAL REPORT 01/02/2025     Summary of key imaging results from the last 24 hours  Cr increasing, cxr stable    Impression   Janet Snow is a 79 y.o. year old female patient is being seen by the pulmonary service for   Acute hypoxic respiratory failure with multilobar infiltrates.  ARDS with improving oxygen needs.  The working diagnosis is that of an infectious pneumonia that precipitated this lung injury. A pathogen has not been identified   BAL without any growth.  All the viral PCR samples were negative  BAL cytology was also negative.  Speaking against a fungal infection  Increased p-ANCA, there has been a gradual drop in hemoglobin as well raising the possibility of occult alveolar hemorrhage from vasculitis.  Though she remains on steroids, the current dose is insufficient  KALEB - non-oliguric. Auto-diuresing.  Not clear if induced by diuresis or related to vasculitis     Recommendations   As follows:    continue methylprednisolone to 125 mg q 6 hr for the next 3 days (1/10-1/12) - then reassess  No change in antibiotics  Concur fully with the ICU management    Suman Frias MD   01/12/25 at 12:14 PM     Disclaimer: Documentation completed with the information available at the time of input. Parts of this note may have been scribed or generated using voice dictation software, Dragon.  Homophonic errors may exist.  Please contact me directly if clarification is needed. The times in the chart may not be reflective of actual patient care times, interventions, or procedures. Documentation occurs after the physical care of the patient.

## 2025-01-12 NOTE — PROGRESS NOTES
[facility] Critical Care Medicine       Date:  1/12/2025  Patient:  Janet Snow  YOB: 1945  MRN:  [id number]   Admit Date:  1/2/2025    Chief Complaint   Patient presents with    Shortness of Breath         History of Present Illness:  Janet Snow is a 79 y.o. year old female patient with Past Medical History of hypothyroidism, anxiety, depression, hyperlipidemia, sciatica, prior cigarette smoker (30 years, quit 30 years ago) who presented to  ED 1/2 with complaints of shortness of breath. Her symptoms started shortly before Lorraine which would have been >1 week prior to time of presentation. At this time, she also complained of productive cough with yellow-green colored sputum, difficulty swallowing, chills. She was seen at an urgent care and was prescribed Augmentin, but did not have improvement.      She met SIRS criteria in the ED with fever, tachycardia, and leukocytosis, as well as elevated lactate. She was given rocephin and azithromycin in ED. She was admitted to the regular nursing floor on 2L NC and started on CAP coverage. Pulmonology was consulted and dc azithromycin due to negative urine atypicals. She was reportedly on 3L NC yesterday but would have random episodes of desaturations with coughing fits, but O2 sats would return back to 3L.     On the morning of 1/5, the hospitalist contacted the ICU team to come see her as she became acutely tachypnic, hypoxic with SpO2 in the 60s, came up to the 80s on NRB. She was visibly in respiratory distress, in tripod position at the edge of the bed. ABG 7.37/35/44, confirmed arterial draw. CXR worsening bilateral airspace consolidations compared to CXR 1/2. She was placed on continuous CPAP 8/100% with SpO2 up to the low 90s and urgently transported to the ICU.      She did have a CT chest 7/24 which showed multifocal reticulonodular and ground-glass opacities (since 2020) 2/2 chronic infectious/inflammatory process. Multiple  new-mildly enlarged pulmonary nodules up to 6mm. Multiple unchanged prominent enlarged lymph nodes, likely reactive. Recommended follow-up CT chest in 3-6 months.      Interval ICU Events:  1/5: Pt arrived to ICU on continuous CPAP. Started IV steroids. Work of breathing improved as the day goes on. FiO2 able to be weaned to 80%. Stat CT angio chest obtained to r/o PE and for further differentiation of diffuse infiltrates seen on CXR.      1/6: Attempted to give patient a break from bipap overnight, but she became hypoxic with SpO2 in the 70s immedately. Remains on continuous bipap this morning. Very anxious with coughing fits and becomes acutely SOB with increased WOB during these episodes. CT chest yesterday showing diffuse GGO, will reengage pulmonology for their input. Broaden abx include zyvox.       1/7: Patient had a few desaturations overnight with tachypnea and accessory muscle use. Valium was added to help with her increased anxiety. Patient reports today that she is tired and that her breathing feels worse. We did discuss intubation as well as the risk associated with it. She stated that she wanted to move forward with intubation, but wanted to discuss it with her children. She then had a desaturation to 80% with respiratory distress remained on BiPap with Fi02 of 100%. Call placed to both of her children, with plans for them to come to bedside. Discussed with the patient and her children the option of intubation. Patient wishes to move forward with the understanding that it is not guaranteed that she will be able to be extubated. She states that she only wishes to be intubated for 7 days. All questions answered. Dr. Pandya called to bedside. Patient does with to remain a DNR but is OK with being intubated. Pulmonary following, plan for a bronchoscopy today, will add bactrim to cover for possible PJP.      1/8: Patient placed on fentanyl drip to help with sedation overnight, patient breathing over the  vent overnight. Will work to try and decrease TV today as ABG allows to meet ARDS Net protocol.     1/9: She remained intermittently very uncomfortable with poor mechanical ventilation synchrony before starting a ketamine infusion overnight.  Oliguric KALEB continues with Scr 3.0 today.  BAL respiratory culture 1/7 without organisms, 4+ pmn's. Hypoactive BS, stool appearing NG output c/f ileus.         1/10: Vent mode changed to PC Pi 10 PEEP 12 with improved patient interfacing.  FiO2 was weaned to 45% overnight.  She also appears more comfortable with up titration of ketamine infusion.  Solute clearance continues to worsen despite increased urine output without diuretics.       1/11: Patient remains sedated to RASS -4. Oxygen requirements continuing to decrease. Creatinine seems to have plateaud. TF switched to Nepro and Lokelma started. Currently trailing high dose steroids for vasculitis for three days.     1/12: Patient remains sedated with minimum fio2 settings. Will start decreasing PEEP and sedation today. Patient remains in KALEB non-oligouric, but with significantly elevated BUN. Spot dose steroids will begin to be tapered tomorrow. Will hold off on diureses for now as patient creatinine has not fully plateaud. Most likely will be able stop ATB therapy as patient has responded to high dose steroids for presumably vasculitis. Family deferring biopsy for now.     Medical History:  History reviewed. No pertinent past medical history.  Past Surgical History:   Procedure Laterality Date    BREAST BIOPSY Right     core biopsy 20 years ago    HYSTERECTOMY      MR HEAD ANGIO WO IV CONTRAST  07/14/2018    MR HEAD ANGIO WO IV CONTRAST [facility]     Medications Prior to Admission   Medication Sig Dispense Refill Last Dose/Taking    simvastatin (Zocor) 20 mg tablet Take 1 tablet (20 mg) by mouth once daily at bedtime.   Taking    FLUoxetine (PROzac) 40 mg capsule Take 1 capsule (40 mg) by mouth once daily.        gabapentin (Neurontin) 400 mg capsule Take 1 capsule (400 mg) by mouth 2 times a day.       levothyroxine (Synthroid, Levoxyl) 50 mcg tablet Take 1 tablet (50 mcg) by mouth early in the morning..        Meperidine (pf), Ropinirole, and Meperidine  Social History     Tobacco Use    Smoking status: Former     Types: Cigarettes    Smokeless tobacco: Never   Substance Use Topics    Drug use: Never     No family history on file.    Review of Systems:  Unable to complete ROS as patient is intubated and sedated.     Physical Exam:    Heart Rate:  [66-94]   Temp:  [36.5 °C (97.7 °F)-36.7 °C (98.1 °F)]   Resp:  [19-37]   BP: (172-187)/(61-65)   Weight:  [79 kg (174 lb 2.6 oz)]   SpO2:  [93 %-98 %]     Physical Exam  Vitals reviewed.   Constitutional:       Appearance: She is not ill-appearing.      Interventions: She is sedated, intubated and restrained.   HENT:      Head: Normocephalic and atraumatic.      Right Ear: External ear normal.      Left Ear: External ear normal.      Nose: Nose normal.      Comments: NGT right nare     Mouth/Throat:      Mouth: Mucous membranes are dry.      Pharynx: Oropharynx is clear.      Comments: ETT   Eyes:      Pupils: Pupils are equal, round, and reactive to light.   Cardiovascular:      Rate and Rhythm: Normal rate and regular rhythm.      Pulses: Normal pulses.      Heart sounds: Normal heart sounds.   Pulmonary:      Effort: She is intubated.      Breath sounds: Decreased breath sounds and wheezing present.   Abdominal:      General: Bowel sounds are decreased.      Palpations: Abdomen is soft.   Genitourinary:     Comments: Indwelling urinary catheter   Musculoskeletal:      Right lower leg: No edema.      Left lower leg: No edema.   Skin:     General: Skin is warm and dry.      Capillary Refill: Capillary refill takes less than 2 seconds.   Neurological:      Comments: Intubated and sedated    Psychiatric:      Comments: EMIR         Assessment/Plan:    I am currently managing  this critically ill patient for the following problems:    Neuro/Psych/Pain Ctrl/Sedation:  #Daily ETOH use  #Hx anxiety, depression   - Pain Control: acetaminophen PRN  - Hold home prozac  - CAM ICU qshift, sleep-wake hygiene, delirium precautions   - Continue propofol and wean off fentanyl   - Dilaudid PRN CPOT >3  - Continue phenobarbital taper   - Continue Thiamine and multivitamin     Respiratory/ENT:  #Acute hypoxic respiratory failure with ARDS - vasculitis/DAH vs infectious pneumonia vs acute inflammatory process. Patient was intubated 1/7/25  #Prior tobacco use   - Continue solu-medrol 125 mg QID for 3 days   - Duonebs q4hrs, add 3% nebs   - Lung protective vent settings, permissive hypercapnia for pH > 7.25, permisive hypoxemia for PO2 > 60; mechanics are improving and she is tolerating ACPC Pi 10 with improved comfort  - Daily CXR  - Pulm hygiene  - Appreciate Pulmonary Consult, etiology of respiratory failure remains uncertain      Cardiovascular:  #Hyperlipidemia   - Continue statin   - Maintain MAPs > 70 in setting of KALEB   - Continuous cardiac monitoring   - EKGs PRN for ACS symptoms, arrhythmias     GI:  #Dysphagia  - PPI for ppx   - Continue Nepro    Renal/Volume Status (Intra & Extravascular):  #Oliguric Acute Kidney Injury. Baseline Cr 0.9 worsening 3.6 -> 3.68 today.   #Hx urge incontinence   #Hyperkalemia Resolved   #Metabolic acidosis  - Hourly I/O's via guerrero catheter; goal even to slightly negative daily    - Appreciate Nephrology Consult.  Family reports that patient would not consent to RRT.     - Replete electrolytes to maintain K >4.0 and Mg >2.0  - Daily RFP, Mg  - Continue Lokelma   - Renal dosage where indicated    Endocrine  #Hypothyroidism   #Steroid induced hyperglycemia   - Continue IV synthroid   - ISS with q4hr glucose checks while NPO  - Hypoglycemia protocol PRN     Infectious Disease:  #CAP pneumonia   #Rule out PJP Pneumonia   # Increasing leukocytosis   - Appreciate ID  consult and rec's.  ABX narrowed to atovaquone and pip-tazo yesterday.   She has received azithro (1/3); ctx (1/3 - 1/4); doxycycline (1/5 - 1/9); linezolid (1/6 - 1/9).    - Continue atovaquone for empiric PJP coverage though this seems pretty low on the DD.       - Most likely can stop empiric zosyn now  - F/U finalized PJP PCR, respiratory viral panel   - Monitor SIRS criteria    Heme/Onc:  #Thrombocytosis - likely acutely reactive   #Anemia  - Maintain Hgb >7.0   - Daily CBC    OBGYN/MSK:  #Hx sciatica   - PT/OT eval  - ICU skin protocol, padded pressure points  - Q2hr turns    Ethics/Code Status:  DNRCCA, no RRT   Patient was intubated 1/7/25 after giving verbal consent infront of her daughter. She mentioned she would only like to be vented for 7 days. Discussed with both children today and they would like to uphold their mothers decision on mechanical ventilation for 7 days. Her code status will stay DNR DNI for when she is extubated.       :  DVT Prophylaxis: SQH & SCDs  GI Prophylaxis: PPI  Bowel Regimen: Colace  Diet: TF  CVC: none  Liz: yes  West: yes  Restraints: soft  Dispo: ICU    Restraints indicated to maintain lines/tubes.  Alternative therapies have been attempted and have been ineffective.  Restrain with soft wrist restraints and side rails up x4 until medical devices discontinued and/or patient able to participate with plan of care.      Critical Care Time:  55 minutes spent in preparing to see patient (I.e. review of medical records), evaluation of diagnostics (I.e. labs, imaging, etc.), documentation, discussing plan of care with patient/ family/ caregiver, and/ or coordination of care with multidisciplinary team. Time does not include completion of procedure time.     Plan Discussed with Attending: Dr. Christel Metcalf PA-C  Critical Care Medicine  [facility]

## 2025-01-12 NOTE — CARE PLAN
Problem: Pain - Adult  Goal: Verbalizes/displays adequate comfort level or baseline comfort level  Outcome: Progressing     Problem: Safety - Adult  Goal: Free from fall injury  Outcome: Progressing     Problem: Discharge Planning  Goal: Discharge to home or other facility with appropriate resources  Outcome: Progressing     Problem: Chronic Conditions and Co-morbidities  Goal: Patient's chronic conditions and co-morbidity symptoms are monitored and maintained or improved  Outcome: Progressing     Problem: Skin  Goal: Decreased wound size/increased tissue granulation at next dressing change  Outcome: Progressing  Flowsheets (Taken 1/12/2025 0046)  Decreased wound size/increased tissue granulation at next dressing change:   Promote sleep for wound healing   Protective dressings over bony prominences  Goal: Participates in plan/prevention/treatment measures  Outcome: Progressing  Flowsheets (Taken 1/12/2025 0046)  Participates in plan/prevention/treatment measures:   Discuss with provider PT/OT consult   Elevate heels  Goal: Prevent/manage excess moisture  Outcome: Progressing  Flowsheets (Taken 1/12/2025 0046)  Prevent/manage excess moisture:   Cleanse incontinence/protect with barrier cream   Monitor for/manage infection if present  Goal: Prevent/minimize sheer/friction injuries  Outcome: Progressing  Flowsheets (Taken 1/12/2025 0046)  Prevent/minimize sheer/friction injuries:   Complete micro-shifts as needed if patient unable. Adjust patient position to relieve pressure points, not a full turn   Use pull sheet   HOB 30 degrees or less   Turn/reposition every 2 hours/use positioning/transfer devices  Goal: Promote/optimize nutrition  Outcome: Progressing  Flowsheets (Taken 1/12/2025 0046)  Promote/optimize nutrition: Monitor/record intake including meals  Goal: Promote skin healing  Outcome: Progressing  Flowsheets (Taken 1/12/2025 0046)  Promote skin healing:   Assess skin/pad under line(s)/device(s)    Turn/reposition every 2 hours/use positioning/transfer devices   Ensure correct size (line/device) and apply per  instructions   Protective dressings over bony prominences   Rotate device position/do not position patient on device     Problem: Nutrition  Goal: Nutrition support is meeting 75% of nutrient needs  Outcome: Progressing  Goal: Tube feed tolerance  Outcome: Progressing     Problem: Knowledge Deficit  Goal: Patient/family/caregiver demonstrates understanding of disease process, treatment plan, medications, and discharge instructions  Outcome: Progressing     Problem: Mechanical Ventilation  Goal: Patient Will Maintain Patent Airway  Outcome: Progressing  Goal: Oral health is maintained or improved  Outcome: Progressing  Goal: ET tube will be managed safely  Outcome: Progressing  Goal: Ability to express needs and understand communication  Outcome: Progressing  Goal: Mobility/activity is maintained at optimum level for patient  Outcome: Progressing     Problem: Safety - Medical Restraint  Goal: Remains free of injury from restraints (Restraint for Interference with Medical Device)  Outcome: Progressing  Flowsheets (Taken 1/12/2025 0046)  Remains free of injury from restraints (restraint for interference with medical device):   Determine that other, less restrictive measures have been tried or would not be effective before applying the restraint   Inform patient/family regarding the reason for restraint   Evaluate the patient's condition at the time of restraint application   Every 2 hours: Monitor safety, psychosocial status, comfort, nutrition and hydration  Goal: Free from restraint(s) (Restraint for Interference with Medical Device)  Outcome: Progressing  Flowsheets (Taken 1/12/2025 0046)  Free from restraint(s) (restraint for interference with medical device):   ONCE/SHIFT or MINIMUM Every 12 hours: Assess and document the continuing need for restraints   Every 24 hours: Continued use of  restraint requires Licensed Independent Practitioner to perform face to face examination and written order   Identify and implement measures to help patient regain control     Problem: Pain  Goal: Takes deep breaths with improved pain control throughout the shift  Outcome: Progressing  Goal: Turns in bed with improved pain control throughout the shift  Outcome: Progressing  Goal: Walks with improved pain control throughout the shift  Outcome: Progressing  Goal: Performs ADL's with improved pain control throughout shift  Outcome: Progressing  Goal: Participates in PT with improved pain control throughout the shift  Outcome: Progressing  Goal: Free from opioid side effects throughout the shift  Outcome: Progressing  Goal: Free from acute confusion related to pain meds throughout the shift  Outcome: Progressing     Problem: Fall/Injury  Goal: Not fall by end of shift  Outcome: Progressing  Goal: Be free from injury by end of the shift  Outcome: Progressing  Goal: Verbalize understanding of personal risk factors for fall in the hospital  Outcome: Progressing  Goal: Verbalize understanding of risk factor reduction measures to prevent injury from fall in the home  Outcome: Progressing  Goal: Use assistive devices by end of the shift  Outcome: Progressing  Goal: Pace activities to prevent fatigue by end of the shift  Outcome: Progressing   The patient's goals for the shift include Able to breath better, safety    The clinical goals for the shift include Maintain hemodynamic stability

## 2025-01-12 NOTE — PROGRESS NOTES
Palliative Care Progress Note    Date of Admission: 1/2/2025    Patient is a 79 y.o. female admitted with Community acquired pneumonia of both upper lobes. Remains intubated and sedated in ICU. Renal functions worsening, however remains non-oliguric. Vent settings have been weaned a bit since yesterday. She is very hypertensive with systolics in 200's. Getting hydralazine and labetalol this morning.    Mental/Cognitive Status: sedated on propofol and ketamine    Respiratory Status: still intubated, but improving    Pain Assessment: calm, no evidence she is in any distress    Pertinent Symptoms: none    Diet/Nutrition: TF    Bowel Regimen: colace + miralax prn    Patient's current condition/Anticipated Prognosis: guarded.  Family/Healthcare Proxy involvement: daughter Bevelry and son Valeriy.    Scheduled medications  amLODIPine, 10 mg, oral, Daily  atovaquone, 750 mg, oral, q12h ADE  docusate sodium, 100 mg, oral, BID  pantoprazole, 40 mg, oral, Daily   Or  esomeprazole, 40 mg, oral, Daily   Or  pantoprazole, 40 mg, intravenous, Daily  FLUoxetine, 40 mg, oral, Daily  folic acid, 1 mg, oral, Daily  heparin, 5,000 Units, subcutaneous, q8h  insulin lispro, 0-5 Units, subcutaneous, q4h  ipratropium-albuteroL, 3 mL, nebulization, q4h  levothyroxine, 25 mcg, intravenous, q24h ADE  methylPREDNISolone sodium succinate (PF), 125 mg, intravenous, q6h  oxygen, , inhalation, Continuous - Inhalation  simvastatin, 20 mg, oral, Nightly  sodium bicarbonate, 50 mEq, intravenous, Once  sodium bicarbonate, 650 mg, oral, BID  sodium chloride, 3 mL, nebulization, q4h  sodium zirconium cyclosilicate, 10 g, oral, q8h  thiamine, 100 mg, oral, Daily      Continuous medications  ketamine, 0-150 mg/hr, Last Rate: 4.5 mg/hr (01/12/25 1322)  propofol, 0-50 mcg/kg/min, Last Rate: 50 mcg/kg/min (01/12/25 1300)      PRN medications  PRN medications: acetaminophen, albuterol, dextrose, dextrose, glucagon, glucagon, hydrALAZINE, HYDROmorphone,  labetaloL, lactulose, oxygen, polyethylene glycol     Results for orders placed or performed during the hospital encounter of 01/02/25 (from the past 24 hours)   Renal function panel   Result Value Ref Range    Glucose 142 (H) 74 - 99 mg/dL    Sodium 139 136 - 145 mmol/L    Potassium 4.9 3.5 - 5.3 mmol/L    Chloride 103 98 - 107 mmol/L    Bicarbonate 21 21 - 32 mmol/L    Anion Gap 20 10 - 20 mmol/L    Urea Nitrogen 134 (HH) 6 - 23 mg/dL    Creatinine 3.81 (H) 0.50 - 1.05 mg/dL    eGFR 12 (L) >60 mL/min/1.73m*2    Calcium 7.6 (L) 8.6 - 10.3 mg/dL    Phosphorus 9.8 (H) 2.5 - 4.9 mg/dL    Albumin 2.6 (L) 3.4 - 5.0 g/dL   POCT GLUCOSE   Result Value Ref Range    POCT Glucose 133 (H) 74 - 99 mg/dL   POCT GLUCOSE   Result Value Ref Range    POCT Glucose 146 (H) 74 - 99 mg/dL   POCT GLUCOSE   Result Value Ref Range    POCT Glucose 146 (H) 74 - 99 mg/dL   POCT GLUCOSE   Result Value Ref Range    POCT Glucose 140 (H) 74 - 99 mg/dL   CBC and Auto Differential   Result Value Ref Range    WBC 26.2 (H) 4.4 - 11.3 x10*3/uL    nRBC 0.1 (H) 0.0 - 0.0 /100 WBCs    RBC 2.35 (L) 4.00 - 5.20 x10*6/uL    Hemoglobin 7.1 (L) 12.0 - 16.0 g/dL    Hematocrit 21.5 (L) 36.0 - 46.0 %    MCV 92 80 - 100 fL    MCH 30.2 26.0 - 34.0 pg    MCHC 33.0 32.0 - 36.0 g/dL    RDW 14.4 11.5 - 14.5 %    Platelets 479 (H) 150 - 450 x10*3/uL    Neutrophils % 87.8 40.0 - 80.0 %    Immature Granulocytes %, Automated 5.0 (H) 0.0 - 0.9 %    Lymphocytes % 2.6 13.0 - 44.0 %    Monocytes % 4.4 2.0 - 10.0 %    Eosinophils % 0.0 0.0 - 6.0 %    Basophils % 0.2 0.0 - 2.0 %    Neutrophils Absolute 22.98 (H) 1.60 - 5.50 x10*3/uL    Immature Granulocytes Absolute, Automated 1.30 (H) 0.00 - 0.50 x10*3/uL    Lymphocytes Absolute 0.69 (L) 0.80 - 3.00 x10*3/uL    Monocytes Absolute 1.14 (H) 0.05 - 0.80 x10*3/uL    Eosinophils Absolute 0.00 0.00 - 0.40 x10*3/uL    Basophils Absolute 0.05 0.00 - 0.10 x10*3/uL   Magnesium   Result Value Ref Range    Magnesium 2.43 (H) 1.60 - 2.40  mg/dL   Renal function panel   Result Value Ref Range    Glucose 144 (H) 74 - 99 mg/dL    Sodium 140 136 - 145 mmol/L    Potassium 4.6 3.5 - 5.3 mmol/L    Chloride 102 98 - 107 mmol/L    Bicarbonate 22 21 - 32 mmol/L    Anion Gap 21 (H) 10 - 20 mmol/L    Urea Nitrogen 162 (HH) 6 - 23 mg/dL    Creatinine 4.05 (H) 0.50 - 1.05 mg/dL    eGFR 11 (L) >60 mL/min/1.73m*2    Calcium 7.4 (L) 8.6 - 10.3 mg/dL    Phosphorus 9.5 (H) 2.5 - 4.9 mg/dL    Albumin 2.5 (L) 3.4 - 5.0 g/dL   Blood Gas Arterial Full Panel   Result Value Ref Range    POCT pH, Arterial 7.28 (L) 7.38 - 7.42 pH    POCT pCO2, Arterial 41 38 - 42 mm Hg    POCT pO2, Arterial 85 85 - 95 mm Hg    POCT SO2, Arterial 96 94 - 100 %    POCT Oxy Hemoglobin, Arterial 94.7 94.0 - 98.0 %    POCT Hematocrit Calculated, Arterial 20.0 (L) 36.0 - 46.0 %    POCT Sodium, Arterial 136 136 - 145 mmol/L    POCT Potassium, Arterial 4.2 3.5 - 5.3 mmol/L    POCT Chloride, Arterial 107 98 - 107 mmol/L    POCT Ionized Calcium, Arterial 1.04 (L) 1.10 - 1.33 mmol/L    POCT Glucose, Arterial 131 (H) 74 - 99 mg/dL    POCT Lactate, Arterial 1.2 0.4 - 2.0 mmol/L    POCT Base Excess, Arterial -6.9 (L) -2.0 - 3.0 mmol/L    POCT HCO3 Calculated, Arterial 19.3 (L) 22.0 - 26.0 mmol/L    POCT Hemoglobin, Arterial 6.8 (L) 12.0 - 16.0 g/dL    POCT Anion Gap, Arterial 14 10 - 25 mmo/L    Patient Temperature 37.0 degrees Celsius    FiO2 35 %    Apparatus      Ventilator Mode Other     Ventilator Rate 24 bpm    PC Pressure Control 10.0 cm H2O    Peep CHM2O 10.0 cm H2O    Site of Arterial Puncture Arterial Line    POCT GLUCOSE   Result Value Ref Range    POCT Glucose 149 (H) 74 - 99 mg/dL   Hemoglobin and hematocrit, blood   Result Value Ref Range    Hemoglobin 7.7 (L) 12.0 - 16.0 g/dL    Hematocrit 22.7 (L) 36.0 - 46.0 %   Type and screen   Result Value Ref Range    ABO TYPE AB     Rh TYPE POS     ANTIBODY SCREEN NEG    VERIFY ABO/Rh Group Test   Result Value Ref Range    ABO TYPE AB     Rh TYPE POS     Blood Gas Arterial Full Panel   Result Value Ref Range    POCT pH, Arterial 7.34 (L) 7.38 - 7.42 pH    POCT pCO2, Arterial 38 38 - 42 mm Hg    POCT pO2, Arterial 88 85 - 95 mm Hg    POCT SO2, Arterial 98 94 - 100 %    POCT Oxy Hemoglobin, Arterial 95.6 94.0 - 98.0 %    POCT Hematocrit Calculated, Arterial 25.0 (L) 36.0 - 46.0 %    POCT Sodium, Arterial 137 136 - 145 mmol/L    POCT Potassium, Arterial 4.6 3.5 - 5.3 mmol/L    POCT Chloride, Arterial 104 98 - 107 mmol/L    POCT Ionized Calcium, Arterial 1.08 (L) 1.10 - 1.33 mmol/L    POCT Glucose, Arterial 119 (H) 74 - 99 mg/dL    POCT Lactate, Arterial 0.9 0.4 - 2.0 mmol/L    POCT Base Excess, Arterial -4.8 (L) -2.0 - 3.0 mmol/L    POCT HCO3 Calculated, Arterial 20.5 (L) 22.0 - 26.0 mmol/L    POCT Hemoglobin, Arterial 8.3 (L) 12.0 - 16.0 g/dL    POCT Anion Gap, Arterial 17 10 - 25 mmo/L    Patient Temperature 37.0 degrees Celsius    FiO2 35 %    Ventilator Mode A/C PC     Ventilator Rate 24 bpm    PC Pressure Control 10.0 cm H2O    Peep CHM2O 8.0 cm H2O    Site of Arterial Puncture Arterial Line     Aba's Test Negative         XR chest 1 view  Result Date: 1/12/2025  Interpreted By:  Jaxon Morales, STUDY: XR CHEST 1 VIEW; 1/12/2025 5:25 am   INDICATION: CLINICAL INFORMATION: Signs/Symptoms:persistent hypoxia, ARDS, eval infiltrates.   COMPARISON: 01/10/2025   ACCESSION NUMBER(S): [id number]   ORDERING CLINICIAN: NORM LANDAVERDE   TECHNIQUE: Portable chest one view.   FINDINGS: The cardiac size is indeterminate in view of the AP projection. There is no change in the ET tube or NG tube appearance. There is no change in the hazy diffuse bilateral infiltrates. No effusions are identified.       No change in the bilateral infiltrates or tube placement. There is no interval change when compared to the previous examination.   MACRO: none   Signed by: Jaxon Morales 1/12/2025 11:03 AM Dictation workstation:   [id number]    XR chest 1 view  Result Date:  1/10/2025  Interpreted By:  Sherly Fiore, STUDY: XR CHEST 1 VIEW 1/10/2025 10:42 am   INDICATION: Signs/Symptoms:acute hypoxemic respiratory failure, assess lung fields   COMPARISON: 01/09/2025   ACCESSION NUMBER(S): [id number]   ORDERING CLINICIAN: JORY RIDDLE   TECHNIQUE: Single AP view chest   FINDINGS: Examination rotated accentuating the cardiac silhouette. Endotracheal tube tip identified 3.6 cm from the noah. NG tube is in place. There is generalized increased interstitial prominence in bilateral lung fields with alveolar airspace infiltrate with alveolar component of pulmonary edema suggested as opposed to postinfectious etiology. Correlate with patient's history.             1. Stable lines and tubes   2. Persistent patchy alveolar airspace infiltrate demonstrated within bilateral lung fields stable. No dense airspace consolidation.   Signed by: Sherly Fiore 1/10/2025 1:06 PM Dictation workstation:   [id number]    XR chest 1 view  Result Date: 1/9/2025  Interpreted By:  Stefany Edwards, STUDY: XR CHEST 1 VIEW 1/9/2025 6:30 am   INDICATION: Signs/Symptoms:persistent hypoxia, unable to wean   COMPARISON: 01/08/2025   ACCESSION NUMBER(S): [id number]   ORDERING CLINICIAN: NORM LANDAVERDE   TECHNIQUE: AP erect view of the chest at bedside   FINDINGS: The tip of the endotracheal tube is located 2 cm above noah with nasogastric tube descending below diaphragm. The cardiac size is borderline enlarged. When compared with yesterday's study, the infiltrate throughout the right lung is unchanged. Infiltrate within the left lung appears slightly improved. No pleural abnormality is seen.       Stable diffuse infiltrate throughout right lung with some mild improvement in the diffuse infiltrate seen in left lung.   Signed by: Stefany Edwards 1/9/2025 8:23 AM Dictation workstation:   [id number]    XR chest 1 view  Result Date: 1/7/2025  Interpreted By:  Isidro Corrales, STUDY: XR CHEST 1 VIEW;  1/7/2025 10:48 pm    INDICATION: Signs/Symptoms:OG tube.   COMPARISON: Chest x-ray 01/07/2025   ACCESSION NUMBER(S): [id number]   ORDERING CLINICIAN: AMANDA SÁNCHEZ   FINDINGS: Enteric tube terminates in the left mid abdomen with side hole below the GE junction, likely within the distal gastric body. Multiple overlying leads are present.   CARDIOMEDIASTINAL SILHOUETTE: Cardiomediastinal silhouette is stable in size and configuration.   LUNGS: Lung apices are excluded from the field of view. There is diffuse bilateral airspace opacities not significantly changed from prior exam.   ABDOMEN: No remarkable upper abdominal findings.   BONES: Multilevel degenerative changes of the spine.       Enteric tube terminates in the left mid abdomen with side hole below the GE junction.   Diffuse bilateral airspace opacities concerning for developing multifocal pneumonia. Continued radiographic follow-up to resolution is advised.   MACRO: None   Signed by: Isidro Corrales 1/7/2025 10:52 PM Dictation workstation:   [id number]    XR chest 1 view  Result Date: 1/7/2025  Interpreted By:  Elda Dorado, STUDY: XR CHEST 1 VIEW;  1/7/2025 10:40 am   INDICATION: Signs/Symptoms:intubation.   COMPARISON: 01/07/2025 at 5:45 a.m.   ACCESSION NUMBER(S): [id number]   ORDERING CLINICIAN: KENISHA LEE   FINDINGS: Heart is normal in size.   The patient is intubated. The tip terminates above the noah.   There is diffuse parenchymal infiltration.   There are atherosclerotic changes of the aorta. There are degenerative changes of the spine.   COMPARISON OF FINDING: The patient has undergone interval intubation. The lungs are similar.       Diffuse bilateral parenchymal infiltration. Interval intubation.   MACRO: none   Signed by: Elda Dorado 1/7/2025 11:22 AM Dictation workstation:   [id number]    XR chest 1 view  Result Date: 1/7/2025  Interpreted By:  Jaxon Morales, STUDY: XR CHEST 1 VIEW; 1/7/2025 6:35 am   INDICATION: CLINICAL INFORMATION:  Signs/Symptoms:persistent hypoxia, unable to wean from NIV.   COMPARISON: 01/06/2025   ACCESSION NUMBER(S): [id number]   ORDERING CLINICIAN: NORM LANDAVERDE   TECHNIQUE: Portable chest one view.   FINDINGS: The cardiac size is indeterminate in view of the AP projection. There is continued diffuse ground-glass infiltrate bilaterally. No effusions are identified.       Persistent ground-glass infiltrates diffusely bilaterally. Etiology is unclear with differential to include infectious organisms as well as pulmonary edema and ARDS.   MACRO: none   Signed by: Jaxon Morales 1/7/2025 8:20 AM Dictation workstation:   [id number]    Transthoracic Echo (TTE) Complete  Result Date: 1/6/2025  PHYSICIAN INTERPRETATION: Left Ventricle: The left ventricular systolic function is normal, with a visually estimated ejection fraction of 70-75%. There are no regional wall motion abnormalities. The left ventricular cavity size is normal. There is normal septal thickness. Spectral Doppler shows a normal pattern of left ventricular diastolic filling. Left Atrium: The left atrium is normal in size. Right Ventricle: The right ventricle is normal in size. There is normal right ventricular global systolic function. Right Atrium: The right atrium is normal in size. Aortic Valve: The aortic valve is trileaflet. The aortic valve dimensionless index is 0.74. There is no evidence of aortic valve regurgitation. The peak instantaneous gradient of the aortic valve is 22 mmHg. The mean gradient of the aortic valve is 11 mmHg. Mitral Valve: The mitral valve is normal in structure. There is no evidence of mitral valve regurgitation. Tricuspid Valve: The tricuspid valve is structurally normal. There is mild tricuspid regurgitation. Pulmonic Valve: The pulmonic valve is structurally normal. There is physiologic pulmonic valve regurgitation. Pericardium: Small pericardial effusion. Aorta: The aortic root is normal. Systemic Veins: The inferior vena cava  appears normal in size, with IVC inspiratory collapse greater than 50%.  CONCLUSIONS:  1. The left ventricular systolic function is normal, with a visually estimated ejection fraction of 70-75%.  2. Spectral Doppler shows a normal pattern of left ventricular diastolic filling.  3. There is normal right ventricular global systolic function.  4. No evidence of mitral valve regurgitation.  5. Mild tricuspid regurgitation is visualized.     CT angio chest for pulmonary embolism  Result Date: 1/5/2025  FINDINGS: POTENTIAL LIMITATIONS OF THE STUDY: Motion artifact.   HEART AND VESSELS: The evaluation for filling defects at the pulmonary arteries is degraded by motion artifact. No large central filling defects to suggest pulmonary embolism. The smaller segmental/subsegmental pulmonary arteries are not well evaluated on this exam.   No thoracic aortic aneurysm. Mild atherosclerotic calcifications are noted at the thoracic aorta.   The heart is mildly enlarged. There is trace pericardial effusion.   MEDIASTINUM AND MANPREET, LOWER NECK AND AXILLA: The visualized thyroid gland is small.   A right paratracheal lymph node measures 10 mm in short axis. A subcarinal lymph node measures 12 mm in short axis. A right hilar lymph node measures 19 mm in short axis. A left hilar lymph node measures 11 mm in short axis. Calcified lymph nodes are noted at the mediastinum and left hilum.   LUNGS AND AIRWAYS: The trachea and central airways are patent.   There are diffuse bilateral ground-glass opacities. No pleural effusion or pneumothorax.   UPPER ABDOMEN: Calcific foci at the spleen are suggestive of granulomas.   CHEST WALL AND OSSEOUS STRUCTURES: Multilevel degenerative changes at the spine. Redemonstration of remote compression deformity with Schmorl's node at the superior endplate of L1.       1. Study degraded by motion artifact. No evidence of large central pulmonary emboli. The smaller segmental/subsegmental pulmonary arteries are  not well evaluated on this exam. 2. Diffuse bilateral ground-glass opacities, may be secondary to pulmonary edema and/or multifocal pneumonia. Acute respiratory distress syndrome is in the differential diagnoses. 3. Mild cardiomegaly. Trace pericardial effusion. 4. Mild mediastinal and hilar adenopathy.     MACRO: None.   Signed by: Selin Matthews 1/5/2025 6:42 PM Dictation workstation:   [id number]    US renal complete  Result Date: 1/5/2025  Interpreted By:  Jaxon Morales, STUDY: US RENAL COMPLETE; 1/5/2025 10:29 am   INDICATION: Signs/Symptoms:nancy.   COMPARISON: None   ACCESSION NUMBER(S): [id number]   ORDERING CLINICIAN: NORM LANDAVERDE   TECHNIQUE: Grayscale and color Doppler imaging of the kidneys   FINDINGS: The right kidney measures 11.0 cm  . The left kidney measures 11.0 cm  .   There is mild increased echogenicity of the renal cortex bilaterally.     No renal stones are identified.  Renal cortex is normal in thickness bilaterally. No hydronephrosis is identified.   Urinary bladder is nonspecific in appearance with no stones or masses identified.       Mild increased renal cortical echogenicity diffusely bilaterally suggesting chronic renal medical disease.   MACRO: none   Signed by: Jaxon Morales 1/5/2025 12:45 PM Dictation workstation:   [id number]    Visit Vitals  BP (!) 203/78   Pulse 75   Temp 36.4 °C (97.5 °F) (Axillary)   Resp 20       Physical Exam  Vitals and nursing note reviewed.   Constitutional:       General: She is not in acute distress.     Appearance: She is ill-appearing.      Comments: Intubated and sedated   HENT:      Mouth/Throat:      Mouth: Mucous membranes are dry.      Pharynx: Oropharynx is clear.   Cardiovascular:      Rate and Rhythm: Normal rate and regular rhythm.      Pulses: Normal pulses.   Pulmonary:      Breath sounds: No wheezing or rales.      Comments: Intubated on 35% FiO2, PEEP 8  Abdominal:      General: There is no distension.      Palpations: Abdomen is  soft.   Musculoskeletal:      Right lower leg: No edema.      Left lower leg: No edema.      Comments: Edema bilat hands, non pitting   Skin:     General: Skin is warm and dry.      Findings: Bruising present.   Neurological:      Comments: Sedated, calm, not following commands, no spontaneous eye opening          Assessment/Plan   IMP:      Assessment/Plan  IMP:    CAP - on Zosyn per ID and Mepron per pulm  Acute respiratory failure with hypoxia - remains intubated. ? Of Vasculitis/DAH vs. Atypical pneumonia vs. Acute inflammatory process. On IV steroids  Sepsis - all cultures NGTD. 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 Still having decent UO, but creatinine progressively worsening. Nephrology following. Family declined renal biopsy and dialysis.  Leukocytosis - continues to trend up. ID is following     Palliative Care Encounter  DNR-CCA  Not capable  Daughter Beverly is stated HPOA. Pt also has a son Valeriy.     1/12  No family members were present at the bedside today. Initial plan was to extubate on day 7 of mechanical vent, however as she is improving from a respiratory standpoint family has decided to give her some more time for better chance of successful weaning. We will follow. D/w Antonio Metcalf PA-C and ICU nurse Azucena ALLISON.    1/11  Daughter present at the bedside today. Reviewed course. She again reconfirmed previous goals of care. Provided updates. She had questions about extubation and how that would look. I explained that is dependent on SBTs. If continues to fail, would then be proceeding with palliative/terminal extubation on day 7 per pt wishes. We will follow.     1/10  Daughter Beverly present at bedside.  Updates given about the patient.  She did reiterate that the family did not want dialysis even if it was temporary. The patient getting tube feeds today. The daughter states that she does not have any questions at this time. States she will likely have some in a few  days if the patient's condition does not improve.     1/9  Daughter Beverly at bedside, UO declining, creatinine mildly worsened, NG currently to LIWS, starting laxatives and trickle tube feeds today to stimulate gut. Diuretics on hold.  Currently stable on vent, better with addition of ketamine and adjustment of vent settings for comfort.  Discussed with attending service and daughter, continue aggressive treatment model of care for specified 7 days currently day 3 on ventilator, no dialysis.     1/8/2025  Discussion with daughter Beverly at bedside. The patient had a hard time being sedated. Beverly did ask for spiritual care now, I did put in a consult. States she is agreeable to her mother's wishes. We will continue to follow.      1/7/2025  Goals of care discussion with daughter Beverly.  The daughter stated that she is aware of her mom's wishes.  States that her mom would only want to be intubated for 1 week.  This is congruent with discussions with the ICU team.  I stated that the palliative care team will be in close follow-up with the family to walk them through this decision making.  We will continue to follow.            Bev Escalera, APRN-CNP

## 2025-01-12 NOTE — CARE PLAN
Problem: Respiratory  Goal: Wean oxygen to maintain O2 saturation per order/standard this shift  1/12/2025 1732 by Faye Mancilla, RRT  Outcome: Progressing  Goal: Clear secretions with interventions this shift  Outcome: Progressing  Goal: Tolerate mechanical ventilation evidenced by VS/agitation level this shift  Outcome: Progressing

## 2025-01-13 LAB
ALBUMIN SERPL BCP-MCNC: 2.5 G/DL (ref 3.4–5)
ALBUMIN SERPL BCP-MCNC: 2.6 G/DL (ref 3.4–5)
ANCA AB PATTERN SER IF-IMP: ABNORMAL
ANCA IGG TITR SER IF: ABNORMAL {TITER}
ANION GAP BLDA CALCULATED.4IONS-SCNC: 16 MMO/L (ref 10–25)
ANION GAP SERPL CALCULATED.3IONS-SCNC: 21 MMOL/L (ref 10–20)
ANION GAP SERPL CALCULATED.3IONS-SCNC: 22 MMOL/L (ref 10–20)
ARTERIAL PATENCY WRIST A: POSITIVE
BASE EXCESS BLDA CALC-SCNC: -3.6 MMOL/L (ref -2–3)
BASO STIPL BLD QL SMEAR: PRESENT
BASOPHILS # BLD MANUAL: 0 X10*3/UL (ref 0–0.1)
BASOPHILS NFR BLD MANUAL: 0 %
BODY TEMPERATURE: 37 DEGREES CELSIUS
BUN SERPL-MCNC: 185 MG/DL (ref 6–23)
BUN SERPL-MCNC: 192 MG/DL (ref 6–23)
BURR CELLS BLD QL SMEAR: ABNORMAL
CA-I BLDA-SCNC: 1.08 MMOL/L (ref 1.1–1.33)
CALCIUM SERPL-MCNC: 7.5 MG/DL (ref 8.6–10.3)
CALCIUM SERPL-MCNC: 7.7 MG/DL (ref 8.6–10.3)
CHLORIDE BLDA-SCNC: 106 MMOL/L (ref 98–107)
CHLORIDE SERPL-SCNC: 103 MMOL/L (ref 98–107)
CHLORIDE SERPL-SCNC: 104 MMOL/L (ref 98–107)
CO2 SERPL-SCNC: 21 MMOL/L (ref 21–32)
CO2 SERPL-SCNC: 23 MMOL/L (ref 21–32)
CREAT SERPL-MCNC: 4.4 MG/DL (ref 0.5–1.05)
CREAT SERPL-MCNC: 4.65 MG/DL (ref 0.5–1.05)
EGFRCR SERPLBLD CKD-EPI 2021: 10 ML/MIN/1.73M*2
EGFRCR SERPLBLD CKD-EPI 2021: 9 ML/MIN/1.73M*2
EOSINOPHIL # BLD MANUAL: 0 X10*3/UL (ref 0–0.4)
EOSINOPHIL NFR BLD MANUAL: 0 %
ERYTHROCYTE [DISTWIDTH] IN BLOOD BY AUTOMATED COUNT: 14.1 % (ref 11.5–14.5)
FUNGUS SPEC CULT: NORMAL
FUNGUS SPEC FUNGUS STN: NORMAL
GLUCOSE BLD MANUAL STRIP-MCNC: 125 MG/DL (ref 74–99)
GLUCOSE BLD MANUAL STRIP-MCNC: 129 MG/DL (ref 74–99)
GLUCOSE BLD MANUAL STRIP-MCNC: 134 MG/DL (ref 74–99)
GLUCOSE BLD MANUAL STRIP-MCNC: 158 MG/DL (ref 74–99)
GLUCOSE BLD MANUAL STRIP-MCNC: 161 MG/DL (ref 74–99)
GLUCOSE BLD MANUAL STRIP-MCNC: 170 MG/DL (ref 74–99)
GLUCOSE BLD MANUAL STRIP-MCNC: 179 MG/DL (ref 74–99)
GLUCOSE BLDA-MCNC: 198 MG/DL (ref 74–99)
GLUCOSE SERPL-MCNC: 134 MG/DL (ref 74–99)
GLUCOSE SERPL-MCNC: 188 MG/DL (ref 74–99)
HCO3 BLDA-SCNC: 21.5 MMOL/L (ref 22–26)
HCT VFR BLD AUTO: 22.3 % (ref 36–46)
HCT VFR BLD EST: 22 % (ref 36–46)
HGB BLD-MCNC: 7.7 G/DL (ref 12–16)
HGB BLDA-MCNC: 7.4 G/DL (ref 12–16)
IMM GRANULOCYTES # BLD AUTO: 2.15 X10*3/UL (ref 0–0.5)
IMM GRANULOCYTES NFR BLD AUTO: 6.6 % (ref 0–0.9)
INHALED O2 CONCENTRATION: 40 %
LACTATE BLDA-SCNC: 1.3 MMOL/L (ref 0.4–2)
LYMPHOCYTES # BLD MANUAL: 1.3 X10*3/UL (ref 0.8–3)
LYMPHOCYTES NFR BLD MANUAL: 4 %
MAGNESIUM SERPL-MCNC: 2.56 MG/DL (ref 1.6–2.4)
MAGNESIUM SERPL-MCNC: 2.62 MG/DL (ref 1.6–2.4)
MCH RBC QN AUTO: 30.8 PG (ref 26–34)
MCHC RBC AUTO-ENTMCNC: 34.5 G/DL (ref 32–36)
MCV RBC AUTO: 89 FL (ref 80–100)
METAMYELOCYTES # BLD MANUAL: 0.98 X10*3/UL
METAMYELOCYTES NFR BLD MANUAL: 3 %
MONOCYTES # BLD MANUAL: 2.6 X10*3/UL (ref 0.05–0.8)
MONOCYTES NFR BLD MANUAL: 8 %
MYELOCYTES # BLD MANUAL: 0.33 X10*3/UL
MYELOCYTES NFR BLD MANUAL: 1 %
MYELOPEROXIDASE AB SER-ACNC: 15 AU/ML (ref 0–19)
NEUTS SEG # BLD MANUAL: 27.3 X10*3/UL (ref 1.6–5)
NEUTS SEG NFR BLD MANUAL: 84 %
NRBC BLD-RTO: 0.3 /100 WBCS (ref 0–0)
OVALOCYTES BLD QL SMEAR: ABNORMAL
OXYHGB MFR BLDA: 97.5 % (ref 94–98)
PCO2 BLDA: 38 MM HG (ref 38–42)
PEEP CMH2O: 8 CM H2O
PH BLDA: 7.36 PH (ref 7.38–7.42)
PHOSPHATE SERPL-MCNC: 8.9 MG/DL (ref 2.5–4.9)
PHOSPHATE SERPL-MCNC: 9 MG/DL (ref 2.5–4.9)
PLATELET # BLD AUTO: 530 X10*3/UL (ref 150–450)
PO2 BLDA: 104 MM HG (ref 85–95)
POTASSIUM BLDA-SCNC: 3.2 MMOL/L (ref 3.5–5.3)
POTASSIUM SERPL-SCNC: 3.2 MMOL/L (ref 3.5–5.3)
POTASSIUM SERPL-SCNC: 3.7 MMOL/L (ref 3.5–5.3)
PROTEINASE3 AB SER-ACNC: 0 AU/ML (ref 0–19)
RBC # BLD AUTO: 2.5 X10*6/UL (ref 4–5.2)
RBC MORPH BLD: ABNORMAL
SAO2 % BLDA: 99 % (ref 94–100)
SCAN RESULT: NORMAL
SODIUM BLDA-SCNC: 140 MMOL/L (ref 136–145)
SODIUM SERPL-SCNC: 143 MMOL/L (ref 136–145)
SODIUM SERPL-SCNC: 144 MMOL/L (ref 136–145)
SPECIMEN DRAWN FROM PATIENT: ABNORMAL
TARGETS BLD QL SMEAR: ABNORMAL
TIDAL VOLUME: 370 ML
TOTAL CELLS COUNTED BLD: 100
VENTILATOR MODE: ABNORMAL
VENTILATOR RATE: 14 BPM
WBC # BLD AUTO: 32.5 X10*3/UL (ref 4.4–11.3)

## 2025-01-13 PROCEDURE — 99232 SBSQ HOSP IP/OBS MODERATE 35: CPT | Performed by: NURSE PRACTITIONER

## 2025-01-13 PROCEDURE — 2500000004 HC RX 250 GENERAL PHARMACY W/ HCPCS (ALT 636 FOR OP/ED): Performed by: ANESTHESIOLOGY

## 2025-01-13 PROCEDURE — 2500000002 HC RX 250 W HCPCS SELF ADMINISTERED DRUGS (ALT 637 FOR MEDICARE OP, ALT 636 FOR OP/ED)

## 2025-01-13 PROCEDURE — 83735 ASSAY OF MAGNESIUM: CPT

## 2025-01-13 PROCEDURE — 37799 UNLISTED PX VASCULAR SURGERY: CPT

## 2025-01-13 PROCEDURE — 2500000004 HC RX 250 GENERAL PHARMACY W/ HCPCS (ALT 636 FOR OP/ED)

## 2025-01-13 PROCEDURE — 2500000001 HC RX 250 WO HCPCS SELF ADMINISTERED DRUGS (ALT 637 FOR MEDICARE OP): Performed by: INTERNAL MEDICINE

## 2025-01-13 PROCEDURE — 82947 ASSAY GLUCOSE BLOOD QUANT: CPT

## 2025-01-13 PROCEDURE — 2500000005 HC RX 250 GENERAL PHARMACY W/O HCPCS

## 2025-01-13 PROCEDURE — 2500000001 HC RX 250 WO HCPCS SELF ADMINISTERED DRUGS (ALT 637 FOR MEDICARE OP)

## 2025-01-13 PROCEDURE — 80069 RENAL FUNCTION PANEL: CPT

## 2025-01-13 PROCEDURE — 85027 COMPLETE CBC AUTOMATED: CPT

## 2025-01-13 PROCEDURE — 36600 WITHDRAWAL OF ARTERIAL BLOOD: CPT

## 2025-01-13 PROCEDURE — 84295 ASSAY OF SERUM SODIUM: CPT

## 2025-01-13 PROCEDURE — 82435 ASSAY OF BLOOD CHLORIDE: CPT

## 2025-01-13 PROCEDURE — 94640 AIRWAY INHALATION TREATMENT: CPT

## 2025-01-13 PROCEDURE — 85007 BL SMEAR W/DIFF WBC COUNT: CPT

## 2025-01-13 PROCEDURE — 99291 CRITICAL CARE FIRST HOUR: CPT

## 2025-01-13 PROCEDURE — 94003 VENT MGMT INPAT SUBQ DAY: CPT

## 2025-01-13 PROCEDURE — 2020000001 HC ICU ROOM DAILY

## 2025-01-13 RX ORDER — FUROSEMIDE 10 MG/ML
100 INJECTION INTRAMUSCULAR; INTRAVENOUS ONCE
Status: COMPLETED | OUTPATIENT
Start: 2025-01-13 | End: 2025-01-13

## 2025-01-13 RX ORDER — METOPROLOL TARTRATE 1 MG/ML
5 INJECTION, SOLUTION INTRAVENOUS ONCE
Status: COMPLETED | OUTPATIENT
Start: 2025-01-13 | End: 2025-01-13

## 2025-01-13 RX ORDER — DEXMEDETOMIDINE HYDROCHLORIDE 4 UG/ML
0-1.5 INJECTION, SOLUTION INTRAVENOUS CONTINUOUS
Status: DISCONTINUED | OUTPATIENT
Start: 2025-01-13 | End: 2025-01-13

## 2025-01-13 RX ORDER — POTASSIUM CHLORIDE 1.5 G/1.58G
40 POWDER, FOR SOLUTION ORAL EVERY 4 HOURS
Status: COMPLETED | OUTPATIENT
Start: 2025-01-13 | End: 2025-01-13

## 2025-01-13 RX ORDER — FENTANYL CITRATE-0.9 % NACL/PF 10 MCG/ML
0-75 PLASTIC BAG, INJECTION (ML) INTRAVENOUS CONTINUOUS
Status: DISCONTINUED | OUTPATIENT
Start: 2025-01-13 | End: 2025-01-14

## 2025-01-13 RX ORDER — FUROSEMIDE 10 MG/ML
80 INJECTION INTRAMUSCULAR; INTRAVENOUS ONCE
Status: COMPLETED | OUTPATIENT
Start: 2025-01-13 | End: 2025-01-13

## 2025-01-13 RX ORDER — FENTANYL CITRATE 50 UG/ML
25 INJECTION, SOLUTION INTRAMUSCULAR; INTRAVENOUS
Status: DISCONTINUED | OUTPATIENT
Start: 2025-01-13 | End: 2025-01-13

## 2025-01-13 RX ORDER — HYDRALAZINE HYDROCHLORIDE 25 MG/1
25 TABLET, FILM COATED ORAL 3 TIMES DAILY
Status: DISCONTINUED | OUTPATIENT
Start: 2025-01-13 | End: 2025-01-14

## 2025-01-13 RX ORDER — METOPROLOL TARTRATE 1 MG/ML
5 INJECTION, SOLUTION INTRAVENOUS ONCE
Status: DISCONTINUED | OUTPATIENT
Start: 2025-01-13 | End: 2025-01-13

## 2025-01-13 RX ORDER — DEXMEDETOMIDINE HYDROCHLORIDE 4 UG/ML
0-1.5 INJECTION, SOLUTION INTRAVENOUS CONTINUOUS
Status: DISCONTINUED | OUTPATIENT
Start: 2025-01-13 | End: 2025-01-15 | Stop reason: HOSPADM

## 2025-01-13 RX ORDER — FENTANYL CITRATE 50 UG/ML
25 INJECTION, SOLUTION INTRAMUSCULAR; INTRAVENOUS ONCE
Status: COMPLETED | OUTPATIENT
Start: 2025-01-13 | End: 2025-01-13

## 2025-01-13 RX ORDER — FENTANYL CITRATE 50 UG/ML
50 INJECTION, SOLUTION INTRAMUSCULAR; INTRAVENOUS ONCE
Status: COMPLETED | OUTPATIENT
Start: 2025-01-13 | End: 2025-01-13

## 2025-01-13 RX ORDER — METOPROLOL TARTRATE 1 MG/ML
5 INJECTION, SOLUTION INTRAVENOUS EVERY 6 HOURS
Status: DISCONTINUED | OUTPATIENT
Start: 2025-01-13 | End: 2025-01-13

## 2025-01-13 RX ADMIN — SIMVASTATIN 20 MG: 20 TABLET, FILM COATED ORAL at 20:54

## 2025-01-13 RX ADMIN — Medication 30 PERCENT: at 07:16

## 2025-01-13 RX ADMIN — POTASSIUM CHLORIDE 40 MEQ: 1.5 FOR SOLUTION ORAL at 20:54

## 2025-01-13 RX ADMIN — METHYLPREDNISOLONE SODIUM SUCCINATE 80 MG: 40 INJECTION, POWDER, FOR SOLUTION INTRAMUSCULAR; INTRAVENOUS at 16:25

## 2025-01-13 RX ADMIN — FOLIC ACID 1 MG: 1 TABLET ORAL at 08:37

## 2025-01-13 RX ADMIN — AMLODIPINE BESYLATE 10 MG: 10 TABLET ORAL at 08:37

## 2025-01-13 RX ADMIN — THIAMINE HCL TAB 100 MG 100 MG: 100 TAB at 08:37

## 2025-01-13 RX ADMIN — IPRATROPIUM BROMIDE AND ALBUTEROL SULFATE 3 ML: 2.5; .5 SOLUTION RESPIRATORY (INHALATION) at 07:16

## 2025-01-13 RX ADMIN — IPRATROPIUM BROMIDE AND ALBUTEROL SULFATE 3 ML: 2.5; .5 SOLUTION RESPIRATORY (INHALATION) at 19:01

## 2025-01-13 RX ADMIN — HYDRALAZINE HYDROCHLORIDE 25 MG: 25 TABLET ORAL at 08:37

## 2025-01-13 RX ADMIN — FLUOXETINE HYDROCHLORIDE 40 MG: 40 CAPSULE ORAL at 08:37

## 2025-01-13 RX ADMIN — DEXMEDETOMIDINE HYDROCHLORIDE 0.2 MCG/KG/HR: 400 INJECTION INTRAVENOUS at 12:10

## 2025-01-13 RX ADMIN — CARBOXYMETHYLCELLULOSE SODIUM 1 DROP: 5 SOLUTION/ DROPS OPHTHALMIC at 20:54

## 2025-01-13 RX ADMIN — FENTANYL CITRATE 25 MCG: 50 INJECTION INTRAMUSCULAR; INTRAVENOUS at 11:48

## 2025-01-13 RX ADMIN — INSULIN LISPRO 1 UNITS: 100 INJECTION, SOLUTION INTRAVENOUS; SUBCUTANEOUS at 16:21

## 2025-01-13 RX ADMIN — CARBOXYMETHYLCELLULOSE SODIUM 1 DROP: 5 SOLUTION/ DROPS OPHTHALMIC at 00:00

## 2025-01-13 RX ADMIN — LEVOTHYROXINE SODIUM ANHYDROUS 25 MCG: 100 INJECTION, POWDER, LYOPHILIZED, FOR SOLUTION INTRAVENOUS at 08:37

## 2025-01-13 RX ADMIN — HEPARIN SODIUM 5000 UNITS: 5000 INJECTION, SOLUTION INTRAVENOUS; SUBCUTANEOUS at 11:28

## 2025-01-13 RX ADMIN — AMIODARONE HYDROCHLORIDE 150 MG: 1.5 INJECTION, SOLUTION INTRAVENOUS at 13:29

## 2025-01-13 RX ADMIN — NICARDIPINE HYDROCHLORIDE 2.5 MG/HR: 0.2 INJECTION INTRAVENOUS at 10:48

## 2025-01-13 RX ADMIN — FUROSEMIDE 80 MG: 10 INJECTION, SOLUTION INTRAMUSCULAR; INTRAVENOUS at 11:28

## 2025-01-13 RX ADMIN — AMIODARONE HYDROCHLORIDE 0.5 MG/MIN: 1.8 INJECTION, SOLUTION INTRAVENOUS at 19:41

## 2025-01-13 RX ADMIN — FUROSEMIDE 100 MG: 10 INJECTION, SOLUTION INTRAMUSCULAR; INTRAVENOUS at 17:40

## 2025-01-13 RX ADMIN — HYDROMORPHONE HYDROCHLORIDE 0.6 MG: 1 INJECTION, SOLUTION INTRAMUSCULAR; INTRAVENOUS; SUBCUTANEOUS at 03:06

## 2025-01-13 RX ADMIN — FENTANYL CITRATE 25 MCG: 50 INJECTION INTRAMUSCULAR; INTRAVENOUS at 09:08

## 2025-01-13 RX ADMIN — Medication 40 PERCENT: at 19:01

## 2025-01-13 RX ADMIN — METOPROLOL TARTRATE 5 MG: 5 INJECTION INTRAVENOUS at 13:10

## 2025-01-13 RX ADMIN — SODIUM BICARBONATE 650 MG TABLET 650 MG: at 20:54

## 2025-01-13 RX ADMIN — ATOVAQUONE 750 MG: 750 SUSPENSION ORAL at 20:54

## 2025-01-13 RX ADMIN — FENTANYL CITRATE 50 MCG: 50 INJECTION INTRAMUSCULAR; INTRAVENOUS at 13:00

## 2025-01-13 RX ADMIN — FENTANYL CITRATE 25 MCG/HR: 0.05 INJECTION, SOLUTION INTRAMUSCULAR; INTRAVENOUS at 13:21

## 2025-01-13 RX ADMIN — HEPARIN SODIUM 5000 UNITS: 5000 INJECTION, SOLUTION INTRAVENOUS; SUBCUTANEOUS at 20:54

## 2025-01-13 RX ADMIN — AMIODARONE HYDROCHLORIDE 1 MG/MIN: 1.8 INJECTION, SOLUTION INTRAVENOUS at 13:43

## 2025-01-13 RX ADMIN — IPRATROPIUM BROMIDE AND ALBUTEROL SULFATE 3 ML: 2.5; .5 SOLUTION RESPIRATORY (INHALATION) at 15:17

## 2025-01-13 RX ADMIN — HEPARIN SODIUM 5000 UNITS: 5000 INJECTION, SOLUTION INTRAVENOUS; SUBCUTANEOUS at 04:38

## 2025-01-13 RX ADMIN — SODIUM CHLORIDE, SODIUM LACTATE, POTASSIUM CHLORIDE, AND CALCIUM CHLORIDE 250 ML: 600; 310; 30; 20 INJECTION, SOLUTION INTRAVENOUS at 13:23

## 2025-01-13 RX ADMIN — METOPROLOL TARTRATE 5 MG: 5 INJECTION INTRAVENOUS at 13:00

## 2025-01-13 RX ADMIN — IPRATROPIUM BROMIDE AND ALBUTEROL SULFATE 3 ML: 2.5; .5 SOLUTION RESPIRATORY (INHALATION) at 11:51

## 2025-01-13 RX ADMIN — POTASSIUM CHLORIDE 40 MEQ: 1.5 FOR SOLUTION ORAL at 17:40

## 2025-01-13 RX ADMIN — DOCUSATE SODIUM 100 MG: 50 LIQUID ORAL at 08:38

## 2025-01-13 RX ADMIN — CARBOXYMETHYLCELLULOSE SODIUM 1 DROP: 5 SOLUTION/ DROPS OPHTHALMIC at 08:38

## 2025-01-13 RX ADMIN — INSULIN LISPRO 1 UNITS: 100 INJECTION, SOLUTION INTRAVENOUS; SUBCUTANEOUS at 21:04

## 2025-01-13 RX ADMIN — SODIUM BICARBONATE 650 MG TABLET 650 MG: at 08:37

## 2025-01-13 RX ADMIN — ESOMEPRAZOLE MAGNESIUM 40 MG: 40 FOR SUSPENSION ORAL at 08:37

## 2025-01-13 RX ADMIN — ATOVAQUONE 750 MG: 750 SUSPENSION ORAL at 08:37

## 2025-01-13 RX ADMIN — IPRATROPIUM BROMIDE AND ALBUTEROL SULFATE 3 ML: 2.5; .5 SOLUTION RESPIRATORY (INHALATION) at 04:04

## 2025-01-13 RX ADMIN — INSULIN LISPRO 1 UNITS: 100 INJECTION, SOLUTION INTRAVENOUS; SUBCUTANEOUS at 11:29

## 2025-01-13 ASSESSMENT — PAIN SCALES - GENERAL
PAINLEVEL_OUTOF10: 1
PAINLEVEL_OUTOF10: 0 - NO PAIN
PAINLEVEL_OUTOF10: 3
PAINLEVEL_OUTOF10: 0 - NO PAIN

## 2025-01-13 ASSESSMENT — RESPIRATORY DISTRESS OBSERVATION SCALE (RDOS)
GRUNTING AT END OF EXPIRATION: 0 - NONE
HEART RATE PER MINUTE: 2 - >109 BEATS
PARADOXICAL BREATHING PATTERN: 2 - PRESENT
RDOS TOTAL SCORE: 9
ACCESSORY MUSCLE RISE IN CLAVICLE DURING INSPIRATION: 2 - PRONOUNCED RISE
GRUNTING AT END OF EXPIRATION: 0 - NONE
ACCESSORY MUSCLE RISE IN CLAVICLE DURING INSPIRATION: 2 - PRONOUNCED RISE
INVOLUNTARY NASAL FLARING: 0 - NONE
RESTLESS NONPURPOSEFUL MOVEMENTS: 1 - OCCASIONAL, SLIGHT MOVEMENTS
INVOLUNTARY NASAL FLARING: 0 - NONE
LOOK OF FEAR: 0 - NONE
LOOK OF FEAR: 0 - NONE
RESPIRATORY RATE PER MINUTE: 2 - >30 BREATHS
PARADOXICAL BREATHING PATTERN: 2 - PRESENT
HEART RATE PER MINUTE: 2 - >109 BEATS
RESTLESS NONPURPOSEFUL MOVEMENTS: 1 - OCCASIONAL, SLIGHT MOVEMENTS
RESPIRATORY RATE PER MINUTE: 1 - 19-30 BREATHS
RDOS TOTAL SCORE: 8

## 2025-01-13 ASSESSMENT — PAIN - FUNCTIONAL ASSESSMENT

## 2025-01-13 NOTE — CARE PLAN
The patient's goals for the shift include: unable to assess    The clinical goals for the shift include: vent wean if tolerated, monitor neuro status, maintain stable BP      Over the shift, the patient did not make progress toward the following goals. Barriers to progression include - unable to assess following due to physical and cognitive limitations on exam.    Problem: Pain  Goal: Walks with improved pain control throughout the shift  Outcome: Not Progressing  Goal: Performs ADL's with improved pain control throughout shift  Outcome: Not Progressing  Goal: Participates in PT with improved pain control throughout the shift  Outcome: Not Progressing  Goal: Free from opioid side effects throughout the shift  Outcome: Not Progressing  Goal: Free from acute confusion related to pain meds throughout the shift  Outcome: Not Progressing     Problem: Fall/Injury  Goal: Use assistive devices by end of the shift  Outcome: Not Progressing       The patient made progress toward the following goals.    Problem: Pain - Adult  Goal: Verbalizes/displays adequate comfort level or baseline comfort level  Outcome: Progressing  Flowsheets (Taken 1/11/2025 1800)  Verbalizes/displays adequate comfort level or baseline comfort level:   Encourage patient to monitor pain and request assistance   Assess pain using appropriate pain scale   Administer analgesics based on type and severity of pain and evaluate response   Implement non-pharmacological measures as appropriate and evaluate response   Consider cultural and social influences on pain and pain management   Notify Licensed Independent Practitioner if interventions unsuccessful or patient reports new pain     Problem: Safety - Adult  Goal: Free from fall injury  Outcome: Progressing  Flowsheets (Taken 1/11/2025 1800)  Free from fall injury: Instruct family/caregiver on patient safety     Problem: Discharge Planning  Goal: Discharge to home or other facility with appropriate  resources  Outcome: Progressing  Flowsheets (Taken 1/11/2025 1800)  Discharge to home or other facility with appropriate resources:   Identify barriers to discharge with patient and caregiver   Arrange for needed discharge resources and transportation as appropriate   Identify discharge learning needs (meds, wound care, etc)     Problem: Chronic Conditions and Co-morbidities  Goal: Patient's chronic conditions and co-morbidity symptoms are monitored and maintained or improved  Outcome: Progressing  Flowsheets (Taken 1/11/2025 0800)  Care Plan - Patient's Chronic Conditions and Co-Morbidity Symptoms are Monitored and Maintained or Improved:   Monitor and assess patient's chronic conditions and comorbid symptoms for stability, deterioration, or improvement   Collaborate with multidisciplinary team to address chronic and comorbid conditions and prevent exacerbation or deterioration   Update acute care plan with appropriate goals if chronic or comorbid symptoms are exacerbated and prevent overall improvement and discharge     Problem: Respiratory  Goal: Minimal/no exertional discomfort or dyspnea this shift  Outcome: Progressing  Flowsheets (Taken 1/11/2025 0800)  Minimal/no exertional discomfort or dyspnea this shift: Positioning to promote ventilation/comfort  Goal: No signs of respiratory distress (eg. Use of accessory muscles. Peds grunting)  Outcome: Progressing  Goal: Verbalize decreased shortness of breath this shift  Outcome: Progressing  Flowsheets (Taken 1/11/2025 1800)  Verbalize decreased shortness of breath this shift:   Encourage/provide pulmonary hygiene/secretion clearance   Suctioning  Goal: Wean oxygen to maintain O2 saturation per order/standard this shift  Outcome: Progressing  Goal: Clear secretions with interventions this shift  Outcome: Progressing  Flowsheets (Taken 1/11/2025 1800)  Clear secretions with interventions this shift:   Suctioning   Med administration/monitoring of effect  Goal:  Tolerate mechanical ventilation evidenced by VS/agitation level this shift  Outcome: Progressing  Flowsheets (Taken 1/11/2025 1800)  Tolerate mechanical ventilation evidenced by VS/agitation level this shift:   Maintain ET tube tube position   Mechanical ventilation     Problem: Skin  Goal: Decreased wound size/increased tissue granulation at next dressing change  Outcome: Progressing  Flowsheets (Taken 1/11/2025 1800)  Decreased wound size/increased tissue granulation at next dressing change:   Promote sleep for wound healing   Protective dressings over bony prominences   Utilize specialty bed per algorithm  Goal: Participates in plan/prevention/treatment measures  Outcome: Progressing  Flowsheets (Taken 1/11/2025 1800)  Participates in plan/prevention/treatment measures: Elevate heels  Goal: Prevent/manage excess moisture  Outcome: Progressing  Flowsheets (Taken 1/11/2025 1800)  Prevent/manage excess moisture:   Cleanse incontinence/protect with barrier cream   Follow provider orders for dressing changes   Moisturize dry skin   Monitor for/manage infection if present  Goal: Prevent/minimize sheer/friction injuries  Outcome: Progressing  Flowsheets (Taken 1/11/2025 1800)  Prevent/minimize sheer/friction injuries:   Complete micro-shifts as needed if patient unable. Adjust patient position to relieve pressure points, not a full turn   HOB 30 degrees or less   Increase activity/out of bed for meals   Turn/reposition every 2 hours/use positioning/transfer devices   Use pull sheet   Utilize specialty bed per algorithm  Goal: Promote/optimize nutrition  Outcome: Progressing  Flowsheets (Taken 1/11/2025 1800)  Promote/optimize nutrition: Monitor/record intake including meals  Goal: Promote skin healing  Outcome: Progressing  Flowsheets (Taken 1/11/2025 1800)  Promote skin healing:   Assess skin/pad under line(s)/device(s)   Ensure correct size (line/device) and apply per  instructions   Protective dressings  over bony prominences   Rotate device position/do not position patient on device   Turn/reposition every 2 hours/use positioning/transfer devices     Problem: Nutrition  Goal: Nutrition support is meeting 75% of nutrient needs  Outcome: Progressing  Goal: Tube feed tolerance  Outcome: Progressing     Problem: Knowledge Deficit  Goal: Patient/family/caregiver demonstrates understanding of disease process, treatment plan, medications, and discharge instructions  Outcome: Progressing  Flowsheets (Taken 1/11/2025 1800)  Patient/family/caregiver demonstrates understanding of disease process, treatment plan, medications, and discharge instructions:   Complete learning assessment and assess knowledge base   Provide teaching at level of understanding     Problem: Mechanical Ventilation  Goal: Patient Will Maintain Patent Airway  Outcome: Progressing  Flowsheets (Taken 1/11/2025 1800)  Patient Will Maintain Patent Airway:   Assess and monitor airway secretions and suction as needed per patient's condition and according to policy   Hyper oxygenate with 100% FiO2 prior to suctioning   Suctioning secretions as indicated to maintain patent airway   Elevate head of bed 30 degrees if patient has tube feeding  Goal: Oral health is maintained or improved  Outcome: Progressing  Flowsheets (Taken 1/11/2025 1800)  Oral Health is Maintained or Improved:   Assess and monitor condition of lips and mouth and perform oral care per hospital policy   Perform oral care with an oral swab   Apply water-based moisturizer to lips   Suction oral pharynx  Goal: ET tube will be managed safely  Outcome: Progressing  Flowsheets (Taken 1/11/2025 1800)  Endotracheal Tube Will Be Managed Safely:   Assess and monitor insertion depth at lip/nare line   Utilize ETT securing device and change as necessary to ensure ETT is properly secured to patient   Support ventilator tubing to avoid pressure from drag of tubing   Keep ambu bag, mask, oxygen connection  tubing, and extra ETT at bedside and accompanying patient at all times   Utilize endotracheal tube securing device   Reposition endotracheal tube to opposite side of mouth  Goal: Ability to express needs and understand communication  Outcome: Progressing  Flowsheets (Taken 1/11/2025 1800)  Ability to express needs and understand communication: Assess and monitor patient's ability to understand information and communicate  Goal: Mobility/activity is maintained at optimum level for patient  Outcome: Progressing     Problem: Safety - Medical Restraint  Goal: Remains free of injury from restraints (Restraint for Interference with Medical Device)  Outcome: Progressing  Flowsheets (Taken 1/11/2025 1800)  Remains free of injury from restraints (restraint for interference with medical device):   Determine that other, less restrictive measures have been tried or would not be effective before applying the restraint   Evaluate the patient's condition at the time of restraint application   Inform patient/family regarding the reason for restraint   Every 2 hours: Monitor safety, psychosocial status, comfort, nutrition and hydration  Goal: Free from restraint(s) (Restraint for Interference with Medical Device)  Outcome: Progressing  Flowsheets (Taken 1/11/2025 1800)  Free from restraint(s) (restraint for interference with medical device):   ONCE/SHIFT or MINIMUM Every 12 hours: Assess and document the continuing need for restraints   Every 24 hours: Continued use of restraint requires Licensed Independent Practitioner to perform face to face examination and written order   Identify and implement measures to help patient regain control     Problem: Pain  Goal: Takes deep breaths with improved pain control throughout the shift  Outcome: Progressing  Goal: Turns in bed with improved pain control throughout the shift  Outcome: Progressing     Problem: Fall/Injury  Goal: Not fall by end of shift  Outcome: Progressing  Goal: Be free  from injury by end of the shift  Outcome: Progressing  Goal: Verbalize understanding of personal risk factors for fall in the hospital  Outcome: Progressing  Goal: Verbalize understanding of risk factor reduction measures to prevent injury from fall in the home  Outcome: Progressing  Goal: Pace activities to prevent fatigue by end of the shift  Outcome: Progressing

## 2025-01-13 NOTE — CARE PLAN
Problem: Pain - Adult  Goal: Verbalizes/displays adequate comfort level or baseline comfort level  1/13/2025 0328 by Nilam Irwin RN  Outcome: Progressing  1/13/2025 0322 by Nilam Irwin RN  Outcome: Progressing     Problem: Safety - Adult  Goal: Free from fall injury  1/13/2025 0328 by Nilam Irwin RN  Outcome: Progressing  1/13/2025 0322 by Nilam Irwin RN  Outcome: Progressing     Problem: Discharge Planning  Goal: Discharge to home or other facility with appropriate resources  1/13/2025 0328 by Nilam Irwin RN  Outcome: Progressing  1/13/2025 0322 by Nilam Irwin RN  Outcome: Progressing     Problem: Chronic Conditions and Co-morbidities  Goal: Patient's chronic conditions and co-morbidity symptoms are monitored and maintained or improved  1/13/2025 0328 by Nilam Irwin RN  Outcome: Progressing  1/13/2025 0322 by Nilam Irwin RN  Outcome: Progressing     Problem: Respiratory  Goal: Minimal/no exertional discomfort or dyspnea this shift  1/13/2025 0328 by Nilam Irwin RN  Outcome: Progressing  1/13/2025 0322 by Nilam Irwin RN  Outcome: Progressing  Goal: No signs of respiratory distress (eg. Use of accessory muscles. Peds grunting)  1/13/2025 0328 by Nilam Irwin RN  Outcome: Progressing  1/13/2025 0322 by Nilam Irwin RN  Outcome: Progressing  Goal: Verbalize decreased shortness of breath this shift  1/13/2025 0328 by Nilam Irwin RN  Outcome: Progressing  1/13/2025 0322 by Nilam Irwin RN  Outcome: Progressing  Goal: Wean oxygen to maintain O2 saturation per order/standard this shift  1/13/2025 0328 by Nilam Irwin RN  Outcome: Progressing  1/13/2025 0322 by Nilam Irwin RN  Outcome: Progressing  Goal: Clear secretions with interventions this shift  1/13/2025 0328 by Nilam Irwin RN  Outcome: Progressing  1/13/2025 0322 by Nilam Irwin RN  Outcome: Progressing  Goal: Tolerate mechanical ventilation evidenced by VS/agitation level this shift  1/13/2025  0328 by Nilam Irwin RN  Outcome: Progressing  1/13/2025 0322 by Nilam Irwin RN  Outcome: Progressing     Problem: Skin  Goal: Decreased wound size/increased tissue granulation at next dressing change  1/13/2025 0328 by Nilam Irwin RN  Outcome: Progressing  1/13/2025 0322 by Nilam Irwin RN  Outcome: Progressing  Flowsheets (Taken 1/13/2025 0322)  Decreased wound size/increased tissue granulation at next dressing change:   Promote sleep for wound healing   Protective dressings over bony prominences  Goal: Participates in plan/prevention/treatment measures  1/13/2025 0328 by Nilam Irwin RN  Outcome: Progressing  1/13/2025 0322 by Nilam Irwin RN  Outcome: Progressing  Flowsheets (Taken 1/13/2025 0322)  Participates in plan/prevention/treatment measures: Elevate heels  Goal: Prevent/manage excess moisture  1/13/2025 0328 by Nilam Irwin RN  Outcome: Progressing  1/13/2025 0322 by Nilam Irwin RN  Outcome: Progressing  Flowsheets (Taken 1/13/2025 0322)  Prevent/manage excess moisture:   Cleanse incontinence/protect with barrier cream   Moisturize dry skin   Follow provider orders for dressing changes   Monitor for/manage infection if present  Goal: Prevent/minimize sheer/friction injuries  1/13/2025 0328 by Nilam Irwin RN  Outcome: Progressing  1/13/2025 0322 by Nilam Irwin RN  Outcome: Progressing  Flowsheets (Taken 1/13/2025 0322)  Prevent/minimize sheer/friction injuries:   Complete micro-shifts as needed if patient unable. Adjust patient position to relieve pressure points, not a full turn   Turn/reposition every 2 hours/use positioning/transfer devices   Use pull sheet   Utilize specialty bed per algorithm  Goal: Promote/optimize nutrition  1/13/2025 0328 by Nilam Irwin RN  Outcome: Progressing  1/13/2025 0322 by Nilam Irwin RN  Outcome: Progressing  Flowsheets (Taken 1/13/2025 0322)  Promote/optimize nutrition: Monitor/record intake including meals  Goal: Promote skin  healing  1/13/2025 0328 by Nilam Irwin RN  Outcome: Progressing  1/13/2025 0322 by Nilam Irwin RN  Outcome: Progressing  Flowsheets (Taken 1/13/2025 0322)  Promote skin healing:   Assess skin/pad under line(s)/device(s)   Protective dressings over bony prominences   Rotate device position/do not position patient on device   Turn/reposition every 2 hours/use positioning/transfer devices     Problem: Nutrition  Goal: Nutrition support is meeting 75% of nutrient needs  1/13/2025 0328 by Nilam Irwin RN  Outcome: Progressing  1/13/2025 0322 by Nilam Irwin RN  Outcome: Progressing  Goal: Tube feed tolerance  1/13/2025 0328 by Nilam Irwin RN  Outcome: Progressing  1/13/2025 0322 by Nilam Irwin RN  Outcome: Progressing     Problem: Knowledge Deficit  Goal: Patient/family/caregiver demonstrates understanding of disease process, treatment plan, medications, and discharge instructions  1/13/2025 0328 by Nilam Irwin RN  Outcome: Progressing  1/13/2025 0322 by Nilam Irwin RN  Outcome: Progressing     Problem: Mechanical Ventilation  Goal: Patient Will Maintain Patent Airway  1/13/2025 0328 by Nilam Irwin RN  Outcome: Progressing  1/13/2025 0322 by Nilam Irwin RN  Outcome: Progressing  Goal: Oral health is maintained or improved  1/13/2025 0328 by Nilam Irwin RN  Outcome: Progressing  1/13/2025 0322 by Nilam Irwin RN  Outcome: Progressing  Goal: ET tube will be managed safely  1/13/2025 0328 by Nilam Irwin RN  Outcome: Progressing  1/13/2025 0322 by Nilam Irwin RN  Outcome: Progressing  Goal: Ability to express needs and understand communication  1/13/2025 0328 by Nilam Irwin RN  Outcome: Progressing  1/13/2025 0322 by Nilam Irwin RN  Outcome: Progressing  Goal: Mobility/activity is maintained at optimum level for patient  1/13/2025 0328 by Nilam Irwin RN  Outcome: Progressing  1/13/2025 0322 by Nilam Irwin RN  Outcome: Progressing     Problem: Safety -  Medical Restraint  Goal: Remains free of injury from restraints (Restraint for Interference with Medical Device)  1/13/2025 0328 by Nilam Irwin RN  Outcome: Progressing  1/13/2025 0322 by Nilam Irwin RN  Outcome: Progressing  Goal: Free from restraint(s) (Restraint for Interference with Medical Device)  1/13/2025 0328 by Nilam Irwin RN  Outcome: Progressing  1/13/2025 0322 by Nilam Irwin RN  Outcome: Progressing     Problem: Pain  Goal: Takes deep breaths with improved pain control throughout the shift  1/13/2025 0328 by Nilam Irwin RN  Outcome: Progressing  1/13/2025 0322 by Nilam Irwin RN  Outcome: Progressing  Goal: Turns in bed with improved pain control throughout the shift  1/13/2025 0328 by Nilam Irwin RN  Outcome: Progressing  1/13/2025 0322 by Nilam Irwin RN  Outcome: Progressing  Goal: Walks with improved pain control throughout the shift  1/13/2025 0328 by Nilam Irwin RN  Outcome: Progressing  1/13/2025 0322 by Nilam Irwin RN  Outcome: Progressing  Goal: Performs ADL's with improved pain control throughout shift  1/13/2025 0328 by Nilam Irwin RN  Outcome: Progressing  1/13/2025 0322 by Nilam Irwin RN  Outcome: Progressing  Goal: Participates in PT with improved pain control throughout the shift  1/13/2025 0328 by Nilam Irwin RN  Outcome: Progressing  1/13/2025 0322 by Nilam Irwin RN  Outcome: Progressing  Goal: Free from opioid side effects throughout the shift  1/13/2025 0328 by Nilam Irwin RN  Outcome: Progressing  1/13/2025 0322 by Nilam Irwin RN  Outcome: Progressing  Goal: Free from acute confusion related to pain meds throughout the shift  1/13/2025 0328 by Nilam Irwin RN  Outcome: Progressing  1/13/2025 0322 by Nilam Irwin RN  Outcome: Progressing     Problem: Fall/Injury  Goal: Not fall by end of shift  1/13/2025 0328 by Nilam Irwin RN  Outcome: Progressing  1/13/2025 0322 by Nilam Irwin RN  Outcome: Progressing  Goal:  Be free from injury by end of the shift  1/13/2025 0328 by Nilam Irwin RN  Outcome: Progressing  1/13/2025 0322 by Nilam Irwin RN  Outcome: Progressing  Goal: Verbalize understanding of personal risk factors for fall in the hospital  1/13/2025 0328 by Nilam Irwin RN  Outcome: Progressing  1/13/2025 0322 by Nilam Irwin RN  Outcome: Progressing  Goal: Verbalize understanding of risk factor reduction measures to prevent injury from fall in the home  1/13/2025 0328 by Nilam Irwin RN  Outcome: Progressing  1/13/2025 0322 by Nilam Irwin RN  Outcome: Progressing  Goal: Use assistive devices by end of the shift  1/13/2025 0328 by Nilam Irwin RN  Outcome: Progressing  1/13/2025 0322 by Nilam Irwin RN  Outcome: Progressing  Goal: Pace activities to prevent fatigue by end of the shift  1/13/2025 0328 by Nilam Irwin RN  Outcome: Progressing  1/13/2025 0322 by Nilam Irwin RN  Outcome: Progressing

## 2025-01-13 NOTE — PROGRESS NOTES
"Janet Snow is a 79 y.o. female on day 11 of admission presenting with Community acquired pneumonia of both upper lobes.    Subjective   Seen for acute renal failure she remains intubated on the vent she has bilateral pneumonia apparently the patient and the family clear they do not want her on the ventilator more than 7 days tomorrow would be the seventh day they absolutely refused renal biopsy or dialysis therapy her overall condition is about the same no changes       Objective     Physical Exam  Constitutional:       Comments: Remains intubated on the ventilator   Neck:      Vascular: No carotid bruit.   Cardiovascular:      Rate and Rhythm: Normal rate and regular rhythm.      Heart sounds: No murmur heard.     No friction rub. No gallop.   Pulmonary:      Breath sounds: Wheezing and rhonchi present. No rales.   Chest:      Chest wall: No tenderness.   Abdominal:      General: There is no distension.      Tenderness: There is no abdominal tenderness. There is no guarding or rebound.   Musculoskeletal:         General: No swelling or tenderness.      Cervical back: Neck supple.      Right lower leg: No edema.      Left lower leg: No edema.   Lymphadenopathy:      Cervical: No cervical adenopathy.         Last Recorded Vitals  Blood pressure 167/69, pulse 101, temperature 36.5 °C (97.7 °F), temperature source Axillary, resp. rate (!) 28, height 1.6 m (5' 3\"), weight 79.6 kg (175 lb 7.8 oz), SpO2 92%.    Intake/Output last 3 Shifts:  I/O last 3 completed shifts:  In: 2733 (34.3 mL/kg) [I.V.:753 (9.5 mL/kg); NG/GT:1830; IV Piggyback:150]  Out: 2296 (28.8 mL/kg) [Urine:1746 (0.6 mL/kg/hr); Stool:550]  Weight: 79.6 kg     Current Facility-Administered Medications:     acetaminophen (Tylenol) tablet 650 mg, 650 mg, oral, q6h PRN, Shravan Barroso PA-C, 650 mg at 01/04/25 1909    albuterol 2.5 mg /3 mL (0.083 %) nebulizer solution 2.5 mg, 2.5 mg, nebulization, q2h PRN, Shravan Barroso PA-C    amLODIPine (Norvasc) " tablet 10 mg, 10 mg, oral, Daily, Shravan Barroso PA-C, 10 mg at 01/13/25 0837    atovaquone (Mepron) suspension 750 mg, 750 mg, oral, q12h ADE, Max Sanchez MD, 750 mg at 01/13/25 0837    dextrose 50 % injection 12.5 g, 12.5 g, intravenous, q15 min PRN, ANNELISE Barnhart    dextrose 50 % injection 25 g, 25 g, intravenous, q15 min PRN, ANNELISE Barnhart    docusate sodium (Colace) oral liquid 100 mg, 100 mg, oral, BID, Shravan Barroso PA-C, 100 mg at 01/13/25 0838    pantoprazole (ProtoNix) EC tablet 40 mg, 40 mg, oral, Daily **OR** esomeprazole (NexIUM) suspension 40 mg, 40 mg, oral, Daily, 40 mg at 01/13/25 0837 **OR** pantoprazole (ProtoNix) injection 40 mg, 40 mg, intravenous, Daily, Linda Dodd PA-C, 40 mg at 01/12/25 0842    fentaNYL PF (Sublimaze) injection 25 mcg, 25 mcg, intravenous, q3h PRN, Antonio Metcalf PA-C    FLUoxetine (PROzac) capsule 40 mg, 40 mg, oral, Daily, Shravan Barroso PA-C, 40 mg at 01/13/25 0837    folic acid (Folvite) tablet 1 mg, 1 mg, oral, Daily, ANNELISE Barnhart, 1 mg at 01/13/25 0837    furosemide (Lasix) injection 80 mg, 80 mg, intravenous, Once, Antonio Metcalf PA-C    glucagon (Glucagen) injection 1 mg, 1 mg, intramuscular, q15 min PRN, ANNELISE Barnhart    glucagon (Glucagen) injection 1 mg, 1 mg, intramuscular, q15 min PRN, ANNELISE Barnhart    heparin (porcine) injection 5,000 Units, 5,000 Units, subcutaneous, q8h, Shravan Barroso PA-C, 5,000 Units at 01/13/25 0438    hydrALAZINE (Apresoline) tablet 25 mg, 25 mg, oral, TID, Antonio Metcalf PA-C, 25 mg at 01/13/25 0837    insulin lispro injection 0-5 Units, 0-5 Units, subcutaneous, q4h, NIKITA Barnhart-CNP, 2 Units at 01/08/25 0342    ipratropium-albuteroL (Duo-Neb) 0.5-2.5 mg/3 mL nebulizer solution 3 mL, 3 mL, nebulization, q4h, Shravan Barroso PA-C, 3 mL at 01/13/25 0716    lactulose 20 gram/30 mL oral solution 20 g, 20 g, oral, Daily PRN, Manisha Silviera,  ROBERT    levothyroxine (Synthroid) injection 25 mcg, 25 mcg, intravenous, q24h ADE, Karina Gottlieb, PharmD, 25 mcg at 01/13/25 0837    lubricating eye drops ophthalmic solution 1 drop, 1 drop, Both Eyes, BID, Shravan Barroso PA-C, 1 drop at 01/13/25 0838    methylPREDNISolone sod succinate (SOLU-Medrol) 40 mg/mL injection 80 mg, 80 mg, intravenous, Daily, Antonio Metcalf PA-C    niCARdipine (Cardene) 40 mg in sodium chloride 200 mL (0.2 mg/mL) infusion (premix), 0-15 mg/hr, intravenous, Continuous, Antonio Metcalf PA-C, Last Rate: 12.5 mL/hr at 01/13/25 1048, 2.5 mg/hr at 01/13/25 1048    oxygen (O2) therapy, , inhalation, Continuous PRN - O2/gases, Shravan Barroso PA-C, 3 L/min at 01/1945    oxygen (O2) therapy, , inhalation, Continuous - Inhalation, Shravan Barroso PA-C, 30 percent at 01/13/25 0716    polyethylene glycol (Glycolax, Miralax) packet 17 g, 17 g, oral, Daily PRN, Shravan Barroso PA-C, 17 g at 01/09/25 1344    propofol (Diprivan) infusion, 0-50 mcg/kg/min, intravenous, Continuous, ANNELISE Barnhart, Stopped at 01/13/25 0618    simvastatin (Zocor) tablet 20 mg, 20 mg, oral, Nightly, Shravan Barroso PA-C, 20 mg at 01/12/25 2049    sodium bicarbonate tablet 650 mg, 650 mg, oral, BID, Antonio Metcalf PA-C, 650 mg at 01/13/25 0837    thiamine (Vitamin B-1) tablet 100 mg, 100 mg, oral, Daily, ANNELISE Barnhart, 100 mg at 01/13/25 0837   Relevant Results    Results for orders placed or performed during the hospital encounter of 01/02/25 (from the past 96 hours)   Blood Gas Arterial Full Panel   Result Value Ref Range    POCT pH, Arterial 7.35 (L) 7.38 - 7.42 pH    POCT pCO2, Arterial 40 38 - 42 mm Hg    POCT pO2, Arterial 120 (H) 85 - 95 mm Hg    POCT SO2, Arterial 100 94 - 100 %    POCT Oxy Hemoglobin, Arterial 97.6 94.0 - 98.0 %    POCT Hematocrit Calculated, Arterial 20.0 (L) 36.0 - 46.0 %    POCT Sodium, Arterial 136 136 - 145 mmol/L    POCT Potassium, Arterial 4.7 3.5 - 5.3 mmol/L     POCT Chloride, Arterial 108 (H) 98 - 107 mmol/L    POCT Ionized Calcium, Arterial 1.13 1.10 - 1.33 mmol/L    POCT Glucose, Arterial 118 (H) 74 - 99 mg/dL    POCT Lactate, Arterial 0.9 0.4 - 2.0 mmol/L    POCT Base Excess, Arterial -3.2 (L) -2.0 - 3.0 mmol/L    POCT HCO3 Calculated, Arterial 22.1 22.0 - 26.0 mmol/L    POCT Hemoglobin, Arterial 6.7 (L) 12.0 - 16.0 g/dL    POCT Anion Gap, Arterial 11 10 - 25 mmo/L    Patient Temperature 37.0 degrees Celsius    FiO2 60 %    Ventilator Mode A/C PC     Ventilator Rate 24 bpm    PC Pressure Control 10.0 cm H2O    Peep CHM2O 12.0 cm H2O    Site of Arterial Puncture Arterial Line     Aba's Test Negative    Glomerular Basement Membrane Antibody   Result Value Ref Range    GBM Ab 0 0 - 19 AU/mL   Metapneumovirus PCR   Result Value Ref Range    Metapneumovirus (Human), PCR Not Detected Not detected   POCT GLUCOSE   Result Value Ref Range    POCT Glucose 123 (H) 74 - 99 mg/dL   POCT GLUCOSE   Result Value Ref Range    POCT Glucose 118 (H) 74 - 99 mg/dL   POCT GLUCOSE   Result Value Ref Range    POCT Glucose 112 (H) 74 - 99 mg/dL   POCT GLUCOSE   Result Value Ref Range    POCT Glucose 125 (H) 74 - 99 mg/dL   Respiratory Culture/Smear    Specimen: SPUTUM; Fluid   Result Value Ref Range    Respiratory Culture/Smear (A)      Culture not performed. See Gram stain findings. Recollect if clinically indicated.    Gram Stain (A)      Gram stain indicates specimen contains significant salivary contamination.   POCT GLUCOSE   Result Value Ref Range    POCT Glucose 118 (H) 74 - 99 mg/dL   CBC and Auto Differential   Result Value Ref Range    WBC 25.8 (H) 4.4 - 11.3 x10*3/uL    nRBC 0.1 (H) 0.0 - 0.0 /100 WBCs    RBC 2.57 (L) 4.00 - 5.20 x10*6/uL    Hemoglobin 7.6 (L) 12.0 - 16.0 g/dL    Hematocrit 23.4 (L) 36.0 - 46.0 %    MCV 91 80 - 100 fL    MCH 29.6 26.0 - 34.0 pg    MCHC 32.5 32.0 - 36.0 g/dL    RDW 14.1 11.5 - 14.5 %    Platelets 603 (H) 150 - 450 x10*3/uL    Immature  Granulocytes %, Automated 5.3 (H) 0.0 - 0.9 %    Immature Granulocytes Absolute, Automated 1.37 (H) 0.00 - 0.50 x10*3/uL   Magnesium   Result Value Ref Range    Magnesium 2.62 (H) 1.60 - 2.40 mg/dL   Renal function panel   Result Value Ref Range    Glucose 140 (H) 74 - 99 mg/dL    Sodium 139 136 - 145 mmol/L    Potassium 4.9 3.5 - 5.3 mmol/L    Chloride 105 98 - 107 mmol/L    Bicarbonate 22 21 - 32 mmol/L    Anion Gap 17 10 - 20 mmol/L    Urea Nitrogen 105 (HH) 6 - 23 mg/dL    Creatinine 3.59 (H) 0.50 - 1.05 mg/dL    eGFR 12 (L) >60 mL/min/1.73m*2    Calcium 7.6 (L) 8.6 - 10.3 mg/dL    Phosphorus 8.1 (H) 2.5 - 4.9 mg/dL    Albumin 2.8 (L) 3.4 - 5.0 g/dL   Manual Differential   Result Value Ref Range    Neutrophils %, Manual 90.0 40.0 - 80.0 %    Bands %, Manual 1.0 0.0 - 5.0 %    Lymphocytes %, Manual 4.0 13.0 - 44.0 %    Monocytes %, Manual 3.0 2.0 - 10.0 %    Eosinophils %, Manual 0.0 0.0 - 6.0 %    Basophils %, Manual 0.0 0.0 - 2.0 %    Myelocytes %, Manual 2.0 0.0 - 0.0 %    Seg Neutrophils Absolute, Manual 23.22 (H) 1.60 - 5.00 x10*3/uL    Bands Absolute, Manual 0.26 0.00 - 0.50 x10*3/uL    Lymphocytes Absolute, Manual 1.03 0.80 - 3.00 x10*3/uL    Monocytes Absolute, Manual 0.77 0.05 - 0.80 x10*3/uL    Eosinophils Absolute, Manual 0.00 0.00 - 0.40 x10*3/uL    Basophils Absolute, Manual 0.00 0.00 - 0.10 x10*3/uL    Myelocytes Absolute, Manual 0.52 0.00 - 0.00 x10*3/uL    Total Cells Counted 100     Neutrophils Absolute, Manual 23.48 (H) 1.60 - 5.50 x10*3/uL    RBC Morphology See Below     RBC Fragments Few     Target Cells Few     Basophilic Stippling Present    POCT GLUCOSE   Result Value Ref Range    POCT Glucose 137 (H) 74 - 99 mg/dL   CBC   Result Value Ref Range    WBC 24.5 (H) 4.4 - 11.3 x10*3/uL    nRBC 0.1 (H) 0.0 - 0.0 /100 WBCs    RBC 2.44 (L) 4.00 - 5.20 x10*6/uL    Hemoglobin 7.5 (L) 12.0 - 16.0 g/dL    Hematocrit 22.8 (L) 36.0 - 46.0 %    MCV 93 80 - 100 fL    MCH 30.7 26.0 - 34.0 pg    MCHC 32.9  32.0 - 36.0 g/dL    RDW 13.9 11.5 - 14.5 %    Platelets 556 (H) 150 - 450 x10*3/uL   POCT GLUCOSE   Result Value Ref Range    POCT Glucose 142 (H) 74 - 99 mg/dL   Urinalysis with Reflex Microscopic   Result Value Ref Range    Color, Urine Light-Yellow Light-Yellow, Yellow, Dark-Yellow    Appearance, Urine Turbid (N) Clear    Specific Gravity, Urine 1.017 1.005 - 1.035    pH, Urine 5.0 5.0, 5.5, 6.0, 6.5, 7.0, 7.5, 8.0    Protein, Urine 20 (TRACE) NEGATIVE, 10 (TRACE), 20 (TRACE) mg/dL    Glucose, Urine Normal Normal mg/dL    Blood, Urine 1.0 (3+) (A) NEGATIVE    Ketones, Urine NEGATIVE NEGATIVE mg/dL    Bilirubin, Urine NEGATIVE NEGATIVE    Urobilinogen, Urine Normal Normal mg/dL    Nitrite, Urine NEGATIVE NEGATIVE    Leukocyte Esterase, Urine 25 Brissa/uL (A) NEGATIVE   Microscopic Only, Urine   Result Value Ref Range    WBC, Urine 11-20 (A) 1-5, NONE /HPF    RBC, Urine >20 (A) NONE, 1-2, 3-5 /HPF    Squamous Epithelial Cells, Urine 1-9 (SPARSE) Reference range not established. /HPF    Bacteria, Urine 1+ (A) NONE SEEN /HPF    Mucus, Urine FEW Reference range not established. /LPF    Fine Granular Casts, Urine 1+ (A) NONE /LPF    Calcium Oxalate Crystals, Urine 1+ NONE, 1+ /HPF    Amorphous Crystals, Urine 1+ NONE, 1+, 2+ /HPF   POCT GLUCOSE   Result Value Ref Range    POCT Glucose 126 (H) 74 - 99 mg/dL   POCT GLUCOSE   Result Value Ref Range    POCT Glucose 108 (H) 74 - 99 mg/dL   POCT GLUCOSE   Result Value Ref Range    POCT Glucose 122 (H) 74 - 99 mg/dL   POCT GLUCOSE   Result Value Ref Range    POCT Glucose 144 (H) 74 - 99 mg/dL   CBC and Auto Differential   Result Value Ref Range    WBC 26.6 (H) 4.4 - 11.3 x10*3/uL    nRBC 0.1 (H) 0.0 - 0.0 /100 WBCs    RBC 2.64 (L) 4.00 - 5.20 x10*6/uL    Hemoglobin 8.0 (L) 12.0 - 16.0 g/dL    Hematocrit 24.4 (L) 36.0 - 46.0 %    MCV 92 80 - 100 fL    MCH 30.3 26.0 - 34.0 pg    MCHC 32.8 32.0 - 36.0 g/dL    RDW 14.2 11.5 - 14.5 %    Platelets 582 (H) 150 - 450 x10*3/uL     Immature Granulocytes %, Automated 5.3 (H) 0.0 - 0.9 %    Immature Granulocytes Absolute, Automated 1.42 (H) 0.00 - 0.50 x10*3/uL   Magnesium   Result Value Ref Range    Magnesium 2.62 (H) 1.60 - 2.40 mg/dL   Renal function panel   Result Value Ref Range    Glucose 136 (H) 74 - 99 mg/dL    Sodium 138 136 - 145 mmol/L    Potassium 5.4 (H) 3.5 - 5.3 mmol/L    Chloride 102 98 - 107 mmol/L    Bicarbonate 21 21 - 32 mmol/L    Anion Gap 20 10 - 20 mmol/L    Urea Nitrogen 130 (HH) 6 - 23 mg/dL    Creatinine 3.68 (H) 0.50 - 1.05 mg/dL    eGFR 12 (L) >60 mL/min/1.73m*2    Calcium 7.5 (L) 8.6 - 10.3 mg/dL    Phosphorus 9.7 (H) 2.5 - 4.9 mg/dL    Albumin 2.7 (L) 3.4 - 5.0 g/dL   Manual Differential   Result Value Ref Range    Neutrophils %, Manual 88.0 40.0 - 80.0 %    Lymphocytes %, Manual 4.0 13.0 - 44.0 %    Monocytes %, Manual 3.0 2.0 - 10.0 %    Eosinophils %, Manual 0.0 0.0 - 6.0 %    Basophils %, Manual 0.0 0.0 - 2.0 %    Metamyelocytes %, Manual 3.0 0.0 - 0.0 %    Myelocytes %, Manual 2.0 0.0 - 0.0 %    Seg Neutrophils Absolute, Manual 23.41 (H) 1.60 - 5.00 x10*3/uL    Lymphocytes Absolute, Manual 1.06 0.80 - 3.00 x10*3/uL    Monocytes Absolute, Manual 0.80 0.05 - 0.80 x10*3/uL    Eosinophils Absolute, Manual 0.00 0.00 - 0.40 x10*3/uL    Basophils Absolute, Manual 0.00 0.00 - 0.10 x10*3/uL    Metamyelocytes Absolute, Manual 0.80 0.00 - 0.00 x10*3/uL    Myelocytes Absolute, Manual 0.53 0.00 - 0.00 x10*3/uL    Total Cells Counted 100     RBC Morphology See Below     Polychromasia Mild     [place] Cells Few    Blood Gas Arterial Full Panel   Result Value Ref Range    POCT pH, Arterial 7.24 (LL) 7.38 - 7.42 pH    POCT pCO2, Arterial 48 (H) 38 - 42 mm Hg    POCT pO2, Arterial 114 (H) 85 - 95 mm Hg    POCT SO2, Arterial 99 94 - 100 %    POCT Oxy Hemoglobin, Arterial 96.9 94.0 - 98.0 %    POCT Hematocrit Calculated, Arterial 25.0 (L) 36.0 - 46.0 %    POCT Sodium, Arterial 134 (L) 136 - 145 mmol/L    POCT Potassium, Arterial 5.5  (H) 3.5 - 5.3 mmol/L    POCT Chloride, Arterial 105 98 - 107 mmol/L    POCT Ionized Calcium, Arterial 1.07 (L) 1.10 - 1.33 mmol/L    POCT Glucose, Arterial 145 (H) 74 - 99 mg/dL    POCT Lactate, Arterial 0.9 0.4 - 2.0 mmol/L    POCT Base Excess, Arterial -6.5 (L) -2.0 - 3.0 mmol/L    POCT HCO3 Calculated, Arterial 20.6 (L) 22.0 - 26.0 mmol/L    POCT Hemoglobin, Arterial 8.2 (L) 12.0 - 16.0 g/dL    POCT Anion Gap, Arterial 14 10 - 25 mmo/L    Patient Temperature 37.0 degrees Celsius    FiO2 45 %    Apparatus      Ventilator Mode Other     Ventilator Rate 24 bpm    PC Pressure Control 10.0 cm H2O    Peep CHM2O 10.0 cm H2O    Critical Called By JESSICA     Critical Called To [facility]     Critical Call Time 608     Critical Read Back Y     Critical Note PH     Site of Arterial Puncture Arterial Line    POCT GLUCOSE   Result Value Ref Range    POCT Glucose 141 (H) 74 - 99 mg/dL   POCT GLUCOSE   Result Value Ref Range    POCT Glucose 147 (H) 74 - 99 mg/dL   Renal function panel   Result Value Ref Range    Glucose 142 (H) 74 - 99 mg/dL    Sodium 139 136 - 145 mmol/L    Potassium 4.9 3.5 - 5.3 mmol/L    Chloride 103 98 - 107 mmol/L    Bicarbonate 21 21 - 32 mmol/L    Anion Gap 20 10 - 20 mmol/L    Urea Nitrogen 134 (HH) 6 - 23 mg/dL    Creatinine 3.81 (H) 0.50 - 1.05 mg/dL    eGFR 12 (L) >60 mL/min/1.73m*2    Calcium 7.6 (L) 8.6 - 10.3 mg/dL    Phosphorus 9.8 (H) 2.5 - 4.9 mg/dL    Albumin 2.6 (L) 3.4 - 5.0 g/dL   POCT GLUCOSE   Result Value Ref Range    POCT Glucose 133 (H) 74 - 99 mg/dL   POCT GLUCOSE   Result Value Ref Range    POCT Glucose 146 (H) 74 - 99 mg/dL   POCT GLUCOSE   Result Value Ref Range    POCT Glucose 146 (H) 74 - 99 mg/dL   POCT GLUCOSE   Result Value Ref Range    POCT Glucose 140 (H) 74 - 99 mg/dL   CBC and Auto Differential   Result Value Ref Range    WBC 26.2 (H) 4.4 - 11.3 x10*3/uL    nRBC 0.1 (H) 0.0 - 0.0 /100 WBCs    RBC 2.35 (L) 4.00 - 5.20 x10*6/uL    Hemoglobin 7.1 (L) 12.0 - 16.0 g/dL     Hematocrit 21.5 (L) 36.0 - 46.0 %    MCV 92 80 - 100 fL    MCH 30.2 26.0 - 34.0 pg    MCHC 33.0 32.0 - 36.0 g/dL    RDW 14.4 11.5 - 14.5 %    Platelets 479 (H) 150 - 450 x10*3/uL    Neutrophils % 87.8 40.0 - 80.0 %    Immature Granulocytes %, Automated 5.0 (H) 0.0 - 0.9 %    Lymphocytes % 2.6 13.0 - 44.0 %    Monocytes % 4.4 2.0 - 10.0 %    Eosinophils % 0.0 0.0 - 6.0 %    Basophils % 0.2 0.0 - 2.0 %    Neutrophils Absolute 22.98 (H) 1.60 - 5.50 x10*3/uL    Immature Granulocytes Absolute, Automated 1.30 (H) 0.00 - 0.50 x10*3/uL    Lymphocytes Absolute 0.69 (L) 0.80 - 3.00 x10*3/uL    Monocytes Absolute 1.14 (H) 0.05 - 0.80 x10*3/uL    Eosinophils Absolute 0.00 0.00 - 0.40 x10*3/uL    Basophils Absolute 0.05 0.00 - 0.10 x10*3/uL   Magnesium   Result Value Ref Range    Magnesium 2.43 (H) 1.60 - 2.40 mg/dL   Renal function panel   Result Value Ref Range    Glucose 144 (H) 74 - 99 mg/dL    Sodium 140 136 - 145 mmol/L    Potassium 4.6 3.5 - 5.3 mmol/L    Chloride 102 98 - 107 mmol/L    Bicarbonate 22 21 - 32 mmol/L    Anion Gap 21 (H) 10 - 20 mmol/L    Urea Nitrogen 162 (HH) 6 - 23 mg/dL    Creatinine 4.05 (H) 0.50 - 1.05 mg/dL    eGFR 11 (L) >60 mL/min/1.73m*2    Calcium 7.4 (L) 8.6 - 10.3 mg/dL    Phosphorus 9.5 (H) 2.5 - 4.9 mg/dL    Albumin 2.5 (L) 3.4 - 5.0 g/dL   Blood Gas Arterial Full Panel   Result Value Ref Range    POCT pH, Arterial 7.28 (L) 7.38 - 7.42 pH    POCT pCO2, Arterial 41 38 - 42 mm Hg    POCT pO2, Arterial 85 85 - 95 mm Hg    POCT SO2, Arterial 96 94 - 100 %    POCT Oxy Hemoglobin, Arterial 94.7 94.0 - 98.0 %    POCT Hematocrit Calculated, Arterial 20.0 (L) 36.0 - 46.0 %    POCT Sodium, Arterial 136 136 - 145 mmol/L    POCT Potassium, Arterial 4.2 3.5 - 5.3 mmol/L    POCT Chloride, Arterial 107 98 - 107 mmol/L    POCT Ionized Calcium, Arterial 1.04 (L) 1.10 - 1.33 mmol/L    POCT Glucose, Arterial 131 (H) 74 - 99 mg/dL    POCT Lactate, Arterial 1.2 0.4 - 2.0 mmol/L    POCT Base Excess, Arterial -6.9  (L) -2.0 - 3.0 mmol/L    POCT HCO3 Calculated, Arterial 19.3 (L) 22.0 - 26.0 mmol/L    POCT Hemoglobin, Arterial 6.8 (L) 12.0 - 16.0 g/dL    POCT Anion Gap, Arterial 14 10 - 25 mmo/L    Patient Temperature 37.0 degrees Celsius    FiO2 35 %    Apparatus      Ventilator Mode Other     Ventilator Rate 24 bpm    PC Pressure Control 10.0 cm H2O    Peep CHM2O 10.0 cm H2O    Site of Arterial Puncture Arterial Line    POCT GLUCOSE   Result Value Ref Range    POCT Glucose 149 (H) 74 - 99 mg/dL   Hemoglobin and hematocrit, blood   Result Value Ref Range    Hemoglobin 7.7 (L) 12.0 - 16.0 g/dL    Hematocrit 22.7 (L) 36.0 - 46.0 %   Type and screen   Result Value Ref Range    ABO TYPE AB     Rh TYPE POS     ANTIBODY SCREEN NEG    VERIFY ABO/Rh Group Test   Result Value Ref Range    ABO TYPE AB     Rh TYPE POS    Blood Gas Arterial Full Panel   Result Value Ref Range    POCT pH, Arterial 7.34 (L) 7.38 - 7.42 pH    POCT pCO2, Arterial 38 38 - 42 mm Hg    POCT pO2, Arterial 88 85 - 95 mm Hg    POCT SO2, Arterial 98 94 - 100 %    POCT Oxy Hemoglobin, Arterial 95.6 94.0 - 98.0 %    POCT Hematocrit Calculated, Arterial 25.0 (L) 36.0 - 46.0 %    POCT Sodium, Arterial 137 136 - 145 mmol/L    POCT Potassium, Arterial 4.6 3.5 - 5.3 mmol/L    POCT Chloride, Arterial 104 98 - 107 mmol/L    POCT Ionized Calcium, Arterial 1.08 (L) 1.10 - 1.33 mmol/L    POCT Glucose, Arterial 119 (H) 74 - 99 mg/dL    POCT Lactate, Arterial 0.9 0.4 - 2.0 mmol/L    POCT Base Excess, Arterial -4.8 (L) -2.0 - 3.0 mmol/L    POCT HCO3 Calculated, Arterial 20.5 (L) 22.0 - 26.0 mmol/L    POCT Hemoglobin, Arterial 8.3 (L) 12.0 - 16.0 g/dL    POCT Anion Gap, Arterial 17 10 - 25 mmo/L    Patient Temperature 37.0 degrees Celsius    FiO2 35 %    Ventilator Mode A/C PC     Ventilator Rate 24 bpm    PC Pressure Control 10.0 cm H2O    Peep CHM2O 8.0 cm H2O    Site of Arterial Puncture Arterial Line     Aba's Test Negative    POCT GLUCOSE   Result Value Ref Range    POCT  Glucose 136 (H) 74 - 99 mg/dL   Blood Gas Arterial Full Panel   Result Value Ref Range    POCT pH, Arterial 7.38 7.38 - 7.42 pH    POCT pCO2, Arterial 38 38 - 42 mm Hg    POCT pO2, Arterial 79 (L) 85 - 95 mm Hg    POCT SO2, Arterial 97 94 - 100 %    POCT Oxy Hemoglobin, Arterial 94.2 94.0 - 98.0 %    POCT Hematocrit Calculated, Arterial 25.0 (L) 36.0 - 46.0 %    POCT Sodium, Arterial 139 136 - 145 mmol/L    POCT Potassium, Arterial 4.3 3.5 - 5.3 mmol/L    POCT Chloride, Arterial 104 98 - 107 mmol/L    POCT Ionized Calcium, Arterial 1.07 (L) 1.10 - 1.33 mmol/L    POCT Glucose, Arterial 135 (H) 74 - 99 mg/dL    POCT Lactate, Arterial 1.2 0.4 - 2.0 mmol/L    POCT Base Excess, Arterial -2.4 (L) -2.0 - 3.0 mmol/L    POCT HCO3 Calculated, Arterial 22.5 22.0 - 26.0 mmol/L    POCT Hemoglobin, Arterial 8.3 (L) 12.0 - 16.0 g/dL    POCT Anion Gap, Arterial 17 10 - 25 mmo/L    Patient Temperature 37.0 degrees Celsius    FiO2 35 %    Ventilator Mode CPAP     Spontaneous Tidal Volume 536 mL    Total Minute Volume 13.3 Liter    Peep CHM2O 8.0 cm H2O    Pressure Support 5 cm H2O    Site of Arterial Puncture Arterial Line    Renal function panel   Result Value Ref Range    Glucose 140 (H) 74 - 99 mg/dL    Sodium 142 136 - 145 mmol/L    Potassium 4.1 3.5 - 5.3 mmol/L    Chloride 101 98 - 107 mmol/L    Bicarbonate 22 21 - 32 mmol/L    Anion Gap 23 (H) 10 - 20 mmol/L    Urea Nitrogen 171 (HH) 6 - 23 mg/dL    Creatinine 4.09 (H) 0.50 - 1.05 mg/dL    eGFR 11 (L) >60 mL/min/1.73m*2    Calcium 8.0 (L) 8.6 - 10.3 mg/dL    Phosphorus 9.0 (H) 2.5 - 4.9 mg/dL    Albumin 2.8 (L) 3.4 - 5.0 g/dL   Magnesium   Result Value Ref Range    Magnesium 2.48 (H) 1.60 - 2.40 mg/dL   POCT GLUCOSE   Result Value Ref Range    POCT Glucose 119 (H) 74 - 99 mg/dL   Blood Gas Arterial Full Panel   Result Value Ref Range    POCT pH, Arterial 7.38 7.38 - 7.42 pH    POCT pCO2, Arterial 39 38 - 42 mm Hg    POCT pO2, Arterial 80 (L) 85 - 95 mm Hg    POCT SO2, Arterial  97 94 - 100 %    POCT Oxy Hemoglobin, Arterial 94.9 94.0 - 98.0 %    POCT Hematocrit Calculated, Arterial 26.0 (L) 36.0 - 46.0 %    POCT Sodium, Arterial 139 136 - 145 mmol/L    POCT Potassium, Arterial 4.2 3.5 - 5.3 mmol/L    POCT Chloride, Arterial 104 98 - 107 mmol/L    POCT Ionized Calcium, Arterial 1.06 (L) 1.10 - 1.33 mmol/L    POCT Glucose, Arterial 127 (H) 74 - 99 mg/dL    POCT Lactate, Arterial 1.1 0.4 - 2.0 mmol/L    POCT Base Excess, Arterial -1.8 -2.0 - 3.0 mmol/L    POCT HCO3 Calculated, Arterial 23.1 22.0 - 26.0 mmol/L    POCT Hemoglobin, Arterial 8.5 (L) 12.0 - 16.0 g/dL    POCT Anion Gap, Arterial 16 10 - 25 mmo/L    Patient Temperature 37.0 degrees Celsius    FiO2 35 %    Apparatus      Ventilator Mode SIMV/PS     Ventilator Rate 14 bpm    Tidal Volume 370 mL    Peep CHM2O 8.0 cm H2O    Site of Arterial Puncture Arterial Line    POCT GLUCOSE   Result Value Ref Range    POCT Glucose 134 (H) 74 - 99 mg/dL   POCT GLUCOSE   Result Value Ref Range    POCT Glucose 125 (H) 74 - 99 mg/dL   CBC and Auto Differential   Result Value Ref Range    WBC 32.5 (H) 4.4 - 11.3 x10*3/uL    nRBC 0.3 (H) 0.0 - 0.0 /100 WBCs    RBC 2.50 (L) 4.00 - 5.20 x10*6/uL    Hemoglobin 7.7 (L) 12.0 - 16.0 g/dL    Hematocrit 22.3 (L) 36.0 - 46.0 %    MCV 89 80 - 100 fL    MCH 30.8 26.0 - 34.0 pg    MCHC 34.5 32.0 - 36.0 g/dL    RDW 14.1 11.5 - 14.5 %    Platelets 530 (H) 150 - 450 x10*3/uL    Immature Granulocytes %, Automated 6.6 (H) 0.0 - 0.9 %    Immature Granulocytes Absolute, Automated 2.15 (H) 0.00 - 0.50 x10*3/uL   Magnesium   Result Value Ref Range    Magnesium 2.56 (H) 1.60 - 2.40 mg/dL   Renal function panel   Result Value Ref Range    Glucose 134 (H) 74 - 99 mg/dL    Sodium 143 136 - 145 mmol/L    Potassium 3.7 3.5 - 5.3 mmol/L    Chloride 103 98 - 107 mmol/L    Bicarbonate 23 21 - 32 mmol/L    Anion Gap 21 (H) 10 - 20 mmol/L    Urea Nitrogen 185 (HH) 6 - 23 mg/dL    Creatinine 4.40 (H) 0.50 - 1.05 mg/dL    eGFR 10 (L) >60  mL/min/1.73m*2    Calcium 7.5 (L) 8.6 - 10.3 mg/dL    Phosphorus 8.9 (H) 2.5 - 4.9 mg/dL    Albumin 2.6 (L) 3.4 - 5.0 g/dL   Manual Differential   Result Value Ref Range    Neutrophils %, Manual 84.0 40.0 - 80.0 %    Lymphocytes %, Manual 4.0 13.0 - 44.0 %    Monocytes %, Manual 8.0 2.0 - 10.0 %    Eosinophils %, Manual 0.0 0.0 - 6.0 %    Basophils %, Manual 0.0 0.0 - 2.0 %    Metamyelocytes %, Manual 3.0 0.0 - 0.0 %    Myelocytes %, Manual 1.0 0.0 - 0.0 %    Seg Neutrophils Absolute, Manual 27.30 (H) 1.60 - 5.00 x10*3/uL    Lymphocytes Absolute, Manual 1.30 0.80 - 3.00 x10*3/uL    Monocytes Absolute, Manual 2.60 (H) 0.05 - 0.80 x10*3/uL    Eosinophils Absolute, Manual 0.00 0.00 - 0.40 x10*3/uL    Basophils Absolute, Manual 0.00 0.00 - 0.10 x10*3/uL    Metamyelocytes Absolute, Manual 0.98 0.00 - 0.00 x10*3/uL    Myelocytes Absolute, Manual 0.33 0.00 - 0.00 x10*3/uL    Total Cells Counted 100     RBC Morphology See Below     Target Cells Few     Ovalocytes Few     Jamar Cells Few     Basophilic Stippling Present    POCT GLUCOSE   Result Value Ref Range    POCT Glucose 129 (H) 74 - 99 mg/dL       Assessment/Plan   Acute renal failure renal function continue to deteriorate via creatinine are rising patient is uremic condition is now therapy however the family absolutely refuse dialysis therapy and renal biopsy  Respiratory failure she still intubated on the vent the plan is to extubate tomorrow  Lateral pneumonia  Septic shock    Prognosis is grim discussed with the ICU team        Gonzalo Hemphill MD

## 2025-01-13 NOTE — PROGRESS NOTES
[facility] Critical Care Medicine       Date:  1/13/2025  Patient:  Janet Snow  YOB: 1945  MRN:  [id number]   Admit Date:  1/2/2025    Chief Complaint   Patient presents with    Shortness of Breath         History of Present Illness:  Janet Snow is a 79 y.o. year old female patient with Past Medical History of hypothyroidism, anxiety, depression, hyperlipidemia, sciatica, prior cigarette smoker (30 years, quit 30 years ago) who presented to  ED 1/2 with complaints of shortness of breath. Her symptoms started shortly before Lorraine which would have been >1 week prior to time of presentation. At this time, she also complained of productive cough with yellow-green colored sputum, difficulty swallowing, chills. She was seen at an urgent care and was prescribed Augmentin, but did not have improvement.      She met SIRS criteria in the ED with fever, tachycardia, and leukocytosis, as well as elevated lactate. She was given rocephin and azithromycin in ED. She was admitted to the regular nursing floor on 2L NC and started on CAP coverage. Pulmonology was consulted and dc azithromycin due to negative urine atypicals. She was reportedly on 3L NC yesterday but would have random episodes of desaturations with coughing fits, but O2 sats would return back to 3L.     On the morning of 1/5, the hospitalist contacted the ICU team to come see her as she became acutely tachypnic, hypoxic with SpO2 in the 60s, came up to the 80s on NRB. She was visibly in respiratory distress, in tripod position at the edge of the bed. ABG 7.37/35/44, confirmed arterial draw. CXR worsening bilateral airspace consolidations compared to CXR 1/2. She was placed on continuous CPAP 8/100% with SpO2 up to the low 90s and urgently transported to the ICU.      She did have a CT chest 7/24 which showed multifocal reticulonodular and ground-glass opacities (since 2020) 2/2 chronic infectious/inflammatory process. Multiple  new-mildly enlarged pulmonary nodules up to 6mm. Multiple unchanged prominent enlarged lymph nodes, likely reactive. Recommended follow-up CT chest in 3-6 months.      Interval ICU Events:  1/5: Pt arrived to ICU on continuous CPAP. Started IV steroids. Work of breathing improved as the day goes on. FiO2 able to be weaned to 80%. Stat CT angio chest obtained to r/o PE and for further differentiation of diffuse infiltrates seen on CXR.      1/6: Attempted to give patient a break from bipap overnight, but she became hypoxic with SpO2 in the 70s immedately. Remains on continuous bipap this morning. Very anxious with coughing fits and becomes acutely SOB with increased WOB during these episodes. CT chest yesterday showing diffuse GGO, will reengage pulmonology for their input. Broaden abx include zyvox.       1/7: Patient had a few desaturations overnight with tachypnea and accessory muscle use. Valium was added to help with her increased anxiety. Patient reports today that she is tired and that her breathing feels worse. We did discuss intubation as well as the risk associated with it. She stated that she wanted to move forward with intubation, but wanted to discuss it with her children. She then had a desaturation to 80% with respiratory distress remained on BiPap with Fi02 of 100%. Call placed to both of her children, with plans for them to come to bedside. Discussed with the patient and her children the option of intubation. Patient wishes to move forward with the understanding that it is not guaranteed that she will be able to be extubated. She states that she only wishes to be intubated for 7 days. All questions answered. Dr. Pandya called to bedside. Patient does with to remain a DNR but is OK with being intubated. Pulmonary following, plan for a bronchoscopy today, will add bactrim to cover for possible PJP.      1/8: Patient placed on fentanyl drip to help with sedation overnight, patient breathing over the  vent overnight. Will work to try and decrease TV today as ABG allows to meet ARDS Net protocol.     1/9: She remained intermittently very uncomfortable with poor mechanical ventilation synchrony before starting a ketamine infusion overnight.  Oliguric KALEB continues with Scr 3.0 today.  BAL respiratory culture 1/7 without organisms, 4+ pmn's. Hypoactive BS, stool appearing NG output c/f ileus.         1/10: Vent mode changed to PC Pi 10 PEEP 12 with improved patient interfacing.  FiO2 was weaned to 45% overnight.  She also appears more comfortable with up titration of ketamine infusion.  Solute clearance continues to worsen despite increased urine output without diuretics.       1/11: Patient remains sedated to RASS -4. Oxygen requirements continuing to decrease. Creatinine seems to have plateaud. TF switched to Nepro and Lokelma started. Currently trailing high dose steroids for vasculitis for three days.     1/12: Patient remains sedated with minimum fio2 settings. Will start decreasing PEEP and sedation today. Patient remains in KALEB non-oligouric, but with significantly elevated BUN. Spot dose steroids will begin to be tapered tomorrow. Will hold off on diureses for now as patient creatinine has not fully plateaud. Most likely will be able stop ATB therapy as patient has responded to high dose steroids for presumably vasculitis. Family deferring biopsy for now.     1/13: Patient off sedation does blink on command and nod her head. Unable to move extremities most likely due to deconditioning. Hypertensive yesterday started on low dose Cardene infusion. Added hydralazine today. Cr and BUN continue to rise, but with excellent urine output. High dose steroids stopped and transitioned to maintenance dosing. Will diurese with 80 mg IV lasix today to try to help excrete urea. Plan for extubation tomorrow with DNI order possible hospice involvement.      Medical History:  History reviewed. No pertinent past medical  history.  Past Surgical History:   Procedure Laterality Date    BREAST BIOPSY Right     core biopsy 20 years ago    HYSTERECTOMY      MR HEAD ANGIO WO IV CONTRAST  07/14/2018    MR HEAD ANGIO WO IV CONTRAST [facility]     Medications Prior to Admission   Medication Sig Dispense Refill Last Dose/Taking    simvastatin (Zocor) 20 mg tablet Take 1 tablet (20 mg) by mouth once daily at bedtime.   Taking    FLUoxetine (PROzac) 40 mg capsule Take 1 capsule (40 mg) by mouth once daily.       gabapentin (Neurontin) 400 mg capsule Take 1 capsule (400 mg) by mouth 2 times a day.       levothyroxine (Synthroid, Levoxyl) 50 mcg tablet Take 1 tablet (50 mcg) by mouth early in the morning..        Meperidine (pf), Ropinirole, and Meperidine  Social History     Tobacco Use    Smoking status: Former     Types: Cigarettes    Smokeless tobacco: Never   Substance Use Topics    Drug use: Never     No family history on file.    Review of Systems:  Unable to complete ROS as patient is intubated and sedated.     Physical Exam:    Heart Rate:  [72-99]   Temp:  [36.4 °C (97.5 °F)-36.6 °C (97.9 °F)]   Resp:  [19-35]   BP: (167-203)/(69-78)   Weight:  [79.6 kg (175 lb 7.8 oz)-79.9 kg (176 lb 2.4 oz)]   SpO2:  [91 %-95 %]     Physical Exam  Vitals reviewed.   Constitutional:       Appearance: She is not ill-appearing.      Interventions: She is sedated, intubated and restrained.   HENT:      Head: Normocephalic and atraumatic.      Right Ear: External ear normal.      Left Ear: External ear normal.      Nose: Nose normal.      Comments: NGT right nare     Mouth/Throat:      Pharynx: Oropharynx is clear.      Comments: ETT   Eyes:      Pupils: Pupils are equal, round, and reactive to light.   Cardiovascular:      Rate and Rhythm: Normal rate and regular rhythm.      Pulses: Normal pulses.      Heart sounds: Normal heart sounds.   Pulmonary:      Effort: She is intubated.      Breath sounds: No wheezing.      Comments: Patient is  intubated   Abdominal:      Palpations: Abdomen is soft.   Genitourinary:     Comments: Indwelling urinary catheter   Musculoskeletal:      Right lower leg: No edema.      Left lower leg: No edema.   Skin:     General: Skin is warm and dry.      Capillary Refill: Capillary refill takes less than 2 seconds.   Neurological:      Comments: Off sedation today. She was able to understand and follow commands by blinking  Decrease strength was unable to move extremities   Psychiatric:      Comments: EMIR         Assessment/Plan:    I am currently managing this critically ill patient for the following problems:    Neuro/Psych/Pain Ctrl/Sedation:  #Daily ETOH use  #Hx anxiety, depression   #Toxic Metabolic Encephalopathy 2/2 respiratory failure and uremic   - Pain Control: acetaminophen PRN  - Continue home prozac  - CAM ICU qshift, sleep-wake hygiene, delirium precautions   - Now off ketamine, requiring very little propofol will monitor today with out  - Fentanyl PRN CPOT >3  - Continue phenobarbital taper   - Continue Thiamine and multivitamin     Respiratory/ENT:  #Acute hypoxic respiratory failure with ARDS - vasculitis/DAH vs infectious pneumonia vs acute inflammatory process. 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 Baseline Cr 0.9 worsening   #Hx urge incontinence   #Hyperkalemia Resolved   #Metabolic acidosis Uremic   #Uremia mostly like from KALEB and steroid induced   - Hourly I/O's via guerrero catheter; goal even to slightly negative daily    - Appreciate Nephrology Consult.  Family reports that patient would not consent to RRT  --Consider Lasix challenge to help with uremia   - Replete electrolytes to maintain K >4.0 and Mg >2.0  - Daily RFP, Mg  - Renal dosage where indicated    Endocrine  #Hypothyroidism   #Steroid induced hyperglycemia   - Continue IV synthroid   - ISS with q4hr glucose checks while NPO  - Hypoglycemia protocol PRN     Infectious Disease:  #CAP pneumonia   #Rule out PJP Pneumonia   #Increasing leukocytosis most likely related to steroids & stress induced   - Appreciate ID consult and rec's.  ABX narrowed to atovaquone  She has received azithro (1/3); ctx (1/3 - 1/4); doxycycline (1/5 - 1/9); linezolid (1/6 - 1/9); Zosyn (1/5 - 1/12)   - Continue atovaquone for empiric PJP coverage though this seems pretty low on the DD.       - F/U finalized PJP PCR,  - Respiratory viral panel completely negative   - Monitor SIRS criteria    Heme/Onc:  #Thrombocytosis - likely acutely reactive   #Anemia  - Maintain Hgb >7.0   - Daily CBC    OBGYN/MSK:  #Hx sciatica   - PT/OT eval  - ICU skin protocol, padded pressure points  - Q2hr turns    Ethics/Code Status:  DNRCCA, no RRT   Patient was intubated 1/7/25 after giving verbal consent infront of her daughter. She mentioned she would only like to be vented for 7 days. Discussed with both children today and they would like to uphold their mothers decision on mechanical ventilation for 7 days. Her code status will stay DNRDNI for when she is extubated.       :  DVT Prophylaxis: SQH & SCDs  GI Prophylaxis: PPI  Bowel Regimen: Miralax  Diet: TF  CVC: none  Liz: yes  Guerrero: yes  Restraints: soft  Dispo:  ICU    Restraints indicated to maintain lines/tubes.  Alternative therapies have been attempted and have been ineffective.  Restrain with soft wrist restraints and side rails up x4 until medical devices discontinued and/or patient able to participate with plan of care.      Critical Care Time:  45 minutes spent in preparing to see patient (I.e. review of medical records), evaluation of diagnostics (I.e. labs, imaging, etc.), documentation, discussing plan of care with patient/ family/ caregiver, and/ or coordination of care with multidisciplinary team. Time does not include completion of procedure time.     Plan Discussed with Attending: Dr. Gloria Metcalf PAAlexC  Critical Care Medicine  [facility]

## 2025-01-13 NOTE — CARE PLAN
The patient's goals for the shift include unable to assess    The clinical goals for the shift include Pt will remain hemodynamically stable this shift    Over the shift, the patient did not make progress toward the following goals. Barriers to progression include - unable to assess the following due to physical and cognitive limitations on exam.    Problem: Pain  Goal: Walks with improved pain control throughout the shift  Outcome: Not Progressing  Goal: Performs ADL's with improved pain control throughout shift  Outcome: Not Progressing  Goal: Participates in PT with improved pain control throughout the shift  Outcome: Not Progressing  Goal: Free from opioid side effects throughout the shift  Outcome: Not Progressing  Goal: Free from acute confusion related to pain meds throughout the shift  Outcome: Not Progressing     Problem: Fall/Injury  Goal: Use assistive devices by end of the shift  Outcome: Not Progressing       The patient made progress toward the following goals:    Problem: Pain - Adult  Goal: Verbalizes/displays adequate comfort level or baseline comfort level  Outcome: Progressing  Flowsheets (Taken 1/11/2025 1800)  Verbalizes/displays adequate comfort level or baseline comfort level:   Encourage patient to monitor pain and request assistance   Assess pain using appropriate pain scale   Administer analgesics based on type and severity of pain and evaluate response   Implement non-pharmacological measures as appropriate and evaluate response   Consider cultural and social influences on pain and pain management   Notify Licensed Independent Practitioner if interventions unsuccessful or patient reports new pain     Problem: Safety - Adult  Goal: Free from fall injury  Outcome: Progressing  Flowsheets (Taken 1/11/2025 1800)  Free from fall injury: Instruct family/caregiver on patient safety     Problem: Discharge Planning  Goal: Discharge to home or other facility with appropriate resources  Outcome:  Progressing  Flowsheets (Taken 1/11/2025 1800)  Discharge to home or other facility with appropriate resources:   Identify barriers to discharge with patient and caregiver   Arrange for needed discharge resources and transportation as appropriate   Identify discharge learning needs (meds, wound care, etc)     Problem: Chronic Conditions and Co-morbidities  Goal: Patient's chronic conditions and co-morbidity symptoms are monitored and maintained or improved  Outcome: Progressing  Flowsheets (Taken 1/11/2025 0800)  Care Plan - Patient's Chronic Conditions and Co-Morbidity Symptoms are Monitored and Maintained or Improved:   Monitor and assess patient's chronic conditions and comorbid symptoms for stability, deterioration, or improvement   Collaborate with multidisciplinary team to address chronic and comorbid conditions and prevent exacerbation or deterioration   Update acute care plan with appropriate goals if chronic or comorbid symptoms are exacerbated and prevent overall improvement and discharge     Problem: Respiratory  Goal: Minimal/no exertional discomfort or dyspnea this shift  Outcome: Progressing  Flowsheets (Taken 1/11/2025 0800)  Minimal/no exertional discomfort or dyspnea this shift: Positioning to promote ventilation/comfort  Goal: No signs of respiratory distress (eg. Use of accessory muscles. Peds grunting)  Outcome: Progressing  Goal: Verbalize decreased shortness of breath this shift  Outcome: Progressing  Flowsheets (Taken 1/11/2025 1800)  Verbalize decreased shortness of breath this shift:   Encourage/provide pulmonary hygiene/secretion clearance   Suctioning  Goal: Wean oxygen to maintain O2 saturation per order/standard this shift  Outcome: Progressing  Goal: Clear secretions with interventions this shift  Outcome: Progressing  Flowsheets (Taken 1/11/2025 1800)  Clear secretions with interventions this shift:   Suctioning   Med administration/monitoring of effect  Goal: Tolerate mechanical  ventilation evidenced by VS/agitation level this shift  Outcome: Progressing  Flowsheets (Taken 1/11/2025 1800)  Tolerate mechanical ventilation evidenced by VS/agitation level this shift:   Maintain ET tube tube position   Mechanical ventilation     Problem: Skin  Goal: Decreased wound size/increased tissue granulation at next dressing change  Outcome: Progressing  Flowsheets (Taken 1/11/2025 1800)  Decreased wound size/increased tissue granulation at next dressing change:   Promote sleep for wound healing   Protective dressings over bony prominences   Utilize specialty bed per algorithm  Goal: Participates in plan/prevention/treatment measures  Outcome: Progressing  Flowsheets (Taken 1/11/2025 1800)  Participates in plan/prevention/treatment measures: Elevate heels  Goal: Prevent/manage excess moisture  Outcome: Progressing  Flowsheets (Taken 1/11/2025 1800)  Prevent/manage excess moisture:   Cleanse incontinence/protect with barrier cream   Follow provider orders for dressing changes   Moisturize dry skin   Monitor for/manage infection if present  Goal: Prevent/minimize sheer/friction injuries  Outcome: Progressing  Flowsheets (Taken 1/11/2025 1800)  Prevent/minimize sheer/friction injuries:   Complete micro-shifts as needed if patient unable. Adjust patient position to relieve pressure points, not a full turn   HOB 30 degrees or less   Increase activity/out of bed for meals   Turn/reposition every 2 hours/use positioning/transfer devices   Use pull sheet   Utilize specialty bed per algorithm  Goal: Promote/optimize nutrition  Outcome: Progressing  Flowsheets (Taken 1/11/2025 1800)  Promote/optimize nutrition: Monitor/record intake including meals  Goal: Promote skin healing  Outcome: Progressing  Flowsheets (Taken 1/11/2025 1800)  Promote skin healing:   Assess skin/pad under line(s)/device(s)   Ensure correct size (line/device) and apply per  instructions   Protective dressings over bony  prominences   Rotate device position/do not position patient on device   Turn/reposition every 2 hours/use positioning/transfer devices     Problem: Nutrition  Goal: Nutrition support is meeting 75% of nutrient needs  Outcome: Progressing  Goal: Tube feed tolerance  Outcome: Progressing     Problem: Knowledge Deficit  Goal: Patient/family/caregiver demonstrates understanding of disease process, treatment plan, medications, and discharge instructions  Outcome: Progressing  Flowsheets (Taken 1/11/2025 1800)  Patient/family/caregiver demonstrates understanding of disease process, treatment plan, medications, and discharge instructions:   Complete learning assessment and assess knowledge base   Provide teaching at level of understanding     Problem: Mechanical Ventilation  Goal: Patient Will Maintain Patent Airway  Outcome: Progressing  Flowsheets (Taken 1/11/2025 1800)  Patient Will Maintain Patent Airway:   Assess and monitor airway secretions and suction as needed per patient's condition and according to policy   Hyper oxygenate with 100% FiO2 prior to suctioning   Suctioning secretions as indicated to maintain patent airway   Elevate head of bed 30 degrees if patient has tube feeding  Goal: Oral health is maintained or improved  Outcome: Progressing  Flowsheets (Taken 1/11/2025 1800)  Oral Health is Maintained or Improved:   Assess and monitor condition of lips and mouth and perform oral care per hospital policy   Perform oral care with an oral swab   Apply water-based moisturizer to lips   Suction oral pharynx  Goal: ET tube will be managed safely  Outcome: Progressing  Flowsheets (Taken 1/11/2025 1800)  Endotracheal Tube Will Be Managed Safely:   Assess and monitor insertion depth at lip/nare line   Utilize ETT securing device and change as necessary to ensure ETT is properly secured to patient   Support ventilator tubing to avoid pressure from drag of tubing   Keep ambu bag, mask, oxygen connection tubing, and  extra ETT at bedside and accompanying patient at all times   Utilize endotracheal tube securing device   Reposition endotracheal tube to opposite side of mouth  Goal: Ability to express needs and understand communication  Outcome: Progressing  Flowsheets (Taken 1/11/2025 1800)  Ability to express needs and understand communication: Assess and monitor patient's ability to understand information and communicate  Goal: Mobility/activity is maintained at optimum level for patient  Outcome: Progressing     Problem: Safety - Medical Restraint  Goal: Remains free of injury from restraints (Restraint for Interference with Medical Device)  Outcome: Progressing  Flowsheets (Taken 1/11/2025 1800)  Remains free of injury from restraints (restraint for interference with medical device):   Determine that other, less restrictive measures have been tried or would not be effective before applying the restraint   Evaluate the patient's condition at the time of restraint application   Inform patient/family regarding the reason for restraint   Every 2 hours: Monitor safety, psychosocial status, comfort, nutrition and hydration  Goal: Free from restraint(s) (Restraint for Interference with Medical Device)  Outcome: Progressing  Flowsheets (Taken 1/11/2025 1800)  Free from restraint(s) (restraint for interference with medical device):   ONCE/SHIFT or MINIMUM Every 12 hours: Assess and document the continuing need for restraints   Every 24 hours: Continued use of restraint requires Licensed Independent Practitioner to perform face to face examination and written order   Identify and implement measures to help patient regain control     Problem: Pain  Goal: Takes deep breaths with improved pain control throughout the shift  Outcome: Progressing  Goal: Turns in bed with improved pain control throughout the shift  Outcome: Progressing     Problem: Fall/Injury  Goal: Not fall by end of shift  Outcome: Progressing  Goal: Be free from injury by  end of the shift  Outcome: Progressing  Goal: Verbalize understanding of personal risk factors for fall in the hospital  Outcome: Progressing  Goal: Verbalize understanding of risk factor reduction measures to prevent injury from fall in the home  Outcome: Progressing  Goal: Pace activities to prevent fatigue by end of the shift  Outcome: Progressing

## 2025-01-13 NOTE — PROGRESS NOTES
"Chief Complaint   Patient presents with    Headache        Giuliana Rodas is a 51 y.o. female with a history of multiple medical diagnoses as listed below that presents for evaluation of headaches. She has been having headaches almost weekly for the last several months. The headache tends to feel like pressure as if a "cap" were on her head and at other times she has headaches that are from the front of her head down to the middle of the posterior aspect of the head. The headache that is like a ca is described as a "sharp pressure" that is 10/10 in intensity. The headache are debilitating and only allow her to lie down in a ximena quiet room as the pain worsened with lights and sounds. She has nausea very frequently with these headaches but says that she does not vomit. When the headaches are in the center of her head she feels that the pain extends into the neck and shoulders as well. It too can get up to a 10/10 but she is less sensitive to light and sounds and tends not to have nausea. The head can last from 3 hours to 3 days at a time. She has not family history of headaches. She has also been having problems sleeping so she was started on Elavil by her PCP in hopes of treating both her headaches and her insomnia. She has not had a headache in the last three weeks since she has been on the medication. She has no complaints with the medication.    Interval History  8/18/2016  She has had about 5-6 "bad" headaches since she was last seen in clinic. She has been having various headache types including tension headaches and sinus headaches as well. She has been using Robaxin which she says helps with the tension headaches and she has been using Fioricet to help with her various other headache types. She has not had much relief with tramadol as she says that she has taken the medication several times in the past and feel that it's effects have likely been lost on her. Elavil 50 mg qhs has been helping her to sleep but " Spiritual Care Visit  Spiritual Care Request    Reason for Visit:  Routine Visit: Introduction     Request Received From:       Focus of Care:  Visited With: Patient         Refer to :          Spiritual Care Assessment    Spiritual Assessment:                      Care Provided:  Intended Effects: Establish rapport and connectedness, Build relationship of care and support, Helping someone feel comforted, Lessen someone's feelings of isolation, Demonstrate caring and concern    Sense of Community and or Judaism Affiliation:  Spiritism         Addressed Needs/Concerns and/or Betito Through:          Outcome:        Advance Directives:         Spiritual Care Annotation    Annotation:  Patient received a visit from the Spiritual care volunteer while admitted.  This patient was offered emotional and spiritual support no other needs were expressed. Spiritual care will remain available for support as requested.     Volunter Initial : Jess COLLINS    Reviewed and Submitted by Chaplain Cheek          she does not feel like the medication has made her excessively sedated and she does think that she could tolerate an increased dose of the medication. She has been having GI issues and has been looking to find a gastroenterologist that is within her network for gastroparesis and she has been scheduled to see dermatology for evaluation of two skin lesions that her PCP felt could be precancerous.    11/17/2016  She has still been having episodic headaches since she was last seen. She recently had a tooth extracted and was told that she had an infection underlying it as well. She has been taking Elavil as directed and has bene having some improvement in her migraines. She still tends to have frequent tension headaches that are treated very well with Robaxin. She has been seeing GI for her gastroparesis but has scheduled follow-up toward the end of the month to decide how the problem will be approached. She has not had any new problems since she was last seen in clinic.    02/16/2017  Since last being seen she says that she has been seen by GI who determined that she had a problem with bile acid production. She says that she has been doing better overall with her headaches and feels that when she has taken the Fioricet it has been beneficial to help in the relief of her headaches. She has not had any new problems but feels that the pain has been slightly more frequent than before.    05/16/2017  Headaches were well controlled when she was taking her medications consisting of Elavil and Methocarbamol as preventative medications. She has since been unable to get Robaxin because insurance is no longer covering the medication. She has not been taking it for the last month and has been having more frequent headaches since that time. She has been using Fioricet as an abortive medication which has been helpful in alleviating her pain. She has dry mouth and dry eyes as a result of her Elavil. Her dry eyes has been making  reading more difficult as she has constant tearing in the eyes.    07/18/2017  She has had at least one migraine since she was last seen in clinic while she was visiting [place] with her parents and was spending some time outside when she had a headache that began and intensified fairly quickly. Fioricet was on hand which she took and was able to relieve her headache so that she could continue her day without many issues. She has continued to have tension headaches that eminate in the neck and into the shoulders as well. She has the pain radiate across the back at times as well. She has also been having headaches across the front of the head that has been feeling of intense pressure that she feels is related to her sinus headaches. She has been working to become more active and exercise more.    10/18/2017  She continues to work with her medical team try to get better control of her symptoms.  Tension headaches has still been bothersome despite her compliance and methocarbamol.  She feels that the medication be ineffective and she is is wishing to try different medication to control her symptoms.  Despite the frequency of her tension headaches she has had fewer migraines than compared to her previous visit.  She'll been taking all medications as directed and has been tolerating well.    01/18/2018  She has been dealing with her mother going to chemotherapy treatments with has been taxing on both her mother and herself.  Headaches have still been present, but slightly better than when she was last seen in clinic.  Migraines have not been very bothersome; however, she has continued to have tension headaches relatively often.    02/28/2019  Headaches have been bothersome since she was last seen in clinic but seem to be better in the last year.  She has been tolerating her current medication regimen well.  She feels that her level of stress has been a major trigger for her symptoms which she has been unable to find  a suitable medication regimen to control these symptoms would do reliability.  She has been most plagued by an episode of shaking which occurred unexplained weight clear to patient said that she seemed to lose consciousness and was unaware of what was going on during the episode.  She has never had any episodes like this since it happened at does not recall any previous history of syncope or seizure.    04/22/2019  She has found the last 2 months the headaches seem to be less responsive to her current control medication.  Despite compliance with her Elavil therapy headaches seem to be occurring more frequently, more intensely, and seemed to be longer lasting compared to 2 months ago.  She has not been able to identify any triggers that would account for this increase in her headaches.  She has continued to have response to her abortive therapies before and she has been relying on the much more than she had been 2 months prior.  She has come to the point where her abortive therapies are not able to last as long as they did before she is concerned that with continued headache increases she would not having medication to successfully treat all the headaches she has had.    08/01/2019  She has been following closely with her GI doctor to determine what has been causing her to have gastritis and persistent GI upset.  The patient has been having very little food intake and has also been having decreased fluid intake over the last several weeks due to persistent abdominal pain.  There is suspicion that she has had excessive by will build up the stomach which is causing some her symptoms.  She has been placed on medication to try her reduce bile secretion, but the medication seemed to make her headaches become worse.  She also admits that due to decrease food and fluid intake this could also be contributing to her symptoms.  She has found significant relief from injectable sumatriptan, but the medication seems to be  somewhat too strong at times and she request injectable at a lower dose to determine if he would be just as efficacious.    09/04/2019  Despite her compliance with the medication the patient continues to have headaches approximately 4-5 times per week.  She says that she has been having 20-22 days of headache per month.  Headaches seem to be slightly less intense compared to when she was last seen in clinic, but the headaches still debilitating enough to prevent her from caring for her own activities of daily living.  Sumatriptan has been helpful in treating headaches when have occurred, but the patient feels limited by the number of injections she is able to obtain via her insurance.  She says that she tries to save the medication for bad headaches rather than trying to treat them as they occur.    11/04/2019  She currently has a migraine.  The headache has been ongoing for last several days and seems to be refractory to all of her typical abortive medication strategies.  She has tried sumatriptan without any noticeable improvement.  She cannot think of any specific triggers that would have made her symptoms come on with such intensity.    05/13/2020  She has been work with her primary care doctor in order to try to address the persistent gastrointestinal problem that she has been dealing with for the last few days.  She finds that her headaches have been slightly worse in this time as well.  These headaches are more tension-type reflecting pain in the back of the head and neck.  When she has had migraines occur Triptan medications that she previously tried have not been very effective.     PAST MEDICAL HISTORY:  Past Medical History:   Diagnosis Date    Bile reflux gastritis     Breast cyst     Eczema     Fibrocystic breast     Fibromyalgia     Gastroparesis     dr. harden    GERD (gastroesophageal reflux disease)     Hyperglyceridemia     Hyperlipidemia     Hypertension     IC (interstitial  cystitis)     dr. labadie    Psoriasis     Tension headache        PAST SURGICAL HISTORY:  Past Surgical History:   Procedure Laterality Date    BREAST BIOPSY Right     over 10 years ago/ milk duct    CHOLECYSTECTOMY      ESOPHAGOGASTRODUODENOSCOPY N/A 3/12/2020    Procedure: EGD (ESOPHAGOGASTRODUODENOSCOPY);  Surgeon: Damon Mcmahon MD;  Location: [facility] ([address] [place] [address]);  Service: Endoscopy;  Laterality: N/A;  use pcf scope    HEMORRHOID SURGERY      HYSTERECTOMY      KNEE SURGERY      OOPHORECTOMY      one ov removed       SOCIAL HISTORY:  Social History     Socioeconomic History    Marital status:      Spouse name: Not on file    Number of children: 2    Years of education: Not on file    Highest education level: Not on file   Occupational History    Occupation:      Employer: [employer]   Social Needs    Financial resource strain: Not on file    Food insecurity:     Worry: Never true     Inability: Never true    Transportation needs:     Medical: No     Non-medical: No   Tobacco Use    Smoking status: Never Smoker    Smokeless tobacco: Never Used   Substance and Sexual Activity    Alcohol use: No     Frequency: Never     Drinks per session: 1 or 2     Binge frequency: Never    Drug use: No    Sexual activity: Yes     Partners: Male     Birth control/protection: See Surgical Hx   Lifestyle    Physical activity:     Days per week: 2 days     Minutes per session: 20 min    Stress: Only a little   Relationships    Social connections:     Talks on phone: More than three times a week     Gets together: Once a week     Attends [employer] service: Not on file     Active member of club or organization: No     Attends meetings of clubs or organizations: Patient refused     Relationship status:    Other Topics Concern    Not on file   Social History Narrative    Lives at home with  and daughter       FAMILY HISTORY:  Family History   Problem Relation Age of Onset     Hypertension Mother     Migraines Mother     Breast cancer Mother     Hypertension Father     Stroke Father     Heart disease Father     Hyperlipidemia Father     Diabetes Father     Breast cancer Paternal Grandmother     Colon cancer Neg Hx     Ovarian cancer Neg Hx     Inflammatory bowel disease Neg Hx     Celiac disease Neg Hx        ALLERGIES AND MEDICATIONS: updated and reviewed.  Review of patient's allergies indicates:   Allergen Reactions    Depo-provera [medroxyprogesterone] Anxiety    Dicyclomine Rash     Swelling of lip      Prozac [fluoxetine] Anxiety     Current Outpatient Medications   Medication Sig Dispense Refill    amlodipine-olmesartan (ANGEL) 5-40 mg per tablet TAKE ONE TABLET BY MOUTH EVERY DAY 90 tablet 3    atenolol (TENORMIN) 50 MG tablet TAKE ONE TABLET BY MOUTH TWICE DAILY AS NEEDED 180 tablet 1    azelaic acid (FINACEA) 15 % gel Apply to face nightly. Increase to BID as tolerated 50 g 2    BIFIDOBACTERIUM INFANTIS (ALIGN ORAL) Take 1 tablet by mouth once daily.      cephALEXin (KEFLEX) 500 MG capsule Take 1 capsule (500 mg total) by mouth every 12 (twelve) hours. 10 capsule 0    colestipol (COLESTID) 1 gram Tab Take 1 tablet (1 g total) by mouth 2 (two) times daily. 180 tablet 0    CREON 24,000-76,000 -120,000 unit capsule TAKE TWO CAPSULES BY MOUTH THREE TIMES DAILY WITH meals 180 capsule 2    diphenhydrAMINE (BENADRYL) 50 MG capsule Take 1 capsule (50 mg total) by mouth every 6 (six) hours as needed. 20 capsule 0    DULoxetine (CYMBALTA) 30 MG capsule 30 mg daily and then 30 mg bid 60 capsule 5    escitalopram oxalate (LEXAPRO) 20 MG tablet Take 1 tablet (20 mg total) by mouth once daily. 30 tablet 11    ferrous sulfate (FEOSOL) 325 mg (65 mg iron) Tab tablet Take 325 mg by mouth.      FLUCELVAX QUAD 0143-6656, PF, 60 mcg (15 mcg x 4)/0.5 mL Syrg ADM 0.5ML IM UTD  0    fluocinonide (LIDEX) 0.05 % external solution AAA scalp qday - bid prn pruritus 60 mL 3     fremanezumab-vfrm (AJOVY) 225 mg/1.5 mL injection inject once subcutaneously EVERY 28 DAYS AS DIRECTED 1.5 mL 5    hyoscyamine (ANASPAZ,LEVSIN) 0.125 mg Tab Take 1 tablet (125 mcg total) by mouth before meals as needed. 90 tablet 5    ketoconazole (NIZORAL) 2 % shampoo Wash hair with medicated shampoo at least 2x/week - let sit on scalp at least 5 minutes prior to rinsing 120 mL 5    ketorolac (TORADOL) 10 mg tablet Take 1 tablet (10 mg total) by mouth every 6 (six) hours. 30 tablet 5    Lactobacillus rhamnosus GG (CULTURELLE) 10 billion cell capsule Take 1 capsule by mouth.      methocarbamoL (ROBAXIN) 500 MG Tab Take 1 tablet (500 mg total) by mouth every 8 (eight) hours as needed (Headaches and muscle spasms). 60 tablet 3    ondansetron (ZOFRAN) 4 MG tablet Take 1 tablet (4 mg total) by mouth every 6 (six) hours. 12 tablet 0    pantoprazole (PROTONIX) 40 MG tablet Please take 1 tablet every 12 hr when you have chest discomfort, then take 1 tablet every day. 90 tablet 3    pravastatin (PRAVACHOL) 20 MG tablet TAKE ONE TABLET BY MOUTH EVERY EVENING 90 tablet 0    prochlorperazine (COMPAZINE) 10 MG tablet Take 1 tablet (10 mg total) by mouth every 6 (six) hours as needed (Headache.  Take with Benadryl 50 mg). 15 tablet 0    RABEprazole (ACIPHEX) 20 mg tablet Take 1 tablet (20 mg total) by mouth once daily. 30 tablet 11    sucralfate (CARAFATE) 1 gram tablet Take 1 g by mouth 4 (four) times daily.      sumatriptan (IMITREX STATDOSE) 6 mg/0.5 mL kit inject 6mg (0.5ml) into skin once AS NEEDED FOR MIGRAINE (MAXIMUM OF TWO IN 24 hours)  5    sumatriptan (IMITREX) 100 MG tablet Take 1 tablet (100 mg total) by mouth as needed for Migraine. Take at the onset of headache, may take 1 more in 1 hour if needed. 12 tablet 5    tiZANidine (ZANAFLEX) 4 MG tablet Take 4 mg by mouth.      traZODone (DESYREL) 50 MG tablet Take 1 tablet (50 mg total) by mouth every evening. 30 tablet 11    triamcinolone acetonide 0.1%  (KENALOG) 0.1 % cream AAA bid prn redness, scaling or itching 60 g 1    ubrogepant (UBRELVY)tablet 50 mg Take 1 tablet (50 mg total) by mouth once as needed for Migraine. 8 tablet 2     No current facility-administered medications for this visit.        Review of Systems   Constitutional: Negative for activity change, appetite change, fever and unexpected weight change.   HENT: Negative for trouble swallowing and voice change.    Eyes: Positive for photophobia and visual disturbance.   Respiratory: Negative for apnea and shortness of breath.    Cardiovascular: Negative for chest pain and leg swelling.   Gastrointestinal: Positive for nausea and vomiting. Negative for constipation.   Genitourinary: Negative for difficulty urinating.   Musculoskeletal: Negative for back pain, gait problem and neck pain.   Skin: Negative for color change and pallor.   Neurological: Positive for headaches. Negative for dizziness, seizures, syncope, weakness and numbness.   Hematological: Negative for adenopathy.   Psychiatric/Behavioral: Negative for agitation, confusion and decreased concentration.       Neurologic Exam     Mental Status   Oriented to person, place, and time.   Registration: recalls 3 of 3 objects.   Attention: normal. Concentration: normal.   Speech: speech is normal   Level of consciousness: alert  Knowledge: good.     Cranial Nerves     CN II   Right visual field deficit: none  Left visual field deficit: none     CN III, IV, VI   Extraocular motions are normal.   Right pupil: Size: 3 mm. Shape: regular.   Left pupil: Size: 3 mm. Shape: regular.   CN III: no CN III palsy  CN VI: no CN VI palsy  Nystagmus: none   Diplopia: none  Ophthalmoparesis: none  Upgaze: normal  Downgaze: normal  Conjugate gaze: present    CN VII   Facial expression full, symmetric.   Right facial weakness: none  Left facial weakness: none    CN VIII   CN VIII normal.     CN XI   CN XI normal.     CN XII   CN XII normal.   Tongue deviation:  "none    Motor Exam   Muscle bulk: normal  Overall muscle tone: normal  Right arm tone: normal  Left arm tone: normal  Right leg tone: normal  Left leg tone: normal    Strength   Strength 5/5 throughout.     Gait, Coordination, and Reflexes     Tremor   Resting tremor: absent      Physical Exam   Constitutional: She is oriented to person, place, and time. She appears well-developed and well-nourished.   HENT:   Head: Normocephalic and atraumatic.   Eyes: EOM are normal.   Neck: Normal range of motion.   Pulmonary/Chest: Effort normal. No apnea. No respiratory distress.   Musculoskeletal: Normal range of motion.   Neurological: She is alert and oriented to person, place, and time. She has normal strength.   Psychiatric: She has a normal mood and affect. Her speech is normal and behavior is normal. Thought content normal.   Vitals reviewed.      Vitals:    05/13/20 1529   Weight: 75.8 kg (167 lb 1.7 oz)   Height: 5' 2" (1.575 m)       Assessment & Plan:  Problem List Items Addressed This Visit     Chronic migraine without aura, with intractable migraine, so stated, with status migrainosus - Primary    Overview     Headaches have been much better with the once monthly headache control injections.  Periodic headaches have been treated with subcutaneous sumatriptan, but the medication may be slightly too strong for the patient. She may benefit from a different class of medication to address the headaches.         Relevant Medications    fremanezumab-vfrm (AJOVY) 225 mg/1.5 mL injection    ubrogepant (UBRELVY)tablet 50 mg    Tension headache    Relevant Medications    methocarbamoL (ROBAXIN) 500 MG Tab        The patient location is: home  The chief complaint leading to consultation is: headache  Visit type: audiovisual  Total time spent with patient: 20  Each patient to whom he or she provides medical services by telemedicine is:  (1) informed of the relationship between the physician and patient and the respective role " of any other health care provider with respect to management of the patient; and (2) notified that he or she may decline to receive medical services by telemedicine and may withdraw from such care at any time.    Notes:     Follow-up: Follow up in about 3 months (around 8/13/2020).

## 2025-01-13 NOTE — PROGRESS NOTES
Janet Snow is a 79 y.o. female on day 11 of admission presenting with Community acquired pneumonia of both upper lobes.    Subjective   Interval History:   Afebrile  Sedated, intubated  Interval worsening renal function    Review of Systems   Unable to perform ROS: Intubated       Objective   Range of Vitals (last 24 hours)  Heart Rate:  []   Temp:  [36.5 °C (97.7 °F)-36.9 °C (98.4 °F)]   Resp:  [20-51]   BP: (158-187)/(64-72)   Weight:  [79.6 kg (175 lb 7.8 oz)-79.9 kg (176 lb 2.4 oz)]   SpO2:  [89 %-95 %]   Daily Weight  01/13/25 : 79.6 kg (175 lb 7.8 oz)    Body mass index is 31.09 kg/m².    Physical Exam  Constitutional:       Interventions: She is sedated and intubated.   HENT:      Head: Normocephalic and atraumatic.   Eyes:      General: No scleral icterus.     Conjunctiva/sclera: Conjunctivae normal.   Cardiovascular:      Rate and Rhythm: Normal rate and regular rhythm.      Heart sounds: Normal heart sounds.   Pulmonary:      Effort: She is intubated.      Breath sounds: Decreased breath sounds present.   Abdominal:      General: Bowel sounds are normal.      Palpations: Abdomen is soft.   Musculoskeletal:      Cervical back: Neck supple.      Right lower leg: No edema.      Left lower leg: No edema.   Skin:     General: Skin is warm and dry.   Neurological:      Comments: Unable to assess  Psychiatric:      Comments: Unable to assess         Antibiotics  This patient does not have an active medication from one of the medication groupers.    Relevant Results  Labs  Results from last 72 hours   Lab Units 01/13/25  0434 01/12/25  1029 01/12/25  0423 01/11/25  0450   WBC AUTO x10*3/uL 32.5*  --  26.2* 26.6*   HEMOGLOBIN g/dL 7.7* 7.7* 7.1* 8.0*   HEMATOCRIT % 22.3* 22.7* 21.5* 24.4*   PLATELETS AUTO x10*3/uL 530*  --  479* 582*   NEUTROS PCT AUTO %  --   --  87.8  --    LYMPHO PCT MAN % 4.0  --   --  4.0   LYMPHS PCT AUTO %  --   --  2.6  --    MONO PCT MAN % 8.0  --   --  3.0   MONOS PCT AUTO %   --   --  4.4  --    EOSINO PCT MAN % 0.0  --   --  0.0   EOS PCT AUTO %  --   --  0.0  --      Results from last 72 hours   Lab Units 01/13/25  0434 01/12/25  1700 01/12/25  0423   SODIUM mmol/L 143 142 140   POTASSIUM mmol/L 3.7 4.1 4.6   CHLORIDE mmol/L 103 101 102   CO2 mmol/L 23 22 22   BUN mg/dL 185* 171* 162*   CREATININE mg/dL 4.40* 4.09* 4.05*   GLUCOSE mg/dL 134* 140* 144*   CALCIUM mg/dL 7.5* 8.0* 7.4*   ANION GAP mmol/L 21* 23* 21*   EGFR mL/min/1.73m*2 10* 11* 11*   PHOSPHORUS mg/dL 8.9* 9.0* 9.5*     Results from last 72 hours   Lab Units 01/13/25 0434 01/12/25  1700 01/12/25  0423   ALBUMIN g/dL 2.6* 2.8* 2.5*     Estimated Creatinine Clearance: 10.4 mL/min (A) (by C-G formula based on SCr of 4.4 mg/dL (H)).  C-Reactive Protein   Date Value Ref Range Status   01/05/2025 35.67 (H) <1.00 mg/dL Final     CRP   Date Value Ref Range Status   01/04/2020 10.1 (H) 0 - 2.0 MG/DL Final     Comment:     Performed at [address] [address]     Microbiology  Reviewed-BAL PCR pending  Imaging  XR chest 1 view    Result Date: 1/12/2025  Interpreted By:  Jaxon Morales, STUDY: XR CHEST 1 VIEW; 1/12/2025 5:25 am   INDICATION: CLINICAL INFORMATION: Signs/Symptoms:persistent hypoxia, ARDS, eval infiltrates.   COMPARISON: 01/10/2025   ACCESSION NUMBER(S): [id number]   ORDERING CLINICIAN: NORM LANDAVERDE   TECHNIQUE: Portable chest one view.   FINDINGS: The cardiac size is indeterminate in view of the AP projection. There is no change in the ET tube or NG tube appearance. There is no change in the hazy diffuse bilateral infiltrates. No effusions are identified.       No change in the bilateral infiltrates or tube placement. There is no interval change when compared to the previous examination.   MACRO: none   Signed by: Jaxon Morales 1/12/2025 11:03 AM Dictation workstation:   [id number]    XR chest 1 view    Result Date: 1/10/2025  Interpreted By:  Sherly Fiore, STUDY: XR CHEST 1 VIEW  1/10/2025 10:42 am   INDICATION: Signs/Symptoms:acute hypoxemic respiratory failure, assess lung fields   COMPARISON: 01/09/2025   ACCESSION NUMBER(S): [id number]   ORDERING CLINICIAN: JORY RIDDLE   TECHNIQUE: Single AP view chest   FINDINGS: Examination rotated accentuating the cardiac silhouette. Endotracheal tube tip identified 3.6 cm from the noah. NG tube is in place. There is generalized increased interstitial prominence in bilateral lung fields with alveolar airspace infiltrate with alveolar component of pulmonary edema suggested as opposed to postinfectious etiology. Correlate with patient's history.             1. Stable lines and tubes   2. Persistent patchy alveolar airspace infiltrate demonstrated within bilateral lung fields stable. No dense airspace consolidation.   Signed by: Sherly Fiore 1/10/2025 1:06 PM Dictation workstation:   [id number]    XR abdomen 1 view    Result Date: 1/9/2025  Interpreted By:  Jaxon Morales, STUDY: XR ABDOMEN 1 VIEW; 1/9/2025 9:31 am   INDICATION: Signs/Symptoms:c/f ileus.   COMPARISON: 01/08/2025   ACCESSION NUMBER(S): [id number]   ORDERING CLINICIAN: JORY RIDDLE   TECHNIQUE: KUB, portable, one view   FINDINGS: Motion artifact obscures the bowel-gas pattern significantly. A nasogastric tube extends into the epigastrium with the tip overlying the mid abdomen.       Exam is limited due to marked motion artifact obscuring the bowel gas pattern. Nasogastric tube extends into the stomach.   MACRO: none   Signed by: Jaxon Morales 1/9/2025 10:05 AM Dictation workstation:   [id number]    XR chest 1 view    Result Date: 1/9/2025  Interpreted By:  Stefany Edwards, STUDY: XR CHEST 1 VIEW 1/9/2025 6:30 am   INDICATION: Signs/Symptoms:persistent hypoxia, unable to wean   COMPARISON: 01/08/2025   ACCESSION NUMBER(S): [id number]   ORDERING CLINICIAN: NORM LANDAVERDE   TECHNIQUE: AP erect view of the chest at bedside   FINDINGS: The tip of the endotracheal tube is located 2  cm above noah with nasogastric tube descending below diaphragm. The cardiac size is borderline enlarged. When compared with yesterday's study, the infiltrate throughout the right lung is unchanged. Infiltrate within the left lung appears slightly improved. No pleural abnormality is seen.       Stable diffuse infiltrate throughout right lung with some mild improvement in the diffuse infiltrate seen in left lung.   Signed by: Stefany Edwards 1/9/2025 8:23 AM Dictation workstation:   [id number]    XR chest abdomen for OG NG placement    Result Date: 1/8/2025  Interpreted By:  Finkelstein, Evan, STUDY: XR CHEST ABDOMEN FOR OG NG PLACEMENT;  1/8/2025 2:41 am two views of the chest.   INDICATION: Signs/Symptoms:OG tube placement.   COMPARISON: None.   ACCESSION NUMBER(S): [id number]   ORDERING CLINICIAN: AMANDA SÁNCHEZ   FINDINGS: Endotracheal tube present with tip approximately 2.4 cm above the noah. OG tube courses below the diaphragm, with tip extending all of the way into the lower pelvis, beyond the field of view. Extensive patchy airspace opacities throughout the lungs. No sizable pleural effusion or pneumothorax. No acute osseous abnormality.       OG tube courses below the diaphragm with tip extending into the pelvis beyond the field of view. Endotracheal tube tip lies 2.4 cm above the noah. Redemonstrated extensive patchy airspace opacities throughout the lungs concerning for multifocal pneumonia.     MACRO: None.   Signed by: Evan Finkelstein 1/8/2025 3:04 AM Dictation workstation:   [id number]    XR chest 1 view    Result Date: 1/7/2025  Interpreted By:  Isidro Corrales, STUDY: XR CHEST 1 VIEW;  1/7/2025 10:48 pm   INDICATION: Signs/Symptoms:OG tube.   COMPARISON: Chest x-ray 01/07/2025   ACCESSION NUMBER(S): [id number]   ORDERING CLINICIAN: AMANDA SÁNCHEZ   FINDINGS: Enteric tube terminates in the left mid abdomen with side hole below the GE junction, likely within the distal gastric body. Multiple  overlying leads are present.   CARDIOMEDIASTINAL SILHOUETTE: Cardiomediastinal silhouette is stable in size and configuration.   LUNGS: Lung apices are excluded from the field of view. There is diffuse bilateral airspace opacities not significantly changed from prior exam.   ABDOMEN: No remarkable upper abdominal findings.   BONES: Multilevel degenerative changes of the spine.       Enteric tube terminates in the left mid abdomen with side hole below the GE junction.   Diffuse bilateral airspace opacities concerning for developing multifocal pneumonia. Continued radiographic follow-up to resolution is advised.   MACRO: None   Signed by: Isidro Corrales 1/7/2025 10:52 PM Dictation workstation:   [id number]    Bronchoscopy    Result Date: 1/7/2025  Table formatting from the original result was not included. Impression The left main stem, left upper lobar bronchus, lingular lobar bronchus, left lower lobar bronchus, right main stem, right upper lobar bronchus, bronchus intermedius, right middle lobar bronchus and right lower lobar bronchus appeared normal. Bronchoalveolar lavage was performed x1 in the RML and the fluid appeared cloudy Findings The left main stem, left upper lobar bronchus, lingular lobar bronchus, left lower lobar bronchus, right main stem, right upper lobar bronchus, bronchus intermedius, right middle lobar bronchus and right lower lobar bronchus appeared normal. Bronchoalveolar lavage was performed x1 in the RML with 140 mL of saline instilled and a total return of 90 mL. The fluid appeared cloudy. Recommendation There is no recommended follow-up for this procedure. Indication ARDS (adult respiratory distress syndrome) (Multi) Staff Staff Role No Staff Documented Medications See Anesthesia Record. Preprocedure A history and physical has been performed, and patient medication allergies have been reviewed. The patient's tolerance of previous anesthesia has been reviewed. The risks and benefits of the  procedure and the sedation options and risks were discussed with the  daughter (medical POA) and son (financial POA) . All questions were answered and informed consent obtained. Details of the Procedure The patient was already under sedation prior to the procedure. The patient's blood pressure, respirations, heart rate, oxygen and ECG were monitored throughout the procedure. The patient experienced no blood loss. The scope was introduced through the endotracheal tube. The procedure was not difficult. The patient tolerated the procedure well. There were no apparent adverse events. Events Procedure Events Event Event Time Specimens * Cannot find log * BAL taken from RML 140cc instilled, retunr 90cc of white cloudy fluid. Mucosa appeared normal and non friable. Some mucous secretions suctioned away. Procedure Location [address] [address] [facility] [address] [phone] Referring Provider Laura Maloney MD Procedure Provider Laura PERALTA MD     XR chest 1 view    Result Date: 1/7/2025  Interpreted By:  Elda Dorado, STUDY: XR CHEST 1 VIEW;  1/7/2025 10:40 am   INDICATION: Signs/Symptoms:intubation.   COMPARISON: 01/07/2025 at 5:45 a.m.   ACCESSION NUMBER(S): [id number]   ORDERING CLINICIAN: KENISHA LEE   FINDINGS: Heart is normal in size.   The patient is intubated. The tip terminates above the noah.   There is diffuse parenchymal infiltration.   There are atherosclerotic changes of the aorta. There are degenerative changes of the spine.   COMPARISON OF FINDING: The patient has undergone interval intubation. The lungs are similar.       Diffuse bilateral parenchymal infiltration. Interval intubation.   MACRO: none   Signed by: Elda Dorado 1/7/2025 11:22 AM Dictation workstation:   [id number]    XR chest 1 view    Result Date: 1/7/2025  Interpreted By:  Jaxon Morales, STUDY: XR CHEST 1 VIEW; 1/7/2025 6:35 am   INDICATION: CLINICAL  INFORMATION: Signs/Symptoms:persistent hypoxia, unable to wean from NIV.   COMPARISON: 01/06/2025   ACCESSION NUMBER(S): [id number]   ORDERING CLINICIAN: NORM LANDAVERDE   TECHNIQUE: Portable chest one view.   FINDINGS: The cardiac size is indeterminate in view of the AP projection. There is continued diffuse ground-glass infiltrate bilaterally. No effusions are identified.       Persistent ground-glass infiltrates diffusely bilaterally. Etiology is unclear with differential to include infectious organisms as well as pulmonary edema and ARDS.   MACRO: none   Signed by: Jaxon Morales 1/7/2025 8:20 AM Dictation workstation:   [id number]    ECG 12 lead    Result Date: 1/6/2025  Normal sinus rhythm Nonspecific ST abnormality Prolonged QT Abnormal ECG No previous ECGs available Confirmed by Vicky Corado ([id number]) on 1/6/2025 4:01:46 PM    Transthoracic Echo (TTE) Complete    Result Date: 1/6/2025           [place] [address]            Phone [phone] TRANSTHORACIC ECHOCARDIOGRAM REPORT Patient Name:       ROBBIE SCOTTY ROCHA     Reading Physician:    [id number] Vicky Corado MD Study Date:         1/6/2025             Ordering Provider:    [id number] NORM LANDAVERDE MRN/PID:            [id number]             Fellow: Accession#:         [id number]         Nurse: Date of Birth/Age:  1945 / 79 years Sonographer:          Rosalba Leiva RDCS Gender Assigned at  F                    Additional Staff: Birth: Height:             160.02 cm            Admit Date: Weight:             69.85 kg             Admission Status:     Inpatient -                                                                Routine BSA / BMI:          1.73 m2 / 27.28      Department Location:  [facility]                      kg/m2 Blood Pressure: 151 /76 mmHg Study Type:    TRANSTHORACIC ECHO (TTE) COMPLETE Diagnosis/ICD: Hypoxemia-R09.02; Acute respiratory failure with hypoxia-J96.01 Indication:    hypoxemia CPT Codes:     Echo Complete w Full [id number] Patient History: Smoker:            Former. BMI:               Overweight 25 - 30 Pertinent History: Resp difficulty, bipap, cough, pna, sciatica, arf, resp                    failure/hypoxia. Study Detail: The following Echo studies were performed: 2D, Doppler, M-Mode and               color flow. Technically challenging study due to prominent lung               artifact, body habitus and patient lying in supine position.               Definity used as a contrast agent for endocardial border               definition. Total contrast used for this procedure was 2 mL via IV               push.  PHYSICIAN INTERPRETATION: Left Ventricle: The left ventricular systolic function is normal, with a visually estimated ejection fraction of 70-75%. There are no regional wall motion abnormalities. The left ventricular cavity size is normal. There is normal septal thickness. Spectral Doppler shows a normal pattern of left ventricular diastolic filling. Left Atrium: The left atrium is normal in size. Right Ventricle: The right ventricle is normal in size. There is normal right ventricular global systolic function. Right Atrium: The right atrium is normal in size. Aortic Valve: The aortic valve is trileaflet. The aortic valve dimensionless index is 0.74. There is no evidence of aortic valve regurgitation. The peak instantaneous gradient of the aortic valve is 22 mmHg. The mean gradient of the aortic valve is 11 mmHg. Mitral Valve: The mitral valve is normal in structure. There is no evidence of mitral valve regurgitation. Tricuspid Valve: The tricuspid valve is structurally normal. There is mild tricuspid regurgitation. Pulmonic Valve: The pulmonic valve is structurally normal. There is physiologic  pulmonic valve regurgitation. Pericardium: Small pericardial effusion. Aorta: The aortic root is normal. Systemic Veins: The inferior vena cava appears normal in size, with IVC inspiratory collapse greater than 50%.  CONCLUSIONS:  1. The left ventricular systolic function is normal, with a visually estimated ejection fraction of 70-75%.  2. Spectral Doppler shows a normal pattern of left ventricular diastolic filling.  3. There is normal right ventricular global systolic function.  4. No evidence of mitral valve regurgitation.  5. Mild tricuspid regurgitation is visualized. QUANTITATIVE DATA SUMMARY:  2D MEASUREMENTS:           Normal Ranges: LAs:             2.60 cm   (2.7-4.0cm) IVSd:            0.68 cm   (0.6-1.1cm) LVPWd:           0.53 cm   (0.6-1.1cm) LVIDd:           5.32 cm   (3.9-5.9cm) LV Mass Index:   62.2 g/m2  LV SYSTOLIC FUNCTION BY 2D PLANIMETRY (MOD):                      Normal Ranges: EF-A4C View:    60 % (>=55%) EF-A2C View:    66 % EF-Biplane:     65 % EF-Visual:      73 % LV EF Reported: 73 %  LV DIASTOLIC FUNCTION:           Normal Ranges: MV Peak E:             0.94 m/s  (0.7-1.2 m/s) MV Peak A:             1.04 m/s  (0.42-0.7 m/s) E/A Ratio:             0.91      (1.0-2.2) MV e'                  0.078 m/s (>8.0) MV lateral e'          0.08 m/s MV medial e'           0.07 m/s E/e' Ratio:            12.07     (<8.0) a'                     0.08 m/s  MITRAL VALVE:          Normal Ranges: MV DT:        186 msec (150-240msec)  AORTIC VALVE:                      Normal Ranges: AoV Vmax:                2.35 m/s  (<=1.7m/s) AoV Peak P.1 mmHg (<20mmHg) AoV Mean P.0 mmHg (1.7-11.5mmHg) LVOT Max Cj:            1.95 m/s  (<=1.1m/s) AoV VTI:                 43.90 cm  (18-25cm) LVOT VTI:                32.60 cm LVOT Diameter:           1.90 cm   (1.8-2.4cm) AoV Area, VTI:           2.11 cm2  (2.5-5.5cm2) AoV Area,Vmax:           2.35 cm2  (2.5-4.5cm2) AoV Dimensionless  Index: 0.74  RIGHT VENTRICLE: RV s' 0.21 m/s  TRICUSPID VALVE/RVSP:          Normal Ranges: Peak TR Velocity:     3.56 m/s RV Syst Pressure:     59 mmHg  (< 30mmHg) IVC Diam:             1.16 cm  PULMONIC VALVE:          Normal Ranges: PV Accel Time:  124 msec (>120ms) PV Max Cj:     1.3 m/s  (0.6-0.9m/s) PV Max P.9 mmHg  [id number] Vicky Corado MD Electronically signed on 2025 at 3:30:16 PM  ** Final **     XR chest 1 view    Result Date: 2025  Interpreted By:  Jaxon Morales, STUDY: XR CHEST 1 VIEW; 2025 5:38 am   INDICATION: CLINICAL INFORMATION: Signs/Symptoms:Pneumonia follow-up. Previous abnormal chest radiograph. Shortness of breath.   COMPARISON: 2025   ACCESSION NUMBER(S): [id number]   ORDERING CLINICIAN: AMANDA SÁNCHEZ   TECHNIQUE: Portable chest one view.   FINDINGS: The cardiac size is indeterminate in view of the AP projection. Diffuse ground-glass infiltrates are again identified, similar compared to the previous study. No effusions are appreciated.       Diffuse ground-glass infiltrates. There is no interval change when compared to the previous examination.   MACRO: none   Signed by: Jaxon Morales 2025 8:29 AM Dictation workstation:   [id number]    CT angio chest for pulmonary embolism    Result Date: 2025  Interpreted By:  Selin Matthews, STUDY: CT ANGIO CHEST FOR PULMONARY EMBOLISM;  2025 4:10 pm   INDICATION: Signs/Symptoms:severe hypoxia   COMPARISON: Chest x-ray 2025. CT chest 2024   ACCESSION NUMBER(S): [id number]   ORDERING CLINICIAN: NORM LANDAVERDE   TECHNIQUE: Helical data acquisition of the chest was obtained following the uneventful administration of intravenous contrast material. Images were reformatted in axial, coronal, and sagittal planes. MIP images were created and reviewed.   FINDINGS: POTENTIAL LIMITATIONS OF THE STUDY: Motion artifact.   HEART AND VESSELS: The evaluation for filling defects at the pulmonary arteries is degraded  by motion artifact. No large central filling defects to suggest pulmonary embolism. The smaller segmental/subsegmental pulmonary arteries are not well evaluated on this exam.   No thoracic aortic aneurysm. Mild atherosclerotic calcifications are noted at the thoracic aorta.   The heart is mildly enlarged. There is trace pericardial effusion.   MEDIASTINUM AND MANPREET, LOWER NECK AND AXILLA: The visualized thyroid gland is small.   A right paratracheal lymph node measures 10 mm in short axis. A subcarinal lymph node measures 12 mm in short axis. A right hilar lymph node measures 19 mm in short axis. A left hilar lymph node measures 11 mm in short axis. Calcified lymph nodes are noted at the mediastinum and left hilum.   LUNGS AND AIRWAYS: The trachea and central airways are patent.   There are diffuse bilateral ground-glass opacities. No pleural effusion or pneumothorax.   UPPER ABDOMEN: Calcific foci at the spleen are suggestive of granulomas.   CHEST WALL AND OSSEOUS STRUCTURES: Multilevel degenerative changes at the spine. Redemonstration of remote compression deformity with Schmorl's node at the superior endplate of L1.       1. Study degraded by motion artifact. No evidence of large central pulmonary emboli. The smaller segmental/subsegmental pulmonary arteries are not well evaluated on this exam. 2. Diffuse bilateral ground-glass opacities, may be secondary to pulmonary edema and/or multifocal pneumonia. Acute respiratory distress syndrome is in the differential diagnoses. 3. Mild cardiomegaly. Trace pericardial effusion. 4. Mild mediastinal and hilar adenopathy.     MACRO: None.   Signed by: Selin Matthews 1/5/2025 6:42 PM Dictation workstation:   [id number]    US renal complete    Result Date: 1/5/2025  Interpreted By:  Jaxon Morales, STUDY: US RENAL COMPLETE; 1/5/2025 10:29 am   INDICATION: Signs/Symptoms:nancy.   COMPARISON: None   ACCESSION NUMBER(S): [id number]   ORDERING CLINICIAN: NORM LANDAVERDE    TECHNIQUE: Grayscale and color Doppler imaging of the kidneys   FINDINGS: The right kidney measures 11.0 cm  . The left kidney measures 11.0 cm  .   There is mild increased echogenicity of the renal cortex bilaterally.     No renal stones are identified.  Renal cortex is normal in thickness bilaterally. No hydronephrosis is identified.   Urinary bladder is nonspecific in appearance with no stones or masses identified.       Mild increased renal cortical echogenicity diffusely bilaterally suggesting chronic renal medical disease.   MACRO: none   Signed by: Jaxon Morales 1/5/2025 12:45 PM Dictation workstation:   [id number]    XR chest 1 view    Result Date: 1/5/2025  Interpreted By:  Sincere Slater, STUDY: XR CHEST 1 VIEW;  1/5/2025 8:48 am   INDICATION: Signs/Symptoms:respiratory distress acute.   COMPARISON: 01/02/2025   ACCESSION NUMBER(S): [id number]   ORDERING CLINICIAN: RAMANDEEP ALLRED   FINDINGS: Diffusely increased bilateral airspace consolidation. No visualized pleural effusion. Cardiomediastinal silhouette unchanged. Aortic atherosclerosis. Thoracic degenerative changes with dextroscoliosis.       Diffusely increased bilateral airspace consolidation.   MACRO: None   Signed by: Sincere Slater 1/5/2025 10:08 AM Dictation workstation:   [id number]    XR chest 2 views    Result Date: 1/2/2025  Interpreted By:  Jaxon Morales, STUDY: XR CHEST 2 VIEWS; 1/2/2025 3:05 pm   INDICATION: Signs/Symptoms:fever, productive cough, chest pain.   COMPARISON: 06/14/2022   ACCESSION NUMBER(S): [id number]   ORDERING CLINICIAN: DANIAL COATES   TECHNIQUE: AP and lateral views of the chest were obtained.   FINDINGS: The cardiac silhouette is normal in appearance. There are new patchy bilateral alveolar infiltrates most marked within the upper lobes bilaterally .  No effusions are identified.       Extensive patchy bilateral infiltrates most marked within the upper lobes bilaterally. Findings are suspicious for pneumonia.  Follow-up to assure complete clearing is suggested.   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

## 2025-01-13 NOTE — CARE PLAN
The patient's goals for the shift include unable to express    The clinical goals for the shift include Maintain hemodynamic stability      Problem: Pain - Adult  Goal: Verbalizes/displays adequate comfort level or baseline comfort level  Outcome: Progressing     Problem: Safety - Adult  Goal: Free from fall injury  Outcome: Progressing     Problem: Discharge Planning  Goal: Discharge to home or other facility with appropriate resources  Outcome: Progressing     Problem: Chronic Conditions and Co-morbidities  Goal: Patient's chronic conditions and co-morbidity symptoms are monitored and maintained or improved  Outcome: Progressing     Problem: Respiratory  Goal: Minimal/no exertional discomfort or dyspnea this shift  Outcome: Progressing  Goal: No signs of respiratory distress (eg. Use of accessory muscles. Peds grunting)  Outcome: Progressing  Goal: Verbalize decreased shortness of breath this shift  Outcome: Progressing  Goal: Wean oxygen to maintain O2 saturation per order/standard this shift  Outcome: Progressing  Goal: Clear secretions with interventions this shift  Outcome: Progressing  Goal: Tolerate mechanical ventilation evidenced by VS/agitation level this shift  Outcome: Progressing     Problem: Skin  Goal: Decreased wound size/increased tissue granulation at next dressing change  Outcome: Progressing  Flowsheets (Taken 1/13/2025 0322)  Decreased wound size/increased tissue granulation at next dressing change:   Promote sleep for wound healing   Protective dressings over bony prominences  Goal: Participates in plan/prevention/treatment measures  Outcome: Progressing  Flowsheets (Taken 1/13/2025 0322)  Participates in plan/prevention/treatment measures: Elevate heels  Goal: Prevent/manage excess moisture  Outcome: Progressing  Flowsheets (Taken 1/13/2025 0322)  Prevent/manage excess moisture:   Cleanse incontinence/protect with barrier cream   Moisturize dry skin   Follow provider orders for dressing  changes   Monitor for/manage infection if present  Goal: Prevent/minimize sheer/friction injuries  Outcome: Progressing  Flowsheets (Taken 1/13/2025 0322)  Prevent/minimize sheer/friction injuries:   Complete micro-shifts as needed if patient unable. Adjust patient position to relieve pressure points, not a full turn   Turn/reposition every 2 hours/use positioning/transfer devices   Use pull sheet   Utilize specialty bed per algorithm  Goal: Promote/optimize nutrition  Outcome: Progressing  Flowsheets (Taken 1/13/2025 0322)  Promote/optimize nutrition: Monitor/record intake including meals  Goal: Promote skin healing  Outcome: Progressing  Flowsheets (Taken 1/13/2025 0322)  Promote skin healing:   Assess skin/pad under line(s)/device(s)   Protective dressings over bony prominences   Rotate device position/do not position patient on device   Turn/reposition every 2 hours/use positioning/transfer devices     Problem: Nutrition  Goal: Nutrition support is meeting 75% of nutrient needs  Outcome: Progressing  Goal: Tube feed tolerance  Outcome: Progressing     Problem: Knowledge Deficit  Goal: Patient/family/caregiver demonstrates understanding of disease process, treatment plan, medications, and discharge instructions  Outcome: Progressing     Problem: Mechanical Ventilation  Goal: Patient Will Maintain Patent Airway  Outcome: Progressing  Goal: Oral health is maintained or improved  Outcome: Progressing  Goal: ET tube will be managed safely  Outcome: Progressing  Goal: Ability to express needs and understand communication  Outcome: Progressing  Goal: Mobility/activity is maintained at optimum level for patient  Outcome: Progressing     Problem: Safety - Medical Restraint  Goal: Remains free of injury from restraints (Restraint for Interference with Medical Device)  Outcome: Progressing  Goal: Free from restraint(s) (Restraint for Interference with Medical Device)  Outcome: Progressing     Problem: Pain  Goal: Takes  deep breaths with improved pain control throughout the shift  Outcome: Progressing  Goal: Turns in bed with improved pain control throughout the shift  Outcome: Progressing  Goal: Walks with improved pain control throughout the shift  Outcome: Progressing  Goal: Performs ADL's with improved pain control throughout shift  Outcome: Progressing  Goal: Participates in PT with improved pain control throughout the shift  Outcome: Progressing  Goal: Free from opioid side effects throughout the shift  Outcome: Progressing  Goal: Free from acute confusion related to pain meds throughout the shift  Outcome: Progressing     Problem: Fall/Injury  Goal: Not fall by end of shift  Outcome: Progressing  Goal: Be free from injury by end of the shift  Outcome: Progressing  Goal: Verbalize understanding of personal risk factors for fall in the hospital  Outcome: Progressing  Goal: Verbalize understanding of risk factor reduction measures to prevent injury from fall in the home  Outcome: Progressing  Goal: Use assistive devices by end of the shift  Outcome: Progressing  Goal: Pace activities to prevent fatigue by end of the shift  Outcome: Progressing

## 2025-01-13 NOTE — PROGRESS NOTES
Patient not medically clear. Patient remains intubated with plan to extubate tomorrow. At this time there is not a safe discharge plan in place. Will follow.      01/13/25 5338   Discharge Planning   Home or Post Acute Services Other (Comment)   Expected Discharge Disposition [place]  (TBD)   Does the patient need discharge transport arranged? Yes   RoundTrip coordination needed? Yes

## 2025-01-14 LAB
ADENOVIRUS QPCR, VIRC: NOT DETECTED COPIES/ML
ADENOVIRUS RVP, VIRC: NOT DETECTED
ALBUMIN SERPL BCP-MCNC: 2.5 G/DL (ref 3.4–5)
ALBUMIN SERPL BCP-MCNC: 2.6 G/DL (ref 3.4–5)
ALP SERPL-CCNC: 84 U/L (ref 33–136)
ALT SERPL W P-5'-P-CCNC: 32 U/L (ref 7–45)
ANION GAP BLDA CALCULATED.4IONS-SCNC: 16 MMO/L (ref 10–25)
ANION GAP SERPL CALCULATED.3IONS-SCNC: 20 MMOL/L (ref 10–20)
ANION GAP SERPL CALCULATED.3IONS-SCNC: 21 MMOL/L (ref 10–20)
ARTERIAL PATENCY WRIST A: POSITIVE
ASPERGILLUS GALACTOMANNAN EIA (NON-BLOOD SPECIMEN): 0.1
AST SERPL W P-5'-P-CCNC: 30 U/L (ref 9–39)
BASE EXCESS BLDA CALC-SCNC: -2.5 MMOL/L (ref -2–3)
BASOPHILS # BLD MANUAL: 0 X10*3/UL (ref 0–0.1)
BASOPHILS NFR BLD MANUAL: 0 %
BILIRUB DIRECT SERPL-MCNC: 0.2 MG/DL (ref 0–0.3)
BILIRUB SERPL-MCNC: 0.4 MG/DL (ref 0–1.2)
BODY TEMPERATURE: 37 DEGREES CELSIUS
BUN SERPL-MCNC: 197 MG/DL (ref 6–23)
BUN SERPL-MCNC: 200 MG/DL (ref 6–23)
CA-I BLDA-SCNC: 1.12 MMOL/L (ref 1.1–1.33)
CALCIUM SERPL-MCNC: 7.8 MG/DL (ref 8.6–10.3)
CALCIUM SERPL-MCNC: 8.1 MG/DL (ref 8.6–10.3)
CHLAMYDIA.PNEUMONIAE PCR, VIRC: NOT DETECTED
CHLORIDE BLDA-SCNC: 109 MMOL/L (ref 98–107)
CHLORIDE SERPL-SCNC: 105 MMOL/L (ref 98–107)
CHLORIDE SERPL-SCNC: 108 MMOL/L (ref 98–107)
CHLORIDE UR-SCNC: 17 MMOL/L
CHLORIDE/CREATININE (MMOL/G) IN URINE: 44 MMOL/G CREAT (ref 38–318)
CO2 SERPL-SCNC: 23 MMOL/L (ref 21–32)
CO2 SERPL-SCNC: 23 MMOL/L (ref 21–32)
CREAT SERPL-MCNC: 4.72 MG/DL (ref 0.5–1.05)
CREAT SERPL-MCNC: 4.82 MG/DL (ref 0.5–1.05)
CREAT UR-MCNC: 38.8 MG/DL (ref 20–320)
CYTOMEGALOVIRUS DNA PCR, (NON-BLOOD SPECI: NOT DETECTED IU/ML
EGFRCR SERPLBLD CKD-EPI 2021: 9 ML/MIN/1.73M*2
EGFRCR SERPLBLD CKD-EPI 2021: 9 ML/MIN/1.73M*2
ENTEROVIRUS/RHINOVIRUS RVP, VIRC: NOT DETECTED
EOSINOPHIL # BLD MANUAL: 0 X10*3/UL (ref 0–0.4)
EOSINOPHIL NFR BLD MANUAL: 0 %
ERYTHROCYTE [DISTWIDTH] IN BLOOD BY AUTOMATED COUNT: 14 % (ref 11.5–14.5)
FUNGITELL BETA-D GLUCAN PCR, QUANTITATIVE (NON-BLOOD SPECIMEN): <45 PG/ML
GLUCOSE BLD MANUAL STRIP-MCNC: 135 MG/DL (ref 74–99)
GLUCOSE BLD MANUAL STRIP-MCNC: 144 MG/DL (ref 74–99)
GLUCOSE BLD MANUAL STRIP-MCNC: 151 MG/DL (ref 74–99)
GLUCOSE BLDA-MCNC: 145 MG/DL (ref 74–99)
GLUCOSE SERPL-MCNC: 150 MG/DL (ref 74–99)
GLUCOSE SERPL-MCNC: 169 MG/DL (ref 74–99)
HCO3 BLDA-SCNC: 22.5 MMOL/L (ref 22–26)
HCT VFR BLD AUTO: 21.4 % (ref 36–46)
HCT VFR BLD EST: 28 % (ref 36–46)
HGB BLD-MCNC: 7.3 G/DL (ref 12–16)
HGB BLDA-MCNC: 9.2 G/DL (ref 12–16)
HUMAN BOCAVIRUS RVP, VIRC: NOT DETECTED
HUMAN CORONAVIRUS RVP, VIRC: NOT DETECTED
HUMAN HERPESVIRUS-6 DNA PCR, QUANTITATIVE (NON-BLOOD SPECIMEN): NOT DETECTED COPIES/ML
IMM GRANULOCYTES # BLD AUTO: 1.58 X10*3/UL (ref 0–0.5)
IMM GRANULOCYTES NFR BLD AUTO: 5.6 % (ref 0–0.9)
INFLUENZA A , VIRC: NOT DETECTED
INFLUENZA A H1N1-09 , VIRC: NOT DETECTED
INFLUENZA B PCR, VIRC: NOT DETECTED
INHALED O2 CONCENTRATION: 40 %
LACTATE BLDA-SCNC: 0.8 MMOL/L (ref 0.4–2)
LEGIONELLA PNEUMO PCR, VIRC: NOT DETECTED
LYMPHOCYTES # BLD MANUAL: 0.57 X10*3/UL (ref 0.8–3)
LYMPHOCYTES NFR BLD MANUAL: 2 %
MAGNESIUM SERPL-MCNC: 2.57 MG/DL (ref 1.6–2.4)
MAGNESIUM SERPL-MCNC: 2.68 MG/DL (ref 1.6–2.4)
MCH RBC QN AUTO: 30.2 PG (ref 26–34)
MCHC RBC AUTO-ENTMCNC: 34.1 G/DL (ref 32–36)
MCV RBC AUTO: 88 FL (ref 80–100)
METAMYELOCYTES # BLD MANUAL: 0.28 X10*3/UL
METAMYELOCYTES NFR BLD MANUAL: 1 %
METAPNEUMOVIRUS , VIRC: NOT DETECTED
MONOCYTES # BLD MANUAL: 0.28 X10*3/UL (ref 0.05–0.8)
MONOCYTES NFR BLD MANUAL: 1 %
MYCOPLASMA.PNEUMONIAE PCR, VIRC: NOT DETECTED
NEUTS SEG # BLD MANUAL: 27.26 X10*3/UL (ref 1.6–5)
NEUTS SEG NFR BLD MANUAL: 96 %
NRBC BLD-RTO: 0.1 /100 WBCS (ref 0–0)
OVALOCYTES BLD QL SMEAR: ABNORMAL
OXYHGB MFR BLDA: 94.7 % (ref 94–98)
PAN.LEGIONELLA PCR, VIRC: NOT DETECTED
PARAINFLUENZA PCR, VIRC: NOT DETECTED
PCO2 BLDA: 39 MM HG (ref 38–42)
PEEP CMH2O: 5 CM H2O
PH BLDA: 7.37 PH (ref 7.38–7.42)
PHOSPHATE SERPL-MCNC: 8.8 MG/DL (ref 2.5–4.9)
PHOSPHATE SERPL-MCNC: 8.9 MG/DL (ref 2.5–4.9)
PLATELET # BLD AUTO: 445 X10*3/UL (ref 150–450)
PNEUMOCYSTIS PCR,QUANTITATIVE (NON-BLOOD SPECIMEN): NOT DETECTED COPIES/ML
PO2 BLDA: 89 MM HG (ref 85–95)
POLYCHROMASIA BLD QL SMEAR: ABNORMAL
POTASSIUM BLDA-SCNC: 4.1 MMOL/L (ref 3.5–5.3)
POTASSIUM SERPL-SCNC: 3.9 MMOL/L (ref 3.5–5.3)
POTASSIUM SERPL-SCNC: 4.3 MMOL/L (ref 3.5–5.3)
POTASSIUM UR-SCNC: 28 MMOL/L
POTASSIUM/CREAT UR-RTO: 72 MMOL/G CREAT
PROT SERPL-MCNC: 5.5 G/DL (ref 6.4–8.2)
RBC # BLD AUTO: 2.42 X10*6/UL (ref 4–5.2)
RBC MORPH BLD: ABNORMAL
RSV PCR, RVP, VIRC: NOT DETECTED
SAO2 % BLDA: 99 % (ref 94–100)
SODIUM BLDA-SCNC: 143 MMOL/L (ref 136–145)
SODIUM SERPL-SCNC: 145 MMOL/L (ref 136–145)
SODIUM SERPL-SCNC: 147 MMOL/L (ref 136–145)
SODIUM UR-SCNC: 31 MMOL/L
SODIUM/CREAT UR-RTO: 80 MMOL/G CREAT
SPECIMEN DRAWN FROM PATIENT: ABNORMAL
TOTAL CELLS COUNTED BLD: 100
TOTAL MINUTE VOLUME: 370 LITER
VENTILATOR MODE: ABNORMAL
VENTILATOR RATE: 14 BPM
WBC # BLD AUTO: 28.4 X10*3/UL (ref 4.4–11.3)

## 2025-01-14 PROCEDURE — 9420000001 HC RT PATIENT EDUCATION 5 MIN

## 2025-01-14 PROCEDURE — 2500000005 HC RX 250 GENERAL PHARMACY W/O HCPCS

## 2025-01-14 PROCEDURE — 84100 ASSAY OF PHOSPHORUS: CPT

## 2025-01-14 PROCEDURE — 99291 CRITICAL CARE FIRST HOUR: CPT

## 2025-01-14 PROCEDURE — 82248 BILIRUBIN DIRECT: CPT

## 2025-01-14 PROCEDURE — 82947 ASSAY GLUCOSE BLOOD QUANT: CPT

## 2025-01-14 PROCEDURE — 2020000001 HC ICU ROOM DAILY

## 2025-01-14 PROCEDURE — 85027 COMPLETE CBC AUTOMATED: CPT

## 2025-01-14 PROCEDURE — 2500000002 HC RX 250 W HCPCS SELF ADMINISTERED DRUGS (ALT 637 FOR MEDICARE OP, ALT 636 FOR OP/ED)

## 2025-01-14 PROCEDURE — 2500000004 HC RX 250 GENERAL PHARMACY W/ HCPCS (ALT 636 FOR OP/ED)

## 2025-01-14 PROCEDURE — 2500000001 HC RX 250 WO HCPCS SELF ADMINISTERED DRUGS (ALT 637 FOR MEDICARE OP): Performed by: INTERNAL MEDICINE

## 2025-01-14 PROCEDURE — 2500000001 HC RX 250 WO HCPCS SELF ADMINISTERED DRUGS (ALT 637 FOR MEDICARE OP)

## 2025-01-14 PROCEDURE — 99497 ADVNCD CARE PLAN 30 MIN: CPT | Performed by: NURSE PRACTITIONER

## 2025-01-14 PROCEDURE — 99233 SBSQ HOSP IP/OBS HIGH 50: CPT | Performed by: NURSE PRACTITIONER

## 2025-01-14 PROCEDURE — 94003 VENT MGMT INPAT SUBQ DAY: CPT

## 2025-01-14 PROCEDURE — 37799 UNLISTED PX VASCULAR SURGERY: CPT

## 2025-01-14 PROCEDURE — 94640 AIRWAY INHALATION TREATMENT: CPT

## 2025-01-14 PROCEDURE — 82810 BLOOD GASES O2 SAT ONLY: CPT

## 2025-01-14 PROCEDURE — 85007 BL SMEAR W/DIFF WBC COUNT: CPT

## 2025-01-14 PROCEDURE — 82570 ASSAY OF URINE CREATININE: CPT

## 2025-01-14 PROCEDURE — 2500000004 HC RX 250 GENERAL PHARMACY W/ HCPCS (ALT 636 FOR OP/ED): Performed by: ANESTHESIOLOGY

## 2025-01-14 PROCEDURE — 84295 ASSAY OF SERUM SODIUM: CPT

## 2025-01-14 PROCEDURE — 99239 HOSP IP/OBS DSCHRG MGMT >30: CPT

## 2025-01-14 PROCEDURE — 83735 ASSAY OF MAGNESIUM: CPT

## 2025-01-14 PROCEDURE — 2500000004 HC RX 250 GENERAL PHARMACY W/ HCPCS (ALT 636 FOR OP/ED): Performed by: NURSE PRACTITIONER

## 2025-01-14 RX ORDER — LORAZEPAM 2 MG/ML
1 INJECTION INTRAMUSCULAR ONCE
Status: COMPLETED | OUTPATIENT
Start: 2025-01-14 | End: 2025-01-14

## 2025-01-14 RX ORDER — HYDRALAZINE HYDROCHLORIDE 20 MG/ML
10 INJECTION INTRAMUSCULAR; INTRAVENOUS ONCE
Status: COMPLETED | OUTPATIENT
Start: 2025-01-14 | End: 2025-01-14

## 2025-01-14 RX ORDER — GLYCOPYRROLATE 0.2 MG/ML
0.2 INJECTION INTRAMUSCULAR; INTRAVENOUS EVERY 4 HOURS PRN
Status: DISCONTINUED | OUTPATIENT
Start: 2025-01-14 | End: 2025-01-15 | Stop reason: HOSPADM

## 2025-01-14 RX ORDER — FENTANYL CITRATE 50 UG/ML
50 INJECTION, SOLUTION INTRAMUSCULAR; INTRAVENOUS
Status: DISCONTINUED | OUTPATIENT
Start: 2025-01-14 | End: 2025-01-15 | Stop reason: HOSPADM

## 2025-01-14 RX ORDER — LORAZEPAM 2 MG/ML
1 INJECTION INTRAMUSCULAR EVERY 2 HOUR PRN
Status: DISCONTINUED | OUTPATIENT
Start: 2025-01-14 | End: 2025-01-15 | Stop reason: HOSPADM

## 2025-01-14 RX ORDER — FENTANYL CITRATE-0.9 % NACL/PF 10 MCG/ML
25-400 PLASTIC BAG, INJECTION (ML) INTRAVENOUS CONTINUOUS
Status: DISCONTINUED | OUTPATIENT
Start: 2025-01-14 | End: 2025-01-15 | Stop reason: HOSPADM

## 2025-01-14 RX ORDER — FENTANYL CITRATE 50 UG/ML
100 INJECTION, SOLUTION INTRAMUSCULAR; INTRAVENOUS ONCE
Status: COMPLETED | OUTPATIENT
Start: 2025-01-14 | End: 2025-01-14

## 2025-01-14 RX ORDER — FENTANYL CITRATE-0.9 % NACL/PF 10 MCG/ML
25-200 PLASTIC BAG, INJECTION (ML) INTRAVENOUS CONTINUOUS
Status: DISCONTINUED | OUTPATIENT
Start: 2025-01-14 | End: 2025-01-14

## 2025-01-14 RX ADMIN — FENTANYL CITRATE 50 MCG: 50 INJECTION INTRAMUSCULAR; INTRAVENOUS at 16:15

## 2025-01-14 RX ADMIN — LEVOTHYROXINE SODIUM ANHYDROUS 25 MCG: 100 INJECTION, POWDER, LYOPHILIZED, FOR SOLUTION INTRAVENOUS at 07:56

## 2025-01-14 RX ADMIN — Medication 2 L/MIN: at 16:05

## 2025-01-14 RX ADMIN — ESOMEPRAZOLE MAGNESIUM 40 MG: 40 FOR SUSPENSION ORAL at 08:15

## 2025-01-14 RX ADMIN — HEPARIN SODIUM 5000 UNITS: 5000 INJECTION, SOLUTION INTRAVENOUS; SUBCUTANEOUS at 11:53

## 2025-01-14 RX ADMIN — NICARDIPINE HYDROCHLORIDE 7.5 MG/HR: 0.2 INJECTION INTRAVENOUS at 11:54

## 2025-01-14 RX ADMIN — FENTANYL CITRATE 75 MCG/HR: 0.05 INJECTION, SOLUTION INTRAMUSCULAR; INTRAVENOUS at 11:55

## 2025-01-14 RX ADMIN — IPRATROPIUM BROMIDE AND ALBUTEROL SULFATE 3 ML: 2.5; .5 SOLUTION RESPIRATORY (INHALATION) at 08:05

## 2025-01-14 RX ADMIN — FENTANYL CITRATE 200 MCG/HR: 0.05 INJECTION, SOLUTION INTRAMUSCULAR; INTRAVENOUS at 19:49

## 2025-01-14 RX ADMIN — INSULIN LISPRO 1 UNITS: 100 INJECTION, SOLUTION INTRAVENOUS; SUBCUTANEOUS at 00:42

## 2025-01-14 RX ADMIN — FENTANYL CITRATE 100 MCG/HR: 0.05 INJECTION, SOLUTION INTRAMUSCULAR; INTRAVENOUS at 14:00

## 2025-01-14 RX ADMIN — LORAZEPAM 1 MG: 2 INJECTION INTRAMUSCULAR; INTRAVENOUS at 15:56

## 2025-01-14 RX ADMIN — CARBOXYMETHYLCELLULOSE SODIUM 1 DROP: 5 SOLUTION/ DROPS OPHTHALMIC at 08:16

## 2025-01-14 RX ADMIN — FOLIC ACID 1 MG: 1 TABLET ORAL at 08:16

## 2025-01-14 RX ADMIN — HEPARIN SODIUM 5000 UNITS: 5000 INJECTION, SOLUTION INTRAVENOUS; SUBCUTANEOUS at 04:17

## 2025-01-14 RX ADMIN — GLYCOPYRROLATE 0.2 MG: 0.2 INJECTION, SOLUTION INTRAMUSCULAR; INTRAVENOUS at 16:04

## 2025-01-14 RX ADMIN — NICARDIPINE HYDROCHLORIDE 7.5 MG/HR: 0.2 INJECTION INTRAVENOUS at 06:40

## 2025-01-14 RX ADMIN — HYDRALAZINE HYDROCHLORIDE 10 MG: 20 INJECTION INTRAMUSCULAR; INTRAVENOUS at 00:38

## 2025-01-14 RX ADMIN — AMIODARONE HYDROCHLORIDE 0.5 MG/MIN: 1.8 INJECTION, SOLUTION INTRAVENOUS at 06:53

## 2025-01-14 RX ADMIN — ATOVAQUONE 750 MG: 750 SUSPENSION ORAL at 08:16

## 2025-01-14 RX ADMIN — IPRATROPIUM BROMIDE AND ALBUTEROL SULFATE 3 ML: 2.5; .5 SOLUTION RESPIRATORY (INHALATION) at 04:07

## 2025-01-14 RX ADMIN — THIAMINE HCL TAB 100 MG 100 MG: 100 TAB at 08:15

## 2025-01-14 RX ADMIN — DEXMEDETOMIDINE HYDROCHLORIDE 0.4 MCG/KG/HR: 400 INJECTION INTRAVENOUS at 06:02

## 2025-01-14 RX ADMIN — IPRATROPIUM BROMIDE AND ALBUTEROL SULFATE 3 ML: 2.5; .5 SOLUTION RESPIRATORY (INHALATION) at 00:54

## 2025-01-14 RX ADMIN — Medication 40 L/MIN: at 08:06

## 2025-01-14 RX ADMIN — AMLODIPINE BESYLATE 10 MG: 10 TABLET ORAL at 08:15

## 2025-01-14 RX ADMIN — FLUOXETINE HYDROCHLORIDE 40 MG: 40 CAPSULE ORAL at 08:16

## 2025-01-14 RX ADMIN — FENTANYL CITRATE 100 MCG: 50 INJECTION INTRAMUSCULAR; INTRAVENOUS at 15:56

## 2025-01-14 RX ADMIN — INSULIN LISPRO 1 UNITS: 100 INJECTION, SOLUTION INTRAVENOUS; SUBCUTANEOUS at 08:27

## 2025-01-14 RX ADMIN — LORAZEPAM 1 MG: 2 INJECTION INTRAMUSCULAR; INTRAVENOUS at 16:16

## 2025-01-14 RX ADMIN — IPRATROPIUM BROMIDE AND ALBUTEROL SULFATE 3 ML: 2.5; .5 SOLUTION RESPIRATORY (INHALATION) at 11:25

## 2025-01-14 RX ADMIN — Medication 2 L/MIN: at 20:38

## 2025-01-14 ASSESSMENT — RESPIRATORY DISTRESS OBSERVATION SCALE (RDOS)
HEART RATE PER MINUTE: 0 - <90 BEATS
HEART RATE PER MINUTE: 0 - <90 BEATS
RDOS TOTAL SCORE: 6
LOOK OF FEAR: 0 - NONE
ACCESSORY MUSCLE RISE IN CLAVICLE DURING INSPIRATION: 2 - PRONOUNCED RISE
RESTLESS NONPURPOSEFUL MOVEMENTS: 1 - OCCASIONAL, SLIGHT MOVEMENTS
GRUNTING AT END OF EXPIRATION: 0 - NONE
RESPIRATORY RATE PER MINUTE: 0 - <19 BREATHS
RESTLESS NONPURPOSEFUL MOVEMENTS: 1 - OCCASIONAL, SLIGHT MOVEMENTS
RESPIRATORY RATE PER MINUTE: 0 - <19 BREATHS
INVOLUNTARY NASAL FLARING: 0 - NONE
INVOLUNTARY NASAL FLARING: 0 - NONE
ACCESSORY MUSCLE RISE IN CLAVICLE DURING INSPIRATION: 2 - PRONOUNCED RISE
PARADOXICAL BREATHING PATTERN: 2 - PRESENT
RESTLESS NONPURPOSEFUL MOVEMENTS: 0 - NONE
HEART RATE PER MINUTE: 1 - 90-109 BEATS
RESPIRATORY RATE PER MINUTE: 1 - 19-30 BREATHS
LOOK OF FEAR: 0 - NONE
RDOS TOTAL SCORE: 8
INVOLUNTARY NASAL FLARING: 2 - PRESENT
LOOK OF FEAR: 0 - NONE
PARADOXICAL BREATHING PATTERN: 2 - PRESENT
RESTLESS NONPURPOSEFUL MOVEMENTS: 2 - FREQUENT MOVEMENTS
ACCESSORY MUSCLE RISE IN CLAVICLE DURING INSPIRATION: 2 - PRONOUNCED RISE
GRUNTING AT END OF EXPIRATION: 2 - PRESENT
GRUNTING AT END OF EXPIRATION: 2 - PRESENT
PARADOXICAL BREATHING PATTERN: 2 - PRESENT
RDOS TOTAL SCORE: 4
INVOLUNTARY NASAL FLARING: 0 - NONE
RESPIRATORY RATE PER MINUTE: 2 - >30 BREATHS
ACCESSORY MUSCLE RISE IN CLAVICLE DURING INSPIRATION: 2 - PRONOUNCED RISE
GRUNTING AT END OF EXPIRATION: 0 - NONE
PARADOXICAL BREATHING PATTERN: 2 - PRESENT

## 2025-01-14 ASSESSMENT — PAIN SCALES - GENERAL
PAINLEVEL_OUTOF10: 0 - NO PAIN

## 2025-01-14 ASSESSMENT — PAIN - FUNCTIONAL ASSESSMENT
PAIN_FUNCTIONAL_ASSESSMENT: CPOT (CRITICAL CARE PAIN OBSERVATION TOOL)

## 2025-01-14 NOTE — PROGRESS NOTES
Palliative Care Progress Note    Date of Admission: 1/2/2025    Patient is a 79 y.o. female admitted with Community acquired pneumonia of both upper lobes. Remains on vent, minimal settings, but not pulling TV much above settings, RR set at 14, currently breathing at 16, spo2 94%. Given 180mg IV lasix with minimal additional diuresis. Remains on nicardipine, amio, precedex and fentanyl.       Scheduled medications  amLODIPine, 10 mg, oral, Daily  atovaquone, 750 mg, oral, q12h ADE  [Held by provider] docusate sodium, 100 mg, oral, BID  pantoprazole, 40 mg, oral, Daily   Or  esomeprazole, 40 mg, oral, Daily   Or  pantoprazole, 40 mg, intravenous, Daily  FLUoxetine, 40 mg, oral, Daily  folic acid, 1 mg, oral, Daily  heparin, 5,000 Units, subcutaneous, q8h  insulin lispro, 0-5 Units, subcutaneous, q4h  ipratropium-albuteroL, 3 mL, nebulization, q4h  levothyroxine, 25 mcg, intravenous, q24h ADE  lubricating eye drops, 1 drop, Both Eyes, BID  methylPREDNISolone sodium succinate (PF), 80 mg, intravenous, Daily  oxygen, , inhalation, Continuous - Inhalation  simvastatin, 20 mg, oral, Nightly  thiamine, 100 mg, oral, Daily      Continuous medications  amiodarone, 0.5 mg/min, Last Rate: 0.5 mg/min (01/14/25 0800)  dexmedeTOMIDine, 0-1.5 mcg/kg/hr, Last Rate: 0.4 mcg/kg/hr (01/14/25 0800)  fentaNYL, 0-75 mcg/hr, Last Rate: 50 mcg/hr (01/14/25 0800)  niCARdipine, 0-15 mg/hr, Last Rate: 7.5 mg/hr (01/14/25 0640)      PRN medications  PRN medications: acetaminophen, albuterol, dextrose, dextrose, glucagon, glucagon, lactulose, oxygen, polyethylene glycol     Results for orders placed or performed during the hospital encounter of 01/02/25 (from the past 24 hours)   POCT GLUCOSE   Result Value Ref Range    POCT Glucose 161 (H) 74 - 99 mg/dL   Blood Gas Arterial Full Panel   Result Value Ref Range    POCT pH, Arterial 7.36 (L) 7.38 - 7.42 pH    POCT pCO2, Arterial 38 38 - 42 mm Hg    POCT pO2, Arterial 104 (H) 85 - 95 mm Hg    POCT  SO2, Arterial 99 94 - 100 %    POCT Oxy Hemoglobin, Arterial 97.5 94.0 - 98.0 %    POCT Hematocrit Calculated, Arterial 22.0 (L) 36.0 - 46.0 %    POCT Sodium, Arterial 140 136 - 145 mmol/L    POCT Potassium, Arterial 3.2 (L) 3.5 - 5.3 mmol/L    POCT Chloride, Arterial 106 98 - 107 mmol/L    POCT Ionized Calcium, Arterial 1.08 (L) 1.10 - 1.33 mmol/L    POCT Glucose, Arterial 198 (H) 74 - 99 mg/dL    POCT Lactate, Arterial 1.3 0.4 - 2.0 mmol/L    POCT Base Excess, Arterial -3.6 (L) -2.0 - 3.0 mmol/L    POCT HCO3 Calculated, Arterial 21.5 (L) 22.0 - 26.0 mmol/L    POCT Hemoglobin, Arterial 7.4 (L) 12.0 - 16.0 g/dL    POCT Anion Gap, Arterial 16 10 - 25 mmo/L    Patient Temperature 37.0 degrees Celsius    FiO2 40 %    Ventilator Mode      Ventilator Rate 14 bpm    Tidal Volume 370 mL    Peep CHM2O 8.0 cm H2O    Site of Arterial Puncture Arterial Line     Aba's Test Positive    POCT GLUCOSE   Result Value Ref Range    POCT Glucose 179 (H) 74 - 99 mg/dL   Renal function panel   Result Value Ref Range    Glucose 188 (H) 74 - 99 mg/dL    Sodium 144 136 - 145 mmol/L    Potassium 3.2 (L) 3.5 - 5.3 mmol/L    Chloride 104 98 - 107 mmol/L    Bicarbonate 21 21 - 32 mmol/L    Anion Gap 22 (H) 10 - 20 mmol/L    Urea Nitrogen 192 (HH) 6 - 23 mg/dL    Creatinine 4.65 (H) 0.50 - 1.05 mg/dL    eGFR 9 (L) >60 mL/min/1.73m*2    Calcium 7.7 (L) 8.6 - 10.3 mg/dL    Phosphorus 9.0 (H) 2.5 - 4.9 mg/dL    Albumin 2.5 (L) 3.4 - 5.0 g/dL   Magnesium   Result Value Ref Range    Magnesium 2.62 (H) 1.60 - 2.40 mg/dL   POCT GLUCOSE   Result Value Ref Range    POCT Glucose 170 (H) 74 - 99 mg/dL   POCT GLUCOSE   Result Value Ref Range    POCT Glucose 158 (H) 74 - 99 mg/dL   Renal function panel   Result Value Ref Range    Glucose 169 (H) 74 - 99 mg/dL    Sodium 145 136 - 145 mmol/L    Potassium 4.3 3.5 - 5.3 mmol/L    Chloride 105 98 - 107 mmol/L    Bicarbonate 23 21 - 32 mmol/L    Anion Gap 21 (H) 10 - 20 mmol/L    Urea Nitrogen 197 (HH) 6 - 23  mg/dL    Creatinine 4.72 (H) 0.50 - 1.05 mg/dL    eGFR 9 (L) >60 mL/min/1.73m*2    Calcium 7.8 (L) 8.6 - 10.3 mg/dL    Phosphorus 8.9 (H) 2.5 - 4.9 mg/dL    Albumin 2.6 (L) 3.4 - 5.0 g/dL   Magnesium   Result Value Ref Range    Magnesium 2.68 (H) 1.60 - 2.40 mg/dL   POCT GLUCOSE   Result Value Ref Range    POCT Glucose 135 (H) 74 - 99 mg/dL   Blood Gas Arterial Full Panel   Result Value Ref Range    POCT pH, Arterial 7.37 (L) 7.38 - 7.42 pH    POCT pCO2, Arterial 39 38 - 42 mm Hg    POCT pO2, Arterial 89 85 - 95 mm Hg    POCT SO2, Arterial 99 94 - 100 %    POCT Oxy Hemoglobin, Arterial 94.7 94.0 - 98.0 %    POCT Hematocrit Calculated, Arterial 28.0 (L) 36.0 - 46.0 %    POCT Sodium, Arterial 143 136 - 145 mmol/L    POCT Potassium, Arterial 4.1 3.5 - 5.3 mmol/L    POCT Chloride, Arterial 109 (H) 98 - 107 mmol/L    POCT Ionized Calcium, Arterial 1.12 1.10 - 1.33 mmol/L    POCT Glucose, Arterial 145 (H) 74 - 99 mg/dL    POCT Lactate, Arterial 0.8 0.4 - 2.0 mmol/L    POCT Base Excess, Arterial -2.5 (L) -2.0 - 3.0 mmol/L    POCT HCO3 Calculated, Arterial 22.5 22.0 - 26.0 mmol/L    POCT Hemoglobin, Arterial 9.2 (L) 12.0 - 16.0 g/dL    POCT Anion Gap, Arterial 16 10 - 25 mmo/L    Patient Temperature 37.0 degrees Celsius    FiO2 40 %    Ventilator Mode SIMV     Ventilator Rate 14 bpm    Total Minute Volume 370.0 Liter    Peep CHM2O 5.0 cm H2O    Site of Arterial Puncture Arterial Line     Aba's Test Positive    CBC and Auto Differential   Result Value Ref Range    WBC 28.4 (H) 4.4 - 11.3 x10*3/uL    nRBC 0.1 (H) 0.0 - 0.0 /100 WBCs    RBC 2.42 (L) 4.00 - 5.20 x10*6/uL    Hemoglobin 7.3 (L) 12.0 - 16.0 g/dL    Hematocrit 21.4 (L) 36.0 - 46.0 %    MCV 88 80 - 100 fL    MCH 30.2 26.0 - 34.0 pg    MCHC 34.1 32.0 - 36.0 g/dL    RDW 14.0 11.5 - 14.5 %    Platelets 445 150 - 450 x10*3/uL    Immature Granulocytes %, Automated 5.6 (H) 0.0 - 0.9 %    Immature Granulocytes Absolute, Automated 1.58 (H) 0.00 - 0.50 x10*3/uL   Magnesium    Result Value Ref Range    Magnesium 2.57 (H) 1.60 - 2.40 mg/dL   Hepatic function panel   Result Value Ref Range    Albumin 2.5 (L) 3.4 - 5.0 g/dL    Bilirubin, Total 0.4 0.0 - 1.2 mg/dL    Bilirubin, Direct 0.2 0.0 - 0.3 mg/dL    Alkaline Phosphatase 84 33 - 136 U/L    ALT 32 7 - 45 U/L    AST 30 9 - 39 U/L    Total Protein 5.5 (L) 6.4 - 8.2 g/dL   Phosphorus   Result Value Ref Range    Phosphorus 8.8 (H) 2.5 - 4.9 mg/dL   Basic Metabolic Panel   Result Value Ref Range    Glucose 150 (H) 74 - 99 mg/dL    Sodium 147 (H) 136 - 145 mmol/L    Potassium 3.9 3.5 - 5.3 mmol/L    Chloride 108 (H) 98 - 107 mmol/L    Bicarbonate 23 21 - 32 mmol/L    Anion Gap 20 10 - 20 mmol/L    Urea Nitrogen 200 (HH) 6 - 23 mg/dL    Creatinine 4.82 (H) 0.50 - 1.05 mg/dL    eGFR 9 (L) >60 mL/min/1.73m*2    Calcium 8.1 (L) 8.6 - 10.3 mg/dL   Manual Differential   Result Value Ref Range    Neutrophils %, Manual 96.0 40.0 - 80.0 %    Lymphocytes %, Manual 2.0 13.0 - 44.0 %    Monocytes %, Manual 1.0 2.0 - 10.0 %    Eosinophils %, Manual 0.0 0.0 - 6.0 %    Basophils %, Manual 0.0 0.0 - 2.0 %    Metamyelocytes %, Manual 1.0 0.0 - 0.0 %    Seg Neutrophils Absolute, Manual 27.26 (H) 1.60 - 5.00 x10*3/uL    Lymphocytes Absolute, Manual 0.57 (L) 0.80 - 3.00 x10*3/uL    Monocytes Absolute, Manual 0.28 0.05 - 0.80 x10*3/uL    Eosinophils Absolute, Manual 0.00 0.00 - 0.40 x10*3/uL    Basophils Absolute, Manual 0.00 0.00 - 0.10 x10*3/uL    Metamyelocytes Absolute, Manual 0.28 0.00 - 0.00 x10*3/uL    Total Cells Counted 100     RBC Morphology See Below     Polychromasia Mild     Ovalocytes Few    POCT GLUCOSE   Result Value Ref Range    POCT Glucose 151 (H) 74 - 99 mg/dL        XR chest 1 view  Result Date: 1/12/2025  Interpreted By:  Jaxon Morales, STUDY: XR CHEST 1 VIEW; 1/12/2025 5:25 am   INDICATION: CLINICAL INFORMATION: Signs/Symptoms:persistent hypoxia, ARDS, eval infiltrates.   COMPARISON: 01/10/2025   ACCESSION NUMBER(S): [id number]    ORDERING CLINICIAN: NORM LANDAVERDE   TECHNIQUE: Portable chest one view.   FINDINGS: The cardiac size is indeterminate in view of the AP projection. There is no change in the ET tube or NG tube appearance. There is no change in the hazy diffuse bilateral infiltrates. No effusions are identified.       No change in the bilateral infiltrates or tube placement. There is no interval change when compared to the previous examination.   MACRO: none   Signed by: Jaxon Morales 1/12/2025 11:03 AM Dictation workstation:   [id number]    XR chest 1 view  Result Date: 1/10/2025  Interpreted By:  Sherly Fiore, STUDY: XR CHEST 1 VIEW 1/10/2025 10:42 am   INDICATION: Signs/Symptoms:acute hypoxemic respiratory failure, assess lung fields   COMPARISON: 01/09/2025   ACCESSION NUMBER(S): [id number]   ORDERING CLINICIAN: JORY RIDDLE   TECHNIQUE: Single AP view chest   FINDINGS: Examination rotated accentuating the cardiac silhouette. Endotracheal tube tip identified 3.6 cm from the noah. NG tube is in place. There is generalized increased interstitial prominence in bilateral lung fields with alveolar airspace infiltrate with alveolar component of pulmonary edema suggested as opposed to postinfectious etiology. Correlate with patient's history.             1. Stable lines and tubes   2. Persistent patchy alveolar airspace infiltrate demonstrated within bilateral lung fields stable. No dense airspace consolidation.   Signed by: Sherly Fiore 1/10/2025 1:06 PM Dictation workstation:   [id number]    XR chest 1 view  Result Date: 1/9/2025  Interpreted By:  Stefany Edwards, STUDY: XR CHEST 1 VIEW 1/9/2025 6:30 am   INDICATION: Signs/Symptoms:persistent hypoxia, unable to wean   COMPARISON: 01/08/2025   ACCESSION NUMBER(S): [id number]   ORDERING CLINICIAN: NORM LANDAVERDE   TECHNIQUE: AP erect view of the chest at bedside   FINDINGS: The tip of the endotracheal tube is located 2 cm above noah with nasogastric tube descending below  diaphragm. The cardiac size is borderline enlarged. When compared with yesterday's study, the infiltrate throughout the right lung is unchanged. Infiltrate within the left lung appears slightly improved. No pleural abnormality is seen.       Stable diffuse infiltrate throughout right lung with some mild improvement in the diffuse infiltrate seen in left lung.   Signed by: Stefany Edwards 1/9/2025 8:23 AM Dictation workstation:   [id number]    XR chest 1 view  Result Date: 1/7/2025  Interpreted By:  Isidro Corrales, STUDY: XR CHEST 1 VIEW;  1/7/2025 10:48 pm   INDICATION: Signs/Symptoms:OG tube.   COMPARISON: Chest x-ray 01/07/2025   ACCESSION NUMBER(S): [id number]   ORDERING CLINICIAN: AMANDA SÁNCHEZ   FINDINGS: Enteric tube terminates in the left mid abdomen with side hole below the GE junction, likely within the distal gastric body. Multiple overlying leads are present.   CARDIOMEDIASTINAL SILHOUETTE: Cardiomediastinal silhouette is stable in size and configuration.   LUNGS: Lung apices are excluded from the field of view. There is diffuse bilateral airspace opacities not significantly changed from prior exam.   ABDOMEN: No remarkable upper abdominal findings.   BONES: Multilevel degenerative changes of the spine.       Enteric tube terminates in the left mid abdomen with side hole below the GE junction.   Diffuse bilateral airspace opacities concerning for developing multifocal pneumonia. Continued radiographic follow-up to resolution is advised.   MACRO: None   Signed by: Isidro Corrales 1/7/2025 10:52 PM Dictation workstation:   [id number]    XR chest 1 view  Result Date: 1/7/2025  Interpreted By:  Elda Dorado, STUDY: XR CHEST 1 VIEW;  1/7/2025 10:40 am   INDICATION: Signs/Symptoms:intubation.   COMPARISON: 01/07/2025 at 5:45 a.m.   ACCESSION NUMBER(S): [id number]   ORDERING CLINICIAN: KENISHA LEE   FINDINGS: Heart is normal in size.   The patient is intubated. The tip terminates above the noah.   There  is diffuse parenchymal infiltration.   There are atherosclerotic changes of the aorta. There are degenerative changes of the spine.   COMPARISON OF FINDING: The patient has undergone interval intubation. The lungs are similar.       Diffuse bilateral parenchymal infiltration. Interval intubation.   MACRO: none   Signed by: Elda Dorado 1/7/2025 11:22 AM Dictation workstation:   [id number]    XR chest 1 view  Result Date: 1/7/2025  Interpreted By:  Jaxon Morales, STUDY: XR CHEST 1 VIEW; 1/7/2025 6:35 am   INDICATION: CLINICAL INFORMATION: Signs/Symptoms:persistent hypoxia, unable to wean from NIV.   COMPARISON: 01/06/2025   ACCESSION NUMBER(S): [id number]   ORDERING CLINICIAN: NORM LANDAVERDE   TECHNIQUE: Portable chest one view.   FINDINGS: The cardiac size is indeterminate in view of the AP projection. There is continued diffuse ground-glass infiltrate bilaterally. No effusions are identified.       Persistent ground-glass infiltrates diffusely bilaterally. Etiology is unclear with differential to include infectious organisms as well as pulmonary edema and ARDS.   MACRO: none   Signed by: Jaxon Morales 1/7/2025 8:20 AM Dictation workstation:   [id number]    Transthoracic Echo (TTE) Complete  Result Date: 1/6/2025  PHYSICIAN INTERPRETATION: Left Ventricle: The left ventricular systolic function is normal, with a visually estimated ejection fraction of 70-75%. There are no regional wall motion abnormalities. The left ventricular cavity size is normal. There is normal septal thickness. Spectral Doppler shows a normal pattern of left ventricular diastolic filling. Left Atrium: The left atrium is normal in size. Right Ventricle: The right ventricle is normal in size. There is normal right ventricular global systolic function. Right Atrium: The right atrium is normal in size. Aortic Valve: The aortic valve is trileaflet. The aortic valve dimensionless index is 0.74. There is no evidence of aortic valve regurgitation.  The peak instantaneous gradient of the aortic valve is 22 mmHg. The mean gradient of the aortic valve is 11 mmHg. Mitral Valve: The mitral valve is normal in structure. There is no evidence of mitral valve regurgitation. Tricuspid Valve: The tricuspid valve is structurally normal. There is mild tricuspid regurgitation. Pulmonic Valve: The pulmonic valve is structurally normal. There is physiologic pulmonic valve regurgitation. Pericardium: Small pericardial effusion. Aorta: The aortic root is normal. Systemic Veins: The inferior vena cava appears normal in size, with IVC inspiratory collapse greater than 50%.  CONCLUSIONS:  1. The left ventricular systolic function is normal, with a visually estimated ejection fraction of 70-75%.  2. Spectral Doppler shows a normal pattern of left ventricular diastolic filling.  3. There is normal right ventricular global systolic function.  4. No evidence of mitral valve regurgitation.  5. Mild tricuspid regurgitation is visualized.     CT angio chest for pulmonary embolism  Result Date: 1/5/2025  FINDINGS: POTENTIAL LIMITATIONS OF THE STUDY: Motion artifact.   HEART AND VESSELS: The evaluation for filling defects at the pulmonary arteries is degraded by motion artifact. No large central filling defects to suggest pulmonary embolism. The smaller segmental/subsegmental pulmonary arteries are not well evaluated on this exam.   No thoracic aortic aneurysm. Mild atherosclerotic calcifications are noted at the thoracic aorta.   The heart is mildly enlarged. There is trace pericardial effusion.   MEDIASTINUM AND MANPREET, LOWER NECK AND AXILLA: The visualized thyroid gland is small.   A right paratracheal lymph node measures 10 mm in short axis. A subcarinal lymph node measures 12 mm in short axis. A right hilar lymph node measures 19 mm in short axis. A left hilar lymph node measures 11 mm in short axis. Calcified lymph nodes are noted at the mediastinum and left hilum.   LUNGS AND AIRWAYS:  The trachea and central airways are patent.   There are diffuse bilateral ground-glass opacities. No pleural effusion or pneumothorax.   UPPER ABDOMEN: Calcific foci at the spleen are suggestive of granulomas.   CHEST WALL AND OSSEOUS STRUCTURES: Multilevel degenerative changes at the spine. Redemonstration of remote compression deformity with Schmorl's node at the superior endplate of L1.       1. Study degraded by motion artifact. No evidence of large central pulmonary emboli. The smaller segmental/subsegmental pulmonary arteries are not well evaluated on this exam. 2. Diffuse bilateral ground-glass opacities, may be secondary to pulmonary edema and/or multifocal pneumonia. Acute respiratory distress syndrome is in the differential diagnoses. 3. Mild cardiomegaly. Trace pericardial effusion. 4. Mild mediastinal and hilar adenopathy.     MACRO: None.   Signed by: Selin Matthews 1/5/2025 6:42 PM Dictation workstation:   [id number]    US renal complete  Result Date: 1/5/2025  Interpreted By:  Jaxon Morales, STUDY: US RENAL COMPLETE; 1/5/2025 10:29 am   INDICATION: Signs/Symptoms:nancy.   COMPARISON: None   ACCESSION NUMBER(S): [id number]   ORDERING CLINICIAN: NORM LANDAVERDE   TECHNIQUE: Grayscale and color Doppler imaging of the kidneys   FINDINGS: The right kidney measures 11.0 cm  . The left kidney measures 11.0 cm  .   There is mild increased echogenicity of the renal cortex bilaterally.     No renal stones are identified.  Renal cortex is normal in thickness bilaterally. No hydronephrosis is identified.   Urinary bladder is nonspecific in appearance with no stones or masses identified.       Mild increased renal cortical echogenicity diffusely bilaterally suggesting chronic renal medical disease.   MACRO: none   Signed by: Jaxon Morales 1/5/2025 12:45 PM Dictation workstation:   [id number]    Visit Vitals  /56   Pulse 77   Temp 36.5 °C (97.7 °F) (Temporal)   Resp 15       Physical Exam  Vitals and  nursing note reviewed.   Constitutional:       General: She is not in acute distress.     Appearance: She is ill-appearing.      Comments: Intubated and sedated   HENT:      Mouth/Throat:      Mouth: Mucous membranes are dry.      Pharynx: Oropharynx is clear.   Cardiovascular:      Rate and Rhythm: Normal rate and regular rhythm.      Pulses: Normal pulses.   Pulmonary:      Breath sounds: No wheezing or rales.      Comments: Intubated  Abdominal:      General: There is no distension.      Palpations: Abdomen is soft.   Musculoskeletal:      Right lower leg: No edema.      Left lower leg: No edema.      Comments: Edema bilat hands, non pitting   Skin:     General: Skin is warm and dry.      Findings: Bruising present.   Neurological:      Comments: Sedated, calm, opens eyes, tracks albeit slowly, not following commands, is on precedex and fentanyl currently.           Assessment/Plan   IMP:      CAPvs atypical PNA  Acute respiratory failure with hypoxia - remains intubated. ? Of Vasculitis/DAH vs. Atypical pneumonia vs. Acute inflammatory process. On IV steroids  Sepsis - all cultures NGTD. 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 Still having decent UO, but creatinine progressively worsening. Nephrology following. Family declined renal biopsy and dialysis.  Leukocytosis - continues to trend up. ID is following     Palliative Care Encounter  DNR-CCA  Not capable  Daughter Beverly is stated HPOA. Pt also has a son Valeriy.     1/14  Spoke to critical care team today, awaiting family to discuss plan for extubation. Unsure how patient will do with extubation, no showing signs that she would do well off ventilator. Family given option of extubation with low threshold to switch to comfort care vs extubation in full comfort model of care. Awaiting family decision. Spoke to staff RN Nupur, no family decision as of yet, no family at bedside.     Addendum:  Spoke to daughter at bedside. From  family perspective, not likely to have a recovery enough to the point that the patient would find acceptable, even in best case scenario. Family would like patient extubated in a full comfort model of care. DNRCC signed, comfort meds put in place. Of note patient has been difficult to sedate previously, requiring high amounts of sedatives previously.     Checked on patient again post extubation. Signs of discomfort, gave additional fentanyl, increased drip rate, given additional ativan. Provided update regarding patient status to family at bedside, provided emotional support. Provided education to family regarding respiratory status, breathing changes and patient comfort level.       1/13  No family present at bedside this afternoon. Renal function worsened, but urine output good, potassium stable. Plan for extubation tomorrow with DNI order, possible hospice involvement/comfort care if patient decompensates.     1/12  No family members were present at the bedside today. Initial plan was to extubate on day 7 of mechanical vent. We will follow. D/w Antonio Metcalf PA-C and ICU nurse Azucena KING    1/11  Daughter present at the bedside today. Reviewed course. She again reconfirmed previous goals of care. Provided updates. She had questions about extubation and how that would look. I explained that is dependent on SBTs. If continues to fail, would then be proceeding with palliative/terminal extubation on day 7 per pt wishes. We will follow.     1/10  Daughter Beverly present at bedside.  Updates given about the patient.  She did reiterate that the family did not want dialysis even if it was temporary. The patient getting tube feeds today. The daughter states that she does not have any questions at this time. States she will likely have some in a few days if the patient's condition does not improve.     1/9  Daughter Beverly at bedside, UO declining, creatinine mildly worsened, NG currently to LIWS, starting laxatives and  trickle tube feeds today to stimulate gut. Diuretics on hold.  Currently stable on vent, better with addition of ketamine and adjustment of vent settings for comfort.  Discussed with attending service and daughter, continue aggressive treatment model of care for specified 7 days currently day 3 on ventilator, no dialysis.     1/8/2025  Discussion with daughter Beverly at bedside. The patient had a hard time being sedated. Beverly did ask for spiritual care now, I did put in a consult. States she is agreeable to her mother's wishes. We will continue to follow.      1/7/2025  Goals of care discussion with daughter Beverly.  The daughter stated that she is aware of her mom's wishes.  States that her mom would only want to be intubated for 1 week.  This is congruent with discussions with the ICU team.  I stated that the palliative care team will be in close follow-up with the family to walk them through this decision making.  We will continue to follow.        Total time 60min. Spent 16min in acp discussion.     Arcelia Bhandari, APRN-CNP

## 2025-01-14 NOTE — CARE PLAN
The patient's goals for the shift include unable to assess    The clinical goals for the shift include pt will be pain free this shift    Over the shift, the patient made progress toward goal. Recommend to continue to monitor vitals, heart rhythm, and signs of distress with cpot/ rdos.

## 2025-01-14 NOTE — PROGRESS NOTES
History of acute renal failure she was admitted with septic shock pneumonia respiratory failure patient and family made decision to extubate the patient today and no dialysis no renal biopsy I will sign off call me if needed

## 2025-01-14 NOTE — CARE PLAN
The patient's goals for the shift include unable to assess    The clinical goals for the shift include Patient will remain hemodynamically stable      Problem: Pain - Adult  Goal: Verbalizes/displays adequate comfort level or baseline comfort level  1/14/2025 0152 by Megan Arteaga RN  Outcome: Progressing  Flowsheets (Taken 1/14/2025 0152)  Verbalizes/displays adequate comfort level or baseline comfort level:   Encourage patient to monitor pain and request assistance   Assess pain using appropriate pain scale   Administer analgesics based on type and severity of pain and evaluate response   Implement non-pharmacological measures as appropriate and evaluate response   Consider cultural and social influences on pain and pain management   Notify Licensed Independent Practitioner if interventions unsuccessful or patient reports new pain  1/14/2025 0146 by Megan Arteaga RN  Outcome: Progressing  Flowsheets (Taken 1/14/2025 0146)  Verbalizes/displays adequate comfort level or baseline comfort level:   Encourage patient to monitor pain and request assistance   Assess pain using appropriate pain scale   Administer analgesics based on type and severity of pain and evaluate response   Implement non-pharmacological measures as appropriate and evaluate response   Consider cultural and social influences on pain and pain management   Notify Licensed Independent Practitioner if interventions unsuccessful or patient reports new pain     Problem: Safety - Adult  Goal: Free from fall injury  1/14/2025 0152 by Megan Arteaga RN  Outcome: Progressing  Flowsheets (Taken 1/14/2025 0152)  Free from fall injury:   Instruct family/caregiver on patient safety   Based on caregiver fall risk screen, instruct family/caregiver to ask for assistance with transferring infant if caregiver noted to have fall risk factors  1/14/2025 0146 by Megan Arteaga RN  Outcome: Progressing  Flowsheets (Taken 1/14/2025 0146)  Free from fall injury:   Instruct  family/caregiver on patient safety   Based on caregiver fall risk screen, instruct family/caregiver to ask for assistance with transferring infant if caregiver noted to have fall risk factors     Problem: Discharge Planning  Goal: Discharge to home or other facility with appropriate resources  1/14/2025 0152 by Megan Arteaga RN  Outcome: Progressing  Flowsheets (Taken 1/14/2025 0152)  Discharge to home or other facility with appropriate resources:   Identify barriers to discharge with patient and caregiver   Arrange for needed discharge resources and transportation as appropriate   Identify discharge learning needs (meds, wound care, etc)   Arrange for interpreters to assist at discharge as needed   Refer to discharge planning if patient needs post-hospital services based on physician order or complex needs related to functional status, cognitive ability or social support system  1/14/2025 0146 by Megan Arteaga RN  Outcome: Progressing  Flowsheets (Taken 1/14/2025 0146)  Discharge to home or other facility with appropriate resources:   Identify barriers to discharge with patient and caregiver   Arrange for needed discharge resources and transportation as appropriate   Identify discharge learning needs (meds, wound care, etc)   Arrange for interpreters to assist at discharge as needed   Refer to discharge planning if patient needs post-hospital services based on physician order or complex needs related to functional status, cognitive ability or social support system     Problem: Chronic Conditions and Co-morbidities  Goal: Patient's chronic conditions and co-morbidity symptoms are monitored and maintained or improved  1/14/2025 0152 by Megan Arteaga RN  Outcome: Progressing  Flowsheets (Taken 1/14/2025 0152)  Care Plan - Patient's Chronic Conditions and Co-Morbidity Symptoms are Monitored and Maintained or Improved:   Monitor and assess patient's chronic conditions and comorbid symptoms for stability, deterioration, or  improvement   Collaborate with multidisciplinary team to address chronic and comorbid conditions and prevent exacerbation or deterioration   Update acute care plan with appropriate goals if chronic or comorbid symptoms are exacerbated and prevent overall improvement and discharge  1/14/2025 0146 by Megan Arteaga RN  Outcome: Progressing  Flowsheets (Taken 1/14/2025 0146)  Care Plan - Patient's Chronic Conditions and Co-Morbidity Symptoms are Monitored and Maintained or Improved:   Monitor and assess patient's chronic conditions and comorbid symptoms for stability, deterioration, or improvement   Collaborate with multidisciplinary team to address chronic and comorbid conditions and prevent exacerbation or deterioration   Update acute care plan with appropriate goals if chronic or comorbid symptoms are exacerbated and prevent overall improvement and discharge     Problem: Respiratory  Goal: Minimal/no exertional discomfort or dyspnea this shift  1/14/2025 0152 by Megan Arteaga RN  Outcome: Progressing  Flowsheets (Taken 1/14/2025 0152)  Minimal/no exertional discomfort or dyspnea this shift: Positioning to promote ventilation/comfort  1/14/2025 0146 by Megan Arteaga RN  Outcome: Progressing  Flowsheets (Taken 1/14/2025 0146)  Minimal/no exertional discomfort or dyspnea this shift: Positioning to promote ventilation/comfort  Goal: No signs of respiratory distress (eg. Use of accessory muscles. Peds grunting)  1/14/2025 0152 by Megan Arteaga RN  Outcome: Progressing  1/14/2025 0146 by Megan Arteaga RN  Outcome: Progressing  Goal: Verbalize decreased shortness of breath this shift  1/14/2025 0152 by Megan Arteaga RN  Outcome: Progressing  Flowsheets (Taken 1/14/2025 0152)  Verbalize decreased shortness of breath this shift: Encourage/provide pulmonary hygiene/secretion clearance  1/14/2025 0146 by Megan Arteaga RN  Outcome: Progressing  Flowsheets (Taken 1/14/2025 0146)  Verbalize decreased shortness of breath this shift:  Encourage/provide pulmonary hygiene/secretion clearance  Goal: Wean oxygen to maintain O2 saturation per order/standard this shift  1/14/2025 0152 by Megan Arteaga RN  Outcome: Progressing  1/14/2025 0146 by Megan Arteaga RN  Outcome: Progressing  Goal: Clear secretions with interventions this shift  1/14/2025 0152 by Megan Arteaga RN  Outcome: Progressing  Flowsheets (Taken 1/14/2025 0152)  Clear secretions with interventions this shift:   Suctioning   Med administration/monitoring of effect  1/14/2025 0146 by Megan Arteaga RN  Outcome: Progressing  Flowsheets (Taken 1/14/2025 0146)  Clear secretions with interventions this shift:   Suctioning   Med administration/monitoring of effect  Goal: Tolerate mechanical ventilation evidenced by VS/agitation level this shift  1/14/2025 0152 by Megan Arteaga RN  Outcome: Progressing  Flowsheets (Taken 1/14/2025 0152)  Tolerate mechanical ventilation evidenced by VS/agitation level this shift: Maintain ET tube tube position  1/14/2025 0146 by Megan Arteaga RN  Outcome: Progressing  Flowsheets (Taken 1/14/2025 0146)  Tolerate mechanical ventilation evidenced by VS/agitation level this shift: Maintain ET tube tube position     Problem: Skin  Goal: Decreased wound size/increased tissue granulation at next dressing change  1/14/2025 0152 by Megan Arteaga RN  Outcome: Progressing  Flowsheets (Taken 1/14/2025 0152)  Decreased wound size/increased tissue granulation at next dressing change:   Promote sleep for wound healing   Utilize specialty bed per algorithm   Protective dressings over bony prominences  1/14/2025 0146 by Megan Arteaga RN  Outcome: Progressing  Flowsheets (Taken 1/14/2025 0146)  Decreased wound size/increased tissue granulation at next dressing change:   Promote sleep for wound healing   Utilize specialty bed per algorithm   Protective dressings over bony prominences  Goal: Participates in plan/prevention/treatment measures  1/14/2025 0152 by Megan Arteaga RN  Outcome:  Progressing  Flowsheets (Taken 1/14/2025 0152)  Participates in plan/prevention/treatment measures: Elevate heels  1/14/2025 0146 by Megan Arteaga RN  Outcome: Progressing  Flowsheets (Taken 1/14/2025 0146)  Participates in plan/prevention/treatment measures: Elevate heels  Goal: Prevent/manage excess moisture  1/14/2025 0152 by Megan Arteaga RN  Outcome: Progressing  Flowsheets (Taken 1/14/2025 0152)  Prevent/manage excess moisture:   Cleanse incontinence/protect with barrier cream   Moisturize dry skin  1/14/2025 0146 by Megan Arteaga RN  Outcome: Progressing  Flowsheets (Taken 1/14/2025 0146)  Prevent/manage excess moisture:   Cleanse incontinence/protect with barrier cream   Moisturize dry skin   Use wicking fabric (obtain order)  Goal: Prevent/minimize sheer/friction injuries  1/14/2025 0152 by Megan Arteaga RN  Outcome: Progressing  Flowsheets (Taken 1/14/2025 0152)  Prevent/minimize sheer/friction injuries:   Complete micro-shifts as needed if patient unable. Adjust patient position to relieve pressure points, not a full turn   Increase activity/out of bed for meals   Use pull sheet   HOB 30 degrees or less   Turn/reposition every 2 hours/use positioning/transfer devices  1/14/2025 0146 by Megan Arteaga RN  Outcome: Progressing  Flowsheets (Taken 1/14/2025 0146)  Prevent/minimize sheer/friction injuries:   Complete micro-shifts as needed if patient unable. Adjust patient position to relieve pressure points, not a full turn   Increase activity/out of bed for meals   Use pull sheet   HOB 30 degrees or less   Turn/reposition every 2 hours/use positioning/transfer devices  Goal: Promote/optimize nutrition  1/14/2025 0152 by Megan Arteaga RN  Outcome: Progressing  Flowsheets (Taken 1/14/2025 0152)  Promote/optimize nutrition: Discuss with provider if NPO > 2 days  1/14/2025 0146 by Megan Arteaga RN  Outcome: Progressing  Flowsheets (Taken 1/14/2025 0146)  Promote/optimize nutrition: Discuss with provider if NPO > 2  days  Goal: Promote skin healing  1/14/2025 0152 by Megan Arteaga RN  Outcome: Progressing  Flowsheets (Taken 1/14/2025 0152)  Promote skin healing:   Assess skin/pad under line(s)/device(s)   Protective dressings over bony prominences   Turn/reposition every 2 hours/use positioning/transfer devices  1/14/2025 0146 by Megan Arteaga RN  Outcome: Progressing  Flowsheets (Taken 1/14/2025 0146)  Promote skin healing:   Assess skin/pad under line(s)/device(s)   Protective dressings over bony prominences   Turn/reposition every 2 hours/use positioning/transfer devices     Problem: Nutrition  Goal: Nutrition support is meeting 75% of nutrient needs  1/14/2025 0152 by Megan Arteaga RN  Outcome: Progressing  1/14/2025 0146 by Megan Arteaga RN  Outcome: Progressing  Goal: Tube feed tolerance  1/14/2025 0152 by Megan Arteaga RN  Outcome: Progressing  1/14/2025 0146 by Megan Arteaga RN  Outcome: Progressing     Problem: Knowledge Deficit  Goal: Patient/family/caregiver demonstrates understanding of disease process, treatment plan, medications, and discharge instructions  1/14/2025 0152 by Megan Arteaga RN  Outcome: Progressing  Flowsheets (Taken 1/14/2025 0152)  Patient/family/caregiver demonstrates understanding of disease process, treatment plan, medications, and discharge instructions:   Complete learning assessment and assess knowledge base   Provide teaching via preferred learning methods   Provide teaching at level of understanding  1/14/2025 0146 by Megan Arteaga RN  Outcome: Progressing  Flowsheets (Taken 1/14/2025 0146)  Patient/family/caregiver demonstrates understanding of disease process, treatment plan, medications, and discharge instructions:   Complete learning assessment and assess knowledge base   Provide teaching via preferred learning methods   Provide teaching at level of understanding     Problem: Mechanical Ventilation  Goal: Patient Will Maintain Patent Airway  1/14/2025 0152 by Megan Arteaga RN  Outcome:  Progressing  Flowsheets (Taken 1/14/2025 0152)  Patient Will Maintain Patent Airway:   Assess and monitor airway secretions and suction as needed per patient's condition and according to policy   Hyper oxygenate with 100% FiO2 prior to suctioning   Suctioning secretions as indicated to maintain patent airway   Elevate head of bed 30 degrees if patient has tube feeding  1/14/2025 0146 by Megan Artegaa RN  Outcome: Progressing  Flowsheets (Taken 1/14/2025 0146)  Patient Will Maintain Patent Airway:   Assess and monitor airway secretions and suction as needed per patient's condition and according to policy   Hyper oxygenate with 100% FiO2 prior to suctioning   Suctioning secretions as indicated to maintain patent airway   Elevate head of bed 30 degrees if patient has tube feeding  Goal: Oral health is maintained or improved  1/14/2025 0152 by Megan Arteaga RN  Outcome: Progressing  Flowsheets (Taken 1/14/2025 0152)  Oral Health is Maintained or Improved: Assess and monitor condition of lips and mouth and perform oral care per hospital policy  1/14/2025 0146 by Megan Arteaga RN  Outcome: Progressing  Flowsheets (Taken 1/14/2025 0146)  Oral Health is Maintained or Improved: Assess and monitor condition of lips and mouth and perform oral care per hospital policy  Goal: ET tube will be managed safely  1/14/2025 0152 by Megan Arteaga RN  Outcome: Progressing  Flowsheets (Taken 1/14/2025 0152)  Endotracheal Tube Will Be Managed Safely:   Assess and monitor insertion depth at lip/nare line   Utilize ETT securing device and change as necessary to ensure ETT is properly secured to patient   Support ventilator tubing to avoid pressure from drag of tubing   Keep ambu bag, mask, oxygen connection tubing, and extra ETT at bedside and accompanying patient at all times   Utilize endotracheal tube securing device   Reposition endotracheal tube to opposite side of mouth  1/14/2025 0146 by Megan Arteaga RN  Outcome: Progressing  Goal:  Ability to express needs and understand communication  1/14/2025 0152 by Megan Arteaga RN  Outcome: Progressing  Flowsheets (Taken 1/14/2025 0152)  Ability to express needs and understand communication: Assess and monitor patient's ability to understand information and communicate  1/14/2025 0146 by Megan Arteaga RN  Outcome: Progressing  Goal: Mobility/activity is maintained at optimum level for patient  1/14/2025 0152 by Megan Arteaga RN  Outcome: Progressing  1/14/2025 0146 by Megan Arteaga RN  Outcome: Progressing     Problem: Safety - Medical Restraint  Goal: Remains free of injury from restraints (Restraint for Interference with Medical Device)  1/14/2025 0152 by Megan Arteaga RN  Outcome: Progressing  Flowsheets (Taken 1/14/2025 0152)  Remains free of injury from restraints (restraint for interference with medical device):   Determine that other, less restrictive measures have been tried or would not be effective before applying the restraint   Evaluate the patient's condition at the time of restraint application   Inform patient/family regarding the reason for restraint   Every 2 hours: Monitor safety, psychosocial status, comfort, nutrition and hydration  1/14/2025 0146 by Megan Arteaga RN  Outcome: Progressing  Goal: Free from restraint(s) (Restraint for Interference with Medical Device)  1/14/2025 0152 by Megan Arteaga RN  Outcome: Progressing  Flowsheets (Taken 1/14/2025 0152)  Free from restraint(s) (restraint for interference with medical device):   ONCE/SHIFT or MINIMUM Every 12 hours: Assess and document the continuing need for restraints   Every 24 hours: Continued use of restraint requires Licensed Independent Practitioner to perform face to face examination and written order   Identify and implement measures to help patient regain control  1/14/2025 0146 by Megan Arteaga RN  Outcome: Progressing     Problem: Pain  Goal: Takes deep breaths with improved pain control throughout the shift  1/14/2025 0152 by  Megan Arteaga RN  Outcome: Progressing  1/14/2025 0146 by Megan Arteaga RN  Outcome: Progressing  Goal: Turns in bed with improved pain control throughout the shift  1/14/2025 0152 by Megan Arteaga RN  Outcome: Progressing  1/14/2025 0146 by Megan Arteaga RN  Outcome: Progressing  Goal: Walks with improved pain control throughout the shift  1/14/2025 0152 by Megan Arteaga RN  Outcome: Progressing  1/14/2025 0146 by Megan Arteaga RN  Outcome: Progressing  Goal: Performs ADL's with improved pain control throughout shift  1/14/2025 0152 by Megan Arteaga RN  Outcome: Progressing  1/14/2025 0146 by Megan Arteaga RN  Outcome: Progressing  Goal: Participates in PT with improved pain control throughout the shift  1/14/2025 0152 by Megan Arteaga RN  Outcome: Progressing  1/14/2025 0146 by Megan Arteaga RN  Outcome: Progressing  Goal: Free from opioid side effects throughout the shift  1/14/2025 0152 by Megan Arteaga RN  Outcome: Progressing  1/14/2025 0146 by Megan Arteaga RN  Outcome: Progressing  Goal: Free from acute confusion related to pain meds throughout the shift  1/14/2025 0152 by Megan Arteaga RN  Outcome: Progressing  1/14/2025 0146 by Megan Arteaga RN  Outcome: Progressing     Problem: Fall/Injury  Goal: Not fall by end of shift  1/14/2025 0152 by Megan Arteaga RN  Outcome: Progressing  1/14/2025 0146 by Megan Arteaga RN  Outcome: Progressing  Goal: Be free from injury by end of the shift  1/14/2025 0152 by Megan Arteaga RN  Outcome: Progressing  1/14/2025 0146 by Megan Arteaga RN  Outcome: Progressing  Goal: Verbalize understanding of personal risk factors for fall in the hospital  1/14/2025 0152 by Megan Arteaga RN  Outcome: Progressing  1/14/2025 0146 by Megan Arteaga RN  Outcome: Progressing  Goal: Verbalize understanding of risk factor reduction measures to prevent injury from fall in the home  1/14/2025 0152 by Megan Arteaga RN  Outcome: Progressing  1/14/2025 0146 by Megan Arteaga, RN  Outcome: Progressing  Goal: Use assistive  devices by end of the shift  1/14/2025 0152 by Megan Arteaga RN  Outcome: Progressing  1/14/2025 0146 by Megan Arteaga RN  Outcome: Progressing  Goal: Pace activities to prevent fatigue by end of the shift  1/14/2025 0152 by Megan Arteaga RN  Outcome: Progressing  1/14/2025 0146 by Megan Arteaga RN  Outcome: Progressing

## 2025-01-14 NOTE — PROGRESS NOTES
Janet Snow is a 79 y.o. female on day 12 of admission presenting with Community acquired pneumonia of both upper lobes.    Subjective   Interval History:   Patient seen and examined  Afebrile  Intubated        Review of Systems   Unable to perform ROS: Intubated       Objective   Range of Vitals (last 24 hours)  Heart Rate:  []   Temp:  [36.5 °C (97.7 °F)-36.9 °C (98.4 °F)]   Resp:  [0-51]   BP: (132-190)/(55-70)   Weight:  [74.2 kg (163 lb 9.3 oz)]   SpO2:  [89 %-96 %]   Daily Weight  01/14/25 : 74.2 kg (163 lb 9.3 oz)    Body mass index is 28.98 kg/m².    Physical Exam  Constitutional:       Interventions: She is intubated.   HENT:      Head: Normocephalic and atraumatic.   Eyes:      General: No scleral icterus.     Conjunctiva/sclera: Conjunctivae normal.   Cardiovascular:      Rate and Rhythm: Normal rate and regular rhythm.      Heart sounds: Normal heart sounds.   Pulmonary:      Effort: She is intubated.      Breath sounds: Decreased breath sounds present.   Abdominal:      General: Bowel sounds are normal.      Palpations: Abdomen is soft.   Musculoskeletal:      Cervical back: Neck supple.      Right lower leg: No edema.      Left lower leg: No edema.   Skin:     General: Skin is warm and dry.   Neurological:      Comments: Unable to assess  Psychiatric:      Comments: Unable to assess         Antibiotics  This patient does not have an active medication from one of the medication groupers.    Relevant Results  Labs  Results from last 72 hours   Lab Units 01/14/25  0430 01/13/25  0434 01/12/25  1029 01/12/25  0423   WBC AUTO x10*3/uL 28.4* 32.5*  --  26.2*   HEMOGLOBIN g/dL 7.3* 7.7* 7.7* 7.1*   HEMATOCRIT % 21.4* 22.3* 22.7* 21.5*   PLATELETS AUTO x10*3/uL 445 530*  --  479*   NEUTROS PCT AUTO %  --   --   --  87.8   LYMPHO PCT MAN % 2.0 4.0  --   --    LYMPHS PCT AUTO %  --   --   --  2.6   MONO PCT MAN % 1.0 8.0  --   --    MONOS PCT AUTO %  --   --   --  4.4   EOSINO PCT MAN % 0.0 0.0  --    --    EOS PCT AUTO %  --   --   --  0.0     Results from last 72 hours   Lab Units 01/14/25  0430 01/13/25 2353 01/13/25  1631   SODIUM mmol/L 147* 145 144   POTASSIUM mmol/L 3.9 4.3 3.2*   CHLORIDE mmol/L 108* 105 104   CO2 mmol/L 23 23 21   BUN mg/dL 200* 197* 192*   CREATININE mg/dL 4.82* 4.72* 4.65*   GLUCOSE mg/dL 150* 169* 188*   CALCIUM mg/dL 8.1* 7.8* 7.7*   ANION GAP mmol/L 20 21* 22*   EGFR mL/min/1.73m*2 9* 9* 9*   PHOSPHORUS mg/dL 8.8* 8.9* 9.0*     Results from last 72 hours   Lab Units 01/14/25 0430 01/13/25 2353 01/13/25  1631   ALK PHOS U/L 84  --   --    BILIRUBIN TOTAL mg/dL 0.4  --   --    BILIRUBIN DIRECT mg/dL 0.2  --   --    PROTEIN TOTAL g/dL 5.5*  --   --    ALT U/L 32  --   --    AST U/L 30  --   --    ALBUMIN g/dL 2.5* 2.6* 2.5*     Estimated Creatinine Clearance: 9.1 mL/min (A) (by C-G formula based on SCr of 4.82 mg/dL (H)).  C-Reactive Protein   Date Value Ref Range Status   01/05/2025 35.67 (H) <1.00 mg/dL Final     CRP   Date Value Ref Range Status   01/04/2020 10.1 (H) 0 - 2.0 MG/DL Final     Comment:     Performed at [place] [id number]     Microbiology  Reviewed  Imaging  XR chest 1 view    Result Date: 1/12/2025  Interpreted By:  Jaxon Morales, STUDY: XR CHEST 1 VIEW; 1/12/2025 5:25 am   INDICATION: CLINICAL INFORMATION: Signs/Symptoms:persistent hypoxia, ARDS, eval infiltrates.   COMPARISON: 01/10/2025   ACCESSION NUMBER(S): [id number]   ORDERING CLINICIAN: NORM LANDAVERDE   TECHNIQUE: Portable chest one view.   FINDINGS: The cardiac size is indeterminate in view of the AP projection. There is no change in the ET tube or NG tube appearance. There is no change in the hazy diffuse bilateral infiltrates. No effusions are identified.       No change in the bilateral infiltrates or tube placement. There is no interval change when compared to the previous examination.   MACRO: none   Signed by: Jaxon Morales 1/12/2025 11:03 AM Dictation workstation:    [id number]    XR chest 1 view    Result Date: 1/10/2025  Interpreted By:  Sherly Fiore, STUDY: XR CHEST 1 VIEW 1/10/2025 10:42 am   INDICATION: Signs/Symptoms:acute hypoxemic respiratory failure, assess lung fields   COMPARISON: 01/09/2025   ACCESSION NUMBER(S): [id number]   ORDERING CLINICIAN: JORY RIDDLE   TECHNIQUE: Single AP view chest   FINDINGS: Examination rotated accentuating the cardiac silhouette. Endotracheal tube tip identified 3.6 cm from the noah. NG tube is in place. There is generalized increased interstitial prominence in bilateral lung fields with alveolar airspace infiltrate with alveolar component of pulmonary edema suggested as opposed to postinfectious etiology. Correlate with patient's history.             1. Stable lines and tubes   2. Persistent patchy alveolar airspace infiltrate demonstrated within bilateral lung fields stable. No dense airspace consolidation.   Signed by: Sherly Fiore 1/10/2025 1:06 PM Dictation workstation:   [id number]    XR abdomen 1 view    Result Date: 1/9/2025  Interpreted By:  Jaxon Morales, STUDY: XR ABDOMEN 1 VIEW; 1/9/2025 9:31 am   INDICATION: Signs/Symptoms:c/f ileus.   COMPARISON: 01/08/2025   ACCESSION NUMBER(S): [id number]   ORDERING CLINICIAN: JORY RIDDLE   TECHNIQUE: ZENA, portable, one view   FINDINGS: Motion artifact obscures the bowel-gas pattern significantly. A nasogastric tube extends into the epigastrium with the tip overlying the mid abdomen.       Exam is limited due to marked motion artifact obscuring the bowel gas pattern. Nasogastric tube extends into the stomach.   MACRO: none   Signed by: Jaxon Morales 1/9/2025 10:05 AM Dictation workstation:   [id number]    XR chest 1 view    Result Date: 1/9/2025  Interpreted By:  Stefany Edwards, STUDY: XR CHEST 1 VIEW 1/9/2025 6:30 am   INDICATION: Signs/Symptoms:persistent hypoxia, unable to wean   COMPARISON: 01/08/2025   ACCESSION NUMBER(S): [id number]   ORDERING CLINICIAN: NORM  AKHIL   TECHNIQUE: AP erect view of the chest at bedside   FINDINGS: The tip of the endotracheal tube is located 2 cm above noah with nasogastric tube descending below diaphragm. The cardiac size is borderline enlarged. When compared with yesterday's study, the infiltrate throughout the right lung is unchanged. Infiltrate within the left lung appears slightly improved. No pleural abnormality is seen.       Stable diffuse infiltrate throughout right lung with some mild improvement in the diffuse infiltrate seen in left lung.   Signed by: Stefany Edwards 1/9/2025 8:23 AM Dictation workstation:   [id number]    XR chest abdomen for OG NG placement    Result Date: 1/8/2025  Interpreted By:  Finkelstein, Evan, STUDY: XR CHEST ABDOMEN FOR OG NG PLACEMENT;  1/8/2025 2:41 am two views of the chest.   INDICATION: Signs/Symptoms:OG tube placement.   COMPARISON: None.   ACCESSION NUMBER(S): [id number]   ORDERING CLINICIAN: AMANDA SÁNCHEZ   FINDINGS: Endotracheal tube present with tip approximately 2.4 cm above the noah. OG tube courses below the diaphragm, with tip extending all of the way into the lower pelvis, beyond the field of view. Extensive patchy airspace opacities throughout the lungs. No sizable pleural effusion or pneumothorax. No acute osseous abnormality.       OG tube courses below the diaphragm with tip extending into the pelvis beyond the field of view. Endotracheal tube tip lies 2.4 cm above the noah. Redemonstrated extensive patchy airspace opacities throughout the lungs concerning for multifocal pneumonia.     MACRO: None.   Signed by: Evan Finkelstein 1/8/2025 3:04 AM Dictation workstation:   [id number]    XR chest 1 view    Result Date: 1/7/2025  Interpreted By:  Isidro Corrales, STUDY: XR CHEST 1 VIEW;  1/7/2025 10:48 pm   INDICATION: Signs/Symptoms:OG tube.   COMPARISON: Chest x-ray 01/07/2025   ACCESSION NUMBER(S): [id number]   ORDERING CLINICIAN: AMANDA SÁNCHEZ   FINDINGS: Enteric tube  terminates in the left mid abdomen with side hole below the GE junction, likely within the distal gastric body. Multiple overlying leads are present.   CARDIOMEDIASTINAL SILHOUETTE: Cardiomediastinal silhouette is stable in size and configuration.   LUNGS: Lung apices are excluded from the field of view. There is diffuse bilateral airspace opacities not significantly changed from prior exam.   ABDOMEN: No remarkable upper abdominal findings.   BONES: Multilevel degenerative changes of the spine.       Enteric tube terminates in the left mid abdomen with side hole below the GE junction.   Diffuse bilateral airspace opacities concerning for developing multifocal pneumonia. Continued radiographic follow-up to resolution is advised.   MACRO: None   Signed by: Isidro Corrales 1/7/2025 10:52 PM Dictation workstation:   [id number]    Bronchoscopy    Result Date: 1/7/2025  Table formatting from the original result was not included. Impression The left main stem, left upper lobar bronchus, lingular lobar bronchus, left lower lobar bronchus, right main stem, right upper lobar bronchus, bronchus intermedius, right middle lobar bronchus and right lower lobar bronchus appeared normal. Bronchoalveolar lavage was performed x1 in the RML and the fluid appeared cloudy Findings The left main stem, left upper lobar bronchus, lingular lobar bronchus, left lower lobar bronchus, right main stem, right upper lobar bronchus, bronchus intermedius, right middle lobar bronchus and right lower lobar bronchus appeared normal. Bronchoalveolar lavage was performed x1 in the RML with 140 mL of saline instilled and a total return of 90 mL. The fluid appeared cloudy. Recommendation There is no recommended follow-up for this procedure. Indication ARDS (adult respiratory distress syndrome) (Multi) Staff Staff Role No Staff Documented Medications See Anesthesia Record. Preprocedure A history and physical has been performed, and patient medication  allergies have been reviewed. The patient's tolerance of previous anesthesia has been reviewed. The risks and benefits of the procedure and the sedation options and risks were discussed with the  daughter (medical POA) and son (financial POA) . All questions were answered and informed consent obtained. Details of the Procedure The patient was already under sedation prior to the procedure. The patient's blood pressure, respirations, heart rate, oxygen and ECG were monitored throughout the procedure. The patient experienced no blood loss. The scope was introduced through the endotracheal tube. The procedure was not difficult. The patient tolerated the procedure well. There were no apparent adverse events. Events Procedure Events Event Event Time Specimens * Cannot find log * BAL taken from RML 140cc instilled, retunr 90cc of white cloudy fluid. Mucosa appeared normal and non friable. Some mucous secretions suctioned away. Procedure Location [address] Intensive [facility] [address] [facility] [address] [phone] Referring Provider Laura Maloney MD Procedure Provider Laura PERALTA MD     XR chest 1 view    Result Date: 1/7/2025  Interpreted By:  Elda Dorado, STUDY: XR CHEST 1 VIEW;  1/7/2025 10:40 am   INDICATION: Signs/Symptoms:intubation.   COMPARISON: 01/07/2025 at 5:45 a.m.   ACCESSION NUMBER(S): [id number]   ORDERING CLINICIAN: KENISHA LEE   FINDINGS: Heart is normal in size.   The patient is intubated. The tip terminates above the noah.   There is diffuse parenchymal infiltration.   There are atherosclerotic changes of the aorta. There are degenerative changes of the spine.   COMPARISON OF FINDING: The patient has undergone interval intubation. The lungs are similar.       Diffuse bilateral parenchymal infiltration. Interval intubation.   MACRO: none   Signed by: Elda Dorado 1/7/2025 11:22 AM Dictation workstation:   [id number]    XR chest 1  view    Result Date: 1/7/2025  Interpreted By:  Jaxon Morales, STUDY: XR CHEST 1 VIEW; 1/7/2025 6:35 am   INDICATION: CLINICAL INFORMATION: Signs/Symptoms:persistent hypoxia, unable to wean from NIV.   COMPARISON: 01/06/2025   ACCESSION NUMBER(S): [id number]   ORDERING CLINICIAN: NORM LANDAVERDE   TECHNIQUE: Portable chest one view.   FINDINGS: The cardiac size is indeterminate in view of the AP projection. There is continued diffuse ground-glass infiltrate bilaterally. No effusions are identified.       Persistent ground-glass infiltrates diffusely bilaterally. Etiology is unclear with differential to include infectious organisms as well as pulmonary edema and ARDS.   MACRO: none   Signed by: Jaxon Morales 1/7/2025 8:20 AM Dictation workstation:   [id number]    ECG 12 lead    Result Date: 1/6/2025  Normal sinus rhythm Nonspecific ST abnormality Prolonged QT Abnormal ECG No previous ECGs available Confirmed by Vicky Corado ([id number]) on 1/6/2025 4:01:46 PM    Transthoracic Echo (TTE) Complete    Result Date: 1/6/2025           [place] [address]            Phone [phone] TRANSTHORACIC ECHOCARDIOGRAM REPORT Patient Name:       ROBBIE FAJARDO AJ     Reading Physician:    [id number] Vicky Corado MD Study Date:         1/6/2025             Ordering Provider:    [id number] NORM LANDAVERDE MRN/PID:            [id number]             Fellow: Accession#:         [id number]         Nurse: Date of Birth/Age:  1945 / 79 years Sonographer:          Rosalba Leiva RDCS Gender Assigned at  F                    Additional Staff: Birth: Height:             160.02 cm            Admit Date: Weight:             69.85 kg             Admission Status:     Inpatient -                                                                 Routine BSA / BMI:          1.73 m2 / 27.28      Department Location:  [facility]                     kg/m2 Blood Pressure: 151 /76 mmHg Study Type:    TRANSTHORACIC ECHO (TTE) COMPLETE Diagnosis/ICD: Hypoxemia-R09.02; Acute respiratory failure with hypoxia-J96.01 Indication:    hypoxemia CPT Codes:     Echo Complete w Full [id number] Patient History: Smoker:            Former. BMI:               Overweight 25 - 30 Pertinent History: Resp difficulty, bipap, cough, pna, sciatica, arf, resp                    failure/hypoxia. Study Detail: The following Echo studies were performed: 2D, Doppler, M-Mode and               color flow. Technically challenging study due to prominent lung               artifact, body habitus and patient lying in supine position.               Definity used as a contrast agent for endocardial border               definition. Total contrast used for this procedure was 2 mL via IV               push.  PHYSICIAN INTERPRETATION: Left Ventricle: The left ventricular systolic function is normal, with a visually estimated ejection fraction of 70-75%. There are no regional wall motion abnormalities. The left ventricular cavity size is normal. There is normal septal thickness. Spectral Doppler shows a normal pattern of left ventricular diastolic filling. Left Atrium: The left atrium is normal in size. Right Ventricle: The right ventricle is normal in size. There is normal right ventricular global systolic function. Right Atrium: The right atrium is normal in size. Aortic Valve: The aortic valve is trileaflet. The aortic valve dimensionless index is 0.74. There is no evidence of aortic valve regurgitation. The peak instantaneous gradient of the aortic valve is 22 mmHg. The mean gradient of the aortic valve is 11 mmHg. Mitral Valve: The mitral valve is normal in structure. There is no evidence of mitral valve regurgitation. Tricuspid Valve: The tricuspid valve is structurally  normal. There is mild tricuspid regurgitation. Pulmonic Valve: The pulmonic valve is structurally normal. There is physiologic pulmonic valve regurgitation. Pericardium: Small pericardial effusion. Aorta: The aortic root is normal. Systemic Veins: The inferior vena cava appears normal in size, with IVC inspiratory collapse greater than 50%.  CONCLUSIONS:  1. The left ventricular systolic function is normal, with a visually estimated ejection fraction of 70-75%.  2. Spectral Doppler shows a normal pattern of left ventricular diastolic filling.  3. There is normal right ventricular global systolic function.  4. No evidence of mitral valve regurgitation.  5. Mild tricuspid regurgitation is visualized. QUANTITATIVE DATA SUMMARY:  2D MEASUREMENTS:           Normal Ranges: LAs:             2.60 cm   (2.7-4.0cm) IVSd:            0.68 cm   (0.6-1.1cm) LVPWd:           0.53 cm   (0.6-1.1cm) LVIDd:           5.32 cm   (3.9-5.9cm) LV Mass Index:   62.2 g/m2  LV SYSTOLIC FUNCTION BY 2D PLANIMETRY (MOD):                      Normal Ranges: EF-A4C View:    60 % (>=55%) EF-A2C View:    66 % EF-Biplane:     65 % EF-Visual:      73 % LV EF Reported: 73 %  LV DIASTOLIC FUNCTION:           Normal Ranges: MV Peak E:             0.94 m/s  (0.7-1.2 m/s) MV Peak A:             1.04 m/s  (0.42-0.7 m/s) E/A Ratio:             0.91      (1.0-2.2) MV e'                  0.078 m/s (>8.0) MV lateral e'          0.08 m/s MV medial e'           0.07 m/s E/e' Ratio:            12.07     (<8.0) a'                     0.08 m/s  MITRAL VALVE:          Normal Ranges: MV DT:        186 msec (150-240msec)  AORTIC VALVE:                      Normal Ranges: AoV Vmax:                2.35 m/s  (<=1.7m/s) AoV Peak P.1 mmHg (<20mmHg) AoV Mean P.0 mmHg (1.7-11.5mmHg) LVOT Max Cj:            1.95 m/s  (<=1.1m/s) AoV VTI:                 43.90 cm  (18-25cm) LVOT VTI:                32.60 cm LVOT Diameter:           1.90 cm    (1.8-2.4cm) AoV Area, VTI:           2.11 cm2  (2.5-5.5cm2) AoV Area,Vmax:           2.35 cm2  (2.5-4.5cm2) AoV Dimensionless Index: 0.74  RIGHT VENTRICLE: RV s' 0.21 m/s  TRICUSPID VALVE/RVSP:          Normal Ranges: Peak TR Velocity:     3.56 m/s RV Syst Pressure:     59 mmHg  (< 30mmHg) IVC Diam:             1.16 cm  PULMONIC VALVE:          Normal Ranges: PV Accel Time:  124 msec (>120ms) PV Max Cj:     1.3 m/s  (0.6-0.9m/s) PV Max P.9 mmHg  [id number] Vicky Corado MD Electronically signed on 2025 at 3:30:16 PM  ** Final **     XR chest 1 view    Result Date: 2025  Interpreted By:  Jaxon Morales, STUDY: XR CHEST 1 VIEW; 2025 5:38 am   INDICATION: CLINICAL INFORMATION: Signs/Symptoms:Pneumonia follow-up. Previous abnormal chest radiograph. Shortness of breath.   COMPARISON: 2025   ACCESSION NUMBER(S): [id number]   ORDERING CLINICIAN: AMANDA SÁNCHEZ   TECHNIQUE: Portable chest one view.   FINDINGS: The cardiac size is indeterminate in view of the AP projection. Diffuse ground-glass infiltrates are again identified, similar compared to the previous study. No effusions are appreciated.       Diffuse ground-glass infiltrates. There is no interval change when compared to the previous examination.   MACRO: none   Signed by: Jaxon Morales 2025 8:29 AM Dictation workstation:   [id number]    CT angio chest for pulmonary embolism    Result Date: 2025  Interpreted By:  Selin Matthews, STUDY: CT ANGIO CHEST FOR PULMONARY EMBOLISM;  2025 4:10 pm   INDICATION: Signs/Symptoms:severe hypoxia   COMPARISON: Chest x-ray 2025. CT chest 2024   ACCESSION NUMBER(S): [id number]   ORDERING CLINICIAN: NORM LANDAVERDE   TECHNIQUE: Helical data acquisition of the chest was obtained following the uneventful administration of intravenous contrast material. Images were reformatted in axial, coronal, and sagittal planes. MIP images were created and reviewed.   FINDINGS: POTENTIAL LIMITATIONS  OF THE STUDY: Motion artifact.   HEART AND VESSELS: The evaluation for filling defects at the pulmonary arteries is degraded by motion artifact. No large central filling defects to suggest pulmonary embolism. The smaller segmental/subsegmental pulmonary arteries are not well evaluated on this exam.   No thoracic aortic aneurysm. Mild atherosclerotic calcifications are noted at the thoracic aorta.   The heart is mildly enlarged. There is trace pericardial effusion.   MEDIASTINUM AND MANPREET, LOWER NECK AND AXILLA: The visualized thyroid gland is small.   A right paratracheal lymph node measures 10 mm in short axis. A subcarinal lymph node measures 12 mm in short axis. A right hilar lymph node measures 19 mm in short axis. A left hilar lymph node measures 11 mm in short axis. Calcified lymph nodes are noted at the mediastinum and left hilum.   LUNGS AND AIRWAYS: The trachea and central airways are patent.   There are diffuse bilateral ground-glass opacities. No pleural effusion or pneumothorax.   UPPER ABDOMEN: Calcific foci at the spleen are suggestive of granulomas.   CHEST WALL AND OSSEOUS STRUCTURES: Multilevel degenerative changes at the spine. Redemonstration of remote compression deformity with Schmorl's node at the superior endplate of L1.       1. Study degraded by motion artifact. No evidence of large central pulmonary emboli. The smaller segmental/subsegmental pulmonary arteries are not well evaluated on this exam. 2. Diffuse bilateral ground-glass opacities, may be secondary to pulmonary edema and/or multifocal pneumonia. Acute respiratory distress syndrome is in the differential diagnoses. 3. Mild cardiomegaly. Trace pericardial effusion. 4. Mild mediastinal and hilar adenopathy.     MACRO: None.   Signed by: Selin Matthews 1/5/2025 6:42 PM Dictation workstation:   [id number]    US renal complete    Result Date: 1/5/2025  Interpreted By:  Jaxon Morales, STUDY: US RENAL COMPLETE; 1/5/2025 10:29 am    INDICATION: Signs/Symptoms:nancy.   COMPARISON: None   ACCESSION NUMBER(S): [id number]   ORDERING CLINICIAN: NORM LANDAVERDE   TECHNIQUE: Grayscale and color Doppler imaging of the kidneys   FINDINGS: The right kidney measures 11.0 cm  . The left kidney measures 11.0 cm  .   There is mild increased echogenicity of the renal cortex bilaterally.     No renal stones are identified.  Renal cortex is normal in thickness bilaterally. No hydronephrosis is identified.   Urinary bladder is nonspecific in appearance with no stones or masses identified.       Mild increased renal cortical echogenicity diffusely bilaterally suggesting chronic renal medical disease.   MACRO: none   Signed by: Jaxon Morales 1/5/2025 12:45 PM Dictation workstation:   [id number]    XR chest 1 view    Result Date: 1/5/2025  Interpreted By:  Sincere Slater, STUDY: XR CHEST 1 VIEW;  1/5/2025 8:48 am   INDICATION: Signs/Symptoms:respiratory distress acute.   COMPARISON: 01/02/2025   ACCESSION NUMBER(S): [id number]   ORDERING CLINICIAN: RAMANDEEP ALLRED   FINDINGS: Diffusely increased bilateral airspace consolidation. No visualized pleural effusion. Cardiomediastinal silhouette unchanged. Aortic atherosclerosis. Thoracic degenerative changes with dextroscoliosis.       Diffusely increased bilateral airspace consolidation.   MACRO: None   Signed by: Sincere Slater 1/5/2025 10:08 AM Dictation workstation:   [id number]    XR chest 2 views    Result Date: 1/2/2025  Interpreted By:  Jaxon Morales, STUDY: XR CHEST 2 VIEWS; 1/2/2025 3:05 pm   INDICATION: Signs/Symptoms:fever, productive cough, chest pain.   COMPARISON: 06/14/2022   ACCESSION NUMBER(S): [id number]   ORDERING CLINICIAN: DANIAL COATES   TECHNIQUE: AP and lateral views of the chest were obtained.   FINDINGS: The cardiac silhouette is normal in appearance. There are new patchy bilateral alveolar infiltrates most marked within the upper lobes bilaterally .  No effusions are identified.        Extensive patchy bilateral infiltrates most marked within the upper lobes bilaterally. Findings are suspicious for pneumonia. Follow-up to assure complete clearing is suggested.   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

## 2025-01-14 NOTE — PROGRESS NOTES
Palliative Care Progress Note    Date of Admission: 1/2/2025    Patient is a 79 y.o. female admitted with Community acquired pneumonia of both upper lobes. Remains intubated and sedated in ICU. Renal functions worsening. 's today, started on amiodarone, Restarted precedex and fentanyl drip for sedation.       Scheduled medications  amLODIPine, 10 mg, oral, Daily  atovaquone, 750 mg, oral, q12h ADE  docusate sodium, 100 mg, oral, BID  pantoprazole, 40 mg, oral, Daily   Or  esomeprazole, 40 mg, oral, Daily   Or  pantoprazole, 40 mg, intravenous, Daily  FLUoxetine, 40 mg, oral, Daily  folic acid, 1 mg, oral, Daily  heparin, 5,000 Units, subcutaneous, q8h  [Held by provider] hydrALAZINE, 25 mg, oral, TID  insulin lispro, 0-5 Units, subcutaneous, q4h  ipratropium-albuteroL, 3 mL, nebulization, q4h  levothyroxine, 25 mcg, intravenous, q24h ADE  lubricating eye drops, 1 drop, Both Eyes, BID  methylPREDNISolone sodium succinate (PF), 80 mg, intravenous, Daily  oxygen, , inhalation, Continuous - Inhalation  potassium chloride, 40 mEq, oral, q4h  simvastatin, 20 mg, oral, Nightly  sodium bicarbonate, 650 mg, oral, BID  thiamine, 100 mg, oral, Daily      Continuous medications  amiodarone, 1 mg/min, Last Rate: 1 mg/min (01/13/25 1800)   Followed by  amiodarone, 0.5 mg/min  dexmedeTOMIDine, 0-1.5 mcg/kg/hr, Last Rate: 0.2 mcg/kg/hr (01/13/25 1800)  fentaNYL, 0-75 mcg/hr, Last Rate: 50 mcg/hr (01/13/25 1903)  [Held by provider] niCARdipine, 0-15 mg/hr, Last Rate: Stopped (01/13/25 1301)      PRN medications  PRN medications: acetaminophen, albuterol, dextrose, dextrose, glucagon, glucagon, lactulose, oxygen, polyethylene glycol     Results for orders placed or performed during the hospital encounter of 01/02/25 (from the past 24 hours)   POCT GLUCOSE   Result Value Ref Range    POCT Glucose 119 (H) 74 - 99 mg/dL   Blood Gas Arterial Full Panel   Result Value Ref Range    POCT pH, Arterial 7.38 7.38 - 7.42 pH    POCT pCO2,  Arterial 39 38 - 42 mm Hg    POCT pO2, Arterial 80 (L) 85 - 95 mm Hg    POCT SO2, Arterial 97 94 - 100 %    POCT Oxy Hemoglobin, Arterial 94.9 94.0 - 98.0 %    POCT Hematocrit Calculated, Arterial 26.0 (L) 36.0 - 46.0 %    POCT Sodium, Arterial 139 136 - 145 mmol/L    POCT Potassium, Arterial 4.2 3.5 - 5.3 mmol/L    POCT Chloride, Arterial 104 98 - 107 mmol/L    POCT Ionized Calcium, Arterial 1.06 (L) 1.10 - 1.33 mmol/L    POCT Glucose, Arterial 127 (H) 74 - 99 mg/dL    POCT Lactate, Arterial 1.1 0.4 - 2.0 mmol/L    POCT Base Excess, Arterial -1.8 -2.0 - 3.0 mmol/L    POCT HCO3 Calculated, Arterial 23.1 22.0 - 26.0 mmol/L    POCT Hemoglobin, Arterial 8.5 (L) 12.0 - 16.0 g/dL    POCT Anion Gap, Arterial 16 10 - 25 mmo/L    Patient Temperature 37.0 degrees Celsius    FiO2 35 %    Apparatus      Ventilator Mode SIMV/PS     Ventilator Rate 14 bpm    Tidal Volume 370 mL    Peep CHM2O 8.0 cm H2O    Site of Arterial Puncture Arterial Line    POCT GLUCOSE   Result Value Ref Range    POCT Glucose 134 (H) 74 - 99 mg/dL   POCT GLUCOSE   Result Value Ref Range    POCT Glucose 125 (H) 74 - 99 mg/dL   CBC and Auto Differential   Result Value Ref Range    WBC 32.5 (H) 4.4 - 11.3 x10*3/uL    nRBC 0.3 (H) 0.0 - 0.0 /100 WBCs    RBC 2.50 (L) 4.00 - 5.20 x10*6/uL    Hemoglobin 7.7 (L) 12.0 - 16.0 g/dL    Hematocrit 22.3 (L) 36.0 - 46.0 %    MCV 89 80 - 100 fL    MCH 30.8 26.0 - 34.0 pg    MCHC 34.5 32.0 - 36.0 g/dL    RDW 14.1 11.5 - 14.5 %    Platelets 530 (H) 150 - 450 x10*3/uL    Immature Granulocytes %, Automated 6.6 (H) 0.0 - 0.9 %    Immature Granulocytes Absolute, Automated 2.15 (H) 0.00 - 0.50 x10*3/uL   Magnesium   Result Value Ref Range    Magnesium 2.56 (H) 1.60 - 2.40 mg/dL   Renal function panel   Result Value Ref Range    Glucose 134 (H) 74 - 99 mg/dL    Sodium 143 136 - 145 mmol/L    Potassium 3.7 3.5 - 5.3 mmol/L    Chloride 103 98 - 107 mmol/L    Bicarbonate 23 21 - 32 mmol/L    Anion Gap 21 (H) 10 - 20 mmol/L    Urea  Nitrogen 185 (HH) 6 - 23 mg/dL    Creatinine 4.40 (H) 0.50 - 1.05 mg/dL    eGFR 10 (L) >60 mL/min/1.73m*2    Calcium 7.5 (L) 8.6 - 10.3 mg/dL    Phosphorus 8.9 (H) 2.5 - 4.9 mg/dL    Albumin 2.6 (L) 3.4 - 5.0 g/dL   Manual Differential   Result Value Ref Range    Neutrophils %, Manual 84.0 40.0 - 80.0 %    Lymphocytes %, Manual 4.0 13.0 - 44.0 %    Monocytes %, Manual 8.0 2.0 - 10.0 %    Eosinophils %, Manual 0.0 0.0 - 6.0 %    Basophils %, Manual 0.0 0.0 - 2.0 %    Metamyelocytes %, Manual 3.0 0.0 - 0.0 %    Myelocytes %, Manual 1.0 0.0 - 0.0 %    Seg Neutrophils Absolute, Manual 27.30 (H) 1.60 - 5.00 x10*3/uL    Lymphocytes Absolute, Manual 1.30 0.80 - 3.00 x10*3/uL    Monocytes Absolute, Manual 2.60 (H) 0.05 - 0.80 x10*3/uL    Eosinophils Absolute, Manual 0.00 0.00 - 0.40 x10*3/uL    Basophils Absolute, Manual 0.00 0.00 - 0.10 x10*3/uL    Metamyelocytes Absolute, Manual 0.98 0.00 - 0.00 x10*3/uL    Myelocytes Absolute, Manual 0.33 0.00 - 0.00 x10*3/uL    Total Cells Counted 100     RBC Morphology See Below     Target Cells Few     Ovalocytes Few     Jamar Cells Few     Basophilic Stippling Present    POCT GLUCOSE   Result Value Ref Range    POCT Glucose 129 (H) 74 - 99 mg/dL   POCT GLUCOSE   Result Value Ref Range    POCT Glucose 161 (H) 74 - 99 mg/dL   Blood Gas Arterial Full Panel   Result Value Ref Range    POCT pH, Arterial 7.36 (L) 7.38 - 7.42 pH    POCT pCO2, Arterial 38 38 - 42 mm Hg    POCT pO2, Arterial 104 (H) 85 - 95 mm Hg    POCT SO2, Arterial 99 94 - 100 %    POCT Oxy Hemoglobin, Arterial 97.5 94.0 - 98.0 %    POCT Hematocrit Calculated, Arterial 22.0 (L) 36.0 - 46.0 %    POCT Sodium, Arterial 140 136 - 145 mmol/L    POCT Potassium, Arterial 3.2 (L) 3.5 - 5.3 mmol/L    POCT Chloride, Arterial 106 98 - 107 mmol/L    POCT Ionized Calcium, Arterial 1.08 (L) 1.10 - 1.33 mmol/L    POCT Glucose, Arterial 198 (H) 74 - 99 mg/dL    POCT Lactate, Arterial 1.3 0.4 - 2.0 mmol/L    POCT Base Excess, Arterial -3.6  (L) -2.0 - 3.0 mmol/L    POCT HCO3 Calculated, Arterial 21.5 (L) 22.0 - 26.0 mmol/L    POCT Hemoglobin, Arterial 7.4 (L) 12.0 - 16.0 g/dL    POCT Anion Gap, Arterial 16 10 - 25 mmo/L    Patient Temperature 37.0 degrees Celsius    FiO2 40 %    Ventilator Mode      Ventilator Rate 14 bpm    Tidal Volume 370 mL    Peep CHM2O 8.0 cm H2O    Site of Arterial Puncture Arterial Line     Aba's Test Positive    POCT GLUCOSE   Result Value Ref Range    POCT Glucose 179 (H) 74 - 99 mg/dL   Renal function panel   Result Value Ref Range    Glucose 188 (H) 74 - 99 mg/dL    Sodium 144 136 - 145 mmol/L    Potassium 3.2 (L) 3.5 - 5.3 mmol/L    Chloride 104 98 - 107 mmol/L    Bicarbonate 21 21 - 32 mmol/L    Anion Gap 22 (H) 10 - 20 mmol/L    Urea Nitrogen 192 (HH) 6 - 23 mg/dL    Creatinine 4.65 (H) 0.50 - 1.05 mg/dL    eGFR 9 (L) >60 mL/min/1.73m*2    Calcium 7.7 (L) 8.6 - 10.3 mg/dL    Phosphorus 9.0 (H) 2.5 - 4.9 mg/dL    Albumin 2.5 (L) 3.4 - 5.0 g/dL   Magnesium   Result Value Ref Range    Magnesium 2.62 (H) 1.60 - 2.40 mg/dL        XR chest 1 view  Result Date: 1/12/2025  Interpreted By:  Jaxon Morales, STUDY: XR CHEST 1 VIEW; 1/12/2025 5:25 am   INDICATION: CLINICAL INFORMATION: Signs/Symptoms:persistent hypoxia, ARDS, eval infiltrates.   COMPARISON: 01/10/2025   ACCESSION NUMBER(S): [id number]   ORDERING CLINICIAN: NORM LANDAVERDE   TECHNIQUE: Portable chest one view.   FINDINGS: The cardiac size is indeterminate in view of the AP projection. There is no change in the ET tube or NG tube appearance. There is no change in the hazy diffuse bilateral infiltrates. No effusions are identified.       No change in the bilateral infiltrates or tube placement. There is no interval change when compared to the previous examination.   MACRO: none   Signed by: Jaxon Morales 1/12/2025 11:03 AM Dictation workstation:   [id number]    XR chest 1 view  Result Date: 1/10/2025  Interpreted By:  Sherly Fiore, STUDY: XR CHEST 1 VIEW  1/10/2025 10:42 am   INDICATION: Signs/Symptoms:acute hypoxemic respiratory failure, assess lung fields   COMPARISON: 01/09/2025   ACCESSION NUMBER(S): [id number]   ORDERING CLINICIAN: JORY RIDDLE   TECHNIQUE: Single AP view chest   FINDINGS: Examination rotated accentuating the cardiac silhouette. Endotracheal tube tip identified 3.6 cm from the noah. NG tube is in place. There is generalized increased interstitial prominence in bilateral lung fields with alveolar airspace infiltrate with alveolar component of pulmonary edema suggested as opposed to postinfectious etiology. Correlate with patient's history.             1. Stable lines and tubes   2. Persistent patchy alveolar airspace infiltrate demonstrated within bilateral lung fields stable. No dense airspace consolidation.   Signed by: Sherly Fiore 1/10/2025 1:06 PM Dictation workstation:   [id number]    XR chest 1 view  Result Date: 1/9/2025  Interpreted By:  Stefany Edwards, STUDY: XR CHEST 1 VIEW 1/9/2025 6:30 am   INDICATION: Signs/Symptoms:persistent hypoxia, unable to wean   COMPARISON: 01/08/2025   ACCESSION NUMBER(S): [id number]   ORDERING CLINICIAN: NORM LANDAVERDE   TECHNIQUE: AP erect view of the chest at bedside   FINDINGS: The tip of the endotracheal tube is located 2 cm above noah with nasogastric tube descending below diaphragm. The cardiac size is borderline enlarged. When compared with yesterday's study, the infiltrate throughout the right lung is unchanged. Infiltrate within the left lung appears slightly improved. No pleural abnormality is seen.       Stable diffuse infiltrate throughout right lung with some mild improvement in the diffuse infiltrate seen in left lung.   Signed by: Stefany Edwards 1/9/2025 8:23 AM Dictation workstation:   [id number]    XR chest 1 view  Result Date: 1/7/2025  Interpreted By:  Isidro Corrales, STUDY: XR CHEST 1 VIEW;  1/7/2025 10:48 pm   INDICATION: Signs/Symptoms:OG tube.   COMPARISON: Chest x-ray  01/07/2025   ACCESSION NUMBER(S): [id number]   ORDERING CLINICIAN: AMANDA SÁNCHEZ   FINDINGS: Enteric tube terminates in the left mid abdomen with side hole below the GE junction, likely within the distal gastric body. Multiple overlying leads are present.   CARDIOMEDIASTINAL SILHOUETTE: Cardiomediastinal silhouette is stable in size and configuration.   LUNGS: Lung apices are excluded from the field of view. There is diffuse bilateral airspace opacities not significantly changed from prior exam.   ABDOMEN: No remarkable upper abdominal findings.   BONES: Multilevel degenerative changes of the spine.       Enteric tube terminates in the left mid abdomen with side hole below the GE junction.   Diffuse bilateral airspace opacities concerning for developing multifocal pneumonia. Continued radiographic follow-up to resolution is advised.   MACRO: None   Signed by: Isidro Corrales 1/7/2025 10:52 PM Dictation workstation:   [id number]    XR chest 1 view  Result Date: 1/7/2025  Interpreted By:  Elda Dorado, STUDY: XR CHEST 1 VIEW;  1/7/2025 10:40 am   INDICATION: Signs/Symptoms:intubation.   COMPARISON: 01/07/2025 at 5:45 a.m.   ACCESSION NUMBER(S): [id number]   ORDERING CLINICIAN: KENISHA LEE   FINDINGS: Heart is normal in size.   The patient is intubated. The tip terminates above the noah.   There is diffuse parenchymal infiltration.   There are atherosclerotic changes of the aorta. There are degenerative changes of the spine.   COMPARISON OF FINDING: The patient has undergone interval intubation. The lungs are similar.       Diffuse bilateral parenchymal infiltration. Interval intubation.   MACRO: none   Signed by: Elda Dorado 1/7/2025 11:22 AM Dictation workstation:   [id number]    XR chest 1 view  Result Date: 1/7/2025  Interpreted By:  Jaxon Morales, STUDY: XR CHEST 1 VIEW; 1/7/2025 6:35 am   INDICATION: CLINICAL INFORMATION: Signs/Symptoms:persistent hypoxia, unable to wean from NIV.   COMPARISON:  01/06/2025   ACCESSION NUMBER(S): [id number]   ORDERING CLINICIAN: NORM LANDAVERDE   TECHNIQUE: Portable chest one view.   FINDINGS: The cardiac size is indeterminate in view of the AP projection. There is continued diffuse ground-glass infiltrate bilaterally. No effusions are identified.       Persistent ground-glass infiltrates diffusely bilaterally. Etiology is unclear with differential to include infectious organisms as well as pulmonary edema and ARDS.   MACRO: none   Signed by: Jaxon Morales 1/7/2025 8:20 AM Dictation workstation:   [id number]    Transthoracic Echo (TTE) Complete  Result Date: 1/6/2025  PHYSICIAN INTERPRETATION: Left Ventricle: The left ventricular systolic function is normal, with a visually estimated ejection fraction of 70-75%. There are no regional wall motion abnormalities. The left ventricular cavity size is normal. There is normal septal thickness. Spectral Doppler shows a normal pattern of left ventricular diastolic filling. Left Atrium: The left atrium is normal in size. Right Ventricle: The right ventricle is normal in size. There is normal right ventricular global systolic function. Right Atrium: The right atrium is normal in size. Aortic Valve: The aortic valve is trileaflet. The aortic valve dimensionless index is 0.74. There is no evidence of aortic valve regurgitation. The peak instantaneous gradient of the aortic valve is 22 mmHg. The mean gradient of the aortic valve is 11 mmHg. Mitral Valve: The mitral valve is normal in structure. There is no evidence of mitral valve regurgitation. Tricuspid Valve: The tricuspid valve is structurally normal. There is mild tricuspid regurgitation. Pulmonic Valve: The pulmonic valve is structurally normal. There is physiologic pulmonic valve regurgitation. Pericardium: Small pericardial effusion. Aorta: The aortic root is normal. Systemic Veins: The inferior vena cava appears normal in size, with IVC inspiratory collapse greater than 50%.   CONCLUSIONS:  1. The left ventricular systolic function is normal, with a visually estimated ejection fraction of 70-75%.  2. Spectral Doppler shows a normal pattern of left ventricular diastolic filling.  3. There is normal right ventricular global systolic function.  4. No evidence of mitral valve regurgitation.  5. Mild tricuspid regurgitation is visualized.     CT angio chest for pulmonary embolism  Result Date: 1/5/2025  FINDINGS: POTENTIAL LIMITATIONS OF THE STUDY: Motion artifact.   HEART AND VESSELS: The evaluation for filling defects at the pulmonary arteries is degraded by motion artifact. No large central filling defects to suggest pulmonary embolism. The smaller segmental/subsegmental pulmonary arteries are not well evaluated on this exam.   No thoracic aortic aneurysm. Mild atherosclerotic calcifications are noted at the thoracic aorta.   The heart is mildly enlarged. There is trace pericardial effusion.   MEDIASTINUM AND MANPREET, LOWER NECK AND AXILLA: The visualized thyroid gland is small.   A right paratracheal lymph node measures 10 mm in short axis. A subcarinal lymph node measures 12 mm in short axis. A right hilar lymph node measures 19 mm in short axis. A left hilar lymph node measures 11 mm in short axis. Calcified lymph nodes are noted at the mediastinum and left hilum.   LUNGS AND AIRWAYS: The trachea and central airways are patent.   There are diffuse bilateral ground-glass opacities. No pleural effusion or pneumothorax.   UPPER ABDOMEN: Calcific foci at the spleen are suggestive of granulomas.   CHEST WALL AND OSSEOUS STRUCTURES: Multilevel degenerative changes at the spine. Redemonstration of remote compression deformity with Schmorl's node at the superior endplate of L1.       1. Study degraded by motion artifact. No evidence of large central pulmonary emboli. The smaller segmental/subsegmental pulmonary arteries are not well evaluated on this exam. 2. Diffuse bilateral ground-glass  opacities, may be secondary to pulmonary edema and/or multifocal pneumonia. Acute respiratory distress syndrome is in the differential diagnoses. 3. Mild cardiomegaly. Trace pericardial effusion. 4. Mild mediastinal and hilar adenopathy.     MACRO: None.   Signed by: Selin Matthews 1/5/2025 6:42 PM Dictation workstation:   [id number]    US renal complete  Result Date: 1/5/2025  Interpreted By:  Jaxon Morales, STUDY: US RENAL COMPLETE; 1/5/2025 10:29 am   INDICATION: Signs/Symptoms:nancy.   COMPARISON: None   ACCESSION NUMBER(S): [id number]   ORDERING CLINICIAN: NORM LANDAVERDE   TECHNIQUE: Grayscale and color Doppler imaging of the kidneys   FINDINGS: The right kidney measures 11.0 cm  . The left kidney measures 11.0 cm  .   There is mild increased echogenicity of the renal cortex bilaterally.     No renal stones are identified.  Renal cortex is normal in thickness bilaterally. No hydronephrosis is identified.   Urinary bladder is nonspecific in appearance with no stones or masses identified.       Mild increased renal cortical echogenicity diffusely bilaterally suggesting chronic renal medical disease.   MACRO: none   Signed by: Jaxon Morales 1/5/2025 12:45 PM Dictation workstation:   [id number]    Visit Vitals  /63   Pulse 77   Temp 36.9 °C (98.4 °F) (Axillary)   Resp 20       Physical Exam  Vitals and nursing note reviewed.   Constitutional:       General: She is not in acute distress.     Appearance: She is ill-appearing.      Comments: Intubated and sedated   HENT:      Mouth/Throat:      Mouth: Mucous membranes are dry.      Pharynx: Oropharynx is clear.   Cardiovascular:      Rate and Rhythm: Normal rate and regular rhythm.      Pulses: Normal pulses.   Pulmonary:      Breath sounds: No wheezing or rales.      Comments: Intubated  Abdominal:      General: There is no distension.      Palpations: Abdomen is soft.   Musculoskeletal:      Right lower leg: No edema.      Left lower leg: No edema.       Comments: Edema bilat hands, non pitting   Skin:     General: Skin is warm and dry.      Findings: Bruising present.   Neurological:      Comments: Sedated, calm, not following commands, no spontaneous eye opening          Assessment/Plan   IMP:      CAP - on Zosyn per ID and Mepron per pulm  Acute respiratory failure with hypoxia - remains intubated. ? Of Vasculitis/DAH vs. Atypical pneumonia vs. Acute inflammatory process. On IV steroids  Sepsis - all cultures NGTD. 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 Still having decent UO, but creatinine progressively worsening. Nephrology following. Family declined renal biopsy and dialysis.  Leukocytosis - continues to trend up. ID is following     Palliative Care Encounter  DNR-CCA  Not capable  Daughter Beverly is stated HPOA. Pt also has a son Valeriy.     1/13  No family present at bedside this afternoon. Renal function worsened, but urine output good, potassium stable. Plan for extubation tomorrow with DNI order, possible hospice involvement/comfort care if patient decompensates.     1/12  No family members were present at the bedside today. Initial plan was to extubate on day 7 of mechanical vent, however as she is improving from a respiratory standpoint family has decided to give her some more time for better chance of successful weaning. We will follow. D/w Antonio Metcalf PA-C and ICU nurse Azucena KING    1/11  Daughter present at the bedside today. Reviewed course. She again reconfirmed previous goals of care. Provided updates. She had questions about extubation and how that would look. I explained that is dependent on SBTs. If continues to fail, would then be proceeding with palliative/terminal extubation on day 7 per pt wishes. We will follow.     1/10  Daughter Beverly present at bedside.  Updates given about the patient.  She did reiterate that the family did not want dialysis even if it was temporary. The patient getting tube feeds  today. The daughter states that she does not have any questions at this time. States she will likely have some in a few days if the patient's condition does not improve.     1/9  Daughter Beverly at bedside, UO declining, creatinine mildly worsened, NG currently to LIWS, starting laxatives and trickle tube feeds today to stimulate gut. Diuretics on hold.  Currently stable on vent, better with addition of ketamine and adjustment of vent settings for comfort.  Discussed with attending service and daughter, continue aggressive treatment model of care for specified 7 days currently day 3 on ventilator, no dialysis.     1/8/2025  Discussion with daughter Beverly at bedside. The patient had a hard time being sedated. Beverly did ask for spiritual care now, I did put in a consult. States she is agreeable to her mother's wishes. We will continue to follow.      1/7/2025  Goals of care discussion with mae Paul.  The daughter stated that she is aware of her mom's wishes.  States that her mom would only want to be intubated for 1 week.  This is congruent with discussions with the ICU team.  I stated that the palliative care team will be in close follow-up with the family to walk them through this decision making.  We will continue to follow.            Arcelia Bhandari, NIKITA-CNP

## 2025-01-14 NOTE — PROGRESS NOTES
"Nutrition Follow up Note    Nutrition Assessment      Renal function worsening, TF formula changed to Nepro 1/13, running at goal. Family noted to decline dialysis and renal biopsy. Spoke with PA. Family likely to terminally extubate today with DNI/DNR order.    Nutrition History:  Food and Nutrient History: NPO/Vent     Food Allergies/Intolerances:  None  GI Symptoms: None  Oral Problems: None    Anthropometrics:  Ht: 160 cm (5' 3\"), Wt: 74.2 kg (163 lb 9.3 oz), BMI: 28.98  IBW/kg (Dietitian Calculated): 52.27 kg  Percent of IBW: 135 %     Weight Change:  Daily Weight  01/14/25 : 74.2 kg (163 lb 9.3 oz)  07/19/24 : 70.3 kg (155 lb)     Nutrition Focused Physical Exam Findings:      Nutrition Significant Labs:  Lab Results   Component Value Date    WBC 28.4 (H) 01/14/2025    HGB 7.3 (L) 01/14/2025    HCT 21.4 (L) 01/14/2025     01/14/2025    CHOL 161 07/14/2018    TRIG 185 (H) 07/14/2018    HDL 46 (L) 07/14/2018    ALT 32 01/14/2025    AST 30 01/14/2025     (H) 01/14/2025    K 3.9 01/14/2025     (H) 01/14/2025    CREATININE 4.82 (H) 01/14/2025     (HH) 01/14/2025    CO2 23 01/14/2025    TSH 1.35 01/05/2025    INR 1.1 06/10/2022    HGBA1C 5.5 01/06/2020     Nutrition Specific Medications:  amLODIPine, 10 mg, oral, Daily  atovaquone, 750 mg, oral, q12h ADE  [Held by provider] docusate sodium, 100 mg, oral, BID  pantoprazole, 40 mg, oral, Daily   Or  esomeprazole, 40 mg, oral, Daily   Or  pantoprazole, 40 mg, intravenous, Daily  FLUoxetine, 40 mg, oral, Daily  folic acid, 1 mg, oral, Daily  heparin, 5,000 Units, subcutaneous, q8h  insulin lispro, 0-5 Units, subcutaneous, q4h  ipratropium-albuteroL, 3 mL, nebulization, q4h  levothyroxine, 25 mcg, intravenous, q24h ADE  lubricating eye drops, 1 drop, Both Eyes, BID  methylPREDNISolone sodium succinate (PF), 80 mg, intravenous, Daily  oxygen, , inhalation, Continuous - Inhalation  simvastatin, 20 mg, oral, Nightly  thiamine, 100 mg, oral, " Daily      amiodarone, 0.5 mg/min, Last Rate: Stopped (01/14/25 1250)  dexmedeTOMIDine, 0-1.5 mcg/kg/hr, Last Rate: Stopped (01/14/25 1244)  fentaNYL, 0-75 mcg/hr, Last Rate: 75 mcg/hr (01/14/25 1300)  niCARdipine, 0-15 mg/hr, Last Rate: 5 mg/hr (01/14/25 1224)      Dietary Orders (From admission, onward)       Start     Ordered    01/13/25 1730  Enteral feeding with NPO 35; 150; Water; Every 4 hours  Diet effective now        Question Answer Comment   Tube feeding formula: Nepro    Tube feeding continuous rate (mL/hr): 35    Tube feeding flush (mL): 150    Flush type: Water    Flush frequency: Every 4 hours        01/13/25 1729    01/02/25 2236  May Participate in Room Service  ( ROOM SERVICE MAY PARTICIPATE)  Once        Question:  .  Answer:  Yes    01/02/25 2235                  Nutrition Support Intake provides: Nepro at 35 ml/Hr provides 1512 calories, 68 gm protein, 611 mL free water.      Estimated Needs:   Estimated Energy Needs  Total Energy Estimated Needs (kCal):  (4730-5898)  Total Estimated Energy Need per Day (kCal/kg):  (22-25)  Method for Estimating Needs: Actual Wt    Estimated Protein Needs  Total Protein Estimated Needs (g):  ()  Total Protein Estimated Needs (g/kg):  (1.2-2.0)  Method for Estimating Needs: Actual Wt    Estimated Fluid Needs  Total Fluid Estimated Needs (mL):  (9088-7008)  Method for Estimating Needs: 1 mL/kcal        Nutrition Diagnosis   Nutrition Diagnosis:  Malnutrition Diagnosis  Patient has Malnutrition Diagnosis: No    Nutrition Diagnosis  Patient has Nutrition Diagnosis: Yes  Diagnosis Status (1): Resolved  Nutrition Diagnosis 1: Inadequate energy intake  Related to (1): decreased ability to consume sufficient energy  As Evidenced by (1): NPO/Mechanical Ventilation     Nutrition Interventions/Recommendations   Nutrition Interventions and Recommendations:    Nutrition Prescription:  Individualized Nutrition Prescription Provided for : 3626-3619 calories,  gm  protein to be provided via enteral nutrition    Nutrition Interventions:   Food and/or Nutrient Delivery Interventions  Interventions: Enteral intake  Enteral Intake: Other (Comment)  Goal: provide as ordered    Education Documentation  No documentation found.         Nutrition Monitoring and Evaluation   Monitoring/Evaluation:   Food/Nutrient Related History Monitoring  Monitoring and Evaluation Plan: Enteral and parenteral nutrition intake  Enteral and Parenteral Nutrition Intake: Enteral nutrition intake  Criteria: monitoring tube feed tolerance       Time Spent/Follow-up:   Follow Up  Time Spent (min): 25 minutes  Last Date of Nutrition Visit: 01/14/25  Nutrition Follow-Up Needed?: 7-10 days  Follow up Comment: 1/21/25

## 2025-01-14 NOTE — ACP (ADVANCE CARE PLANNING)
Confirming Previous Code Status: DNRCCA   Advance Care Planning Note     Discussion Date: 01/14/25   Discussion Participants: daughter and son    The patient wishes to discuss Advance Care Planning today and the following is a brief summary of our discussion.     Patient has capacity to make their own medical decisions: No  Health Care Agent/Surrogate Decision Maker documented in chart: Yes    Documents on file and valid:  Advance Directive/Living Will: No   Health Care Power of : No  Other: Before patient was intubated she was at capacity to make the decision with bother her children that she would like to be extubated within 7 days.    Communication of Medical Status/Prognosis:   Family was made aware of the medical status and the wishes of the patient. They understand she did not want life saving measures that would impair her quality of life.    Communication of Treatment Goals/Options:   Knowing the above the family would like to honor the patient's wishes and terminally extubate with DNR comfort measures in place prior.      Treatment Decisions  Goals of Care: comfort is paramount; other goals are not relevant or achievable     Follow Up Plan  None   Team Members  Antonio Metcalf PA-C  Time Statement: Total face to face time spent on advance care planning was 25 minutes with 20 minutes spent in counseling, including the explanation.    Antonio Metcalf PA-C  1/14/2025 3:58 PM

## 2025-01-14 NOTE — PROGRESS NOTES
Spiritual Care Visit  Spiritual Care Request    Reason for Visit:  Routine Visit: Follow-up     Request Received From:       Focus of Care:  Visited With: Patient         Refer to :          Spiritual Care Assessment    Spiritual Assessment:       Family Spiritual Care Encounters  Family Coping: Accepting              Care Provided:  Intended Effects: Establish rapport and connectedness, Journeying with someone in the grief process, Demonstrate caring and concern, Lessen someone's feelings of isolation  Methods: Offer emotional support  Interventions: [place]    Sense of Community and or Pentecostal Affiliation:  Episcopal         Addressed Needs/Concerns and/or [place] Through:  Pentecostal Encounters  Pentecostal Needs: Prayer       Outcome:  Outcome of Spiritual Care Visit: [place], Comfort/healing presence     Advance Directives:         Spiritual Care Annotation    Annotation:   provide supportive care for patient in our ICU. Patient was intubated and was unable to participate in the visit today.   offered a calm reassurance.  offered prayer . During the visit not family were present.  Spiritual care will remain available for support as requested or desired.

## 2025-01-14 NOTE — PROGRESS NOTES
[facility] Critical Care Medicine       Date:  1/14/2025  Patient:  Janet Snow  YOB: 1945  MRN:  [id number]   Admit Date:  1/2/2025    Chief Complaint   Patient presents with    Shortness of Breath         History of Present Illness:  Janet Snow is a 79 y.o. year old female patient with Past Medical History of hypothyroidism, anxiety, depression, hyperlipidemia, sciatica, prior cigarette smoker (30 years, quit 30 years ago) who presented to  ED 1/2 with complaints of shortness of breath. Her symptoms started shortly before [place] which would have been >1 week prior to time of presentation. At this time, she also complained of productive cough with yellow-green colored sputum, difficulty swallowing, chills. She was seen at an urgent care and was prescribed Augmentin, but did not have improvement.      She met SIRS criteria in the ED with fever, tachycardia, and leukocytosis, as well as elevated lactate. She was given rocephin and azithromycin in ED. She was admitted to the regular nursing floor on 2L NC and started on CAP coverage. Pulmonology was consulted and dc azithromycin due to negative urine atypicals. She was reportedly on 3L NC yesterday but would have random episodes of desaturations with coughing fits, but O2 sats would return back to 3L.     On the morning of 1/5, the hospitalist contacted the ICU team to come see her as she became acutely tachypnic, hypoxic with SpO2 in the 60s, came up to the 80s on NRB. She was visibly in respiratory distress, in tripod position at the edge of the bed. ABG 7.37/35/44, confirmed arterial draw. CXR worsening bilateral airspace consolidations compared to CXR 1/2. She was placed on continuous CPAP 8/100% with SpO2 up to the low 90s and urgently transported to the ICU.      She did have a CT chest 7/24 which showed multifocal reticulonodular and ground-glass opacities (since 2020) 2/2 chronic infectious/inflammatory process. Multiple  new-mildly enlarged pulmonary nodules up to 6mm. Multiple unchanged prominent enlarged lymph nodes, likely reactive. Recommended follow-up CT chest in 3-6 months.      Interval ICU Events:  1/5: Pt arrived to ICU on continuous CPAP. Started IV steroids. Work of breathing improved as the day goes on. FiO2 able to be weaned to 80%. Stat CT angio chest obtained to r/o PE and for further differentiation of diffuse infiltrates seen on CXR.      1/6: Attempted to give patient a break from bipap overnight, but she became hypoxic with SpO2 in the 70s immedately. Remains on continuous bipap this morning. Very anxious with coughing fits and becomes acutely SOB with increased WOB during these episodes. CT chest yesterday showing diffuse GGO, will reengage pulmonology for their input. Broaden abx include zyvox.       1/7: Patient had a few desaturations overnight with tachypnea and accessory muscle use. Valium was added to help with her increased anxiety. Patient reports today that she is tired and that her breathing feels worse. We did discuss intubation as well as the risk associated with it. She stated that she wanted to move forward with intubation, but wanted to discuss it with her children. She then had a desaturation to 80% with respiratory distress remained on BiPap with Fi02 of 100%. Call placed to both of her children, with plans for them to come to bedside. Discussed with the patient and her children the option of intubation. Patient wishes to move forward with the understanding that it is not guaranteed that she will be able to be extubated. She states that she only wishes to be intubated for 7 days. All questions answered. Dr. Pandya called to bedside. Patient does with to remain a DNR but is OK with being intubated. Pulmonary following, plan for a bronchoscopy today, will add bactrim to cover for possible PJP.      1/8: Patient placed on fentanyl drip to help with sedation overnight, patient breathing over the  vent overnight. Will work to try and decrease TV today as ABG allows to meet ARDS Net protocol.     1/9: She remained intermittently very uncomfortable with poor mechanical ventilation synchrony before starting a ketamine infusion overnight.  Oliguric KALEB continues with Scr 3.0 today.  BAL respiratory culture 1/7 without organisms, 4+ pmn's. Hypoactive BS, stool appearing NG output c/f ileus.         1/10: Vent mode changed to PC Pi 10 PEEP 12 with improved patient interfacing.  FiO2 was weaned to 45% overnight.  She also appears more comfortable with up titration of ketamine infusion.  Solute clearance continues to worsen despite increased urine output without diuretics.       1/11: Patient remains sedated to RASS -4. Oxygen requirements continuing to decrease. Creatinine seems to have plateaud. TF switched to Nepro and Lokelma started. Currently trailing high dose steroids for vasculitis for three days.     1/12: Patient remains sedated with minimum fio2 settings. Will start decreasing PEEP and sedation today. Patient remains in KALEB non-oligouric, but with significantly elevated BUN. Spot dose steroids will begin to be tapered tomorrow. Will hold off on diureses for now as patient creatinine has not fully plateaud. Most likely will be able stop ATB therapy as patient has responded to high dose steroids for presumably vasculitis. Family deferring biopsy for now.     1/13: Patient off sedation does blink on command and nod her head. Unable to move extremities most likely due to deconditioning. Hypertensive yesterday started on low dose Cardene infusion. Added hydralazine today. Cr and BUN continue to rise, but with excellent urine output. High dose steroids stopped and transitioned to maintenance dosing. Will diurese with 80 mg IV lasix today to try to help excrete urea. Plan for extubation tomorrow with DNI order possible hospice involvement.      1/14: Patient remained off sedation majority of yesterday, but then  experience atrial fibrillation with RVR.  BUN and creatinine continue to rise.  Plan for terminal extubation later today.    Hospital Course:  Janet Snow is a 79 y.o. year old female patient with Past Medical History of hypothyroidism, anxiety, depression, hyperlipidemia, sciatica, prior cigarette smoker (30 years, quit 30 years ago) who presented to  ED 1/2 with complaints of shortness of breath.  Patient met SIRS criteria was admitted to regular medical floor on Rocephin and azithromycin requiring 2 L nasal cannula.  On 1/5 patient developed tachypnea, hypoxia with SpO2 in the 60s on nonrebreather mask. Patient was then transferred to the ICU and had a CT PE study which was negative for acute PE but showed diffuse patchy infiltrates bilaterally.  Patient required continuous BiPAP and was not able to come off.  Antibiotics were broadened.  Discussions were made with patient for intubation, but she was refusing.  Evidently she requested to speak to her about mechanical ventilation and intubation and she came to the decision that if she was not able to be weaned off the ventilator within 7 days that she would like to be terminally weaned. Patient was placed on ARDsnet protocol for worsening ARDS, had a bronchoscopy with BAL and culture.  Patient's BUN and creatinine continue to worsen during this time.  P-ANCA was found to be significantly elevated raising suspicion for vasculitis.  As cultures and viral studies started returning negative patient was started on empiric high-dose steroids 500 mg daily on 1/10 for 3 days.  Patient's oxygen requirements were able to be weaned down to minimum to where she was even able to SBT.  During the 3 days of high-dose steroids patient's KALEB and uremia continued to worsen, but she remained oligoanuric to nonoliguric. Dialysis was discussed with family by nephrology who stated she would not want this and subsequently would not want a renal biopsy.  On 1/13 patient sedation  was able to be weaned completely off with patient very weak, but able to nod head and blink on command. Uremia continued to worsen and so patient was Lasix challenged with high-dose loop diuretics. She also developed intermittent A-fib RVR and was started on amiodarone infusion. On 1/14 patient remains on low-dose sedation for comfort, but moves lower extremities, blinks, and nods. As we have reached day 7 family plans to terminally extubate patient with code status being DNRCCA and DNI currently.    Medical History:  History reviewed. No pertinent past medical history.  Past Surgical History:   Procedure Laterality Date    BREAST BIOPSY Right     core biopsy 20 years ago    HYSTERECTOMY      MR HEAD ANGIO WO IV CONTRAST  07/14/2018    MR HEAD ANGIO WO IV CONTRAST [facility]     Medications Prior to Admission   Medication Sig Dispense Refill Last Dose/Taking    simvastatin (Zocor) 20 mg tablet Take 1 tablet (20 mg) by mouth once daily at bedtime.   Taking    FLUoxetine (PROzac) 40 mg capsule Take 1 capsule (40 mg) by mouth once daily.       gabapentin (Neurontin) 400 mg capsule Take 1 capsule (400 mg) by mouth 2 times a day.       levothyroxine (Synthroid, Levoxyl) 50 mcg tablet Take 1 tablet (50 mcg) by mouth early in the morning..        Meperidine (pf), Ropinirole, and Meperidine  Social History     Tobacco Use    Smoking status: Former     Types: Cigarettes    Smokeless tobacco: Never   Substance Use Topics    Drug use: Never     No family history on file.    Review of Systems:  Unable to complete ROS as patient is intubated and sedated.     Physical Exam:    Heart Rate:  []   Temp:  [36.5 °C (97.7 °F)-36.9 °C (98.4 °F)]   Resp:  [0-51]   BP: (132-190)/(55-70)   Weight:  [74.2 kg (163 lb 9.3 oz)]   SpO2:  [89 %-96 %]     Physical Exam  Vitals reviewed.   Constitutional:       Appearance: She is ill-appearing.      Interventions: She is sedated, intubated and restrained.   HENT:      Head:  Normocephalic and atraumatic.      Right Ear: External ear normal.      Left Ear: External ear normal.      Nose: Nose normal.      Comments: NGT right nare     Mouth/Throat:      Pharynx: Oropharynx is clear.      Comments: ETT   Eyes:      Pupils: Pupils are equal, round, and reactive to light.   Cardiovascular:      Rate and Rhythm: Normal rate and regular rhythm.      Pulses: Normal pulses.      Heart sounds: Normal heart sounds.   Pulmonary:      Effort: She is intubated.      Breath sounds: No wheezing.      Comments: Patient is intubated   Abdominal:      Palpations: Abdomen is soft.   Genitourinary:     Comments: Indwelling urinary catheter   Musculoskeletal:      Right lower leg: No edema.      Left lower leg: No edema.   Skin:     General: Skin is warm and dry.      Capillary Refill: Capillary refill takes less than 2 seconds.   Neurological:      Comments: Off sedation today. She was able to understand and follow commands by blinking, nodding head, and moved lower extremities   Psychiatric:      Comments: EMIR         Assessment/Plan:    I am currently managing this critically ill patient for the following problems:    Neuro/Psych/Pain Ctrl/Sedation:  #Daily ETOH use  #Hx anxiety, depression   #Toxic Metabolic Encephalopathy 2/2 respiratory failure and uremic   - Pain Control: acetaminophen PRN  - Continue home prozac  - CAM ICU qshift, sleep-wake hygiene, delirium precautions   - Precedex gtt, Fentanyl gtt   - Continue phenobarbital taper   - Continue Thiamine and multivitamin     Respiratory/ENT:  #Acute hypoxic respiratory failure with ARDS - vasculitis/DAH vs infectious pneumonia vs acute inflammatory process. 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 Baseline Cr 0.9 worsening   #Hx urge incontinence   #Hyperkalemia Resolved   #Metabolic acidosis Uremic   #Uremia mostly like from KALEB and steroid induced   - Hourly I/O's via guerrero catheter; goal even to slightly negative daily    - Appreciate Nephrology Consult.  Family reports that patient would not consent to RRT  - Replete electrolytes to maintain K >4.0 and Mg >2.0  - Daily RFP, Mg  - Renal dosage where indicated    Endocrine  #Hypothyroidism   #Steroid induced hyperglycemia   - Continue IV synthroid   - ISS with q4hr glucose checks while NPO  - Hypoglycemia protocol PRN     Infectious Disease:  #CAP pneumonia   #Rule out PJP Pneumonia   #Increasing leukocytosis most likely related to steroids & stress induced   - Appreciate ID consult and rec's.  ABX narrowed to atovaquone  She has received azithro (1/3); ctx (1/3 - 1/4); doxycycline (1/5 - 1/9); linezolid (1/6 - 1/9); Zosyn (1/5 - 1/12)   - Continue atovaquone for empiric PJP coverage though this seems pretty low on the DD.       - F/U finalized PJP PCR  - Respiratory viral panel completely negative   - Monitor SIRS criteria    Heme/Onc:  #Thrombocytosis - likely acutely reactive   #Anemia  - Maintain Hgb >7.0   - Daily CBC    OBGYN/MSK:  #Hx sciatica   - PT/OT eval  - ICU skin protocol, padded pressure points  - Q2hr turns    Ethics/Code Status:  DNRCCA, no RRT   Patient was intubated 1/7/25 after giving verbal consent infront of her daughter. She mentioned she would only  like to be vented for 7 days. Discussed with both children today and they would like to uphold their mothers decision on mechanical ventilation for 7 days. Her code status will stay DNRDNI for when she is extubated.       :  DVT Prophylaxis: SQH & SCDs  GI Prophylaxis: PPI  Bowel Regimen: Miralax  Diet: TF  CVC: none  Liz: yes  [place]: yes  Restraints: soft  Dispo: ICU    Restraints indicated to maintain lines/tubes.  Alternative therapies have been attempted and have been ineffective.  Restrain with soft wrist restraints and side rails up x4 until medical devices discontinued and/or patient able to participate with plan of care.      Critical Care Time:  45 minutes spent in preparing to see patient (I.e. review of medical records), evaluation of diagnostics (I.e. labs, imaging, etc.), documentation, discussing plan of care with patient/ family/ caregiver, and/ or coordination of care with multidisciplinary team. Time does not include completion of procedure time.     Plan Discussed with Attending: Dr. Gloria Metcalf PA-C  Critical Care Medicine  [facility]

## 2025-01-15 VITALS
DIASTOLIC BLOOD PRESSURE: 53 MMHG | HEIGHT: 63 IN | OXYGEN SATURATION: 63 % | WEIGHT: 163.58 LBS | BODY MASS INDEX: 28.98 KG/M2 | TEMPERATURE: 98.4 F | SYSTOLIC BLOOD PRESSURE: 113 MMHG

## 2025-01-15 NOTE — SIGNIFICANT EVENT
Restraint: Death within 24 hours of utilization of bilateral soft wrist restraints logged on 1/14/2025 as per CMS regulations.

## 2025-01-15 NOTE — SIGNIFICANT EVENT
I was informed by the patient's MICU RN that the patient has passed away.  The patient was placed under a comfort care model of care.  She she was previously receiving Dilaudid continuous infusion for comfort.  Patient was compassionately extubated earlier in the day.      On Examination:      There were no Breath sounds  No heart sounds  No pupillary reflex  Pupils fixed and dilated  No Brain reflexes      Patient was pronounced  at :    21:56      Patient's MICU RN will notify MICU attending  Patient's MICU RN will notify patient's family

## 2025-01-15 NOTE — DISCHARGE SUMMARY
Discharge Diagnosis  Acute hypoxic respiratory failure   Acute renal failure     Issues Requiring Follow-Up  None, patient      Test Results Pending At Discharge  Pending Labs       Order Current Status    AFB Culture/Smear In process    Aspergillus Galactomannan EIA (Non-Blood Specimen) In process    AFB CULTURE AND SMEAR; [facility]; [id number] - Miscellaneous Test Preliminary result    Fungal Culture/Smear Preliminary result            Hospital Course   Janet Snow is a 79 y.o. year old female patient with past medical history of hypothyroidism, anxiety, depression, hyperlipidemia, sciatica, prior cigarette smoker (30 years, quit 30 years ago) who presented to  ED  with complaints of shortness of breath.  Patient met SIRS criteria was admitted to regular medical floor on Rocephin and azithromycin requiring 2 L nasal cannula.  On  patient developed tachypnea, hypoxia with SpO2 in the 60s on nonrebreather mask. Patient was then transferred to the ICU and had a CT PE study which was negative for acute PE but showed diffuse patchy infiltrates bilaterally.  Patient required continuous BiPAP and was not able to come off.  Antibiotics were broadened.  Discussions were made with patient for intubation, but she was refusing.  Evidently she requested to speak to her about mechanical ventilation and intubation and she came to the decision that if she was not able to be weaned off the ventilator within 7 days that she would like to be terminally weaned. Patient was placed on ARDsnet protocol for worsening ARDS, had a bronchoscopy with BAL and culture.  Patient's BUN and creatinine continue to worsen during this time.  P-ANCA was found to be significantly elevated raising suspicion for vasculitis.  As cultures and viral studies started returning negative patient was started on empiric high-dose steroids 500 mg daily on 1/10 for 3 days.  Patient's oxygen requirements were able to be weaned down to minimum to where  she was even able to SBT.  During the 3 days of high-dose steroids patient's KALEB and uremia continued to worsen, but she remained oligoanuric to nonoliguric. Dialysis was discussed with family by nephrology who stated she would not want this and subsequently would not want a renal biopsy.  On  patient sedation was able to be weaned completely off with patient very weak, but able to nod head and blink on command. Uremia continued to worsen and so patient was Lasix challenged with high-dose loop diuretics. She also developed intermittent A-fib RVR and was started on amiodarone infusion. On  patient remains on low-dose sedation for comfort, but moves lower extremities, blinks, and nods. As we have reached day 7 family plans to terminally extubate patient with code status being DNRCCA and DNI currently.     Patient  at 0956. Daughter Molly notified.     Pertinent Physical Exam At Time of Discharge  Asystole on monitor. Unresponsive. Pupils fixed. No heart sounds. No breath sounds. No pulse.    Home Medications     Medication List      ASK your doctor about these medications     gabapentin 400 mg capsule; Commonly known as: Neurontin   levothyroxine 50 mcg tablet; Commonly known as: Synthroid, Levoxyl   PROzac 40 mg capsule; Generic drug: FLUoxetine   simvastatin 20 mg tablet; Commonly known as: Zocor       Outpatient Follow-Up  No future appointments.    Shravan Barroso PA-C  Pulmonary and Critical Care Medicine   [facility]

## 2025-01-16 LAB
ACID FAST STN SPEC: NORMAL
MYCOBACTERIUM SPEC CULT: NORMAL

## 2025-01-20 LAB
FUNGUS SPEC CULT: NORMAL
FUNGUS SPEC FUNGUS STN: NORMAL

## 2025-01-27 LAB
FUNGUS SPEC CULT: NORMAL
FUNGUS SPEC FUNGUS STN: NORMAL

## 2025-03-10 LAB
ACID FAST STN SPEC: NORMAL
MYCOBACTERIUM SPEC CULT: NORMAL
SCAN RESULT: NORMAL